# Patient Record
Sex: MALE | Race: WHITE | NOT HISPANIC OR LATINO | Employment: OTHER | URBAN - NONMETROPOLITAN AREA
[De-identification: names, ages, dates, MRNs, and addresses within clinical notes are randomized per-mention and may not be internally consistent; named-entity substitution may affect disease eponyms.]

---

## 2018-07-18 ENCOUNTER — TRANSCRIBE ORDERS (OUTPATIENT)
Dept: PODIATRY | Facility: CLINIC | Age: 68
End: 2018-07-18

## 2018-07-18 DIAGNOSIS — M79.671 RIGHT FOOT PAIN: Primary | ICD-10-CM

## 2018-08-07 ENCOUNTER — OFFICE VISIT (OUTPATIENT)
Dept: PODIATRY | Facility: CLINIC | Age: 68
End: 2018-08-07

## 2018-08-07 VITALS — OXYGEN SATURATION: 98 % | WEIGHT: 179 LBS | HEIGHT: 70 IN | HEART RATE: 69 BPM | BODY MASS INDEX: 25.62 KG/M2

## 2018-08-07 DIAGNOSIS — M79.671 RIGHT FOOT PAIN: Primary | ICD-10-CM

## 2018-08-07 DIAGNOSIS — S99.921A INJURY OF PLANTAR PLATE OF RIGHT FOOT, INITIAL ENCOUNTER: ICD-10-CM

## 2018-08-07 DIAGNOSIS — M20.21 HALLUX RIGIDUS OF RIGHT FOOT: ICD-10-CM

## 2018-08-07 DIAGNOSIS — B35.3 TINEA PEDIS OF BOTH FEET: ICD-10-CM

## 2018-08-07 PROCEDURE — 99204 OFFICE O/P NEW MOD 45 MIN: CPT | Performed by: PODIATRIST

## 2018-08-07 RX ORDER — HYDROCODONE BITARTRATE AND ACETAMINOPHEN 5; 325 MG/1; MG/1
TABLET ORAL
COMMUNITY
End: 2018-11-08

## 2018-08-07 RX ORDER — CLOTRIMAZOLE AND BETAMETHASONE DIPROPIONATE 10; .64 MG/G; MG/G
CREAM TOPICAL 2 TIMES DAILY
Qty: 45 G | Refills: 0 | Status: SHIPPED | OUTPATIENT
Start: 2018-08-07 | End: 2018-11-08

## 2018-08-07 RX ORDER — CHLORAL HYDRATE 500 MG
CAPSULE ORAL
COMMUNITY
End: 2018-11-08

## 2018-08-07 RX ORDER — LOSARTAN POTASSIUM AND HYDROCHLOROTHIAZIDE 12.5; 5 MG/1; MG/1
TABLET ORAL
COMMUNITY
End: 2018-11-08

## 2018-08-07 RX ORDER — ATORVASTATIN CALCIUM 40 MG/1
TABLET, FILM COATED ORAL
COMMUNITY
End: 2018-11-08

## 2018-08-07 RX ORDER — CELECOXIB 200 MG/1
CAPSULE ORAL
COMMUNITY
End: 2018-11-08

## 2018-08-07 RX ORDER — GUANFACINE 1 MG/1
TABLET ORAL
COMMUNITY
End: 2018-11-08

## 2018-08-07 RX ORDER — LANSOPRAZOLE 30 MG/1
CAPSULE, DELAYED RELEASE ORAL
COMMUNITY
End: 2018-11-08

## 2018-08-07 NOTE — PROGRESS NOTES
Carlos Zazueta  1950  68 y.o. male     Patient presents today with a complaint of right foot pain. X-rays obtained.    08/07/2018    Chief Complaint   Patient presents with   • Right Foot - Pain       History of Present Illness    Carlos Zazueta is a 68 y.o.male who presents to clinic with chief complaint of right foot pain.  Pain has been getting worse over the last 8 months.  Pain is primarily located to the right great toe joint and second toe.  He states the second toe is crossing over the big toe.  He rates his pain as an 8 out of 10.  Pain is aggravated with weightbearing and closed toed shoes.  Pain is relieved with rest.  He has done nothing to treat the pain.  He denies any injuries to the right foot.  He has no other pedal complaints at this time.      Past Medical History:   Diagnosis Date   • Hyperlipidemia    • Hypertension          History reviewed. No pertinent surgical history.      History reviewed. No pertinent family history.    Allergies   Allergen Reactions   • Clindamycin/Lincomycin Hives   • Codeine Nausea Only       Social History     Social History   • Marital status:      Spouse name: N/A   • Number of children: N/A   • Years of education: N/A     Occupational History   • Not on file.     Social History Main Topics   • Smoking status: Former Smoker   • Smokeless tobacco: Current User     Types: Snuff   • Alcohol use Yes   • Drug use: Unknown   • Sexual activity: Defer     Other Topics Concern   • Not on file     Social History Narrative   • No narrative on file         Current Outpatient Prescriptions   Medication Sig Dispense Refill   • atorvastatin (LIPITOR) 40 MG tablet atorvastatin 40 mg tablet     • celecoxib (CeleBREX) 200 MG capsule celecoxib 200 mg capsule     • guanFACINE (TENEX) 1 MG tablet guanfacine 1 mg tablet   Take 1 tablet twice a day by oral route at bedtime for 30 days.     • HYDROcodone-acetaminophen (NORCO) 5-325 MG per tablet hydrocodone 5 mg-acetaminophen 325  "mg tablet   Take 1 tablet 3 times a day by oral route as needed.     • lansoprazole (PREVACID) 30 MG capsule lansoprazole 30 mg capsule,delayed release   take 1 capsule by mouth once daily     • losartan-hydrochlorothiazide (HYZAAR) 50-12.5 MG per tablet losartan 50 mg-hydrochlorothiazide 12.5 mg tablet     • Omega-3 Fatty Acids (FISH OIL) 1000 MG capsule capsule Fish Oil 1,000 mg (120 mg-180 mg) capsule   Take by oral route.     • clotrimazole-betamethasone (LOTRISONE) 1-0.05 % cream Apply  topically to the appropriate area as directed 2 (Two) Times a Day. Apply to affected area twice daily 45 g 0     No current facility-administered medications for this visit.        Review of Systems   Constitutional: Negative.    HENT: Negative.    Respiratory: Negative.    Cardiovascular: Negative.    Gastrointestinal: Negative.    Genitourinary: Negative.    Musculoskeletal:        Right foot pain   Skin: Negative.    Neurological: Negative.    Psychiatric/Behavioral: Negative.          OBJECTIVE    Pulse 69   Ht 177.8 cm (70\")   Wt 81.2 kg (179 lb)   SpO2 98%   BMI 25.68 kg/m²       Physical Exam   Constitutional: He is oriented to person, place, and time. He appears well-developed and well-nourished.   HENT:   Head: Normocephalic and atraumatic.   Nose: Nose normal.   Eyes: Pupils are equal, round, and reactive to light. Conjunctivae and EOM are normal.   Pulmonary/Chest: Effort normal. No respiratory distress. He has no wheezes.   Neurological: He is alert and oriented to person, place, and time. He displays normal reflexes.   Skin: He is not diaphoretic.   Psychiatric: He has a normal mood and affect. His behavior is normal.   Vitals reviewed.      Gait: Normal     Assistive Device: None    Lower Extremity    Cardiovascular:    DP/PT pulses palpable bilateral  CFT brisk  to all digits  Skin temp is warm to warm from proximal tibia to distal digits bilateral  Pedal hair growth present.   No edema " noted    Musculoskeletal:  Muscle strength is 5/5 for all muscle groups tested   ROM of the 1st MTP is limited with pain. R>L  Crossover 2nd toe deformity R  ROM of the MTJ is full without pain or crepitus  bilateral  ROM of the STJ is full without pain or crepitus bilateral   ROM of the ankle joint is full without pain or crepitus  bilateral    Dermatological:   Skin is warm, dry and intact bilateral  Webspaces 1-4 bilateral are clean, dry and intact.   No subcutaneous nodules or masses noted    No open wounds noted   Dry scaling skin in a moccasin distribution with surrounding erythema noted to bilateral plantar feet    Neurological:   Protective sensation intact   Sensation intact to light touch      Radiographs: 3 views of the right foot obtained.  The first MPJ joint degeneration noted.  Second MPJ dislocation.  No acute osseous abnormalities noted        Procedures        ASSESSMENT AND PLAN    Carlos was seen today for pain.    Diagnoses and all orders for this visit:    Right foot pain  -     XR Foot 3+ View Right    Tinea pedis of both feet    Hallux rigidus of right foot    Injury of plantar plate of right foot, initial encounter    Other orders  -     clotrimazole-betamethasone (LOTRISONE) 1-0.05 % cream; Apply  topically to the appropriate area as directed 2 (Two) Times a Day. Apply to affected area twice daily      - Comprehensive foot and ankle exam performed.   - X-rays taken and reviewed  - Rx for Lotrisone for tinea pedis  - Discussion held patient regarding conservative and surgical treatment options for his right foot former days.  Dispensed Brickeys splint.  Patient will consider his options and report back in 2 weeks   - All questions were answered to the patients satisfaction.  - RTC 2 weeks     I spent 45 minutes states that this patient discussing his pedal complaints and treatment options including surgery.          This document has been electronically signed by Anthony Moore DPM on August 7,  2018 2:41 PM     8/7/2018  2:41 PM

## 2018-08-22 ENCOUNTER — OFFICE VISIT (OUTPATIENT)
Dept: PODIATRY | Facility: CLINIC | Age: 68
End: 2018-08-22

## 2018-08-22 VITALS — WEIGHT: 176.4 LBS | BODY MASS INDEX: 25.25 KG/M2 | HEIGHT: 70 IN

## 2018-08-22 DIAGNOSIS — S99.921A INJURY OF PLANTAR PLATE OF RIGHT FOOT, INITIAL ENCOUNTER: Primary | ICD-10-CM

## 2018-08-22 DIAGNOSIS — M79.671 RIGHT FOOT PAIN: ICD-10-CM

## 2018-08-22 DIAGNOSIS — M20.21 HALLUX RIGIDUS OF RIGHT FOOT: ICD-10-CM

## 2018-08-22 DIAGNOSIS — B35.3 TINEA PEDIS OF BOTH FEET: ICD-10-CM

## 2018-08-22 PROCEDURE — 20600 DRAIN/INJ JOINT/BURSA W/O US: CPT | Performed by: PODIATRIST

## 2018-08-22 PROCEDURE — 99213 OFFICE O/P EST LOW 20 MIN: CPT | Performed by: PODIATRIST

## 2018-08-22 RX ORDER — DEXAMETHASONE SODIUM PHOSPHATE 4 MG/ML
4 INJECTION, SOLUTION INTRA-ARTICULAR; INTRALESIONAL; INTRAMUSCULAR; INTRAVENOUS; SOFT TISSUE ONCE
Status: COMPLETED | OUTPATIENT
Start: 2018-08-22 | End: 2018-08-22

## 2018-08-22 RX ORDER — BUPIVACAINE HYDROCHLORIDE 5 MG/ML
1 INJECTION, SOLUTION EPIDURAL; INTRACAUDAL ONCE
Status: COMPLETED | OUTPATIENT
Start: 2018-08-22 | End: 2018-08-22

## 2018-08-22 RX ADMIN — BUPIVACAINE HYDROCHLORIDE 1 ML: 5 INJECTION, SOLUTION EPIDURAL; INTRACAUDAL at 16:58

## 2018-08-22 RX ADMIN — DEXAMETHASONE SODIUM PHOSPHATE 4 MG: 4 INJECTION, SOLUTION INTRA-ARTICULAR; INTRALESIONAL; INTRAMUSCULAR; INTRAVENOUS; SOFT TISSUE at 16:59

## 2018-08-22 NOTE — PROGRESS NOTES
Carlos Zazueta  1950  68 y.o. male       08/22/2018     Chief Complaint   Patient presents with   • Right Foot - Follow-up       History of Present Illness    Patient presents to clinic today for follow-up of his right foot pain.  Continues to complain of debilitating pain to his great toe joint and second toe.  He currently rates his pain as a 5 out of 10.  He describes a sharp and worse with weightbearing.  He is strongly considering surgical intervention.  However, he states that he will not be able to pursue surgical intervention until after Thanksgiving.  He relates today that the fungal cream prescribed for his athlete's foot has resolved issue.  He denies any new pedal complaints today.      Past Medical History:   Diagnosis Date   • Hyperlipidemia    • Hypertension          History reviewed. No pertinent surgical history.      History reviewed. No pertinent family history.    Allergies   Allergen Reactions   • Clindamycin/Lincomycin Hives   • Codeine Nausea Only       Social History     Social History   • Marital status:      Spouse name: N/A   • Number of children: N/A   • Years of education: N/A     Occupational History   • Not on file.     Social History Main Topics   • Smoking status: Former Smoker   • Smokeless tobacco: Current User     Types: Snuff   • Alcohol use Yes   • Drug use: Unknown   • Sexual activity: Defer     Other Topics Concern   • Not on file     Social History Narrative   • No narrative on file         Current Outpatient Prescriptions   Medication Sig Dispense Refill   • atorvastatin (LIPITOR) 40 MG tablet atorvastatin 40 mg tablet     • celecoxib (CeleBREX) 200 MG capsule celecoxib 200 mg capsule     • clotrimazole-betamethasone (LOTRISONE) 1-0.05 % cream Apply  topically to the appropriate area as directed 2 (Two) Times a Day. Apply to affected area twice daily 45 g 0   • guanFACINE (TENEX) 1 MG tablet guanfacine 1 mg tablet   Take 1 tablet twice a day by oral route at  "bedtime for 30 days.     • HYDROcodone-acetaminophen (NORCO) 5-325 MG per tablet hydrocodone 5 mg-acetaminophen 325 mg tablet   Take 1 tablet 3 times a day by oral route as needed.     • lansoprazole (PREVACID) 30 MG capsule lansoprazole 30 mg capsule,delayed release   take 1 capsule by mouth once daily     • losartan-hydrochlorothiazide (HYZAAR) 50-12.5 MG per tablet losartan 50 mg-hydrochlorothiazide 12.5 mg tablet     • Omega-3 Fatty Acids (FISH OIL) 1000 MG capsule capsule Fish Oil 1,000 mg (120 mg-180 mg) capsule   Take by oral route.       Current Facility-Administered Medications   Medication Dose Route Frequency Provider Last Rate Last Dose   • bupivacaine (PF) (MARCAINE) 0.5 % injection 1 mL  1 mL Injection Once Anthony Moore DPM       • dexamethasone (DECADRON) injection 4 mg  4 mg Intra-articular Once Anthony Moore DPM           Review of Systems   Constitutional: Negative.    HENT: Negative.    Respiratory: Negative.    Cardiovascular: Negative.    Gastrointestinal: Negative.    Musculoskeletal:        Right foot pain   Skin: Negative.    Neurological: Negative.    Psychiatric/Behavioral: Negative.          OBJECTIVE    Ht 177.8 cm (70\")   Wt 80 kg (176 lb 6.4 oz)   BMI 25.31 kg/m²       Physical Exam   Constitutional: He is oriented to person, place, and time. He appears well-developed and well-nourished. No distress.   HENT:   Head: Normocephalic and atraumatic.   Nose: Nose normal.   Pulmonary/Chest: Effort normal. No respiratory distress.   Neurological: He is alert and oriented to person, place, and time. He displays normal reflexes.   Psychiatric: He has a normal mood and affect. His behavior is normal.       Gait: Normal     Assistive Device: None    Lower Extremity    Cardiovascular:    DP/PT pulses palpable bilateral  CFT brisk  to all digits  Skin temp is warm to warm from proximal tibia to distal digits bilateral  Pedal hair growth present.   No edema noted    Musculoskeletal:  Muscle " strength is 5/5 for all muscle groups tested   ROM of the 1st MTP is limited with pain. R>L  Crossover 2nd toe deformity R, Pain with ROM of the 2nd MPJ  ROM of the MTJ is full without pain or crepitus  bilateral  ROM of the STJ is full without pain or crepitus bilateral   ROM of the ankle joint is full without pain or crepitus  bilateral    Dermatological:   Skin is warm, dry and intact bilateral  Webspaces 1-4 bilateral are clean, dry and intact.   No subcutaneous nodules or masses noted    No open wounds noted     Neurological:   Protective sensation intact   Sensation intact to light touch               Small joint arthrocentesis  Consent given by: patient  Site marked: site marked  Timeout: Immediately prior to procedure a time out was called to verify the correct patient, procedure, equipment, support staff and site/side marked as required   Supporting Documentation  Indications: pain   Procedure Details  Location: second toe - R second MTP  Needle size: 27 G  Approach: dorsal  Medication group details: 1-1 mixture of half percent Marcaine plain and Decadron 4 mg/mL.  Patient tolerance: patient tolerated the procedure well with no immediate complications              ASSESSMENT AND PLAN    Carlos was seen today for follow-up.    Diagnoses and all orders for this visit:    Injury of plantar plate of right foot, initial encounter  -     dexamethasone (DECADRON) injection 4 mg; Inject 1 mL into the appropriate joint as directed by provider 1 (One) Time.  -     bupivacaine (PF) (MARCAINE) 0.5 % injection 1 mL; Inject 1 mL as directed 1 (One) Time.    Hallux rigidus of right foot    Tinea pedis of both feet    Right foot pain    Other orders  -     Small Joint Arthrocentesis      - Tinea pedis has resolved.  - Continued pain to right first metatarsophalangeal joint and right second metatarsophalangeal joint.  Rediscussed conservative and surgical options.  Patient is interested in surgical intervention after  Thanksgiving.  He has elected for steroid injection today.  - Injection her right second metatarsal phalangeal joint  - Dispensed new bouton splint  - all his questions were answered  - RTC when ready to schedule surgery            This document has been electronically signed by Anthony Moore DPM on August 22, 2018 4:31 PM     8/22/2018  4:31 PM

## 2018-11-05 ENCOUNTER — OFFICE VISIT (OUTPATIENT)
Dept: PODIATRY | Facility: CLINIC | Age: 68
End: 2018-11-05

## 2018-11-05 VITALS — HEIGHT: 70 IN | WEIGHT: 183.2 LBS | HEART RATE: 80 BPM | OXYGEN SATURATION: 98 % | BODY MASS INDEX: 26.23 KG/M2

## 2018-11-05 DIAGNOSIS — S99.921D INJURY OF PLANTAR PLATE OF RIGHT FOOT, SUBSEQUENT ENCOUNTER: ICD-10-CM

## 2018-11-05 DIAGNOSIS — M79.672 LEFT FOOT PAIN: Primary | ICD-10-CM

## 2018-11-05 DIAGNOSIS — M20.21 HALLUX RIGIDUS OF RIGHT FOOT: ICD-10-CM

## 2018-11-05 PROBLEM — S99.921A INJURY OF PLANTAR PLATE OF RIGHT FOOT: Status: ACTIVE | Noted: 2018-11-05

## 2018-11-05 PROCEDURE — 99214 OFFICE O/P EST MOD 30 MIN: CPT | Performed by: PODIATRIST

## 2018-11-05 RX ORDER — RANITIDINE 150 MG/1
TABLET ORAL
COMMUNITY
End: 2018-11-08

## 2018-11-05 RX ORDER — CEFDINIR 300 MG/1
CAPSULE ORAL EVERY 12 HOURS
COMMUNITY
End: 2018-11-08

## 2018-11-05 RX ORDER — CIPROFLOXACIN HYDROCHLORIDE 3.5 MG/ML
SOLUTION/ DROPS TOPICAL
COMMUNITY
End: 2018-11-08

## 2018-11-05 RX ORDER — BUPIVACAINE HCL/0.9 % NACL/PF 0.1 %
2 PLASTIC BAG, INJECTION (ML) EPIDURAL ONCE
Status: CANCELLED | OUTPATIENT
Start: 2018-11-15 | End: 2018-11-05

## 2018-11-05 RX ORDER — TADALAFIL 20 MG/1
TABLET ORAL
COMMUNITY
End: 2018-11-08

## 2018-11-05 NOTE — PROGRESS NOTES
Carlos Zazueta  1950  68 y.o. male   Not diabetic     Patient presents today for a recheck of his right foot; he states his left foot is now starting to feel the same way.      11/05/2018     Chief Complaint   Patient presents with   • Right Foot - Follow-up, Pain       History of Present Illness    Patient presents to clinic today for follow-up.  He continues to complain of pain to his great toe joint and second toe on the right foot.  Continues to complain of debilitating pain to his great toe joint and second toe.  He currently rates his pain as a 6 out of 10.  He describes a sharp and worse with weightbearing.  He is ready to pursue surgical correction.     Past Medical History:   Diagnosis Date   • Bunion    • Hyperlipidemia    • Hypertension    • Right foot pain    • Tinea pedis          History reviewed. No pertinent surgical history.      History reviewed. No pertinent family history.    Allergies   Allergen Reactions   • Clindamycin/Lincomycin Hives   • Codeine Nausea Only       Social History     Social History   • Marital status:      Spouse name: N/A   • Number of children: N/A   • Years of education: N/A     Occupational History   • Not on file.     Social History Main Topics   • Smoking status: Former Smoker   • Smokeless tobacco: Current User     Types: Snuff   • Alcohol use Yes   • Drug use: Unknown   • Sexual activity: Defer     Other Topics Concern   • Not on file     Social History Narrative   • No narrative on file         Current Outpatient Prescriptions   Medication Sig Dispense Refill   • atorvastatin (LIPITOR) 40 MG tablet atorvastatin 40 mg tablet     • cefdinir (OMNICEF) 300 MG capsule Every 12 (Twelve) Hours.     • celecoxib (CeleBREX) 200 MG capsule celecoxib 200 mg capsule     • ciprofloxacin (CILOXAN) 0.3 % ophthalmic solution ciprofloxacin 0.3 % eye drops   INSTILL 3 DROP INTO AFFECTED EAR(S) EVERY 3 HOURS WHILE AWAKE FOR 2 DAYS THEN 2 DROPS Three times a day FOR 5 DAYS     •  "clotrimazole-betamethasone (LOTRISONE) 1-0.05 % cream Apply  topically to the appropriate area as directed 2 (Two) Times a Day. Apply to affected area twice daily 45 g 0   • guanFACINE (TENEX) 1 MG tablet guanfacine 1 mg tablet   Take 1 tablet twice a day by oral route at bedtime for 30 days.     • HYDROcodone-acetaminophen (NORCO) 5-325 MG per tablet hydrocodone 5 mg-acetaminophen 325 mg tablet   Take 1 tablet 3 times a day by oral route as needed.     • lansoprazole (PREVACID) 30 MG capsule lansoprazole 30 mg capsule,delayed release   take 1 capsule by mouth once daily     • losartan-hydrochlorothiazide (HYZAAR) 50-12.5 MG per tablet losartan 50 mg-hydrochlorothiazide 12.5 mg tablet     • Omega-3 Fatty Acids (FISH OIL) 1000 MG capsule capsule Fish Oil 1,000 mg (120 mg-180 mg) capsule   Take by oral route.     • raNITIdine (ZANTAC) 150 MG tablet ranitidine 150 mg tablet   Take 1 tablet twice a day by oral route for 30 days.     • tadalafil (CIALIS) 20 MG tablet Cialis 20 mg tablet   Take 1 tablet every day by oral route as needed.       No current facility-administered medications for this visit.        Review of Systems   Constitutional: Negative.    HENT: Negative.    Eyes: Negative.    Respiratory: Negative.    Cardiovascular: Negative.    Gastrointestinal: Negative.    Musculoskeletal:        Bilateral foot pain; R>L   Skin: Negative.    Neurological: Negative.    Psychiatric/Behavioral: Negative.          OBJECTIVE    Pulse 80   Ht 177.8 cm (70\")   Wt 83.1 kg (183 lb 3.2 oz)   SpO2 98%   BMI 26.29 kg/m²       Physical Exam   Constitutional: He is oriented to person, place, and time. He appears well-developed and well-nourished. No distress.   HENT:   Head: Normocephalic and atraumatic.   Nose: Nose normal.   Eyes: Pupils are equal, round, and reactive to light. Conjunctivae and EOM are normal.   Pulmonary/Chest: Effort normal. No respiratory distress. He has no wheezes.   Neurological: He is alert and " oriented to person, place, and time. He displays normal reflexes.   Skin: Skin is warm and dry. Capillary refill takes less than 2 seconds.   Psychiatric: He has a normal mood and affect. His behavior is normal.   Vitals reviewed.      Gait: Normal     Assistive Device: None    Lower Extremity    Cardiovascular:    DP/PT pulses palpable bilateral  CFT brisk  to all digits  Skin temp is warm to warm from proximal tibia to distal digits bilateral  Pedal hair growth present.   No edema noted    Musculoskeletal:  Muscle strength is 5/5 for all muscle groups tested   ROM of the 1st MTP is limited with pain. R>L  Crossover 2nd toe deformity R, Pain with ROM of the 2nd MPJ  ROM of the MTJ is full without pain or crepitus  bilateral  ROM of the STJ is full without pain or crepitus bilateral   ROM of the ankle joint is full without pain or crepitus  bilateral    Dermatological:   Skin is warm, dry and intact bilateral  Webspaces 1-4 bilateral are clean, dry and intact.   No subcutaneous nodules or masses noted    No open wounds noted     Neurological:   Protective sensation intact   Sensation intact to light touch               Procedures        ASSESSMENT AND PLAN    Carlos was seen today for follow-up and pain.    Diagnoses and all orders for this visit:    Left foot pain  -     XR Foot 3+ View Left    Hallux rigidus of right foot  -     Case Request; Standing  -     ceFAZolin (ANCEF) 2 g in sodium chloride 0.9 % 100 mL IVPB; Infuse 2 g into a venous catheter 1 (One) Time.  -     Case Request    Injury of plantar plate of right foot, subsequent encounter  -     Case Request; Standing  -     ceFAZolin (ANCEF) 2 g in sodium chloride 0.9 % 100 mL IVPB; Infuse 2 g into a venous catheter 1 (One) Time.  -     Case Request    Other orders  -     Follow Anesthesia Guidelines / Standing Orders; Standing  -     Verify NPO Status; Standing  -     Obtain informed consent (if not collected inpatient or PAT); Standing  -     Notify  Physician - Standard; Standing  -     Follow Anesthesia Guidelines / Standing Orders; Future        - Continued pain to right first metatarsophalangeal joint and right second metatarsophalangeal joint.  Rediscussed conservative and surgical options.  Patient has elected for surgical correction. Risks and benefits discussed. No guarantees given or implied. Plan is for 1st MPJ arthrodesis and plantar plate repair.  - all his questions were answered  - RTC after surgery            This document has been electronically signed by Anthony Moore DPM on November 6, 2018 6:26 PM     11/6/2018  6:26 PM

## 2018-11-08 ENCOUNTER — APPOINTMENT (OUTPATIENT)
Dept: PREADMISSION TESTING | Facility: HOSPITAL | Age: 68
End: 2018-11-08

## 2018-11-08 VITALS
DIASTOLIC BLOOD PRESSURE: 80 MMHG | HEIGHT: 70 IN | BODY MASS INDEX: 26.2 KG/M2 | HEART RATE: 75 BPM | OXYGEN SATURATION: 98 % | RESPIRATION RATE: 16 BRPM | SYSTOLIC BLOOD PRESSURE: 148 MMHG | WEIGHT: 183 LBS

## 2018-11-08 LAB
ANION GAP SERPL CALCULATED.3IONS-SCNC: 10 MMOL/L (ref 5–15)
BUN BLD-MCNC: 22 MG/DL (ref 7–21)
BUN/CREAT SERPL: 25.9 (ref 7–25)
CALCIUM SPEC-SCNC: 9.5 MG/DL (ref 8.4–10.2)
CHLORIDE SERPL-SCNC: 99 MMOL/L (ref 95–110)
CO2 SERPL-SCNC: 25 MMOL/L (ref 22–31)
CREAT BLD-MCNC: 0.85 MG/DL (ref 0.7–1.3)
GFR SERPL CREATININE-BSD FRML MDRD: 90 ML/MIN/1.73 (ref 49–113)
GLUCOSE BLD-MCNC: 99 MG/DL (ref 60–100)
POTASSIUM BLD-SCNC: 4.6 MMOL/L (ref 3.5–5.1)
SODIUM BLD-SCNC: 134 MMOL/L (ref 137–145)

## 2018-11-08 PROCEDURE — 93005 ELECTROCARDIOGRAM TRACING: CPT

## 2018-11-08 PROCEDURE — 93010 ELECTROCARDIOGRAM REPORT: CPT | Performed by: INTERNAL MEDICINE

## 2018-11-08 PROCEDURE — 80048 BASIC METABOLIC PNL TOTAL CA: CPT | Performed by: ANESTHESIOLOGY

## 2018-11-08 PROCEDURE — 36415 COLL VENOUS BLD VENIPUNCTURE: CPT

## 2018-11-08 RX ORDER — ATORVASTATIN CALCIUM 40 MG/1
40 TABLET, FILM COATED ORAL NIGHTLY
COMMUNITY
End: 2020-09-24

## 2018-11-08 RX ORDER — CHLORAL HYDRATE 500 MG
1000 CAPSULE ORAL NIGHTLY
COMMUNITY
End: 2022-05-20

## 2018-11-08 RX ORDER — CELECOXIB 200 MG/1
200 CAPSULE ORAL 2 TIMES DAILY
COMMUNITY
End: 2019-02-13 | Stop reason: HOSPADM

## 2018-11-08 RX ORDER — FLUTICASONE PROPIONATE 50 MCG
2 SPRAY, SUSPENSION (ML) NASAL AS NEEDED
COMMUNITY

## 2018-11-08 RX ORDER — LOSARTAN POTASSIUM AND HYDROCHLOROTHIAZIDE 25; 100 MG/1; MG/1
1 TABLET ORAL DAILY
COMMUNITY
End: 2019-02-13 | Stop reason: HOSPADM

## 2018-11-08 RX ORDER — CETIRIZINE HYDROCHLORIDE 10 MG/1
10 TABLET ORAL AS NEEDED
COMMUNITY

## 2018-11-08 RX ORDER — HYDROCODONE BITARTRATE AND ACETAMINOPHEN 7.5; 325 MG/1; MG/1
1 TABLET ORAL 2 TIMES DAILY PRN
COMMUNITY
End: 2018-11-15 | Stop reason: HOSPADM

## 2018-11-08 RX ORDER — SODIUM CHLORIDE, SODIUM GLUCONATE, SODIUM ACETATE, POTASSIUM CHLORIDE, AND MAGNESIUM CHLORIDE 526; 502; 368; 37; 30 MG/100ML; MG/100ML; MG/100ML; MG/100ML; MG/100ML
1000 INJECTION, SOLUTION INTRAVENOUS CONTINUOUS
Status: CANCELLED | OUTPATIENT
Start: 2018-11-15

## 2018-11-08 RX ORDER — TADALAFIL 20 MG/1
20 TABLET ORAL DAILY PRN
COMMUNITY
End: 2019-05-15

## 2018-11-08 RX ORDER — LANSOPRAZOLE 30 MG/1
30 CAPSULE, DELAYED RELEASE ORAL NIGHTLY
COMMUNITY
End: 2019-11-13 | Stop reason: ALTCHOICE

## 2018-11-08 RX ORDER — GUANFACINE 1 MG/1
1 TABLET ORAL NIGHTLY
COMMUNITY
End: 2021-03-23 | Stop reason: DRUGHIGH

## 2018-11-08 NOTE — PAT
Chlorhexidine scrub given with instruction sheet.  Instruction reviewed in PAT, understanding verbalized.

## 2018-11-08 NOTE — DISCHARGE INSTRUCTIONS
[facility]  Pre-op Information and Guidelines    You will be called after 2 p.m. the day before your surgery (Friday for Monday surgery) and notified of your time for arrival and approximate surgery time.  If you have not received a call by 4P.M., please contact Same Day Surgery at [phone] of if outside [facility] call [phone].    Please Follow these Important Safety Guidelines:    • The morning of your procedure, take only the medications listed below with   A sip of water:_____________________________________________       ______________________________________________    • DO NOT eat or drink anything after 12:00 midnight the night before surgery  Specific instructions concerning drinking clear liquids will be discussed during  the pre-surgery instruction call the day before your surgery.    • If you take a blood thinner (ex. Plavix, Coumadin, aspirin), ask your doctor when to stop it before surgery  STOP DATE: _________________    • Only 2 visitors are allowed in patient rooms at a time  Your visitors will be asked to wait in the lobby until the admission process is complete with the exception of a parent with a child and patients in need of special assistance.    • YOU CANNOT DRIVE YOURSELF HOME  You must be accompanied by someone who will be responsible for driving you home after surgery and for your care at home.    • DO NOT chew gum, use breath mints, hard candy, or smoke the day of surgery  • DO NOT drink alcohol for at least 24 hours before your surgery  • DO NOT wear any jewelry and remove all body piercing before coming to the hospital  • DO NOT wear make-up to the hospital  • If you are having surgery on an extremity (arm/leg/foot) remove nail polish/artificial nails on the surgical side  • Clothing, glasses, contacts, dentures, and hairpieces must be removed before surgery  • Bathe the night before or the morning of your surgery and do not use powders/lotions on  skin.

## 2018-11-14 ENCOUNTER — ANESTHESIA EVENT (OUTPATIENT)
Dept: PERIOP | Facility: HOSPITAL | Age: 68
End: 2018-11-14

## 2018-11-15 ENCOUNTER — ANESTHESIA (OUTPATIENT)
Dept: PERIOP | Facility: HOSPITAL | Age: 68
End: 2018-11-15

## 2018-11-15 ENCOUNTER — HOSPITAL ENCOUNTER (OUTPATIENT)
Facility: HOSPITAL | Age: 68
Setting detail: HOSPITAL OUTPATIENT SURGERY
Discharge: HOME | End: 2018-11-15
Attending: PODIATRIST | Admitting: PODIATRIST

## 2018-11-15 ENCOUNTER — APPOINTMENT (OUTPATIENT)
Dept: GENERAL RADIOLOGY | Facility: HOSPITAL | Age: 68
End: 2018-11-15

## 2018-11-15 VITALS
HEART RATE: 87 BPM | HEIGHT: 70 IN | WEIGHT: 181.88 LBS | RESPIRATION RATE: 18 BRPM | DIASTOLIC BLOOD PRESSURE: 65 MMHG | OXYGEN SATURATION: 97 % | SYSTOLIC BLOOD PRESSURE: 115 MMHG | BODY MASS INDEX: 26.04 KG/M2 | TEMPERATURE: 97 F

## 2018-11-15 DIAGNOSIS — M20.21 HALLUX RIGIDUS OF RIGHT FOOT: ICD-10-CM

## 2018-11-15 DIAGNOSIS — S99.921D INJURY OF PLANTAR PLATE OF RIGHT FOOT, SUBSEQUENT ENCOUNTER: ICD-10-CM

## 2018-11-15 PROCEDURE — C1713 ANCHOR/SCREW BN/BN,TIS/BN: HCPCS | Performed by: PODIATRIST

## 2018-11-15 PROCEDURE — 25010000003 CEFAZOLIN PER 500 MG: Performed by: NURSE ANESTHETIST, CERTIFIED REGISTERED

## 2018-11-15 PROCEDURE — 25010000002 PROPOFOL 10 MG/ML EMULSION: Performed by: NURSE ANESTHETIST, CERTIFIED REGISTERED

## 2018-11-15 PROCEDURE — L4361 PNEUMA/VAC WALK BOOT PRE OTS: HCPCS | Performed by: PODIATRIST

## 2018-11-15 PROCEDURE — 25010000002 FENTANYL CITRATE (PF) 100 MCG/2ML SOLUTION: Performed by: NURSE ANESTHETIST, CERTIFIED REGISTERED

## 2018-11-15 PROCEDURE — 25010000002 DEXAMETHASONE PER 1 MG: Performed by: NURSE ANESTHETIST, CERTIFIED REGISTERED

## 2018-11-15 PROCEDURE — 76000 FLUOROSCOPY <1 HR PHYS/QHP: CPT

## 2018-11-15 PROCEDURE — 28750 FUSION OF BIG TOE JOINT: CPT | Performed by: PODIATRIST

## 2018-11-15 PROCEDURE — 25010000002 ONDANSETRON PER 1 MG: Performed by: NURSE ANESTHETIST, CERTIFIED REGISTERED

## 2018-11-15 PROCEDURE — 25010000002 ROPIVACAINE PER 1 MG: Performed by: ANESTHESIOLOGY

## 2018-11-15 PROCEDURE — 25010000002 MIDAZOLAM PER 1 MG: Performed by: NURSE ANESTHETIST, CERTIFIED REGISTERED

## 2018-11-15 PROCEDURE — 28200 REPAIR OF FOOT TENDON: CPT | Performed by: PODIATRIST

## 2018-11-15 PROCEDURE — 28308 INCISION OF METATARSAL: CPT | Performed by: PODIATRIST

## 2018-11-15 DEVICE — CANNULATED SCREW
Type: IMPLANTABLE DEVICE | Site: FOOT | Status: FUNCTIONAL
Brand: ASNIS

## 2018-11-15 DEVICE — BONE SCREW, T7
Type: IMPLANTABLE DEVICE | Site: FOOT | Status: FUNCTIONAL
Brand: VARIAX

## 2018-11-15 DEVICE — LOCKING SCREW, T7
Type: IMPLANTABLE DEVICE | Site: FOOT | Status: FUNCTIONAL
Brand: VARIAX

## 2018-11-15 DEVICE — CURVED PLATE, RIGHT
Type: IMPLANTABLE DEVICE | Site: FOOT | Status: FUNCTIONAL
Brand: VARIAX

## 2018-11-15 RX ORDER — ONDANSETRON 2 MG/ML
INJECTION INTRAMUSCULAR; INTRAVENOUS AS NEEDED
Status: DISCONTINUED | OUTPATIENT
Start: 2018-11-15 | End: 2018-11-15 | Stop reason: SURG

## 2018-11-15 RX ORDER — PROPOFOL 10 MG/ML
VIAL (ML) INTRAVENOUS AS NEEDED
Status: DISCONTINUED | OUTPATIENT
Start: 2018-11-15 | End: 2018-11-15 | Stop reason: SURG

## 2018-11-15 RX ORDER — DEXAMETHASONE SODIUM PHOSPHATE 4 MG/ML
INJECTION, SOLUTION INTRA-ARTICULAR; INTRALESIONAL; INTRAMUSCULAR; INTRAVENOUS; SOFT TISSUE AS NEEDED
Status: DISCONTINUED | OUTPATIENT
Start: 2018-11-15 | End: 2018-11-15 | Stop reason: SURG

## 2018-11-15 RX ORDER — BUPIVACAINE HCL/0.9 % NACL/PF 0.1 %
2 PLASTIC BAG, INJECTION (ML) EPIDURAL ONCE
Status: DISCONTINUED | OUTPATIENT
Start: 2018-11-15 | End: 2018-11-15 | Stop reason: HOSPADM

## 2018-11-15 RX ORDER — ROPIVACAINE HYDROCHLORIDE 5 MG/ML
INJECTION, SOLUTION EPIDURAL; INFILTRATION; PERINEURAL
Status: COMPLETED | OUTPATIENT
Start: 2018-11-15 | End: 2018-11-15

## 2018-11-15 RX ORDER — LIDOCAINE HYDROCHLORIDE 20 MG/ML
INJECTION, SOLUTION INFILTRATION; PERINEURAL AS NEEDED
Status: DISCONTINUED | OUTPATIENT
Start: 2018-11-15 | End: 2018-11-15 | Stop reason: SURG

## 2018-11-15 RX ORDER — EPHEDRINE SULFATE 50 MG/ML
5 INJECTION, SOLUTION INTRAVENOUS ONCE AS NEEDED
Status: DISCONTINUED | OUTPATIENT
Start: 2018-11-15 | End: 2018-11-15 | Stop reason: HOSPADM

## 2018-11-15 RX ORDER — SODIUM CHLORIDE, SODIUM GLUCONATE, SODIUM ACETATE, POTASSIUM CHLORIDE, AND MAGNESIUM CHLORIDE 526; 502; 368; 37; 30 MG/100ML; MG/100ML; MG/100ML; MG/100ML; MG/100ML
INJECTION, SOLUTION INTRAVENOUS CONTINUOUS PRN
Status: DISCONTINUED | OUTPATIENT
Start: 2018-11-15 | End: 2018-11-15 | Stop reason: SURG

## 2018-11-15 RX ORDER — MIDAZOLAM HYDROCHLORIDE 1 MG/ML
INJECTION INTRAMUSCULAR; INTRAVENOUS AS NEEDED
Status: DISCONTINUED | OUTPATIENT
Start: 2018-11-15 | End: 2018-11-15 | Stop reason: SURG

## 2018-11-15 RX ORDER — FENTANYL CITRATE 50 UG/ML
INJECTION, SOLUTION INTRAMUSCULAR; INTRAVENOUS AS NEEDED
Status: DISCONTINUED | OUTPATIENT
Start: 2018-11-15 | End: 2018-11-15 | Stop reason: SURG

## 2018-11-15 RX ORDER — NALOXONE HCL 0.4 MG/ML
0.2 VIAL (ML) INJECTION AS NEEDED
Status: DISCONTINUED | OUTPATIENT
Start: 2018-11-15 | End: 2018-11-15 | Stop reason: HOSPADM

## 2018-11-15 RX ORDER — BUPIVACAINE HYDROCHLORIDE 5 MG/ML
INJECTION, SOLUTION EPIDURAL; INTRACAUDAL AS NEEDED
Status: DISCONTINUED | OUTPATIENT
Start: 2018-11-15 | End: 2018-11-15 | Stop reason: HOSPADM

## 2018-11-15 RX ORDER — EPHEDRINE SULFATE 50 MG/ML
INJECTION, SOLUTION INTRAVENOUS AS NEEDED
Status: DISCONTINUED | OUTPATIENT
Start: 2018-11-15 | End: 2018-11-15 | Stop reason: SURG

## 2018-11-15 RX ORDER — ONDANSETRON 2 MG/ML
4 INJECTION INTRAMUSCULAR; INTRAVENOUS ONCE AS NEEDED
Status: DISCONTINUED | OUTPATIENT
Start: 2018-11-15 | End: 2018-11-15 | Stop reason: HOSPADM

## 2018-11-15 RX ORDER — OXYCODONE AND ACETAMINOPHEN 7.5; 325 MG/1; MG/1
1 TABLET ORAL EVERY 4 HOURS PRN
Qty: 30 TABLET | Refills: 0 | Status: SHIPPED | OUTPATIENT
Start: 2018-11-15 | End: 2019-02-13 | Stop reason: HOSPADM

## 2018-11-15 RX ORDER — LABETALOL HYDROCHLORIDE 5 MG/ML
5 INJECTION, SOLUTION INTRAVENOUS
Status: DISCONTINUED | OUTPATIENT
Start: 2018-11-15 | End: 2018-11-15 | Stop reason: HOSPADM

## 2018-11-15 RX ORDER — DIPHENHYDRAMINE HYDROCHLORIDE 50 MG/ML
12.5 INJECTION INTRAMUSCULAR; INTRAVENOUS
Status: DISCONTINUED | OUTPATIENT
Start: 2018-11-15 | End: 2018-11-15 | Stop reason: HOSPADM

## 2018-11-15 RX ORDER — CEFAZOLIN SODIUM 1 G/3ML
INJECTION, POWDER, FOR SOLUTION INTRAMUSCULAR; INTRAVENOUS AS NEEDED
Status: DISCONTINUED | OUTPATIENT
Start: 2018-11-15 | End: 2018-11-15 | Stop reason: SURG

## 2018-11-15 RX ORDER — FLUMAZENIL 0.1 MG/ML
0.2 INJECTION INTRAVENOUS AS NEEDED
Status: DISCONTINUED | OUTPATIENT
Start: 2018-11-15 | End: 2018-11-15 | Stop reason: HOSPADM

## 2018-11-15 RX ORDER — SODIUM CHLORIDE, SODIUM GLUCONATE, SODIUM ACETATE, POTASSIUM CHLORIDE, AND MAGNESIUM CHLORIDE 526; 502; 368; 37; 30 MG/100ML; MG/100ML; MG/100ML; MG/100ML; MG/100ML
1000 INJECTION, SOLUTION INTRAVENOUS CONTINUOUS
Status: DISCONTINUED | OUTPATIENT
Start: 2018-11-15 | End: 2018-11-15 | Stop reason: HOSPADM

## 2018-11-15 RX ORDER — ACETAMINOPHEN 650 MG/1
650 SUPPOSITORY RECTAL ONCE AS NEEDED
Status: DISCONTINUED | OUTPATIENT
Start: 2018-11-15 | End: 2018-11-15 | Stop reason: HOSPADM

## 2018-11-15 RX ORDER — MEPERIDINE HYDROCHLORIDE 50 MG/ML
12.5 INJECTION INTRAMUSCULAR; INTRAVENOUS; SUBCUTANEOUS
Status: DISCONTINUED | OUTPATIENT
Start: 2018-11-15 | End: 2018-11-15 | Stop reason: HOSPADM

## 2018-11-15 RX ORDER — ACETAMINOPHEN 325 MG/1
650 TABLET ORAL ONCE AS NEEDED
Status: DISCONTINUED | OUTPATIENT
Start: 2018-11-15 | End: 2018-11-15 | Stop reason: HOSPADM

## 2018-11-15 RX ADMIN — PROPOFOL 50 MG: 10 INJECTION, EMULSION INTRAVENOUS at 08:55

## 2018-11-15 RX ADMIN — FENTANYL CITRATE 25 MCG: 50 INJECTION, SOLUTION INTRAMUSCULAR; INTRAVENOUS at 09:46

## 2018-11-15 RX ADMIN — EPHEDRINE SULFATE 10 MG: 50 INJECTION INTRAVENOUS at 09:08

## 2018-11-15 RX ADMIN — DEXAMETHASONE SODIUM PHOSPHATE 4 MG: 4 INJECTION, SOLUTION INTRAMUSCULAR; INTRAVENOUS at 09:01

## 2018-11-15 RX ADMIN — SODIUM CHLORIDE, SODIUM GLUCONATE, SODIUM ACETATE, POTASSIUM CHLORIDE, AND MAGNESIUM CHLORIDE: 526; 502; 368; 37; 30 INJECTION, SOLUTION INTRAVENOUS at 08:35

## 2018-11-15 RX ADMIN — CEFAZOLIN SODIUM 2 G: 1 INJECTION, POWDER, FOR SOLUTION INTRAMUSCULAR; INTRAVENOUS at 09:00

## 2018-11-15 RX ADMIN — SODIUM CHLORIDE, SODIUM GLUCONATE, SODIUM ACETATE, POTASSIUM CHLORIDE, AND MAGNESIUM CHLORIDE 1000 ML: 526; 502; 368; 37; 30 INJECTION, SOLUTION INTRAVENOUS at 06:42

## 2018-11-15 RX ADMIN — LIDOCAINE HYDROCHLORIDE 50 MG: 20 INJECTION, SOLUTION INFILTRATION; PERINEURAL at 08:50

## 2018-11-15 RX ADMIN — EPHEDRINE SULFATE 10 MG: 50 INJECTION INTRAVENOUS at 09:43

## 2018-11-15 RX ADMIN — ONDANSETRON 4 MG: 2 INJECTION INTRAMUSCULAR; INTRAVENOUS at 10:55

## 2018-11-15 RX ADMIN — ROPIVACAINE HYDROCHLORIDE 30 ML: 5 INJECTION, SOLUTION EPIDURAL; INFILTRATION; PERINEURAL at 07:51

## 2018-11-15 RX ADMIN — MIDAZOLAM HYDROCHLORIDE 1 MG: 2 INJECTION, SOLUTION INTRAMUSCULAR; INTRAVENOUS at 08:35

## 2018-11-15 RX ADMIN — PROPOFOL 200 MG: 10 INJECTION, EMULSION INTRAVENOUS at 08:50

## 2018-11-15 RX ADMIN — FENTANYL CITRATE 25 MCG: 50 INJECTION, SOLUTION INTRAMUSCULAR; INTRAVENOUS at 10:22

## 2018-11-15 RX ADMIN — EPHEDRINE SULFATE 10 MG: 50 INJECTION INTRAVENOUS at 09:10

## 2018-11-15 RX ADMIN — SODIUM CHLORIDE, SODIUM GLUCONATE, SODIUM ACETATE, POTASSIUM CHLORIDE, AND MAGNESIUM CHLORIDE: 526; 502; 368; 37; 30 INJECTION, SOLUTION INTRAVENOUS at 10:40

## 2018-11-15 NOTE — ANESTHESIA PROCEDURE NOTES
ANESTHESIA INTUBATION  Urgency: elective    Airway not difficult    General Information and Staff    Patient location during procedure: OR    Indications and Patient Condition  Indications for airway management: airway protection    Preoxygenated: yes  MILS not maintained throughout  Mask difficulty assessment: 0 - not attempted    Final Airway Details  Final airway type: supraglottic airway      Successful airway: I-gel  Size 4    Number of attempts at approach: 1

## 2018-11-15 NOTE — DISCHARGE INSTRUCTIONS
Discharge home once stable per mDA  Resume normal diet as tolerated  Keep dressing clean dry and intact  Partial weight bearing to right heel in cam boot  Ice and elevate  Percocet 7.5mg prn pain   Follow up Monday 11/19/18          This document has been electronically signed by Anthony Moore DPM on November 15, 2018 11:34 AM

## 2018-11-15 NOTE — ANESTHESIA PROCEDURE NOTES
Peripheral Block      Patient location during procedure: pre-op  Start time: 11/15/2018 7:40 AM  Stop time: 11/15/2018 7:47 AM  Reason for block: at surgeon's request and post-op pain management  Performed by  Anesthesiologist: David Herrera MD  Assisted by: Jayson Boone  Preanesthetic Checklist  Completed: patient identified, site marked, surgical consent, pre-op evaluation, timeout performed, IV checked, risks and benefits discussed and monitors and equipment checked  Prep:  Pt Position: left lateral decubitus  Sterile barriers:cap, gloves and mask  Prep: ChloraPrep  Procedure  Sedation:no  Performed under: local infiltration  Guidance:ultrasound guided  ULTRASOUND INTERPRETATION.  Using ultrasound guidance a 21 G gauge needle was placed in close proximity to the sciatic nerve, at which point, under ultrasound guidance anesthetic was injected in the area of the nerve and spread of the anesthesia was seen on ultrasound in close proximity thereto.  There were no abnormalities seen on ultrasound; a digital image was taken; and the patient tolerated the procedure with no complications. Images:still images obtained    Laterality:right  Block Type:popliteal  Injection Technique:single-shot  Needle Type:echogenic  Needle Gauge:21 G    Medications Used: ropivacaine (NAROPIN) 0.5 % injection, 30 mL  Medications  Comment:Pt ID'd  Ultrasound guided  Needle seen throughout  Local infiltration appropriate    Post Assessment  Injection Assessment: negative aspiration for heme, no paresthesia on injection and incremental injection  Patient Tolerance:comfortable throughout block  Complications:no

## 2018-11-15 NOTE — ANESTHESIA PREPROCEDURE EVALUATION
Anesthesia Evaluation     no history of anesthetic complications:  NPO Solid Status: > 8 hours  NPO Liquid Status: > 2 hours           Airway   Mallampati: II  TM distance: >3 FB  Neck ROM: full  No difficulty expected  Dental    (+) partials and poor dentition    Pulmonary - negative pulmonary ROS and normal exam    breath sounds clear to auscultation  (-) COPD, sleep apnea, not a smoker  Cardiovascular - normal exam  Exercise tolerance: good (4-7 METS)    ECG reviewed  Rhythm: regular  Rate: normal    (+) hypertension well controlled,   (-) valvular problems/murmurs, past MI, dysrhythmias, angina, cardiac stents, PVD, hyperlipidemia    ROS comment: Normal sinus rhythm  Incomplete right bundle branch block  Borderline ECG  No previous ECGs available  Confirmed by TEA    Neuro/Psych- negative ROS  (-) seizures, CVA, dizziness/light headedness, psychiatric history  GI/Hepatic/Renal/Endo    (+)  GERD well controlled,    (-) hepatitis, liver disease, no renal disease, diabetes, hypothyroidism    Musculoskeletal         ROS comment: Right foot  Abdominal    Substance History   (+) alcohol use (daily beer),   (-) drug use     OB/GYN          Other   (+) arthritis   history of cancer (skin)                    Anesthesia Plan    ASA 3     general and regional   (Popliteal block discussed and patient agrees to proceed)  intravenous induction   Anesthetic plan, all risks, benefits, and alternatives have been provided, discussed and informed consent has been obtained with: patient and spouse/significant other.

## 2018-11-15 NOTE — BRIEF OP NOTE
ARTHRODESIS METATARSALPHALANGEAL JOINT, TENDON REPAIR FOOT/TOE  Progress Note    Carlos Zazueta  11/15/2018    Pre-op Diagnosis:   Hallux rigidus of right foot [M20.21]  Injury of plantar plate of right foot, subsequent encounter [S99.920F]       Post-Op Diagnosis Codes:     * Hallux rigidus of right foot [M20.21]     * Injury of plantar plate of right foot, subsequent encounter [S99.929N]    Procedure/CPT® Codes:      Procedure(s):  FIRST METATARSALPHALANGEAL JOINT  ARTHRRODOSIS (popliteal block)  AND SECOND PLANTAR PLATE REPIAR AND ALL OTHER INDICATED PROCEDURES      (C-ARM)    Surgeon(s):  Anthony Moore DPM    Anesthesia: General with Block    Staff:   Circulator: Clau Reagan RN; Anca Marshall RN  Scrub Person: Ana Peters  Vendor Representative: Zoey King Robin  Assistant: Lisa Gonzales    Estimated Blood Loss: minimal    Urine Voided: * No values recorded between 11/15/2018  8:44 AM and 11/15/2018 11:20 AM *    Specimens:                None      Drains:      Findings: consistent with pre-op dx    Complications: none      Anthony Moore DPM     Date: 11/15/2018  Time: 11:30 AM

## 2018-11-16 NOTE — OP NOTE
Date of Procedure: 11/16/18     SURGEON: Anthony Moore DPM      ASSISTANT: MARIBEL Benites      PREOPERATIVE DIAGNOSIS: .  1. Hallux rigidus, right foot   2.  Plantar plate injury, right foot    POSTOPERATIVE DIAGNOSIS: Same as preop     PROCEDURES PERFORMED:   1. Right first metatarsophalangeal joint arthrodesis  2.  Osteotomy right second metatarsal with  screw fixation  3.  Repair of plantar plate, right second metatarsal phalangeal joint     ANESTHESIA: Gen. with a popliteal block     HEMOSTASIS: Pneumatic ankle tourniquet set at 250 mmHg.      ESTIMATED BLOOD LOSS: 10 mL.      MATERIALS: Eleno first MTP locking plate with locking screws, 4.0 partially threaded cannulated screw, fiber tape from Arthrex CPR, arthrex 2.0 screw     INJECTABLES:  10 cc of half percent Marcaine plain postoperatively     COMPLICATIONS: None.      CONDITION: Stable.      PATHOLOGY: None     INDICATIONS FOR PROCEDURE: This is a patient of mine who has a painful hallux rigidus and a plantar plate injury to his right second metatarsal phalangeal joint on his right foot.   he  agrees to undergo the surgical intervention at this time. Consent is signed and documented in the chart. History and physical exam were reviewed prior to patient being taken to the operating room and n.p.o. status was confirmed. No guarantees were given or implied regarding the outcome of this treatment.      DESCRIPTION OF PROCEDURE: After mild sedation was administered by the anesthesia team in the preoperative holding area the patient was transported to the operating room and placed in a supine position.  General anesthesia was then administered and the right foot was prepped and draped in usual sterile technique and a timeout was performed to confirm the correct patient, correct site, and correct procedure.      Attention was then directed to the right foot which was exsanguinated using an Esmarch bandage and the tourniquet was rapidly inflated to 250  mmHg.  A linear incision was made to the dorsal medial aspect of the first metatarsal phalangeal joint.  This incision was deepened through subcutaneous tissue taking care to avoid and retract all vital neurovascular structures.  The head of the first metatarsal and base of the proximal phalanx were exposed in the incision site.  Utilizing the Sanguine cup and cone reamers the articular cartilage was denuded off the head of the first metatarsal and the base of proximal phalanx.  The head of the first metatarsal and base of the proximal phalanx were fenestrated.  Next the joint was reduced and fixated utilizing a single 4.0 partially threaded cannulated screw and a dorsal locking plate with locking screws.  Intraoperative fluoroscopy was used throughout this entire process to ensure that the joint and toe were well reduced and that all hardware was well-placed.  Operative site was flushed with copious amounts of normal saline solution.  A layered closure was performed utilizing 3-0 Vicryl, 4-0 Vicryl and 4-0 nylon.    Next attention was directed to the second metatarsal phalangeal joint where a linear incision was made over the dorsal aspect of the second metatarsal phalangeal joint.  This incision was deepened to subcutaneous tissue taking care to identify and retract all vital neurovascular structures.  Next a linear capsulotomy was made and the head of the second metatarsal and base of the proximal phalanx was exposed.  Next utilizing a sagittal bone saw a Weil-type osteotomy was made in the second metatarsal head.  The plantar plate was exposed and was resected off the base of the proximal phalanx.  Next utilizing the Arthrex CPR system fiber tape was used to repair and reapproximate the plantar plate to the base of the proximal phalanx.  The weil osteotomy was fixated utilizing a single 2.0 pop off screw.  The second toe was noted to sit in a rectus position following repair of the plantar plate.  The incision  site was flushed with copious amounts of normal saline solution and a layered closure was performed utilizing 3-0 Vicryl, 4-0 Vicryl and 4-0 nylon.   A sterile dressing was applied.  The tourniquet was deflated and a rapid hyperemic spots was noted to the distal digits.  The patient tolerated procedure and anesthesia well and was transported from the operating room to the PACU while signs stable and neurovascular status intact to the operative extremity.     The plan is to discharge patient home once stable per anesthesia.   he can resume his normal diet.   he is to be weightbearing as tolerated in a cam boot only.   he is to keep his dressing clean, dry and intact.   he will follow up with me on Monday, November 19, 2018.

## 2018-11-19 ENCOUNTER — OFFICE VISIT (OUTPATIENT)
Dept: PODIATRY | Facility: CLINIC | Age: 68
End: 2018-11-19

## 2018-11-19 VITALS — BODY MASS INDEX: 25.91 KG/M2 | HEIGHT: 70 IN | WEIGHT: 181 LBS

## 2018-11-19 DIAGNOSIS — M20.21 HALLUX RIGIDUS OF RIGHT FOOT: Primary | ICD-10-CM

## 2018-11-19 DIAGNOSIS — S99.921D INJURY OF PLANTAR PLATE OF RIGHT FOOT, SUBSEQUENT ENCOUNTER: ICD-10-CM

## 2018-11-19 PROCEDURE — 99024 POSTOP FOLLOW-UP VISIT: CPT | Performed by: PODIATRIST

## 2018-11-19 RX ORDER — HYDROCODONE BITARTRATE AND ACETAMINOPHEN 10; 325 MG/1; MG/1
1 TABLET ORAL EVERY 6 HOURS PRN
Qty: 42 TABLET | Refills: 0 | Status: SHIPPED | OUTPATIENT
Start: 2018-11-19 | End: 2019-02-13 | Stop reason: HOSPADM
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
## 2018-11-26 ENCOUNTER — OFFICE VISIT (OUTPATIENT)
Dept: PODIATRY | Facility: CLINIC | Age: 68
End: 2018-11-26

## 2018-11-26 VITALS — BODY MASS INDEX: 25.91 KG/M2 | WEIGHT: 181 LBS | HEIGHT: 70 IN

## 2018-11-26 DIAGNOSIS — M20.21 HALLUX RIGIDUS OF RIGHT FOOT: Primary | ICD-10-CM

## 2018-11-26 DIAGNOSIS — S99.921D INJURY OF PLANTAR PLATE OF RIGHT FOOT, SUBSEQUENT ENCOUNTER: ICD-10-CM

## 2018-11-26 PROCEDURE — 99024 POSTOP FOLLOW-UP VISIT: CPT | Performed by: PODIATRIST
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
## 2018-12-10 ENCOUNTER — OFFICE VISIT (OUTPATIENT)
Dept: PODIATRY | Facility: CLINIC | Age: 68
End: 2018-12-10

## 2018-12-10 VITALS — HEIGHT: 70 IN | BODY MASS INDEX: 25.91 KG/M2 | WEIGHT: 181 LBS | HEART RATE: 87 BPM | OXYGEN SATURATION: 98 %

## 2018-12-10 DIAGNOSIS — M20.21 HALLUX RIGIDUS OF RIGHT FOOT: Primary | ICD-10-CM

## 2018-12-10 DIAGNOSIS — S99.921D INJURY OF PLANTAR PLATE OF RIGHT FOOT, SUBSEQUENT ENCOUNTER: ICD-10-CM

## 2018-12-10 PROCEDURE — 99024 POSTOP FOLLOW-UP VISIT: CPT | Performed by: PODIATRIST

## 2018-12-10 RX ORDER — SULFAMETHOXAZOLE AND TRIMETHOPRIM 800; 160 MG/1; MG/1
1 TABLET ORAL 2 TIMES DAILY
Qty: 20 TABLET | Refills: 0 | Status: SHIPPED | OUTPATIENT
Start: 2018-12-10 | End: 2018-12-12
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
## 2018-12-12 ENCOUNTER — TELEPHONE (OUTPATIENT)
Dept: PODIATRY | Facility: CLINIC | Age: 68
End: 2018-12-12

## 2018-12-12 RX ORDER — DOXYCYCLINE HYCLATE 100 MG/1
100 CAPSULE ORAL 2 TIMES DAILY
Qty: 20 CAPSULE | Refills: 0 | Status: SHIPPED | OUTPATIENT
Start: 2018-12-12 | End: 2019-02-13 | Stop reason: HOSPADM

## 2018-12-12 NOTE — TELEPHONE ENCOUNTER
RODRIGO      PATIENT'S WIFE CALLED TO SAY THAT PATIENT STARTED THE ANTIBIOTICS Monday AFTER LUNCH AND SINCE THEN HE HAS HAD LOOSE BOWELS AND TODAY HE WOKE UP WITH HIS FACE/JAW/HANDS WERE SWOLLEN AND RED.    IS THERE SOMETHING ELSE HE CAN TAKE.    PATIENT'S WIFE ALSO STATED THAT THEY ARE LEAVING FOR [place] IN A FEW HOURS.

## 2018-12-24 ENCOUNTER — OFFICE VISIT (OUTPATIENT)
Dept: PODIATRY | Facility: CLINIC | Age: 68
End: 2018-12-24

## 2018-12-24 VITALS — WEIGHT: 181 LBS | BODY MASS INDEX: 25.91 KG/M2 | OXYGEN SATURATION: 98 % | HEART RATE: 76 BPM | HEIGHT: 70 IN

## 2018-12-24 DIAGNOSIS — S99.921D INJURY OF PLANTAR PLATE OF RIGHT FOOT, SUBSEQUENT ENCOUNTER: ICD-10-CM

## 2018-12-24 DIAGNOSIS — M20.21 HALLUX RIGIDUS OF RIGHT FOOT: Primary | ICD-10-CM

## 2018-12-24 PROCEDURE — 99024 POSTOP FOLLOW-UP VISIT: CPT | Performed by: PODIATRIST
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
## 2019-01-09 ENCOUNTER — OFFICE VISIT (OUTPATIENT)
Dept: PODIATRY | Facility: CLINIC | Age: 69
End: 2019-01-09

## 2019-01-09 VITALS — HEIGHT: 70 IN | HEART RATE: 101 BPM | OXYGEN SATURATION: 99 % | BODY MASS INDEX: 25.91 KG/M2 | WEIGHT: 181 LBS

## 2019-01-09 DIAGNOSIS — M20.21 HALLUX RIGIDUS OF RIGHT FOOT: Primary | ICD-10-CM

## 2019-01-09 DIAGNOSIS — S99.921D INJURY OF PLANTAR PLATE OF RIGHT FOOT, SUBSEQUENT ENCOUNTER: ICD-10-CM

## 2019-01-09 PROCEDURE — 99024 POSTOP FOLLOW-UP VISIT: CPT | Performed by: PODIATRIST
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
## 2019-02-06 ENCOUNTER — LAB (OUTPATIENT)
Dept: LAB | Facility: HOSPITAL | Age: 69
End: 2019-02-06

## 2019-02-06 ENCOUNTER — APPOINTMENT (OUTPATIENT)
Dept: CT IMAGING | Facility: HOSPITAL | Age: 69
End: 2019-02-06

## 2019-02-06 ENCOUNTER — HOSPITAL ENCOUNTER (INPATIENT)
Facility: HOSPITAL | Age: 69
LOS: 7 days | Discharge: HOME | End: 2019-02-13
Attending: EMERGENCY MEDICINE | Admitting: INTERNAL MEDICINE

## 2019-02-06 DIAGNOSIS — I10 HTN (HYPERTENSION), BENIGN: ICD-10-CM

## 2019-02-06 DIAGNOSIS — R07.9 CHEST PAIN, UNSPECIFIED TYPE: ICD-10-CM

## 2019-02-06 DIAGNOSIS — R06.00 DYSPNEA, UNSPECIFIED TYPE: Primary | ICD-10-CM

## 2019-02-06 DIAGNOSIS — I26.99 OTHER ACUTE PULMONARY EMBOLISM WITHOUT ACUTE COR PULMONALE (HCC): Primary | ICD-10-CM

## 2019-02-06 DIAGNOSIS — R06.00 DYSPNEA, UNSPECIFIED TYPE: ICD-10-CM

## 2019-02-06 DIAGNOSIS — I11.9 BENIGN HYPERTENSIVE HEART DISEASE WITHOUT HEART FAILURE: ICD-10-CM

## 2019-02-06 LAB
ALBUMIN SERPL-MCNC: 3.7 G/DL (ref 3.4–4.8)
ALBUMIN/GLOB SERPL: 1.4 G/DL (ref 1.1–1.8)
ALP SERPL-CCNC: 53 U/L (ref 38–126)
ALT SERPL W P-5'-P-CCNC: 31 U/L (ref 21–72)
ANION GAP SERPL CALCULATED.3IONS-SCNC: 18 MMOL/L (ref 5–15)
AST SERPL-CCNC: 38 U/L (ref 17–59)
BASOPHILS # BLD AUTO: 0.01 10*3/MM3 (ref 0–0.2)
BASOPHILS NFR BLD AUTO: 0.2 % (ref 0–2)
BILIRUB SERPL-MCNC: 0.3 MG/DL (ref 0.2–1.3)
BUN BLD-MCNC: 22 MG/DL (ref 7–21)
BUN/CREAT SERPL: 15.5 (ref 7–25)
CALCIUM SPEC-SCNC: 7.7 MG/DL (ref 8.4–10.2)
CHLORIDE SERPL-SCNC: 91 MMOL/L (ref 95–110)
CO2 SERPL-SCNC: 23 MMOL/L (ref 22–31)
CREAT BLD-MCNC: 1.42 MG/DL (ref 0.7–1.3)
D-DIMER, QUANTITATIVE (MAD,POW, STR): >4000 NG/ML (FEU) (ref 0–470)
DEPRECATED RDW RBC AUTO: 44.5 FL (ref 35.1–43.9)
EOSINOPHIL # BLD AUTO: 0.03 10*3/MM3 (ref 0–0.7)
EOSINOPHIL NFR BLD AUTO: 0.5 % (ref 0–7)
ERYTHROCYTE [DISTWIDTH] IN BLOOD BY AUTOMATED COUNT: 13.2 % (ref 11.5–14.5)
GFR SERPL CREATININE-BSD FRML MDRD: 50 ML/MIN/1.73 (ref 49–113)
GLOBULIN UR ELPH-MCNC: 2.6 GM/DL (ref 2.3–3.5)
GLUCOSE BLD-MCNC: 119 MG/DL (ref 60–100)
HCT VFR BLD AUTO: 30.3 % (ref 39–49)
HGB BLD-MCNC: 11 G/DL (ref 13.7–17.3)
HOLD SPECIMEN: NORMAL
IMM GRANULOCYTES # BLD AUTO: 0.04 10*3/MM3 (ref 0–0.02)
IMM GRANULOCYTES NFR BLD AUTO: 0.6 % (ref 0–0.5)
LYMPHOCYTES # BLD AUTO: 2.31 10*3/MM3 (ref 0.6–4.2)
LYMPHOCYTES NFR BLD AUTO: 34.7 % (ref 10–50)
MCH RBC QN AUTO: 33.6 PG (ref 26.5–34)
MCHC RBC AUTO-ENTMCNC: 36.3 G/DL (ref 31.5–36.3)
MCV RBC AUTO: 92.7 FL (ref 80–98)
MONOCYTES # BLD AUTO: 1.05 10*3/MM3 (ref 0–0.9)
MONOCYTES NFR BLD AUTO: 15.8 % (ref 0–12)
NEUTROPHILS # BLD AUTO: 3.21 10*3/MM3 (ref 2–8.6)
NEUTROPHILS NFR BLD AUTO: 48.2 % (ref 37–80)
NT-PROBNP SERPL-MCNC: 252 PG/ML (ref 0–900)
PLATELET # BLD AUTO: 257 10*3/MM3 (ref 150–450)
PMV BLD AUTO: 8.9 FL (ref 8–12)
POTASSIUM BLD-SCNC: 2.6 MMOL/L (ref 3.5–5.1)
PROT SERPL-MCNC: 6.3 G/DL (ref 6.3–8.6)
RBC # BLD AUTO: 3.27 10*6/MM3 (ref 4.37–5.74)
SODIUM BLD-SCNC: 132 MMOL/L (ref 137–145)
TROPONIN I SERPL-MCNC: <0.012 NG/ML
WBC NRBC COR # BLD: 6.65 10*3/MM3 (ref 3.2–9.8)
WHOLE BLOOD HOLD SPECIMEN: NORMAL

## 2019-02-06 PROCEDURE — 99284 EMERGENCY DEPT VISIT MOD MDM: CPT

## 2019-02-06 PROCEDURE — 71275 CT ANGIOGRAPHY CHEST: CPT

## 2019-02-06 PROCEDURE — 36415 COLL VENOUS BLD VENIPUNCTURE: CPT

## 2019-02-06 PROCEDURE — 83880 ASSAY OF NATRIURETIC PEPTIDE: CPT | Performed by: EMERGENCY MEDICINE

## 2019-02-06 PROCEDURE — 85379 FIBRIN DEGRADATION QUANT: CPT

## 2019-02-06 PROCEDURE — 93005 ELECTROCARDIOGRAM TRACING: CPT | Performed by: EMERGENCY MEDICINE

## 2019-02-06 PROCEDURE — 85025 COMPLETE CBC W/AUTO DIFF WBC: CPT | Performed by: EMERGENCY MEDICINE

## 2019-02-06 PROCEDURE — 0 IOPAMIDOL PER 1 ML: Performed by: EMERGENCY MEDICINE

## 2019-02-06 PROCEDURE — 80053 COMPREHEN METABOLIC PANEL: CPT | Performed by: EMERGENCY MEDICINE

## 2019-02-06 PROCEDURE — 84484 ASSAY OF TROPONIN QUANT: CPT | Performed by: EMERGENCY MEDICINE

## 2019-02-06 PROCEDURE — 25010000002 ENOXAPARIN PER 10 MG: Performed by: EMERGENCY MEDICINE

## 2019-02-06 PROCEDURE — 93010 ELECTROCARDIOGRAM REPORT: CPT | Performed by: INTERNAL MEDICINE

## 2019-02-06 RX ORDER — POTASSIUM CHLORIDE 750 MG/1
40 CAPSULE, EXTENDED RELEASE ORAL ONCE
Status: COMPLETED | OUTPATIENT
Start: 2019-02-06 | End: 2019-02-06

## 2019-02-06 RX ORDER — SODIUM CHLORIDE 9 MG/ML
INJECTION, SOLUTION INTRAVENOUS
Status: COMPLETED
Start: 2019-02-06 | End: 2019-02-07

## 2019-02-06 RX ORDER — LOSARTAN POTASSIUM 50 MG/1
100 TABLET ORAL DAILY
COMMUNITY
End: 2019-06-19 | Stop reason: DRUGHIGH

## 2019-02-06 RX ORDER — SODIUM CHLORIDE 0.9 % (FLUSH) 0.9 %
10 SYRINGE (ML) INJECTION AS NEEDED
Status: DISCONTINUED | OUTPATIENT
Start: 2019-02-06 | End: 2019-02-13 | Stop reason: HOSPADM

## 2019-02-06 RX ORDER — HYDROCODONE BITARTRATE AND ACETAMINOPHEN 7.5; 325 MG/1; MG/1
1 TABLET ORAL EVERY 6 HOURS PRN
COMMUNITY
End: 2019-12-05 | Stop reason: HOSPADM

## 2019-02-06 RX ADMIN — SODIUM CHLORIDE 1000 ML: 900 INJECTION, SOLUTION INTRAVENOUS at 23:40

## 2019-02-06 RX ADMIN — IOPAMIDOL 75 ML: 755 INJECTION, SOLUTION INTRAVENOUS at 21:35

## 2019-02-06 RX ADMIN — POTASSIUM CHLORIDE 40 MEQ: 750 CAPSULE, EXTENDED RELEASE ORAL at 21:15

## 2019-02-06 RX ADMIN — SODIUM CHLORIDE 1000 ML: 9 INJECTION, SOLUTION INTRAVENOUS at 20:44

## 2019-02-06 RX ADMIN — ENOXAPARIN SODIUM 80 MG: 80 INJECTION SUBCUTANEOUS at 20:46

## 2019-02-07 ENCOUNTER — APPOINTMENT (OUTPATIENT)
Dept: ULTRASOUND IMAGING | Facility: HOSPITAL | Age: 69
End: 2019-02-07

## 2019-02-07 ENCOUNTER — APPOINTMENT (OUTPATIENT)
Dept: CARDIOLOGY | Facility: HOSPITAL | Age: 69
End: 2019-02-07

## 2019-02-07 PROBLEM — K21.9 GASTROESOPHAGEAL REFLUX DISEASE WITHOUT ESOPHAGITIS: Status: ACTIVE | Noted: 2019-02-07

## 2019-02-07 PROBLEM — I10 BENIGN ESSENTIAL HTN: Status: ACTIVE | Noted: 2019-02-07

## 2019-02-07 LAB
ALBUMIN SERPL-MCNC: 3.6 G/DL (ref 3.4–4.8)
ALBUMIN/GLOB SERPL: 1.6 G/DL (ref 1.1–1.8)
ALP SERPL-CCNC: 54 U/L (ref 38–126)
ALT SERPL W P-5'-P-CCNC: 30 U/L (ref 21–72)
ANION GAP SERPL CALCULATED.3IONS-SCNC: 7 MMOL/L (ref 5–15)
APTT PPP: 31.9 SECONDS (ref 20–40.3)
AST SERPL-CCNC: 35 U/L (ref 17–59)
BASOPHILS # BLD AUTO: 0.02 10*3/MM3 (ref 0–0.2)
BASOPHILS NFR BLD AUTO: 0.5 % (ref 0–2)
BH CV ECHO MEAS - BSA(HAYCOCK): 1.9 M^2
BH CV ECHO MEAS - BSA: 1.8 M^2
BH CV ECHO MEAS - BZI_BMI: 32.5 KILOGRAMS/M^2
BH CV ECHO MEAS - BZI_METRIC_HEIGHT: 155 CM
BH CV ECHO MEAS - BZI_METRIC_WEIGHT: 78 KG
BILIRUB SERPL-MCNC: 0.9 MG/DL (ref 0.2–1.3)
BUN BLD-MCNC: 20 MG/DL (ref 7–21)
BUN/CREAT SERPL: 18.2 (ref 7–25)
CALCIUM SPEC-SCNC: 7.9 MG/DL (ref 8.4–10.2)
CHLORIDE SERPL-SCNC: 101 MMOL/L (ref 95–110)
CO2 SERPL-SCNC: 28 MMOL/L (ref 22–31)
CREAT BLD-MCNC: 1.1 MG/DL (ref 0.7–1.3)
DEPRECATED RDW RBC AUTO: 47.1 FL (ref 35.1–43.9)
EOSINOPHIL # BLD AUTO: 0.05 10*3/MM3 (ref 0–0.7)
EOSINOPHIL NFR BLD AUTO: 1.2 % (ref 0–7)
ERYTHROCYTE [DISTWIDTH] IN BLOOD BY AUTOMATED COUNT: 13.7 % (ref 11.5–14.5)
GFR SERPL CREATININE-BSD FRML MDRD: 67 ML/MIN/1.73 (ref 49–113)
GLOBULIN UR ELPH-MCNC: 2.3 GM/DL (ref 2.3–3.5)
GLUCOSE BLD-MCNC: 96 MG/DL (ref 60–100)
HCT VFR BLD AUTO: 36.6 % (ref 39–49)
HGB BLD-MCNC: 13.1 G/DL (ref 13.7–17.3)
IMM GRANULOCYTES # BLD AUTO: 0.02 10*3/MM3 (ref 0–0.02)
IMM GRANULOCYTES NFR BLD AUTO: 0.5 % (ref 0–0.5)
INR PPP: 1.09 (ref 0.8–1.2)
LYMPHOCYTES # BLD AUTO: 1.39 10*3/MM3 (ref 0.6–4.2)
LYMPHOCYTES NFR BLD AUTO: 33.5 % (ref 10–50)
MAXIMAL PREDICTED HEART RATE: 153 BPM
MCH RBC QN AUTO: 33.9 PG (ref 26.5–34)
MCHC RBC AUTO-ENTMCNC: 35.8 G/DL (ref 31.5–36.3)
MCV RBC AUTO: 94.8 FL (ref 80–98)
MONOCYTES # BLD AUTO: 0.6 10*3/MM3 (ref 0–0.9)
MONOCYTES NFR BLD AUTO: 14.5 % (ref 0–12)
NEUTROPHILS # BLD AUTO: 2.07 10*3/MM3 (ref 2–8.6)
NEUTROPHILS NFR BLD AUTO: 49.8 % (ref 37–80)
NRBC BLD AUTO-RTO: 0 /100 WBC (ref 0–0)
PLATELET # BLD AUTO: 200 10*3/MM3 (ref 150–450)
PMV BLD AUTO: 9 FL (ref 8–12)
POTASSIUM BLD-SCNC: 3.3 MMOL/L (ref 3.5–5.1)
PROT SERPL-MCNC: 5.9 G/DL (ref 6.3–8.6)
PROTHROMBIN TIME: 13.9 SECONDS (ref 11.1–15.3)
RBC # BLD AUTO: 3.86 10*6/MM3 (ref 4.37–5.74)
SODIUM BLD-SCNC: 136 MMOL/L (ref 137–145)
STRESS TARGET HR: 130 BPM
WBC NRBC COR # BLD: 4.15 10*3/MM3 (ref 3.2–9.8)

## 2019-02-07 PROCEDURE — 83520 IMMUNOASSAY QUANT NOS NONAB: CPT | Performed by: INTERNAL MEDICINE

## 2019-02-07 PROCEDURE — 85220 BLOOC CLOT FACTOR V TEST: CPT | Performed by: INTERNAL MEDICINE

## 2019-02-07 PROCEDURE — 25810000003 SODIUM CHLORIDE 0.9 % WITH KCL 20 MEQ 20-0.9 MEQ/L-% SOLUTION: Performed by: STUDENT IN AN ORGANIZED HEALTH CARE EDUCATION/TRAINING PROGRAM

## 2019-02-07 PROCEDURE — 86147 CARDIOLIPIN ANTIBODY EA IG: CPT | Performed by: INTERNAL MEDICINE

## 2019-02-07 PROCEDURE — 80053 COMPREHEN METABOLIC PANEL: CPT | Performed by: STUDENT IN AN ORGANIZED HEALTH CARE EDUCATION/TRAINING PROGRAM

## 2019-02-07 PROCEDURE — 85610 PROTHROMBIN TIME: CPT | Performed by: INTERNAL MEDICINE

## 2019-02-07 PROCEDURE — 85300 ANTITHROMBIN III ACTIVITY: CPT | Performed by: INTERNAL MEDICINE

## 2019-02-07 PROCEDURE — 93306 TTE W/DOPPLER COMPLETE: CPT

## 2019-02-07 PROCEDURE — 85613 RUSSELL VIPER VENOM DILUTED: CPT | Performed by: INTERNAL MEDICINE

## 2019-02-07 PROCEDURE — 85303 CLOT INHIBIT PROT C ACTIVITY: CPT | Performed by: INTERNAL MEDICINE

## 2019-02-07 PROCEDURE — 85670 THROMBIN TIME PLASMA: CPT | Performed by: INTERNAL MEDICINE

## 2019-02-07 PROCEDURE — 85025 COMPLETE CBC W/AUTO DIFF WBC: CPT | Performed by: STUDENT IN AN ORGANIZED HEALTH CARE EDUCATION/TRAINING PROGRAM

## 2019-02-07 PROCEDURE — 85730 THROMBOPLASTIN TIME PARTIAL: CPT | Performed by: INTERNAL MEDICINE

## 2019-02-07 PROCEDURE — 85306 CLOT INHIBIT PROT S FREE: CPT | Performed by: INTERNAL MEDICINE

## 2019-02-07 PROCEDURE — 93970 EXTREMITY STUDY: CPT

## 2019-02-07 PROCEDURE — 85732 THROMBOPLASTIN TIME PARTIAL: CPT | Performed by: INTERNAL MEDICINE

## 2019-02-07 PROCEDURE — 99223 1ST HOSP IP/OBS HIGH 75: CPT | Performed by: INTERNAL MEDICINE

## 2019-02-07 PROCEDURE — 25010000002 ENOXAPARIN PER 10 MG: Performed by: STUDENT IN AN ORGANIZED HEALTH CARE EDUCATION/TRAINING PROGRAM

## 2019-02-07 PROCEDURE — 83090 ASSAY OF HOMOCYSTEINE: CPT | Performed by: INTERNAL MEDICINE

## 2019-02-07 PROCEDURE — 85705 THROMBOPLASTIN INHIBITION: CPT | Performed by: INTERNAL MEDICINE

## 2019-02-07 RX ORDER — CETIRIZINE HYDROCHLORIDE 10 MG/1
10 TABLET ORAL DAILY
Status: DISCONTINUED | OUTPATIENT
Start: 2019-02-07 | End: 2019-02-13 | Stop reason: HOSPADM

## 2019-02-07 RX ORDER — GUANFACINE 1 MG/1
1 TABLET ORAL NIGHTLY
Status: DISCONTINUED | OUTPATIENT
Start: 2019-02-07 | End: 2019-02-13 | Stop reason: HOSPADM

## 2019-02-07 RX ORDER — HYDROCODONE BITARTRATE AND ACETAMINOPHEN 7.5; 325 MG/1; MG/1
1 TABLET ORAL EVERY 6 HOURS PRN
Status: DISCONTINUED | OUTPATIENT
Start: 2019-02-07 | End: 2019-02-13 | Stop reason: HOSPADM

## 2019-02-07 RX ORDER — SODIUM CHLORIDE 0.9 % (FLUSH) 0.9 %
3 SYRINGE (ML) INJECTION EVERY 12 HOURS SCHEDULED
Status: DISCONTINUED | OUTPATIENT
Start: 2019-02-07 | End: 2019-02-13 | Stop reason: HOSPADM

## 2019-02-07 RX ORDER — PANTOPRAZOLE SODIUM 40 MG/1
40 TABLET, DELAYED RELEASE ORAL EVERY MORNING
Status: DISCONTINUED | OUTPATIENT
Start: 2019-02-07 | End: 2019-02-13 | Stop reason: HOSPADM

## 2019-02-07 RX ORDER — LOSARTAN POTASSIUM 50 MG/1
100 TABLET ORAL DAILY
Status: DISCONTINUED | OUTPATIENT
Start: 2019-02-07 | End: 2019-02-07

## 2019-02-07 RX ORDER — SODIUM CHLORIDE 0.9 % (FLUSH) 0.9 %
1-10 SYRINGE (ML) INJECTION AS NEEDED
Status: DISCONTINUED | OUTPATIENT
Start: 2019-02-07 | End: 2019-02-13 | Stop reason: HOSPADM

## 2019-02-07 RX ORDER — ATORVASTATIN CALCIUM 40 MG/1
40 TABLET, FILM COATED ORAL NIGHTLY
Status: DISCONTINUED | OUTPATIENT
Start: 2019-02-07 | End: 2019-02-07

## 2019-02-07 RX ORDER — FLUTICASONE PROPIONATE 50 MCG
2 SPRAY, SUSPENSION (ML) NASAL DAILY PRN
Status: DISCONTINUED | OUTPATIENT
Start: 2019-02-07 | End: 2019-02-13 | Stop reason: HOSPADM

## 2019-02-07 RX ORDER — WARFARIN SODIUM 5 MG/1
5 TABLET ORAL
Status: DISCONTINUED | OUTPATIENT
Start: 2019-02-07 | End: 2019-02-09

## 2019-02-07 RX ORDER — POTASSIUM CHLORIDE 750 MG/1
40 CAPSULE, EXTENDED RELEASE ORAL ONCE
Status: COMPLETED | OUTPATIENT
Start: 2019-02-07 | End: 2019-02-07

## 2019-02-07 RX ORDER — DOCUSATE SODIUM 100 MG/1
100 CAPSULE, LIQUID FILLED ORAL 2 TIMES DAILY PRN
Status: DISCONTINUED | OUTPATIENT
Start: 2019-02-07 | End: 2019-02-13 | Stop reason: HOSPADM

## 2019-02-07 RX ORDER — FAMOTIDINE 20 MG/1
20 TABLET, FILM COATED ORAL DAILY
Status: DISCONTINUED | OUTPATIENT
Start: 2019-02-07 | End: 2019-02-07

## 2019-02-07 RX ORDER — SODIUM CHLORIDE AND POTASSIUM CHLORIDE 150; 900 MG/100ML; MG/100ML
200 INJECTION, SOLUTION INTRAVENOUS CONTINUOUS
Status: DISCONTINUED | OUTPATIENT
Start: 2019-02-07 | End: 2019-02-07

## 2019-02-07 RX ORDER — ONDANSETRON 2 MG/ML
4 INJECTION INTRAMUSCULAR; INTRAVENOUS EVERY 6 HOURS PRN
Status: DISCONTINUED | OUTPATIENT
Start: 2019-02-07 | End: 2019-02-13 | Stop reason: HOSPADM

## 2019-02-07 RX ADMIN — ENOXAPARIN SODIUM 80 MG: 80 INJECTION SUBCUTANEOUS at 09:14

## 2019-02-07 RX ADMIN — POTASSIUM CHLORIDE AND SODIUM CHLORIDE 200 ML/HR: 900; 150 INJECTION, SOLUTION INTRAVENOUS at 10:35

## 2019-02-07 RX ADMIN — PANTOPRAZOLE SODIUM 40 MG: 40 TABLET, DELAYED RELEASE ORAL at 06:10

## 2019-02-07 RX ADMIN — SODIUM CHLORIDE, PRESERVATIVE FREE 3 ML: 5 INJECTION INTRAVENOUS at 09:14

## 2019-02-07 RX ADMIN — SODIUM CHLORIDE, PRESERVATIVE FREE 3 ML: 5 INJECTION INTRAVENOUS at 21:02

## 2019-02-07 RX ADMIN — GUANFACINE HYDROCHLORIDE 1 MG: 1 TABLET ORAL at 21:02

## 2019-02-07 RX ADMIN — POTASSIUM CHLORIDE AND SODIUM CHLORIDE 200 ML/HR: 900; 150 INJECTION, SOLUTION INTRAVENOUS at 04:55

## 2019-02-07 RX ADMIN — CETIRIZINE HYDROCHLORIDE 10 MG: 10 TABLET, FILM COATED ORAL at 09:14

## 2019-02-07 RX ADMIN — WARFARIN SODIUM 5 MG: 5 TABLET ORAL at 18:40

## 2019-02-07 RX ADMIN — POTASSIUM CHLORIDE 40 MEQ: 750 CAPSULE, EXTENDED RELEASE ORAL at 04:21

## 2019-02-07 RX ADMIN — ENOXAPARIN SODIUM 80 MG: 80 INJECTION SUBCUTANEOUS at 21:02
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
## 2019-02-07 NOTE — PROGRESS NOTES
MEDICINE DAILY PROGRESS NOTE  NAME: Carlos Zazueta  : 1950  MRN: [id number]     LOS: 1 day     PROVIDER OF SERVICE: Ovidio Catherine MD    Chief Complaint: Pulmonary embolus (CMS/HCC)    Subjective:   HPI: 68-year-old white male with medical history significant for hypertension, hyperlipidemia, and gastroesophageal reflux presented to the ER with worsening complaints of dyspnea with exertion.  Patient also with a recent right foot surgery that has decreased his normal mobility.  Patient states that he is otherwise very active and healthy with no history of blood clots in himself or his family.    Interval History:  History taken from: patient chart RN  . Patient resting comfortably. No acute events overnight.    Review of Systems:   Review of Systems   Constitutional: Positive for activity change and fatigue. Negative for appetite change and fever.   Respiratory: Negative for cough and shortness of breath.    Cardiovascular: Negative for chest pain and palpitations.   Gastrointestinal: Negative for abdominal pain and nausea.   Musculoskeletal: Positive for arthralgias. Negative for gait problem.   Skin: Negative for color change and rash.   Neurological: Negative for dizziness, weakness and headaches.   Hematological: Negative for adenopathy. Does not bruise/bleed easily.   Psychiatric/Behavioral: Negative for agitation, confusion and sleep disturbance.       Objective:     Vital Signs  Vitals:    19 0321 19 0322 19 0613 19 0807   BP: 94/60 (!) 85/52     BP Location: Left arm Left arm     Patient Position: Sitting Standing     Pulse:    62   Resp:       Temp:       TempSrc:       SpO2:       Weight:   76.9 kg (169 lb 9.6 oz)    Height:           Physical Exam  Physical Exam   Constitutional: He is oriented to person, place, and time. He appears well-developed and well-nourished. No distress.   HENT:   Head: Normocephalic and atraumatic.   Right Ear: External ear normal.   Left Ear:  External ear normal.   Nose: Nose normal.   Eyes: Conjunctivae and EOM are normal. Pupils are equal, round, and reactive to light.   Neck: Neck supple. No thyromegaly present.   Cardiovascular: Normal rate, regular rhythm and normal heart sounds.   Pulmonary/Chest: Effort normal and breath sounds normal. No respiratory distress. He has no wheezes. He has no rales. He exhibits no tenderness.   Abdominal: Soft. Bowel sounds are normal. He exhibits no distension and no mass. There is no tenderness. There is no rebound and no guarding.   Musculoskeletal: Normal range of motion. He exhibits tenderness. He exhibits no edema.   Neurological: He is alert and oriented to person, place, and time.   Skin: Skin is warm and dry. No rash noted. He is not diaphoretic. No erythema. No pallor.   Psychiatric: He has a normal mood and affect. His behavior is normal.   Nursing note and vitals reviewed.      Medication Review    Current Facility-Administered Medications:   •  cetirizine (zyrTEC) tablet 10 mg, 10 mg, Oral, Daily, Clemente Magdaleno Jr., MD  •  docusate sodium (COLACE) capsule 100 mg, 100 mg, Oral, BID PRN, Clemente Magdaleno Jr., MD  •  enoxaparin (LOVENOX) syringe 80 mg, 1 mg/kg, Subcutaneous, Q12H, Clemente Magdaleno Jr., MD  •  fluticasone (FLONASE) 50 MCG/ACT nasal spray 2 spray, 2 spray, Nasal, Daily PRN, Clemente Magdaleno Jr., MD  •  guanFACINE (TENEX) tablet 1 mg, 1 mg, Oral, Nightly, Clemente Magdaleno Jr., MD  •  HYDROcodone-acetaminophen (NORCO) 7.5-325 MG per tablet 1 tablet, 1 tablet, Oral, Q6H PRN, Clemente Magdaleno Jr., MD  •  ondansetron (ZOFRAN) injection 4 mg, 4 mg, Intravenous, Q6H PRN, Clemente Magdaleno Jr., MD  •  pantoprazole (PROTONIX) EC tablet 40 mg, 40 mg, Oral, QAM, Clemente Magdaleno Jr., MD, 40 mg at 02/07/19 0610  •  sodium chloride 0.9 % flush 1-10 mL, 1-10 mL, Intravenous, PRN, Clemente Magdaleno Jr., MD  •  [COMPLETED] Insert peripheral IV, , , Once **AND** sodium chloride 0.9 % flush 10 mL, 10 mL, Intravenous, PRN, Antonietta  Davion CANNON MD  •  sodium chloride 0.9 % flush 3 mL, 3 mL, Intravenous, Q12H, Clemente Magdaleno Jr., MD  •  sodium chloride 0.9 % with KCl 20 mEq/L infusion, 200 mL/hr, Intravenous, Continuous, Clemente Magdaleno Jr., MD, Last Rate: 200 mL/hr at 02/07/19 0455, 200 mL/hr at 02/07/19 0455     Diagnostic Data    Lab Results (last 24 hours)     Procedure Component Value Units Date/Time    Protime-INR [[id number]]  (Normal) Collected:  02/07/19 0222    Specimen:  Blood Updated:  02/07/19 0317     Protime 13.9 Seconds      INR 1.09    Narrative:       Therapeutic range for most indications is 2.0-3.0 INR,  or 2.5-3.5 for mechanical heart valves.    aPTT [[id number]]  (Normal) Collected:  02/07/19 0222    Specimen:  Blood Updated:  02/07/19 0317     PTT 31.9 seconds     Narrative:       The recommended Heparin therapeutic range is 68-97 seconds.    Extra Tubes [[id number]] Collected:  02/06/19 2040    Specimen:  Blood, Venous Line Updated:  02/06/19 2146    Narrative:       The following orders were created for panel order Extra Tubes.  Procedure                               Abnormality         Status                     ---------                               -----------         ------                     Light Blue Top[[id number]]                                   Final result               Gold Top - SST[[id number]]                                   Final result                 Please view results for these tests on the individual orders.    Light Blue Top [[id number]] Collected:  02/06/19 2040    Specimen:  Blood Updated:  02/06/19 2146     Extra Tube hold for add-on     Comment: Auto resulted       Gold Top - SST [[id number]] Collected:  02/06/19 2040    Specimen:  Blood Updated:  02/06/19 2146     Extra Tube Hold for add-ons.     Comment: Auto resulted.       CBC & Differential [[id number]] Collected:  02/06/19 2035    Specimen:  Blood Updated:  02/06/19 2111    Narrative:       The following orders were created for panel order CBC &  Differential.  Procedure                               Abnormality         Status                     ---------                               -----------         ------                     Scan Slide[[id number]]                                                                  CBC Auto Differential[[id number]]        Abnormal            Final result                 Please view results for these tests on the individual orders.    CBC Auto Differential [[id number]]  (Abnormal) Collected:  02/06/19 2035    Specimen:  Blood Updated:  02/06/19 2111     WBC 6.65 10*3/mm3      RBC 3.27 10*6/mm3      Hemoglobin 11.0 g/dL      Hematocrit 30.3 %      MCV 92.7 fL      MCH 33.6 pg      MCHC 36.3 g/dL      RDW 13.2 %      RDW-SD 44.5 fl      MPV 8.9 fL      Platelets 257 10*3/mm3      Neutrophil % 48.2 %      Lymphocyte % 34.7 %      Monocyte % 15.8 %      Eosinophil % 0.5 %      Basophil % 0.2 %      Immature Grans % 0.6 %      Neutrophils, Absolute 3.21 10*3/mm3      Lymphocytes, Absolute 2.31 10*3/mm3      Monocytes, Absolute 1.05 10*3/mm3      Eosinophils, Absolute 0.03 10*3/mm3      Basophils, Absolute 0.01 10*3/mm3      Immature Grans, Absolute 0.04 10*3/mm3     BNP [[id number]]  (Normal) Collected:  02/06/19 2035    Specimen:  Blood Updated:  02/06/19 2108     proBNP 252.0 pg/mL     Troponin [[id number]]  (Normal) Collected:  02/06/19 2035    Specimen:  Blood Updated:  02/06/19 2108     Troponin I <0.012 ng/mL     Comprehensive Metabolic Panel [[id number]]  (Abnormal) Collected:  02/06/19 2035    Specimen:  Blood Updated:  02/06/19 2107     Glucose 119 mg/dL      BUN 22 mg/dL      Creatinine 1.42 mg/dL      Sodium 132 mmol/L      Potassium 2.6 mmol/L      Chloride 91 mmol/L      CO2 23.0 mmol/L      Calcium 7.7 mg/dL      Total Protein 6.3 g/dL      Albumin 3.70 g/dL      ALT (SGPT) 31 U/L      AST (SGOT) 38 U/L      Alkaline Phosphatase 53 U/L      Total Bilirubin 0.3 mg/dL      eGFR Non African Amer 50 mL/min/1.73       Globulin 2.6 gm/dL      A/G Ratio 1.4 g/dL      BUN/Creatinine Ratio 15.5     Anion Gap 18.0 mmol/L           I reviewed the patient's new clinical results.    Assessment/Plan:     Active Hospital Problems    Diagnosis Date Noted   • **Pulmonary embolus (CMS/HCC) [I26.99] 02/06/2019   • Benign essential HTN [I10] 02/07/2019   • Gastroesophageal reflux disease without esophagitis [K21.9] 02/07/2019       #. Bilateral PE's and DVTs. ECHO pending. Lovenox. Plan for NOAC once ECHO reviewed and no lysis needed. Heme/onc consulted.   #. Hypokalemia. Replaced. Reviewed. Improvement.   #. Acute pain related to PE/DVT. Norco 7.5 q6h PRN.  #. GERD. Protonix.      Will monitor patient's hospital course and adjust treatment as hospital course dictates.    DVT prophylaxis: Lovenox  Code status is   Code Status and Medical Interventions:   Ordered at: 02/07/19 0359     Code Status:    CPR     Medical Interventions (Level of Support Prior to Arrest):    Full       Plan for disposition:Where: home and When:  1-2 days      Time:           This document has been electronically signed by Ovidio Catherine MD on February 7, 2019 8:32 AM

## 2019-02-07 NOTE — CONSULTS
Adult Nutrition  Assessment    Patient Name:  Carlos Zazueta  YOB: 1950  MRN: [id number]  Admit Date:  2/6/2019    Assessment Date:  2/7/2019    Comments:  Pt reports good appetite.  Reports 10-12 lb wt loss the past 2 weeks due to decreased appetite due  To difficulty breathing due to blood clots in his lungs.  Pt willing to receive milk at breakfast and voiced food preference.  Meds an labs reviewed.    Reason for Assessment     Row Name 02/07/19 1629          Reason for Assessment    Reason For Assessment  identified at risk by screening criteria     Identified At Risk by Screening Criteria  MST SCORE 2+;unintentional loss of 10 lbs or more in the past 2 mos             Labs/Tests/Procedures/Meds     Row Name 02/07/19 1630          Labs/Procedures/Meds    Lab Results Reviewed  reviewed        Medications    Pertinent Medications Reviewed  reviewed           Estimated/Assessed Needs     Row Name 02/07/19 1630          Calculation Measurements    Weight Used For Calculations  76.9 kg (169 lb 8.5 oz)        Estimated/Assessed Needs    Additional Documentation  Calorie Requirements (Group);Fluid Requirements (Group);[place]-St. Jeor Equation (Group);Protein Requirements (Group)        Calorie Requirements    Estimated Calorie Requirement (kcal/day)  2015     Estimated Calorie Need Method  [place]-St Jeor        KCAL/KG    14 Kcal/Kg (kcal)  1076.6     15 Kcal/Kg (kcal)  1153.5     18 Kcal/Kg (kcal)  1384.2     20 Kcal/Kg (kcal)  1538     25 Kcal/Kg (kcal)  1922.5     30 Kcal/Kg (kcal)  2307     35 Kcal/Kg (kcal)  2691.5     40 Kcal/Kg (kcal)  3076     45 Kcal/Kg (kcal)  3460.5     50 Kcal/Kg (kcal)  3845        [place]-St. Jeor Equation    RMR ([place]-St. Jeor Equation)  1550.25        Protein Requirements    Est Protein Requirement Amount (gms/kg)  1.2 gm protein     Estimated Protein Requirements (gms/day)  92.28        Fluid Requirements    Estimated Fluid Requirements (mL/day)  1922     RDA  Method (mL)  1922     Brando Method (over 20 kg)  3038         Nutrition Prescription Ordered     Row Name 02/07/19 1632          Nutrition Prescription PO    Current PO Diet  Regular     Common Modifiers  Cardiac         Evaluation of Received Nutrient/Fluid Intake     Row Name 02/07/19 1632 02/07/19 1630       Calculation Measurements    Weight Used For Calculations  --  76.9 kg (169 lb 8.5 oz)       PO Evaluation    Number of Days PO Intake Evaluated  Insufficient Data  --        Evaluation of Prescribed Nutrient/Fluid Intake     Row Name 02/07/19 1630          Calculation Measurements    Weight Used For Calculations  76.9 kg (169 lb 8.5 oz)             Electronically signed by:  Lorena Faria, MAI  02/07/19 4:33 PM

## 2019-02-07 NOTE — PLAN OF CARE
Problem: VTE, DVT and PE (Adult)  Goal: Signs and Symptoms of Listed Potential Problems Will be Absent, Minimized or Managed (VTE, DVT and PE)  Outcome: Ongoing (interventions implemented as appropriate)      Problem: Patient Care Overview  Goal: Plan of Care Review  Outcome: Ongoing (interventions implemented as appropriate)   02/07/19 0511   Coping/Psychosocial   Plan of Care Reviewed With patient   Plan of Care Review   Progress no change   OTHER   Outcome Summary New admit
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
## 2019-02-07 NOTE — PLAN OF CARE
Problem: Patient Care Overview  Goal: Plan of Care Review  Outcome: Ongoing (interventions implemented as appropriate)  Encourage adequate intake.   02/07/19 2015   Coping/Psychosocial   Plan of Care Reviewed With patient   Plan of Care Review   Progress no change   OTHER   Outcome Summary initial assessment

## 2019-02-08 ENCOUNTER — APPOINTMENT (OUTPATIENT)
Dept: CARDIOLOGY | Facility: HOSPITAL | Age: 69
End: 2019-02-08

## 2019-02-08 ENCOUNTER — APPOINTMENT (OUTPATIENT)
Dept: NUCLEAR MEDICINE | Facility: HOSPITAL | Age: 69
End: 2019-02-08

## 2019-02-08 LAB
ALBUMIN SERPL-MCNC: 3.3 G/DL (ref 3.4–4.8)
ALBUMIN/GLOB SERPL: 1.5 G/DL (ref 1.1–1.8)
ALP SERPL-CCNC: 50 U/L (ref 38–126)
ALT SERPL W P-5'-P-CCNC: 28 U/L (ref 21–72)
ANION GAP SERPL CALCULATED.3IONS-SCNC: 4 MMOL/L (ref 5–15)
AST SERPL-CCNC: 32 U/L (ref 17–59)
AT III PPP CHRO-ACNC: 105 % (ref 75–135)
BASOPHILS # BLD AUTO: 0.04 10*3/MM3 (ref 0–0.2)
BASOPHILS NFR BLD AUTO: 0.8 % (ref 0–2)
BILIRUB SERPL-MCNC: 0.6 MG/DL (ref 0.2–1.3)
BUN BLD-MCNC: 12 MG/DL (ref 7–21)
BUN/CREAT SERPL: 13.3 (ref 7–25)
CALCIUM SPEC-SCNC: 8.4 MG/DL (ref 8.4–10.2)
CARDIOLIPIN IGG SER IA-ACNC: <9 GPL U/ML (ref 0–14)
CARDIOLIPIN IGM SER IA-ACNC: <9 MPL U/ML (ref 0–12)
CHLORIDE SERPL-SCNC: 103 MMOL/L (ref 95–110)
CO2 SERPL-SCNC: 31 MMOL/L (ref 22–31)
CREAT BLD-MCNC: 0.9 MG/DL (ref 0.7–1.3)
DEPRECATED RDW RBC AUTO: 48 FL (ref 35.1–43.9)
EOSINOPHIL # BLD AUTO: 0.08 10*3/MM3 (ref 0–0.7)
EOSINOPHIL NFR BLD AUTO: 1.5 % (ref 0–7)
ERYTHROCYTE [DISTWIDTH] IN BLOOD BY AUTOMATED COUNT: 13.9 % (ref 11.5–14.5)
FERRITIN SERPL-MCNC: 263 NG/ML (ref 17.9–464)
FOLATE SERPL-MCNC: 7.02 NG/ML (ref 2.76–21)
GFR SERPL CREATININE-BSD FRML MDRD: 84 ML/MIN/1.73 (ref 49–113)
GLOBULIN UR ELPH-MCNC: 2.2 GM/DL (ref 2.3–3.5)
GLUCOSE BLD-MCNC: 98 MG/DL (ref 60–100)
HCT VFR BLD AUTO: 33.1 % (ref 39–49)
HCYS SERPL-SCNC: 16.7 UMOL/L (ref 0–15)
HGB BLD-MCNC: 11.8 G/DL (ref 13.7–17.3)
IMM GRANULOCYTES # BLD AUTO: 0.05 10*3/MM3 (ref 0–0.02)
IMM GRANULOCYTES NFR BLD AUTO: 0.9 % (ref 0–0.5)
INR PPP: 0.97 (ref 0.8–1.2)
IRON 24H UR-MRATE: 56 MCG/DL (ref 49–181)
IRON SATN MFR SERPL: 26 % (ref 20–55)
LYMPHOCYTES # BLD AUTO: 2.07 10*3/MM3 (ref 0.6–4.2)
LYMPHOCYTES NFR BLD AUTO: 39.1 % (ref 10–50)
MCH RBC QN AUTO: 34.1 PG (ref 26.5–34)
MCHC RBC AUTO-ENTMCNC: 35.6 G/DL (ref 31.5–36.3)
MCV RBC AUTO: 95.7 FL (ref 80–98)
MONOCYTES # BLD AUTO: 0.77 10*3/MM3 (ref 0–0.9)
MONOCYTES NFR BLD AUTO: 14.5 % (ref 0–12)
NEUTROPHILS # BLD AUTO: 2.29 10*3/MM3 (ref 2–8.6)
NEUTROPHILS NFR BLD AUTO: 43.2 % (ref 37–80)
PLATELET # BLD AUTO: 217 10*3/MM3 (ref 150–450)
PMV BLD AUTO: 8.8 FL (ref 8–12)
POTASSIUM BLD-SCNC: 4 MMOL/L (ref 3.5–5.1)
PROT SERPL-MCNC: 5.5 G/DL (ref 6.3–8.6)
PROTHROMBIN TIME: 12.7 SECONDS (ref 11.1–15.3)
RBC # BLD AUTO: 3.46 10*6/MM3 (ref 4.37–5.74)
SODIUM BLD-SCNC: 138 MMOL/L (ref 137–145)
TIBC SERPL-MCNC: 212 MCG/DL (ref 261–462)
VIT B12 BLD-MCNC: 275 PG/ML (ref 239–931)
WBC NRBC COR # BLD: 5.3 10*3/MM3 (ref 3.2–9.8)

## 2019-02-08 PROCEDURE — 81240 F2 GENE: CPT | Performed by: INTERNAL MEDICINE

## 2019-02-08 PROCEDURE — 25010000002 REGADENOSON 0.4 MG/5ML SOLUTION: Performed by: INTERNAL MEDICINE

## 2019-02-08 PROCEDURE — 78452 HT MUSCLE IMAGE SPECT MULT: CPT

## 2019-02-08 PROCEDURE — 80053 COMPREHEN METABOLIC PANEL: CPT | Performed by: STUDENT IN AN ORGANIZED HEALTH CARE EDUCATION/TRAINING PROGRAM

## 2019-02-08 PROCEDURE — 82746 ASSAY OF FOLIC ACID SERUM: CPT | Performed by: INTERNAL MEDICINE

## 2019-02-08 PROCEDURE — 0 TECHNETIUM SESTAMIBI: Performed by: INTERNAL MEDICINE

## 2019-02-08 PROCEDURE — A9500 TC99M SESTAMIBI: HCPCS | Performed by: INTERNAL MEDICINE

## 2019-02-08 PROCEDURE — 83550 IRON BINDING TEST: CPT | Performed by: INTERNAL MEDICINE

## 2019-02-08 PROCEDURE — 85025 COMPLETE CBC W/AUTO DIFF WBC: CPT | Performed by: STUDENT IN AN ORGANIZED HEALTH CARE EDUCATION/TRAINING PROGRAM

## 2019-02-08 PROCEDURE — 93017 CV STRESS TEST TRACING ONLY: CPT

## 2019-02-08 PROCEDURE — 99233 SBSQ HOSP IP/OBS HIGH 50: CPT | Performed by: INTERNAL MEDICINE

## 2019-02-08 PROCEDURE — 82728 ASSAY OF FERRITIN: CPT | Performed by: INTERNAL MEDICINE

## 2019-02-08 PROCEDURE — 25010000002 ENOXAPARIN PER 10 MG: Performed by: STUDENT IN AN ORGANIZED HEALTH CARE EDUCATION/TRAINING PROGRAM

## 2019-02-08 PROCEDURE — 83540 ASSAY OF IRON: CPT | Performed by: INTERNAL MEDICINE

## 2019-02-08 PROCEDURE — 82607 VITAMIN B-12: CPT | Performed by: INTERNAL MEDICINE

## 2019-02-08 PROCEDURE — 85610 PROTHROMBIN TIME: CPT | Performed by: FAMILY MEDICINE

## 2019-02-08 RX ORDER — FOLIC ACID 1 MG/1
1 TABLET ORAL DAILY
Status: DISCONTINUED | OUTPATIENT
Start: 2019-02-08 | End: 2019-02-13 | Stop reason: HOSPADM

## 2019-02-08 RX ORDER — LANOLIN ALCOHOL/MO/W.PET/CERES
1000 CREAM (GRAM) TOPICAL DAILY
Status: DISCONTINUED | OUTPATIENT
Start: 2019-02-08 | End: 2019-02-13 | Stop reason: HOSPADM

## 2019-02-08 RX ORDER — 0.9 % SODIUM CHLORIDE 0.9 %
10 VIAL (ML) INJECTION AS NEEDED
Status: DISCONTINUED | OUTPATIENT
Start: 2019-02-08 | End: 2019-02-13 | Stop reason: HOSPADM

## 2019-02-08 RX ADMIN — TECHNETIUM TC 99M SESTAMIBI 1 DOSE: 1 INJECTION INTRAVENOUS at 08:04

## 2019-02-08 RX ADMIN — FOLIC ACID 1 MG: 1 TABLET ORAL at 17:56

## 2019-02-08 RX ADMIN — GUANFACINE HYDROCHLORIDE 1 MG: 1 TABLET ORAL at 20:25

## 2019-02-08 RX ADMIN — ENOXAPARIN SODIUM 80 MG: 80 INJECTION SUBCUTANEOUS at 09:58

## 2019-02-08 RX ADMIN — TECHNETIUM TC 99M SESTAMIBI 1 DOSE: 1 INJECTION INTRAVENOUS at 09:29

## 2019-02-08 RX ADMIN — REGADENOSON 0.4 MG: 0.08 INJECTION, SOLUTION INTRAVENOUS at 09:28

## 2019-02-08 RX ADMIN — PANTOPRAZOLE SODIUM 40 MG: 40 TABLET, DELAYED RELEASE ORAL at 06:09

## 2019-02-08 RX ADMIN — SODIUM CHLORIDE, PRESERVATIVE FREE 3 ML: 5 INJECTION INTRAVENOUS at 09:59

## 2019-02-08 RX ADMIN — SODIUM CHLORIDE, PRESERVATIVE FREE 3 ML: 5 INJECTION INTRAVENOUS at 20:26

## 2019-02-08 RX ADMIN — ENOXAPARIN SODIUM 80 MG: 80 INJECTION SUBCUTANEOUS at 20:25

## 2019-02-08 RX ADMIN — WARFARIN SODIUM 5 MG: 5 TABLET ORAL at 17:56

## 2019-02-08 RX ADMIN — CETIRIZINE HYDROCHLORIDE 10 MG: 10 TABLET, FILM COATED ORAL at 09:58

## 2019-02-08 RX ADMIN — SODIUM CHLORIDE, PRESERVATIVE FREE 10 ML: 5 INJECTION INTRAVENOUS at 09:28

## 2019-02-08 RX ADMIN — HYDROCODONE BITARTRATE AND ACETAMINOPHEN 1 TABLET: 7.5; 325 TABLET ORAL at 13:45

## 2019-02-08 RX ADMIN — CYANOCOBALAMIN TAB 1000 MCG 1000 MCG: 1000 TAB at 17:56

## 2019-02-08 NOTE — CONSULTS
Cardiology Consultation Note.        Patient Name: Carlos Zazueta  Age/Sex: 68 y.o. male  : 1950  MRN: [id number]    Date of consultation: 2019  Consulting Physician: Jerson Norton MD  Primary care Physician: Marcy Florez APRN  Requesting Physician:  Ovidio Catherine MD     Reason for consultation: Shortness of breath atypical chest pain  Subjective:       Chief Complaint: Shortness of breath    History of Present Illness:  Carlos Zazueta is a 68 y.o. male     Body mass index is 24.34 kg/m².  Past medical history significant for arterial hypertension, hypertensive heart disease, right bundle branch configuration, gastroesophageal reflux disease, previous coronary angiogram done at [facility] 20 years ago which had not revealed off any evidence of any obstructive epicardial coronary artery disease, family history for coronary artery disease and previous history of lightheaded dizziness and presyncopal symptoms.    Patient was initially evaluated for preoperative cardiovascular risk assessment and subsequently had undergone a transthoracic echocardiogram which had revealed preserved left ventricular systolic function.    Patient had undergone right foot surgery in 2018.  Since then patient has had decreased mobility.  Patient does have previous history of heavy alcohol abuse.    Patient had undergone cataract surgery which was also uneventful.    Patient over the last 3-4 weeks has been having increasing episodes of shortness of breath.  Patient was subsequently evaluated by Marcy Florez.  Patient underwent x-ray including blood tests which was unremarkable.  Patient had unexplained symptoms of shortness of breath associated with lightheaded dizziness.  Patient blood pressure 100/60 with a heart rate of 63 bpm.  Patient resting electrocardiogram done revealed sinus rhythm at the rate of 63 bpm with right bundle branch configuration.    Due to the patient's symptom complex  patient had complain of having symptoms of chest pain associated with shortness of breath patient was recommended to start isosorbide and to stop tenex and to consider getting a d-dimer.    Patient was sent to the lab with a d-dimer of greater than 4000 and was subsequently recommended to present to the emergency room to undergo a CTA of the chest to rule out pulmonary embolism.    Patient underwent a CTA which revealed bilateral pulmonary embolism.  Patient was subsequently hospitalized.  Patient underwent a transthoracic echocardiogram which revealed right ventricular enlargement with mild to moderate tricuspid regurgitation with preserved left ventricular systolic function with an ejection fraction of 55-60%.  A shunt underwent ultrasound of the lower extremity which revealed evidence of bilateral deep vein thrombosis.    Patient had complain of having  symptoms of chest pain.  Patient on questioning denies any symptoms of palpitation.  Patient denies any symptoms of lightheaded dizziness.  Patient does complain of having symptoms of shortness of breath with minimal activity.  Patient is also complained of having symptoms of lightheaded dizziness.    Patient 10 point review of system except for what is stated in the history of present illness is negative.      Past Medical History:  1.  Chest pain shortness of breath.  2.  Previous coronary angiogram done at [facility] 20 years ago which was negative for any evidence of any obstructive epicardial coronary artery disease.  3.  Baseline resting echocardiogram with a right bundle branch configuration.  4.  Arterial hypertension.  5.  Hypertensive heart disease.  6.  Preserved left ventricular systolic function with an ejection fraction of 55-60%.  7.  Mild to moderate tricuspid regurgitation and mild mitral regurgitation.  8.  Hyperlipidemia.  9.  Arthritis.  10.  Gastroesophageal reflux disease.  11.  Skin carcinoma resection.  12.  Bilateral pulmonary  embolism noted on a CTA.  13.  Bilateral deep vein thrombosis noted on ultrasound            Past Surgical History:  1.  Tonsillectomy.  2.  Appendectomy.  3.  Colonoscopy.  4.  Exploratory laparotomy.  5.  Inguinal hernia repair.  6.  Esophagogastroduodenoscopy.  7.  Right foot surgery done in November 2018.  8.  Cataract surgery.  9.  MTP joint fusion.      Family History: Family history significant for father who had atherosclerotic coronary artery disease.      Social History: Quit smoking 14 years ago social alcohol intake currently used to drink heavy patient is a retired        Cardiac Risk Factors:   1.  Male.  2.  Arterial hypertension.  3.  Hyperlipidemia.  4.  Family history for coronary artery disease.  5.  Previous history of tobacco abuse    Allergies:  Allergies   Allergen Reactions   • Sulfa Antibiotics Rash   • Sulfamethoxazole-Trimethoprim Swelling   • Clindamycin/Lincomycin Hives   • Codeine Nausea Only       Medication:  Medications Prior to Admission   Medication Sig Dispense Refill Last Dose   • atorvastatin (LIPITOR) 40 MG tablet Take 40 mg by mouth Every Night.   Taking   • cetirizine (zyrTEC) 10 MG tablet Take 10 mg by mouth Daily.   Taking   • HYDROcodone-acetaminophen (NORCO) 7.5-325 MG per tablet Take 1 tablet by mouth Every 6 (Six) Hours As Needed for Moderate Pain .      • lansoprazole (PREVACID) 30 MG capsule Take 30 mg by mouth Every Night.   Taking   • losartan (COZAAR) 50 MG tablet Take 100 mg by mouth Daily.      • Omega-3 Fatty Acids (FISH OIL) 1000 MG capsule capsule Take 1,000 mg by mouth Daily.   Taking   • celecoxib (CeleBREX) 200 MG capsule Take 200 mg by mouth 2 (Two) Times a Day.   Taking   • doxycycline (VIBRAMYCIN) 100 MG capsule Take 1 capsule by mouth 2 (Two) Times a Day. 20 capsule 0 Taking   • fluticasone (FLONASE) 50 MCG/ACT nasal spray 2 sprays into the nostril(s) as directed by provider Daily As Needed for Rhinitis.   Taking   • guanFACINE (TENEX) 1 MG  tablet Take 1 mg by mouth Every Night. 1/2 tab at night   Taking   • HYDROcodone-acetaminophen (NORCO)  MG per tablet Take 1 tablet by mouth Every 6 (Six) Hours As Needed for Moderate Pain . 42 tablet 0 Taking   • losartan-hydrochlorothiazide (HYZAAR) 100-25 MG per tablet Take 1 tablet by mouth Daily.   Taking   • oxyCODONE-acetaminophen (PERCOCET) 7.5-325 MG per tablet Take 1 tablet by mouth Every 4 (Four) Hours As Needed for Moderate Pain. 30 tablet 0 Taking   • tadalafil (CIALIS) 20 MG tablet Take 20 mg by mouth Daily As Needed for erectile dysfunction.   Taking           Review of Systems:       Constitutional:  Denies recent weight loss, weight gain, fever or chills, no change in exercise tolerance.     HENT:  Denies any hearing loss, epistaxis, hoarseness, or difficulty speaking.     Eyes: Wears eyeglasses or contact lenses     Respiratory:  Denies dyspnea with exertion,no cough, wheezing, or hemoptysis.     Cardiovascular: Positive for chest pain and shortness of breath Negative for palpitations,  orthopnea, PND, peripheral edema, syncope, or claudication.     Gastrointestinal:  Denies change in bowel habits, dyspepsia, ulcer disease, hematochezia, or melena.  No nausea, no vomiting, no hematemesis, no diarrhea or constipation.    Endocrine: Negative for cold intolerance, heat intolerance, polydipsia, polyphagia or polyuria. Denies any history of weight change or unintended weight loss.    Genitourinary: Negative for hematuria.  No frequent urination or nocturia.      Musculoskeletal: Denies any history of arthritic symptoms or back problems .  No joint pain, joint stiffness, joint swelling, muscle pain, muscle weakness or neck pain.    Skin:  Denies any change in hair or nails, rashes, or skin lesions.     Allergic/Immunologic: Negative.  Negative for environmental allergies, food allergies or immunocompromised state.     Neurological:  Denies any history of recurrent headaches, strokes, TIA, or  seizure disorder.     Hematological: Denies excessive bleeding, easy bruising, fatigue, lymphadenopathy or petechiae or any bleeding disorders.     Psychiatric/Behavioral: Denies any history of depression, substance abuse, or change in cognitive function. Denies any psychomotor reaction or tangential thought.  No depression, homicidal ideations or suicidal ideations.          Objective:     Objective:  Temp:  [97.2 °F (36.2 °C)-98.6 °F (37 °C)] 98.6 °F (37 °C)  Heart Rate:  [62-83] 83  Resp:  [18] 18  BP: ()/(50-79) 121/79      Body mass index is 24.34 kg/m².           Physical Exam:   General Appearance:    Alert, oriented, cooperative, in no acute distress.   Head:    Normocephalic, atraumatic, without obvious abnormality.   Eyes:           RAS.  Lids and lashes normal, conjunctivae and sclerae normal, no icterus, no pallor.   Ears:    Ears appear intact with no abnormalities noted.   Throat:   Mucous membranes pink and moist.   Neck:   Supple, trachea midline, no carotid bruit, no organomegaly or JVD.   Lungs:     Clear to auscultation and percussion, respirations regular, even and unlabored. No wheezes, rales or rhonchi.    Heart:    Regular rhythm and normal rate, normal S1 and S2, no            murmur, no gallop, no rub, no click.   Abdomen:     Soft, non-tender, non-distended, no guarding, no rebound tenderness, normal bowel sounds in all four quadrants, no masses, liver and spleen nonpalpable.   Genitalia:    Deferred.   Extremities:   Moves all extremities well, no edema, no cyanosis, no              redness, no clubbing.   Pulses:   Pulses palpable and equal bilaterally.   Skin:   Moist and warm. No bleeding, bruising or rash.   Neurologic/Psychiatric:   Alert and oriented to person, place, and time.  Motor, power and tone in upper and lower extremities are grossly intact. No focal neurological deficits. Normal cognitive function. No psychomotor reaction or tangential thought. No depression,  homicidal ideations and suicidal ideations.       Medication Review:   Current Facility-Administered Medications   Medication Dose Route Frequency Provider Last Rate Last Dose   • cetirizine (zyrTEC) tablet 10 mg  10 mg Oral Daily Clemente Magdaleno Jr., MD   10 mg at 02/07/19 0914   • docusate sodium (COLACE) capsule 100 mg  100 mg Oral BID PRN Clemente Magdaleno Jr., MD       • enoxaparin (LOVENOX) syringe 80 mg  1 mg/kg Subcutaneous Q12H Clemente Magdaleno Jr., MD   80 mg at 02/07/19 0914   • fluticasone (FLONASE) 50 MCG/ACT nasal spray 2 spray  2 spray Nasal Daily PRN Clemente Magdaleno Jr., MD       • guanFACINE (TENEX) tablet 1 mg  1 mg Oral Nightly Clemente Magdaleno Jr., MD       • HYDROcodone-acetaminophen (NORCO) 7.5-325 MG per tablet 1 tablet  1 tablet Oral Q6H PRN Clemente Magdaleno Jr., MD       • ondansetron (ZOFRAN) injection 4 mg  4 mg Intravenous Q6H PRN Clemente Magdaleno Jr., MD       • pantoprazole (PROTONIX) EC tablet 40 mg  40 mg Oral QAM Clemente Magdaleno Jr., MD   40 mg at 02/07/19 0610   • Pharmacy to dose warfarin   Does not apply Continuous PRN Ovidio Catherine MD       • sodium chloride 0.9 % flush 1-10 mL  1-10 mL Intravenous PRN Clemente Magdaleno Jr., MD       • sodium chloride 0.9 % flush 10 mL  10 mL Intravenous PRN Davion Mane MD       • sodium chloride 0.9 % flush 3 mL  3 mL Intravenous Q12H Clemente Magdaleno Jr., MD   3 mL at 02/07/19 0914   • warfarin (COUMADIN) tablet 5 mg  5 mg Oral Daily Ovidio Catherine MD   5 mg at 02/07/19 1840       Lab Review:     Results from last 7 days   Lab Units 02/07/19  0944   SODIUM mmol/L 136*   POTASSIUM mmol/L 3.3*   CHLORIDE mmol/L 101   CO2 mmol/L 28.0   BUN mg/dL 20   CREATININE mg/dL 1.10   CALCIUM mg/dL 7.9*   BILIRUBIN mg/dL 0.9   ALK PHOS U/L 54   ALT (SGPT) U/L 30   AST (SGOT) U/L 35   GLUCOSE mg/dL 96     Results from last 7 days   Lab Units 02/06/19  2035   TROPONIN I ng/mL <0.012         Results from last 7 days   Lab Units 02/07/19  0944   WBC 10*3/mm3 4.15   HEMOGLOBIN  g/dL 13.1*   HEMATOCRIT % 36.6*   PLATELETS 10*3/mm3 200     Results from last 7 days   Lab Units 02/07/19  0222   INR  1.09   APTT seconds 31.9                       EKG:   ECG/EMG Results (last 24 hours)     Procedure Component Value Units Date/Time    ECG 12 Lead [[id number]] Collected:  02/06/19 2027     Updated:  02/06/19 2243    Narrative:       Test Reason : dyspnea  Blood Pressure : **/** mmHG  Vent. Rate : 063 BPM     Atrial Rate : 063 BPM     P-R Int : 162 ms          QRS Dur : 108 ms      QT Int : 462 ms       P-R-T Axes : 047 036 058 degrees     QTc Int : 472 ms    Normal sinus rhythm  Incomplete right bundle branch block  Borderline ECG    Confirmed by GAETANO OLIVAREZ, B. N. ([id number]) on 2/6/2019 9:59:23 PM    Referred By:             Confirmed By:FLAKITA SALTER MD    SCANNED EKG [[id number]] Resulted:  02/06/19      Updated:  02/07/19 1309    Adult Transthoracic Echo Complete W/ Cont if Necessary Per Protocol [[id number]] Collected:  02/07/19 0800     Updated:  02/07/19 1803     BSA 1.8 m^2      BH CV ECHO JENARO - BZI_BMI 32.5 kilograms/m^2      BH CV ECHO JENARO - BSA(HAYCOCK) 1.9 m^2      BH CV ECHO JENARO - BZI_METRIC_WEIGHT 78.0 kg       CV ECHO JENARO - BZI_METRIC_HEIGHT 155.0 cm      Target HR (85%) 130 bpm      Max. Pred. HR (100%) 153 bpm     Narrative:       · Right ventricular cavity is mildly dilated.  · Left ventricular wall thickness is consistent with borderline concentric   hypertrophy.  · Mild mitral valve regurgitation is present  · Mild to moderate tricuspid valve regurgitation is present.             ECHO:  Results for orders placed during the hospital encounter of 02/06/19   Adult Transthoracic Echo Complete W/ Cont if Necessary Per Protocol    Narrative · Right ventricular cavity is mildly dilated.  · Left ventricular wall thickness is consistent with borderline concentric   hypertrophy.  · Mild mitral valve regurgitation is present  · Mild to moderate tricuspid valve regurgitation is  present.           Imaging:  Imaging Results (last 24 hours)     Procedure Component Value Units Date/Time    US Venous Doppler Lower Extremity Bilateral (duplex) [[id number]] Collected:  02/07/19 0735     Updated:  02/07/19 0849    Narrative:       Procedure: Bilateral lower extremity venous ultrasound      CLINICAL INDICATION: PE check for DVT, I26.99 Other pulmonary  embolism without acute cor pulmonale      COMPARISON: None    FINDINGS:    Noncompressibility of the left popliteal vein with associated  indwelling partial deep venous thrombosis. Deep venous thrombosis  which appears completely occlusive is also seen involving left  calf vein arising off the popliteal vein. Remainder of left lower  extremity venous system appears widely patent with normal Doppler  blood flow.    Completely occlusive deep venous thrombosis involving right calf  vein extending off the popliteal vein. Otherwise right lower  extremity venous system appears widely patent and demonstrates  normal dopplerable blood flow.        Impression:       CONCLUSION:  1.  Left popliteal vein indwelling partial deep venous  thrombosis.  2.  Bilateral lower extremity calf vein completely occlusive deep  venous thrombosis.  3.  Remainder of bilateral lower extremity venous ultrasound  unremarkable.  4.  Findings discussed with patient's nurse via phone at 8:47 AM  on 2/7/2019.    Electronically signed by:  Janusz Urbina MD  2/7/2019 8:48 AM CST  Workstation: [id number]    CT Angiogram Chest With Contrast [[id number]] Collected:  02/06/19 2125     Updated:  02/06/19 2212    Narrative:         [facility]    Patient Name: DIANA KAYLEEN    ATTENDING: KRISTINE RAE     REFERRING: KRISTINE RAE    ORDERING: KRISTINE RAE    -----------------------    PROCEDURE: CT angiogram chest with contrast    DATE OF EXAM: 2/6/2019    HISTORY: Dyspnea, elevated d-dimer level    CT angiography examination of the chest was performed following  the bolus  administration of intravenous contrast. Axial and  coronal image sets were generated. This exam was performed  according to our departmental dose-optimization program, which  includes automated exposure control, adjustment of the mA and/or  kV according to the patient's size and/or use of iterative  reconstruction techniques with goal of optimizing doses that are  As Low As Reasonably Achievable (ALARA).  Additional multi-planer  oblique MIPS images were also performed. Examination followed the  pulmonary embolus protocol.     No previous studies are available for comparison.    There is satisfactory contrast opacification achieved throughout  the pulmonary arteries bilaterally. There are several filling  defects compatible with a large pulmonary emboli within the right  lower pulmonary branches. There is also a single small lumbar  emboli suggested in the left lower lobe.     The lungs are well expanded bilaterally and appear free of  infiltrates or pleural effusions. No pulmonary nodules or masses  are seen. The thoracic aorta enhances satisfactorily with no  aneurysmal dilatation or dissection seen.  The mediastinal and  hilar regions are clear with no evidence of central masses or  adenopathy. The central airways are widely patent.       Impression:       1. Bilateral pulmonary emboli. Moderate emboli are seen  throughout the right lower lobe pulmonary artery branches. A  single small embolus is seen in the left lower lobe.  2. Lungs are satisfactorily expanded and clear of infiltrates or  effusions.  3. Results of the examination was called to Dr. Mane in the  emergency department at 2210 hours.      Electronically signed by:  Thong Munguia MD  2/6/2019 10:11 PM  CST Workstation: [id number] personally viewed and interpreted the patient's EKG/Telemetry data.    Assessment:   1.  Chest pain shortness of breath.  2.  Bilateral pulmonary embolism.  3.  Bilateral deep vein thrombosis.  4.  Arterial  hypertension.  5.  Hypertensive heart disease.  6.  Baseline EKG with a right bundle branch block.          Plan:   1.  Chest pain and shortness of breath.  Patient's symptoms of chest pain and shortness of breath is probably secondary to the bilateral pulmonary embolism.  Patient on outpatient basis was recommended to undergo a stress tests evaluation.  Patient had undergone previous coronary angiogram 20 years ago at [facility] which had not revealed off any evidence of any obstructive epicardial coronary artery disease.  Patient at the present time would undergo workup for the bilateral pulmonary embolism with a hypercoagulable workup and evaluation by hematology Dr. Vega.  Patient would also be subjected to a Lexiscan Cardiolite stress test to rule out stress-induced ischemia.  Patient has been started on anticoagulation with Coumadin and would continue with the Lovenox.  2.  Abnormal resting (right bundle branch configuration is noted.  Patient has complained of having symptoms of lightheaded dizziness.  Patient has been Tenex daily which was decreased to 1 mg recently due to the patient's low blood pressure.  Patient resting bradycardia is probably secondary Tenex.   3.  Arterial hypertension.  Patient blood pressure remained stable.  4.  Hypertensive heart disease.  Patient echocardiogram revealed right ventricular enlargement with mild to moderate atrophy regurgitation which is probably secondary to the bilateral pulmonary embolism.  Patient hemodynamically has remained stable.      Thank you for the consultation      Time: time spent in face-to-face evaluation of greater than 55  minutes and interacting and formulating examining and discussing the plan with the patient with 50% of greater time spent in face-to-face interaction.    Jerson Norton MD  02/07/19  7:58 PM  Dictated utilizing Dragon dictation.

## 2019-02-08 NOTE — PROGRESS NOTES
"Anticoagulation by Pharmacy - Warfarin    Carlos Zazueta is a 68 y.o.male being initiated on warfarin for active thrombosis PE/DVT  Patient is also receiving enoxaparin    Home regimen: new start  INR Goal: 2-3    Today's INR:   Lab Results   Component Value Date    INR 0.97 02/08/2019       Objective:  [Ht: 177.8 cm (70\"); Wt: 76.7 kg (169 lb)]  Lab Results   Component Value Date    INR 0.97 02/08/2019    INR 1.09 02/07/2019    PROTIME 12.7 02/08/2019    PROTIME 13.9 02/07/2019     Lab Results   Component Value Date    HGB 11.8 (L) 02/08/2019    HGB 13.1 (L) 02/07/2019    HGB 11.0 (L) 02/06/2019    HCT 33.1 (L) 02/08/2019    HCT 36.6 (L) 02/07/2019    HCT 30.3 (L) 02/06/2019     02/08/2019     02/07/2019     02/06/2019       Recent Warfarin Administrations       The 5 most recent administrations since 02/01/2019 are shown below each listed medication.    Warfarin Sodium         Order Dose Date Given     warfarin (COUMADIN) tablet 5 mg 5 mg 02/07/2019                      Assessment  Interacting medications: lovenox treatment dose  INR is 0.97, due to new start warfarin, will initiate with warfarin 5 mg nightly and monitor.    Plan:  1.  Give warfarin 5 mg tablet PO @ 1800 tonight  2.  Draw a PT/INR in AM  3.  Pharmacy will continue to follow    Brian Trammell RPH  02/08/19 1:02 PM     "
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
## 2019-02-08 NOTE — PROGRESS NOTES
"DAILY PROGRESS NOTE  [facility]    Patient Identification:  Name: Carlos Zazueta  Age: 68 y.o.  Sex: male  :  1950  MRN: [id number]         Primary Care Physician: Marcy Florez APRN    Subjective:  Interval History:He feels better.    Objective:    Scheduled Meds:  cetirizine 10 mg Oral Daily   enoxaparin 1 mg/kg Subcutaneous Q12H   guanFACINE 1 mg Oral Nightly   pantoprazole 40 mg Oral QAM   sodium chloride 3 mL Intravenous Q12H   warfarin 5 mg Oral Daily     Continuous Infusions:  Pharmacy to dose warfarin        Vital signs in last 24 hours:  Temp:  [97.4 °F (36.3 °C)-98.6 °F (37 °C)] 97.9 °F (36.6 °C)  Heart Rate:  [61-83] 76  Resp:  [18] 18  BP: (121-134)/(77-80) 132/80    Intake/Output:  No intake or output data in the 24 hours ending 19 1514    Exam:  /80 (BP Location: Right arm, Patient Position: Lying)   Pulse 76   Temp 97.9 °F (36.6 °C) (Oral)   Resp 18   Ht 177.8 cm (70\")   Wt 76.7 kg (169 lb)   SpO2 98%   BMI 24.25 kg/m²     General Appearance:    Alert, cooperative, no distress   Head:    Normocephalic, without obvious abnormality, atraumatic   Eyes:       Throat:   Lips, tongue, gums normal   Neck:   Supple, symmetrical, trachea midline, no JVD   Lungs:     Clear to auscultation bilaterally, respirations unlabored   Chest Wall:    No tenderness or deformity    Heart:    Regular rate and rhythm, S1 and S2 normal, no murmur,no  Rub or gallop   Abdomen:     Soft, non-tender, bowel sounds active, no masses, no organomegaly    Extremities:   Extremities normal, atraumatic, no cyanosis or edema   Pulses:      Skin:   Skin is warm and dry,  no rashes or palpable lesions   Neurologic:   no focal deficits noted      [unfilled]  Data Review:  Results from last 7 days   Lab Units 19  0710 19  0944 19  2035   SODIUM mmol/L 138 136* 132*   POTASSIUM mmol/L 4.0 3.3* 2.6*   CHLORIDE mmol/L 103 101 91*   CO2 mmol/L 31.0 28.0 23.0   BUN mg/dL 12 " 20 22*   CREATININE mg/dL 0.90 1.10 1.42*   GLUCOSE mg/dL 98 96 119*   CALCIUM mg/dL 8.4 7.9* 7.7*     Results from last 7 days   Lab Units 02/08/19  0638 02/07/19  0944 02/06/19 2035   WBC 10*3/mm3 5.30 4.15 6.65   HEMOGLOBIN g/dL 11.8* 13.1* 11.0*   HEMATOCRIT % 33.1* 36.6* 30.3*   PLATELETS 10*3/mm3 217 200 257             No results found for: TROPONINT      Results from last 7 days   Lab Units 02/08/19  0710 02/07/19  0944 02/06/19 2035   ALK PHOS U/L 50 54 53   BILIRUBIN mg/dL 0.6 0.9 0.3   ALT (SGPT) U/L 28 30 31   AST (SGOT) U/L 32 35 38             No results found for: POCGLU  Results from last 7 days   Lab Units 02/08/19  0710 02/07/19  0222   INR  0.97 1.09       Patient Active Problem List   Diagnosis Code   • Hallux rigidus of right foot M20.21   • Injury of plantar plate of right foot S99.921A   • Pulmonary embolus (CMS/HCC) I26.99   • Benign essential HTN I10   • Gastroesophageal reflux disease without esophagitis K21.9       Assessment:  Active Hospital Problems    Diagnosis Date Noted   • **Pulmonary embolus (CMS/HCC) [I26.99] 02/06/2019   • Benign essential HTN [I10] 02/07/2019   • Gastroesophageal reflux disease without esophagitis [K21.9] 02/07/2019      Resolved Hospital Problems   No resolved problems to display.       Plan:  Continue with anticoagulation. Await results of stress test. Lovenox bridge to coumadin. Follow up labs    Darryl Nunn MD  2/8/2019  3:14 PM

## 2019-02-08 NOTE — PLAN OF CARE
Problem: VTE, DVT and PE (Adult)  Goal: Signs and Symptoms of Listed Potential Problems Will be Absent, Minimized or Managed (VTE, DVT and PE)  Outcome: Ongoing (interventions implemented as appropriate)      Problem: Patient Care Overview  Goal: Plan of Care Review  Outcome: Ongoing (interventions implemented as appropriate)   02/07/19 3181   Coping/Psychosocial   Plan of Care Reviewed With patient   OTHER   Outcome Summary U/S of james LE positive for DVTs, started on coumadin, will continue to monitor.

## 2019-02-08 NOTE — PLAN OF CARE
Problem: VTE, DVT and PE (Adult)  Goal: Signs and Symptoms of Listed Potential Problems Will be Absent, Minimized or Managed (VTE, DVT and PE)  Outcome: Ongoing (interventions implemented as appropriate)      Problem: Patient Care Overview  Goal: Plan of Care Review  Outcome: Ongoing (interventions implemented as appropriate)   02/08/19 2491   Coping/Psychosocial   Plan of Care Reviewed With patient   Plan of Care Review   Progress no change

## 2019-02-09 ENCOUNTER — APPOINTMENT (OUTPATIENT)
Dept: GENERAL RADIOLOGY | Facility: HOSPITAL | Age: 69
End: 2019-02-09

## 2019-02-09 LAB
ALBUMIN SERPL-MCNC: 3.7 G/DL (ref 3.4–4.8)
ALBUMIN/GLOB SERPL: 1.6 G/DL (ref 1.1–1.8)
ALP SERPL-CCNC: 52 U/L (ref 38–126)
ALT SERPL W P-5'-P-CCNC: 23 U/L (ref 21–72)
ANION GAP SERPL CALCULATED.3IONS-SCNC: 4 MMOL/L (ref 5–15)
AST SERPL-CCNC: 31 U/L (ref 17–59)
BASOPHILS # BLD AUTO: 0.03 10*3/MM3 (ref 0–0.2)
BASOPHILS NFR BLD AUTO: 0.5 % (ref 0–2)
BILIRUB SERPL-MCNC: 0.4 MG/DL (ref 0.2–1.3)
BUN BLD-MCNC: 10 MG/DL (ref 7–21)
BUN/CREAT SERPL: 12.3 (ref 7–25)
CALCIUM SPEC-SCNC: 8.8 MG/DL (ref 8.4–10.2)
CHLORIDE SERPL-SCNC: 102 MMOL/L (ref 95–110)
CO2 SERPL-SCNC: 31 MMOL/L (ref 22–31)
CREAT BLD-MCNC: 0.81 MG/DL (ref 0.7–1.3)
DEPRECATED RDW RBC AUTO: 48 FL (ref 35.1–43.9)
EOSINOPHIL # BLD AUTO: 0.11 10*3/MM3 (ref 0–0.7)
EOSINOPHIL NFR BLD AUTO: 1.8 % (ref 0–7)
ERYTHROCYTE [DISTWIDTH] IN BLOOD BY AUTOMATED COUNT: 13.7 % (ref 11.5–14.5)
GFR SERPL CREATININE-BSD FRML MDRD: 95 ML/MIN/1.73 (ref 49–113)
GLOBULIN UR ELPH-MCNC: 2.3 GM/DL (ref 2.3–3.5)
GLUCOSE BLD-MCNC: 105 MG/DL (ref 60–100)
HCT VFR BLD AUTO: 34.3 % (ref 39–49)
HGB BLD-MCNC: 12.2 G/DL (ref 13.7–17.3)
IMM GRANULOCYTES # BLD AUTO: 0.03 10*3/MM3 (ref 0–0.02)
IMM GRANULOCYTES NFR BLD AUTO: 0.5 % (ref 0–0.5)
INR PPP: 0.98 (ref 0.8–1.2)
LA NT DPL PPP: 46.5 SEC (ref 0–55)
LA NT DPL/LA NT HPL PPP-RTO: 1.07 RATIO (ref 0–1.4)
LA NT PLATELET PPP: 32.8 SEC (ref 0–51.9)
LUPUS ANTICOAGULANT REFLEX: NORMAL
LYMPHOCYTES # BLD AUTO: 2.17 10*3/MM3 (ref 0.6–4.2)
LYMPHOCYTES NFR BLD AUTO: 35.7 % (ref 10–50)
MCH RBC QN AUTO: 34.3 PG (ref 26.5–34)
MCHC RBC AUTO-ENTMCNC: 35.6 G/DL (ref 31.5–36.3)
MCV RBC AUTO: 96.3 FL (ref 80–98)
MONOCYTES # BLD AUTO: 1.04 10*3/MM3 (ref 0–0.9)
MONOCYTES NFR BLD AUTO: 17.1 % (ref 0–12)
NEUTROPHILS # BLD AUTO: 2.69 10*3/MM3 (ref 2–8.6)
NEUTROPHILS NFR BLD AUTO: 44.4 % (ref 37–80)
PLATELET # BLD AUTO: 236 10*3/MM3 (ref 150–450)
PMV BLD AUTO: 8.8 FL (ref 8–12)
POTASSIUM BLD-SCNC: 4.3 MMOL/L (ref 3.5–5.1)
PROT SERPL-MCNC: 6 G/DL (ref 6.3–8.6)
PROTEIN S-FUNCTIONAL: 101 % (ref 63–140)
PROTHROMBIN TIME: 12.8 SECONDS (ref 11.1–15.3)
RBC # BLD AUTO: 3.56 10*6/MM3 (ref 4.37–5.74)
SCREEN DRVVT: 45.8 SEC (ref 0–47)
SODIUM BLD-SCNC: 137 MMOL/L (ref 137–145)
THROMBIN TIME: 18 SEC (ref 0–23)
URATE SERPL-MCNC: 4.3 MG/DL (ref 2.5–8.5)
WBC NRBC COR # BLD: 6.07 10*3/MM3 (ref 3.2–9.8)

## 2019-02-09 PROCEDURE — 80053 COMPREHEN METABOLIC PANEL: CPT | Performed by: STUDENT IN AN ORGANIZED HEALTH CARE EDUCATION/TRAINING PROGRAM

## 2019-02-09 PROCEDURE — 85610 PROTHROMBIN TIME: CPT | Performed by: FAMILY MEDICINE

## 2019-02-09 PROCEDURE — 85025 COMPLETE CBC W/AUTO DIFF WBC: CPT | Performed by: STUDENT IN AN ORGANIZED HEALTH CARE EDUCATION/TRAINING PROGRAM

## 2019-02-09 PROCEDURE — 84550 ASSAY OF BLOOD/URIC ACID: CPT | Performed by: HOSPITALIST

## 2019-02-09 PROCEDURE — 25010000002 ENOXAPARIN PER 10 MG: Performed by: STUDENT IN AN ORGANIZED HEALTH CARE EDUCATION/TRAINING PROGRAM

## 2019-02-09 PROCEDURE — 99232 SBSQ HOSP IP/OBS MODERATE 35: CPT | Performed by: INTERNAL MEDICINE

## 2019-02-09 PROCEDURE — 73630 X-RAY EXAM OF FOOT: CPT

## 2019-02-09 RX ORDER — WARFARIN SODIUM 7.5 MG/1
7.5 TABLET ORAL
Status: DISCONTINUED | OUTPATIENT
Start: 2019-02-09 | End: 2019-02-11

## 2019-02-09 RX ORDER — LOSARTAN POTASSIUM 50 MG/1
50 TABLET ORAL
Status: DISCONTINUED | OUTPATIENT
Start: 2019-02-09 | End: 2019-02-13 | Stop reason: HOSPADM

## 2019-02-09 RX ADMIN — PANTOPRAZOLE SODIUM 40 MG: 40 TABLET, DELAYED RELEASE ORAL at 06:40

## 2019-02-09 RX ADMIN — SODIUM CHLORIDE, PRESERVATIVE FREE 3 ML: 5 INJECTION INTRAVENOUS at 23:30

## 2019-02-09 RX ADMIN — ENOXAPARIN SODIUM 80 MG: 80 INJECTION SUBCUTANEOUS at 08:53

## 2019-02-09 RX ADMIN — SODIUM CHLORIDE, PRESERVATIVE FREE 3 ML: 5 INJECTION INTRAVENOUS at 08:54

## 2019-02-09 RX ADMIN — WARFARIN SODIUM 7.5 MG: 7.5 TABLET ORAL at 17:04

## 2019-02-09 RX ADMIN — CYANOCOBALAMIN TAB 1000 MCG 1000 MCG: 1000 TAB at 08:54

## 2019-02-09 RX ADMIN — ENOXAPARIN SODIUM 80 MG: 80 INJECTION SUBCUTANEOUS at 21:41

## 2019-02-09 RX ADMIN — FOLIC ACID 1 MG: 1 TABLET ORAL at 08:53

## 2019-02-09 RX ADMIN — CETIRIZINE HYDROCHLORIDE 10 MG: 10 TABLET, FILM COATED ORAL at 08:53

## 2019-02-09 RX ADMIN — LOSARTAN POTASSIUM 50 MG: 50 TABLET, FILM COATED ORAL at 12:13

## 2019-02-09 RX ADMIN — GUANFACINE HYDROCHLORIDE 1 MG: 1 TABLET ORAL at 21:41

## 2019-02-09 RX ADMIN — HYDROCODONE BITARTRATE AND ACETAMINOPHEN 1 TABLET: 7.5; 325 TABLET ORAL at 08:53

## 2019-02-09 NOTE — PROGRESS NOTES
"DAILY PROGRESS NOTE  [facility]    Patient Identification:  Name: Carlos Zazueta  Age: 68 y.o.  Sex: male  :  1950  MRN: [id number]         Primary Care Physician: Marcy Florez APRN    Subjective:  Interval History:He feels better.  He complains of left foot pain.    Objective:    Scheduled Meds:    cetirizine 10 mg Oral Daily   enoxaparin 1 mg/kg Subcutaneous Q12H   folic acid 1 mg Oral Daily   guanFACINE 1 mg Oral Nightly   pantoprazole 40 mg Oral QAM   sodium chloride 3 mL Intravenous Q12H   vitamin B-12 1,000 mcg Oral Daily   warfarin 5 mg Oral Daily     Continuous Infusions:    Pharmacy to dose warfarin        Vital signs in last 24 hours:  Temp:  [97.9 °F (36.6 °C)-99.2 °F (37.3 °C)] 98.7 °F (37.1 °C)  Heart Rate:  [64-81] 72  Resp:  [18] 18  BP: (121-160)/(73-93) 160/90    Intake/Output:    Intake/Output Summary (Last 24 hours) at 2019 1135  Last data filed at 2019 0640  Gross per 24 hour   Intake 1440 ml   Output --   Net 1440 ml       Exam:  /90 (BP Location: Left arm)   Pulse 72   Temp 98.7 °F (37.1 °C) (Temporal)   Resp 18   Ht 177.8 cm (70\")   Wt 77.1 kg (170 lb)   SpO2 98%   BMI 24.39 kg/m²     General Appearance:    Alert, cooperative, no distress   Head:    Normocephalic, without obvious abnormality, atraumatic   Eyes:       Throat:   Lips, tongue, gums normal   Neck:   Supple, symmetrical, trachea midline, no JVD   Lungs:     Clear to auscultation bilaterally, respirations unlabored   Chest Wall:    No tenderness or deformity    Heart:    Regular rate and rhythm, S1 and S2 normal, no murmur,no  Rub or gallop   Abdomen:     Soft, non-tender, bowel sounds active, no masses, no organomegaly    Extremities:   Extremities normal, atraumatic, no cyanosis or edema   Pulses:      Skin:   Skin is warm and dry,  no rashes or palpable lesions   Neurologic:   no focal deficits noted      [unfilled]  Data Review:  Results from last 7 days   Lab Units " 02/09/19  0800 02/08/19  0710 02/07/19  0944   SODIUM mmol/L 137 138 136*   POTASSIUM mmol/L 4.3 4.0 3.3*   CHLORIDE mmol/L 102 103 101   CO2 mmol/L 31.0 31.0 28.0   BUN mg/dL 10 12 20   CREATININE mg/dL 0.81 0.90 1.10   GLUCOSE mg/dL 105* 98 96   CALCIUM mg/dL 8.8 8.4 7.9*     Results from last 7 days   Lab Units 02/09/19  0800 02/08/19  0638 02/07/19  0944   WBC 10*3/mm3 6.07 5.30 4.15   HEMOGLOBIN g/dL 12.2* 11.8* 13.1*   HEMATOCRIT % 34.3* 33.1* 36.6*   PLATELETS 10*3/mm3 236 217 200             No results found for: TROPONINT      Results from last 7 days   Lab Units 02/09/19  0800 02/08/19  0710 02/07/19  0944   ALK PHOS U/L 52 50 54   BILIRUBIN mg/dL 0.4 0.6 0.9   ALT (SGPT) U/L 23 28 30   AST (SGOT) U/L 31 32 35             No results found for: POCGLU  Results from last 7 days   Lab Units 02/09/19  0800 02/08/19  0710 02/07/19  0222   INR  0.98 0.97 1.09       Patient Active Problem List   Diagnosis Code   • Hallux rigidus of right foot M20.21   • Injury of plantar plate of right foot S99.921A   • Pulmonary embolus (CMS/HCC) I26.99   • Benign essential HTN I10   • Gastroesophageal reflux disease without esophagitis K21.9       Assessment:  Active Hospital Problems    Diagnosis Date Noted   • **Pulmonary embolus (CMS/HCC) [I26.99] 02/06/2019   • Benign essential HTN [I10] 02/07/2019   • Gastroesophageal reflux disease without esophagitis [K21.9] 02/07/2019      Resolved Hospital Problems   No resolved problems to display.       Plan:  Continue with anticoagulation. Restart losartan . Lovenox bridge to coumadin. Follow up labs. XR left foot and check uric acid level.    Darryl Nunn MD  2/9/2019  11:35 AM

## 2019-02-09 NOTE — PROGRESS NOTES
Cardiology Progress Note     LOS: 2 days   Patient Care Team:  Marcy Florez APRN as PCP - General (Family Medicine)  Anthony Moore DPM as Consulting Physician (Podiatry)    Subjective:     Chart reviewed. Patient seen and examined. Patient denies any chest pain, shortness of breath, or palpitation.  Patient underwent a nuclear Cardiolite stress tests which revealed evidence of perfusion defect in the inferior wall distribution.  Patient has not had any further recurrent symptoms of chest pain.    Objective:  Temp:  [97.4 °F (36.3 °C)-98.4 °F (36.9 °C)] 98.4 °F (36.9 °C)  Heart Rate:  [61-82] 71  Resp:  [18] 18  BP: (123-154)/(77-90) 154/90    Intake/Output Summary (Last 24 hours) at 2/8/2019 1842  Last data filed at 2/8/2019 1800  Gross per 24 hour   Intake 480 ml   Output --   Net 480 ml       Physical Exam:   General Appearance:    Alert, oriented, cooperative, in no acute distress.   Head:    Normocephalic, atraumatic, without obvious abnormality   Eyes:              RAS. Lids and lashes normal, conjunctivae and sclerae normal, no icterus, no pallor.   Ears:    Ears appear intact with no abnormalities noted.   Throat:   Mucous membranes pink and moist.   Neck:   Supple, trachea midline, no carotid bruit, no organomegaly or JVD.   Lungs:     Clear to auscultation and percussion, respirations. regular, even and unlabored. No wheezes, rales, or rhonchi.    Heart:    Regular rhythm and normal rate, normal S1 and S2, no      murmur, no gallop, no rub, no click.   Abdomen:     Soft, non-tender, non-distended, no guarding, no rebound tenderness. Normal bowel sounds in all four quadrants, no masses, liver and spleen nonpalpable.    Genitalia:    Deferred.   Extremities:   Moves all extremities well, no edema, no cyanosis, no       redness, no clubbing.   Pulses:   Pulses palpable and equal bilaterally.   Skin:   Moist and warm. No bleeding, bruising or rash.   Neurologic/Psychiatric:   Alert and oriented to  person, place, and time.  Motor, power and tone in upper and lower extremities are grossly intact.  No focal neurological deficits. Normal cognitive function. No psychomotor reaction or tangential thought. No depression, homicidal ideations and suicidal ideations.          Results Review:    Results from last 7 days   Lab Units 02/08/19  0710   SODIUM mmol/L 138   POTASSIUM mmol/L 4.0   CHLORIDE mmol/L 103   CO2 mmol/L 31.0   BUN mg/dL 12   CREATININE mg/dL 0.90   CALCIUM mg/dL 8.4   BILIRUBIN mg/dL 0.6   ALK PHOS U/L 50   ALT (SGPT) U/L 28   AST (SGOT) U/L 32   GLUCOSE mg/dL 98     Results from last 7 days   Lab Units 02/06/19  2035   TROPONIN I ng/mL <0.012         Results from last 7 days   Lab Units 02/08/19  0638   WBC 10*3/mm3 5.30   HEMOGLOBIN g/dL 11.8*   HEMATOCRIT % 33.1*   PLATELETS 10*3/mm3 217     Results from last 7 days   Lab Units 02/08/19  0710 02/07/19  0222   INR  0.97 1.09   APTT seconds  --  31.9                       ECHO:  Results for orders placed during the hospital encounter of 02/06/19   Adult Transthoracic Echo Complete W/ Cont if Necessary Per Protocol    Narrative · Right ventricular cavity is mildly dilated.  · Left ventricular wall thickness is consistent with borderline concentric   hypertrophy.  · Mild mitral valve regurgitation is present  · Mild to moderate tricuspid valve regurgitation is present.          SCANNED EKG   Final Result      ECG 12 Lead   ED Interpretation   Davion Mane MD     2/6/2019 10:43 PM   ECG 12 Lead      Date/Time: 2/6/2019 8:27 PM   Performed by: Davion Mane MD   Authorized by: Davion Mane MD    Interpreted by physician   Clinical impression: non-specific ECG   Comments: Normal sinus rhythm rate of 63.  Incomplete right bundle branch    block pattern.  No ST elevation.         Final Result   Test Reason : dyspnea   Blood Pressure : **/** mmHG   Vent. Rate : 063 BPM     Atrial Rate : 063 BPM      P-R Int : 162 ms          QRS Dur : 108 ms       QT Int  : 462 ms       P-R-T Axes : 047 036 058 degrees      QTc Int : 472 ms      Normal sinus rhythm   Incomplete right bundle branch block   Borderline ECG      Confirmed by GAETANO OLIVAREZ, B. N. ([id number]) on 2/6/2019 9:59:23 PM      Referred By:             Confirmed By:FLAKITA SALTER MD           Medication Review:   Current Facility-Administered Medications   Medication Dose Route Frequency Provider Last Rate Last Dose   • cetirizine (zyrTEC) tablet 10 mg  10 mg Oral Daily Clemente Magdaleno Jr., MD   10 mg at 02/08/19 0958   • docusate sodium (COLACE) capsule 100 mg  100 mg Oral BID PRN Clemente Magdaleno Jr., MD       • enoxaparin (LOVENOX) syringe 80 mg  1 mg/kg Subcutaneous Q12H Clemente Magdaleno Jr., MD   80 mg at 02/08/19 0958   • fluticasone (FLONASE) 50 MCG/ACT nasal spray 2 spray  2 spray Nasal Daily PRN Clemente Magdaleno Jr., MD       • folic acid (FOLVITE) tablet 1 mg  1 mg Oral Daily Dickson Vega MD   1 mg at 02/08/19 1756   • guanFACINE (TENEX) tablet 1 mg  1 mg Oral Nightly Clemente Magdaleno Jr., MD   1 mg at 02/07/19 2102   • HYDROcodone-acetaminophen (NORCO) 7.5-325 MG per tablet 1 tablet  1 tablet Oral Q6H PRN Clemente Magdaleno Jr., MD   1 tablet at 02/08/19 1345   • ondansetron (ZOFRAN) injection 4 mg  4 mg Intravenous Q6H PRN Clemente Magdaleno Jr., MD       • pantoprazole (PROTONIX) EC tablet 40 mg  40 mg Oral QAM Clemente Magdaleno Jr., MD   40 mg at 02/08/19 0609   • Pharmacy to dose warfarin   Does not apply Continuous PRN Ovidio Catherine MD       • sodium chloride 0.9 % flush 1-10 mL  1-10 mL Intravenous PRN Clemente Magdaleno Jr., MD       • sodium chloride 0.9 % flush 10 mL  10 mL Intravenous PRN Davion Mane MD       • sodium chloride 0.9 % flush 10 mL  10 mL Intravenous PRN Jerson Norton MD   10 mL at 02/08/19 0928   • sodium chloride 0.9 % flush 3 mL  3 mL Intravenous Q12H Clemente Magdaleno Jr., MD   3 mL at 02/08/19 0959   • vitamin B-12 (CYANOCOBALAMIN) tablet 1,000 mcg  1,000 mcg Oral Daily Dickson Vega MD   1,000  mcg at 02/08/19 1756   • warfarin (COUMADIN) tablet 5 mg  5 mg Oral Daily Ovidio Catherine MD   5 mg at 02/08/19 1756       Assessment and Plan:      Pulmonary embolus (CMS/HCC)    Benign essential HTN    Gastroesophageal reflux disease without esophagitis      1.  Chest pain.  Patient had undergone previous coronary angiogram 20 years ago at [facility] which had not revealed off any evidence of any coronary artery disease.  Patient nuclear stress test was positive with perfusion imaging indicates a medium-sized, mild-to-moderately severe area of ischemia located in the inferior wall.  Have discussed the finding of the nuclear stress test with the patient.  Due to the patient evidence of bilateral deep vein thrombosis and bilateral pulmonary embolism patient at the present time would not be subjected to a invasive coronary angiogram.  Patient would be treated medically.  2.  Pulmonary embolism with deep vein thrombosis.  Patient is on anticoagulation with Coumadin.  Patient has been followed by Dr. Vega for hypercoagulable workup.  3.  Arterial hypertension.  Patient blood pressure has remained stable.    The above plan of management were discussed with the patient          Jerson Norton MD  02/08/19  6:42 PM      Time: Time spent on face-to-face interaction 20 minutes      Dictated utilizing Dragon dictation.

## 2019-02-09 NOTE — PROGRESS NOTES
"Anticoagulation by Pharmacy - Warfarin    Carlos Zazueta is a 68 y.o.male  [Ht: 177.8 cm (70\"); Wt: 77.1 kg (170 lb)] on Warfarin 5 mg PO  for indication of DVT/PE.    Goal INR: 2-3    Today's INR:   Lab Results   Component Value Date    INR 0.98 02/09/2019         Lab Results   Component Value Date    INR 0.98 02/09/2019    INR 0.97 02/08/2019    INR 1.09 02/07/2019    PROTIME 12.8 02/09/2019    PROTIME 12.7 02/08/2019    PROTIME 13.9 02/07/2019     Lab Results   Component Value Date    HGB 12.2 (L) 02/09/2019    HGB 11.8 (L) 02/08/2019    HGB 13.1 (L) 02/07/2019     Lab Results   Component Value Date    HCT 34.3 (L) 02/09/2019    HCT 33.1 (L) 02/08/2019    HCT 36.6 (L) 02/07/2019     Lab Results   Component Value Date     02/09/2019     02/08/2019     02/07/2019       Recent Warfarin Administrations       The 5 most recent administrations since 02/02/2019 are shown below each listed medication.    Warfarin Sodium         Order Dose Date Given     warfarin (COUMADIN) tablet 5 mg 5 mg 02/08/2019      5 mg 02/07/2019                      Assessment/Plan:  Reviewed above labs. INR is 0.98.  INR is sub therapeutic. Pt did receive dose of warfarin last night. No changes in medication list. Will increase current dose of warfarin to  7.5 mg. Pt is also on Enoxaparin. Rx will continue to follow and adjust dose accordingly. .    Alexa Holcomb, [facility]  02/09/19 12:49 PM     "
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
## 2019-02-09 NOTE — PLAN OF CARE
Problem: VTE, DVT and PE (Adult)  Goal: Signs and Symptoms of Listed Potential Problems Will be Absent, Minimized or Managed (VTE, DVT and PE)  Outcome: Ongoing (interventions implemented as appropriate)      Problem: Patient Care Overview  Goal: Plan of Care Review  Outcome: Ongoing (interventions implemented as appropriate)   02/09/19 0657   Coping/Psychosocial   Plan of Care Reviewed With patient   Plan of Care Review   Progress improving

## 2019-02-09 NOTE — PLAN OF CARE
Problem: VTE, DVT and PE (Adult)  Goal: Signs and Symptoms of Listed Potential Problems Will be Absent, Minimized or Managed (VTE, DVT and PE)  Outcome: Ongoing (interventions implemented as appropriate)      Problem: Patient Care Overview  Goal: Plan of Care Review  Outcome: Ongoing (interventions implemented as appropriate)   02/09/19 4884   Coping/Psychosocial   Plan of Care Reviewed With patient   Plan of Care Review   Progress improving   OTHER   Outcome Summary monitoring INR; coumadin increased

## 2019-02-10 PROBLEM — M19.90 ARTHRITIS: Status: ACTIVE | Noted: 2019-02-10

## 2019-02-10 PROBLEM — I82.409 DVT (DEEP VENOUS THROMBOSIS): Status: ACTIVE | Noted: 2019-02-10

## 2019-02-10 LAB
ALBUMIN SERPL-MCNC: 3.6 G/DL (ref 3.4–4.8)
ALBUMIN/GLOB SERPL: 1.6 G/DL (ref 1.1–1.8)
ALP SERPL-CCNC: 50 U/L (ref 38–126)
ALT SERPL W P-5'-P-CCNC: 26 U/L (ref 21–72)
ANION GAP SERPL CALCULATED.3IONS-SCNC: 6 MMOL/L (ref 5–15)
AST SERPL-CCNC: 32 U/L (ref 17–59)
BASOPHILS # BLD AUTO: 0.01 10*3/MM3 (ref 0–0.2)
BASOPHILS NFR BLD AUTO: 0.2 % (ref 0–2)
BILIRUB SERPL-MCNC: 0.3 MG/DL (ref 0.2–1.3)
BUN BLD-MCNC: 8 MG/DL (ref 7–21)
BUN/CREAT SERPL: 12.1 (ref 7–25)
CALCIUM SPEC-SCNC: 8.7 MG/DL (ref 8.4–10.2)
CHLORIDE SERPL-SCNC: 103 MMOL/L (ref 95–110)
CO2 SERPL-SCNC: 28 MMOL/L (ref 22–31)
CREAT BLD-MCNC: 0.66 MG/DL (ref 0.7–1.3)
DEPRECATED RDW RBC AUTO: 48.1 FL (ref 35.1–43.9)
EOSINOPHIL # BLD AUTO: 0.12 10*3/MM3 (ref 0–0.7)
EOSINOPHIL NFR BLD AUTO: 2 % (ref 0–7)
ERYTHROCYTE [DISTWIDTH] IN BLOOD BY AUTOMATED COUNT: 13.8 % (ref 11.5–14.5)
FACT V ACT/NOR PPP: 104 % (ref 70–150)
GFR SERPL CREATININE-BSD FRML MDRD: 120 ML/MIN/1.73 (ref 49–113)
GLOBULIN UR ELPH-MCNC: 2.3 GM/DL (ref 2.3–3.5)
GLUCOSE BLD-MCNC: 103 MG/DL (ref 60–100)
HCT VFR BLD AUTO: 32.7 % (ref 39–49)
HGB BLD-MCNC: 11.7 G/DL (ref 13.7–17.3)
IMM GRANULOCYTES # BLD AUTO: 0.06 10*3/MM3 (ref 0–0.02)
IMM GRANULOCYTES NFR BLD AUTO: 1 % (ref 0–0.5)
INR PPP: 1.06 (ref 0.8–1.2)
LYMPHOCYTES # BLD AUTO: 2.29 10*3/MM3 (ref 0.6–4.2)
LYMPHOCYTES NFR BLD AUTO: 38.2 % (ref 10–50)
MCH RBC QN AUTO: 34.3 PG (ref 26.5–34)
MCHC RBC AUTO-ENTMCNC: 35.8 G/DL (ref 31.5–36.3)
MCV RBC AUTO: 95.9 FL (ref 80–98)
MONOCYTES # BLD AUTO: 0.81 10*3/MM3 (ref 0–0.9)
MONOCYTES NFR BLD AUTO: 13.5 % (ref 0–12)
NEUTROPHILS # BLD AUTO: 2.7 10*3/MM3 (ref 2–8.6)
NEUTROPHILS NFR BLD AUTO: 45.1 % (ref 37–80)
PLATELET # BLD AUTO: 225 10*3/MM3 (ref 150–450)
PMV BLD AUTO: 8.8 FL (ref 8–12)
POTASSIUM BLD-SCNC: 4.1 MMOL/L (ref 3.5–5.1)
PROT SERPL-MCNC: 5.9 G/DL (ref 6.3–8.6)
PROTHROMBIN TIME: 13.6 SECONDS (ref 11.1–15.3)
RBC # BLD AUTO: 3.41 10*6/MM3 (ref 4.37–5.74)
SODIUM BLD-SCNC: 137 MMOL/L (ref 137–145)
WBC NRBC COR # BLD: 5.99 10*3/MM3 (ref 3.2–9.8)

## 2019-02-10 PROCEDURE — 85025 COMPLETE CBC W/AUTO DIFF WBC: CPT | Performed by: STUDENT IN AN ORGANIZED HEALTH CARE EDUCATION/TRAINING PROGRAM

## 2019-02-10 PROCEDURE — 80053 COMPREHEN METABOLIC PANEL: CPT | Performed by: STUDENT IN AN ORGANIZED HEALTH CARE EDUCATION/TRAINING PROGRAM

## 2019-02-10 PROCEDURE — 99232 SBSQ HOSP IP/OBS MODERATE 35: CPT | Performed by: INTERNAL MEDICINE

## 2019-02-10 PROCEDURE — 85610 PROTHROMBIN TIME: CPT | Performed by: FAMILY MEDICINE

## 2019-02-10 PROCEDURE — 25010000002 ENOXAPARIN PER 10 MG: Performed by: STUDENT IN AN ORGANIZED HEALTH CARE EDUCATION/TRAINING PROGRAM

## 2019-02-10 RX ADMIN — SODIUM CHLORIDE, PRESERVATIVE FREE 3 ML: 5 INJECTION INTRAVENOUS at 21:19

## 2019-02-10 RX ADMIN — ENOXAPARIN SODIUM 80 MG: 80 INJECTION SUBCUTANEOUS at 21:18

## 2019-02-10 RX ADMIN — ENOXAPARIN SODIUM 80 MG: 80 INJECTION SUBCUTANEOUS at 09:55

## 2019-02-10 RX ADMIN — HYDROCODONE BITARTRATE AND ACETAMINOPHEN 1 TABLET: 7.5; 325 TABLET ORAL at 16:44

## 2019-02-10 RX ADMIN — LOSARTAN POTASSIUM 50 MG: 50 TABLET, FILM COATED ORAL at 09:55

## 2019-02-10 RX ADMIN — SODIUM CHLORIDE, PRESERVATIVE FREE 3 ML: 5 INJECTION INTRAVENOUS at 09:55

## 2019-02-10 RX ADMIN — CETIRIZINE HYDROCHLORIDE 10 MG: 10 TABLET, FILM COATED ORAL at 09:54

## 2019-02-10 RX ADMIN — WARFARIN SODIUM 7.5 MG: 7.5 TABLET ORAL at 16:44

## 2019-02-10 RX ADMIN — FOLIC ACID 1 MG: 1 TABLET ORAL at 09:54

## 2019-02-10 RX ADMIN — CYANOCOBALAMIN TAB 1000 MCG 1000 MCG: 1000 TAB at 09:55

## 2019-02-10 RX ADMIN — PANTOPRAZOLE SODIUM 40 MG: 40 TABLET, DELAYED RELEASE ORAL at 06:27

## 2019-02-10 RX ADMIN — GUANFACINE HYDROCHLORIDE 1 MG: 1 TABLET ORAL at 21:18

## 2019-02-10 NOTE — NURSING NOTE
"Pt wife came to desk and states that she believes pt has a can of dip (tobacco) in his room and that she has voiced this concern to two other nurses  Pts wife states that if pt is confronted about the dip in his room that he \"will go off and security will have to be called\"  Spoke with pts wife and informed her that nurse has not seen a can of dip nor any trash to indicate the presence of dipping but that nurse would keep a eye on the situation and would approach the situation if nurse witnessed any of the above actions   "

## 2019-02-10 NOTE — PLAN OF CARE
Problem: VTE, DVT and PE (Adult)  Goal: Signs and Symptoms of Listed Potential Problems Will be Absent, Minimized or Managed (VTE, DVT and PE)  Outcome: Ongoing (interventions implemented as appropriate)      Problem: Patient Care Overview  Goal: Plan of Care Review  Outcome: Ongoing (interventions implemented as appropriate)   02/10/19 0350   Coping/Psychosocial   Plan of Care Reviewed With patient   Plan of Care Review   Progress improving   OTHER   Outcome Summary waiting on therapeutic INR

## 2019-02-10 NOTE — PROGRESS NOTES
"DAILY PROGRESS NOTE  [facility]    Patient Identification:  Name: Carlos Zazueta  Age: 68 y.o.  Sex: male  :  1950  MRN: [id number]         Primary Care Physician: Maryc Florez APRN    Subjective:  Interval History:He feels better.  He complains of left foot pain, but is better.    Objective:    Scheduled Meds:    cetirizine 10 mg Oral Daily   enoxaparin 1 mg/kg Subcutaneous Q12H   folic acid 1 mg Oral Daily   guanFACINE 1 mg Oral Nightly   losartan 50 mg Oral Q24H   pantoprazole 40 mg Oral QAM   sodium chloride 3 mL Intravenous Q12H   vitamin B-12 1,000 mcg Oral Daily   warfarin 7.5 mg Oral Daily     Continuous Infusions:    Pharmacy to dose warfarin        Vital signs in last 24 hours:  Temp:  [97.2 °F (36.2 °C)-99.2 °F (37.3 °C)] 98.2 °F (36.8 °C)  Heart Rate:  [65-80] 74  Resp:  [18] 18  BP: (127-144)/(60-89) 127/85    Intake/Output:    Intake/Output Summary (Last 24 hours) at 2/10/2019 1345  Last data filed at 2/10/2019 0956  Gross per 24 hour   Intake 1020 ml   Output --   Net 1020 ml       Exam:  /85 (BP Location: Right arm, Patient Position: Sitting)   Pulse 74   Temp 98.2 °F (36.8 °C) (Temporal)   Resp 18   Ht 177.8 cm (70\")   Wt 76.7 kg (169 lb)   SpO2 97%   BMI 24.25 kg/m²     General Appearance:    Alert, cooperative, no distress   Head:    Normocephalic, without obvious abnormality, atraumatic   Eyes:       Throat:   Lips, tongue, gums normal   Neck:   Supple, symmetrical, trachea midline, no JVD   Lungs:     Clear to auscultation bilaterally, respirations unlabored   Chest Wall:    No tenderness or deformity    Heart:    Regular rate and rhythm, S1 and S2 normal, no murmur,no  Rub or gallop   Abdomen:     Soft, non-tender, bowel sounds active, no masses, no organomegaly    Extremities:   Extremities normal, atraumatic, no cyanosis or edema   Pulses:      Skin:   Skin is warm and dry,  no rashes or palpable lesions   Neurologic:   no focal deficits noted "      [unfilled]  Data Review:  Results from last 7 days   Lab Units 02/10/19  0738 02/09/19  0800 02/08/19  0710   SODIUM mmol/L 137 137 138   POTASSIUM mmol/L 4.1 4.3 4.0   CHLORIDE mmol/L 103 102 103   CO2 mmol/L 28.0 31.0 31.0   BUN mg/dL 8 10 12   CREATININE mg/dL 0.66* 0.81 0.90   GLUCOSE mg/dL 103* 105* 98   CALCIUM mg/dL 8.7 8.8 8.4     Results from last 7 days   Lab Units 02/10/19  0738 02/09/19  0800 02/08/19  0638   WBC 10*3/mm3 5.99 6.07 5.30   HEMOGLOBIN g/dL 11.7* 12.2* 11.8*   HEMATOCRIT % 32.7* 34.3* 33.1*   PLATELETS 10*3/mm3 225 236 217             No results found for: TROPONINT      Results from last 7 days   Lab Units 02/10/19  0738 02/09/19  0800 02/08/19  0710   ALK PHOS U/L 50 52 50   BILIRUBIN mg/dL 0.3 0.4 0.6   ALT (SGPT) U/L 26 23 28   AST (SGOT) U/L 32 31 32             No results found for: POCGLU  Results from last 7 days   Lab Units 02/10/19  0738 02/09/19  0800 02/08/19  0710   INR  1.06 0.98 0.97       Patient Active Problem List   Diagnosis Code   • Hallux rigidus of right foot M20.21   • Injury of plantar plate of right foot S99.921A   • Pulmonary embolus (CMS/HCC) I26.99   • Benign essential HTN I10   • Gastroesophageal reflux disease without esophagitis K21.9   • Arthritis M19.90   • DVT (deep venous thrombosis) (CMS/HCC) I82.409       Assessment:  Active Hospital Problems    Diagnosis Date Noted   • **Pulmonary embolus (CMS/HCC) [I26.99] 02/06/2019   • Arthritis [M19.90] 02/10/2019   • DVT (deep venous thrombosis) (CMS/HCC) [I82.409] 02/10/2019   • Benign essential HTN [I10] 02/07/2019   • Gastroesophageal reflux disease without esophagitis [K21.9] 02/07/2019      Resolved Hospital Problems   No resolved problems to display.       Plan:  Continue with anticoagulation. Restart losartan . Lovenox bridge to coumadin.     Darryl Nunn MD  2/10/2019  1:45 PM
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
## 2019-02-10 NOTE — PLAN OF CARE
Problem: VTE, DVT and PE (Adult)  Goal: Signs and Symptoms of Listed Potential Problems Will be Absent, Minimized or Managed (VTE, DVT and PE)  Outcome: Ongoing (interventions implemented as appropriate)      Problem: Patient Care Overview  Goal: Plan of Care Review  Outcome: Ongoing (interventions implemented as appropriate)   02/10/19 1401   Coping/Psychosocial   Plan of Care Reviewed With patient;spouse   Coping/Psychosocial   Patient Agreement with Plan of Care agrees     Goal: Individualization and Mutuality  Outcome: Ongoing (interventions implemented as appropriate)    Goal: Discharge Needs Assessment  Outcome: Ongoing (interventions implemented as appropriate)    Goal: Interprofessional Rounds/Family Conf  Outcome: Ongoing (interventions implemented as appropriate)

## 2019-02-10 NOTE — PROGRESS NOTES
"Anticoagulation by Pharmacy - Warfarin    Carlos Zazueta is a 68 y.o.male  [Ht: 177.8 cm (70\"); Wt: 76.7 kg (169 lb)] on Warfarin 7.5 mg PO  for indication of DVT/PE.    Goal INR: 2-3    Today's INR:   Lab Results   Component Value Date    INR 1.06 02/10/2019         Lab Results   Component Value Date    INR 1.06 02/10/2019    INR 0.98 02/09/2019    INR 0.97 02/08/2019    PROTIME 13.6 02/10/2019    PROTIME 12.8 02/09/2019    PROTIME 12.7 02/08/2019     Lab Results   Component Value Date    HGB 11.7 (L) 02/10/2019    HGB 12.2 (L) 02/09/2019    HGB 11.8 (L) 02/08/2019     Lab Results   Component Value Date    HCT 32.7 (L) 02/10/2019    HCT 34.3 (L) 02/09/2019    HCT 33.1 (L) 02/08/2019     Lab Results   Component Value Date     02/10/2019     02/09/2019     02/08/2019       Recent Warfarin Administrations       The 5 most recent administrations since 02/03/2019 are shown below each listed medication.    Warfarin Sodium         Order Dose Date Given     warfarin (COUMADIN) tablet 7.5 mg 7.5 mg 02/09/2019     warfarin (COUMADIN) tablet 5 mg 5 mg 02/08/2019      5 mg 02/07/2019                      Assessment/Plan:  Reviewed above labs. INR is 1.06.  INR is slowly increasing towards goal. No changes in medication list. Concurrent medications include enoxaparin. Pt did receive dose of warfarin last night.  Will continue current dose of  7.5 mg. Rx will continue to follow and adjust dose accordingly.      Alexa Holcomb [facility]  02/10/19 10:35 AM     "

## 2019-02-11 LAB
ALBUMIN SERPL-MCNC: 3.3 G/DL (ref 3.4–4.8)
ALBUMIN/GLOB SERPL: 1.4 G/DL (ref 1.1–1.8)
ALP SERPL-CCNC: 45 U/L (ref 38–126)
ALT SERPL W P-5'-P-CCNC: 20 U/L (ref 21–72)
ANION GAP SERPL CALCULATED.3IONS-SCNC: 3 MMOL/L (ref 5–15)
AST SERPL-CCNC: 29 U/L (ref 17–59)
BASOPHILS # BLD AUTO: 0.04 10*3/MM3 (ref 0–0.2)
BASOPHILS NFR BLD AUTO: 0.7 % (ref 0–2)
BILIRUB SERPL-MCNC: 0.2 MG/DL (ref 0.2–1.3)
BUN BLD-MCNC: 8 MG/DL (ref 7–21)
BUN/CREAT SERPL: 10.1 (ref 7–25)
CALCIUM SPEC-SCNC: 8.6 MG/DL (ref 8.4–10.2)
CHLORIDE SERPL-SCNC: 105 MMOL/L (ref 95–110)
CO2 SERPL-SCNC: 30 MMOL/L (ref 22–31)
CREAT BLD-MCNC: 0.79 MG/DL (ref 0.7–1.3)
DEPRECATED RDW RBC AUTO: 50.2 FL (ref 35.1–43.9)
EOSINOPHIL # BLD AUTO: 0.12 10*3/MM3 (ref 0–0.7)
EOSINOPHIL NFR BLD AUTO: 2.2 % (ref 0–7)
ERYTHROCYTE [DISTWIDTH] IN BLOOD BY AUTOMATED COUNT: 14 % (ref 11.5–14.5)
GFR SERPL CREATININE-BSD FRML MDRD: 98 ML/MIN/1.73 (ref 49–113)
GLOBULIN UR ELPH-MCNC: 2.4 GM/DL (ref 2.3–3.5)
GLUCOSE BLD-MCNC: 99 MG/DL (ref 60–100)
HCT VFR BLD AUTO: 33 % (ref 39–49)
HGB BLD-MCNC: 11.3 G/DL (ref 13.7–17.3)
IMM GRANULOCYTES # BLD AUTO: 0.05 10*3/MM3 (ref 0–0.02)
IMM GRANULOCYTES NFR BLD AUTO: 0.9 % (ref 0–0.5)
INR PPP: 1.56 (ref 0.8–1.2)
LYMPHOCYTES # BLD AUTO: 2.3 10*3/MM3 (ref 0.6–4.2)
LYMPHOCYTES NFR BLD AUTO: 42.1 % (ref 10–50)
MCH RBC QN AUTO: 33.6 PG (ref 26.5–34)
MCHC RBC AUTO-ENTMCNC: 34.2 G/DL (ref 31.5–36.3)
MCV RBC AUTO: 98.2 FL (ref 80–98)
MONOCYTES # BLD AUTO: 0.77 10*3/MM3 (ref 0–0.9)
MONOCYTES NFR BLD AUTO: 14.1 % (ref 0–12)
NEUTROPHILS # BLD AUTO: 2.18 10*3/MM3 (ref 2–8.6)
NEUTROPHILS NFR BLD AUTO: 40 % (ref 37–80)
NRBC BLD AUTO-RTO: 0 /100 WBC (ref 0–0)
PLATELET # BLD AUTO: 200 10*3/MM3 (ref 150–450)
PMV BLD AUTO: 8.6 FL (ref 8–12)
POTASSIUM BLD-SCNC: 4.2 MMOL/L (ref 3.5–5.1)
PROT SERPL-MCNC: 5.7 G/DL (ref 6.3–8.6)
PROTHROMBIN TIME: 18.2 SECONDS (ref 11.1–15.3)
RBC # BLD AUTO: 3.36 10*6/MM3 (ref 4.37–5.74)
SODIUM BLD-SCNC: 138 MMOL/L (ref 137–145)
WBC NRBC COR # BLD: 5.46 10*3/MM3 (ref 3.2–9.8)

## 2019-02-11 PROCEDURE — 25010000002 ENOXAPARIN PER 10 MG: Performed by: STUDENT IN AN ORGANIZED HEALTH CARE EDUCATION/TRAINING PROGRAM

## 2019-02-11 PROCEDURE — 80053 COMPREHEN METABOLIC PANEL: CPT | Performed by: STUDENT IN AN ORGANIZED HEALTH CARE EDUCATION/TRAINING PROGRAM

## 2019-02-11 PROCEDURE — 85025 COMPLETE CBC W/AUTO DIFF WBC: CPT | Performed by: STUDENT IN AN ORGANIZED HEALTH CARE EDUCATION/TRAINING PROGRAM

## 2019-02-11 PROCEDURE — 85610 PROTHROMBIN TIME: CPT | Performed by: FAMILY MEDICINE

## 2019-02-11 PROCEDURE — 99232 SBSQ HOSP IP/OBS MODERATE 35: CPT | Performed by: INTERNAL MEDICINE

## 2019-02-11 RX ORDER — WARFARIN SODIUM 3 MG/1
6 TABLET ORAL
Status: DISCONTINUED | OUTPATIENT
Start: 2019-02-11 | End: 2019-02-12

## 2019-02-11 RX ADMIN — FOLIC ACID 1 MG: 1 TABLET ORAL at 08:10

## 2019-02-11 RX ADMIN — PANTOPRAZOLE SODIUM 40 MG: 40 TABLET, DELAYED RELEASE ORAL at 05:51

## 2019-02-11 RX ADMIN — ENOXAPARIN SODIUM 80 MG: 80 INJECTION SUBCUTANEOUS at 08:09

## 2019-02-11 RX ADMIN — SODIUM CHLORIDE, PRESERVATIVE FREE 3 ML: 5 INJECTION INTRAVENOUS at 20:01

## 2019-02-11 RX ADMIN — GUANFACINE HYDROCHLORIDE 1 MG: 1 TABLET ORAL at 21:23

## 2019-02-11 RX ADMIN — LOSARTAN POTASSIUM 50 MG: 50 TABLET, FILM COATED ORAL at 08:10

## 2019-02-11 RX ADMIN — SODIUM CHLORIDE, PRESERVATIVE FREE 3 ML: 5 INJECTION INTRAVENOUS at 08:13

## 2019-02-11 RX ADMIN — CETIRIZINE HYDROCHLORIDE 10 MG: 10 TABLET, FILM COATED ORAL at 08:10

## 2019-02-11 RX ADMIN — CYANOCOBALAMIN TAB 1000 MCG 1000 MCG: 1000 TAB at 08:10

## 2019-02-11 RX ADMIN — WARFARIN SODIUM 6 MG: 3 TABLET ORAL at 17:30

## 2019-02-11 RX ADMIN — ENOXAPARIN SODIUM 80 MG: 80 INJECTION SUBCUTANEOUS at 20:00

## 2019-02-11 NOTE — PLAN OF CARE
Problem: VTE, DVT and PE (Adult)  Goal: Signs and Symptoms of Listed Potential Problems Will be Absent, Minimized or Managed (VTE, DVT and PE)  Outcome: Ongoing (interventions implemented as appropriate)      Problem: Patient Care Overview  Goal: Plan of Care Review  Outcome: Ongoing (interventions implemented as appropriate)   02/11/19 3322   Coping/Psychosocial   Plan of Care Reviewed With patient   Plan of Care Review   Progress no change   OTHER   Outcome Summary Patient ambulating in room

## 2019-02-11 NOTE — PROGRESS NOTES
"Anticoagulation by Pharmacy - Warfarin    Carlos Zazueta is a 68 y.o.male being continued on warfarin for PE and DVT  Patient is also receiving enoxaparin  Home regimen: New start  INR Goal: 2-3  Today's INR:   Lab Results   Component Value Date    INR 1.56 (H) 02/11/2019       Objective:  [Ht: 177.8 cm (70\"); Wt: 76.4 kg (168 lb 6.4 oz)]  Lab Results   Component Value Date    INR 1.56 (H) 02/11/2019    INR 1.06 02/10/2019    INR 0.98 02/09/2019    PROTIME 18.2 (H) 02/11/2019    PROTIME 13.6 02/10/2019    PROTIME 12.8 02/09/2019     Lab Results   Component Value Date    HGB 11.3 (L) 02/11/2019    HGB 11.7 (L) 02/10/2019    HGB 12.2 (L) 02/09/2019    HCT 33.0 (L) 02/11/2019    HCT 32.7 (L) 02/10/2019    HCT 34.3 (L) 02/09/2019     02/11/2019     02/10/2019     02/09/2019       Recent Warfarin Administrations       The 5 most recent administrations since 02/04/2019 are shown below each listed medication.    Warfarin Sodium         Order Dose Date Given     warfarin (COUMADIN) tablet 7.5 mg 7.5 mg 02/10/2019      7.5 mg 02/09/2019     warfarin (COUMADIN) tablet 5 mg 5 mg 02/08/2019      5 mg 02/07/2019                      Assessment  Interacting medications: No significant interactions noted  INR is subtherapeutic, but took a decent jump from 1.06 - 1.56  H&H noted  Will slightly scale back dose to 6 mg    Plan:  1.  Give warfarin 6 mg tablet PO @ 1800 tonight  2.  Draw a PT/INR in AM  3.  Pharmacy will continue to follow    Tru Jacobson RPH  02/11/19 4:42 PM     "

## 2019-02-11 NOTE — PROGRESS NOTES
"DAILY PROGRESS NOTE  [facility]    Patient Identification:  Name: Carlos Zazueta  Age: 68 y.o.  Sex: male  :  1950  MRN: [id number]         Primary Care Physician: Marcy Florez APRN    Subjective:  Interval History:He feels better.  He complains of left foot pain, but is better.    Objective:    Scheduled Meds:    cetirizine 10 mg Oral Daily   enoxaparin 1 mg/kg Subcutaneous Q12H   folic acid 1 mg Oral Daily   guanFACINE 1 mg Oral Nightly   losartan 50 mg Oral Q24H   pantoprazole 40 mg Oral QAM   sodium chloride 3 mL Intravenous Q12H   vitamin B-12 1,000 mcg Oral Daily   warfarin 7.5 mg Oral Daily     Continuous Infusions:    Pharmacy to dose warfarin        Vital signs in last 24 hours:  Temp:  [98.2 °F (36.8 °C)-98.5 °F (36.9 °C)] 98.2 °F (36.8 °C)  Heart Rate:  [66-90] 68  Resp:  [18] 18  BP: (124-150)/(71-89) 139/71    Intake/Output:    Intake/Output Summary (Last 24 hours) at 2019 1107  Last data filed at 2019 0550  Gross per 24 hour   Intake 1260 ml   Output --   Net 1260 ml       Exam:  /71 (BP Location: Left arm, Patient Position: Sitting)   Pulse 68   Temp 98.2 °F (36.8 °C) (Temporal)   Resp 18   Ht 177.8 cm (70\")   Wt 76.4 kg (168 lb 6.4 oz)   SpO2 98%   BMI 24.16 kg/m²     General Appearance:    Alert, cooperative, no distress   Head:    Normocephalic, without obvious abnormality, atraumatic   Eyes:       Throat:   Lips, tongue, gums normal   Neck:   Supple, symmetrical, trachea midline, no JVD   Lungs:     Clear to auscultation bilaterally, respirations unlabored   Chest Wall:    No tenderness or deformity    Heart:    Regular rate and rhythm, S1 and S2 normal, no murmur,no  Rub or gallop   Abdomen:     Soft, non-tender, bowel sounds active, no masses, no organomegaly    Extremities:   Extremities normal, atraumatic, no cyanosis or edema   Pulses:      Skin:   Skin is warm and dry,  no rashes or palpable lesions   Neurologic:   no focal deficits " noted      [unfilled]  Data Review:  Results from last 7 days   Lab Units 02/11/19  0528 02/10/19  0738 02/09/19  0800   SODIUM mmol/L 138 137 137   POTASSIUM mmol/L 4.2 4.1 4.3   CHLORIDE mmol/L 105 103 102   CO2 mmol/L 30.0 28.0 31.0   BUN mg/dL 8 8 10   CREATININE mg/dL 0.79 0.66* 0.81   GLUCOSE mg/dL 99 103* 105*   CALCIUM mg/dL 8.6 8.7 8.8     Results from last 7 days   Lab Units 02/11/19  0528 02/10/19  0738 02/09/19  0800   WBC 10*3/mm3 5.46 5.99 6.07   HEMOGLOBIN g/dL 11.3* 11.7* 12.2*   HEMATOCRIT % 33.0* 32.7* 34.3*   PLATELETS 10*3/mm3 200 225 236             No results found for: TROPONINT      Results from last 7 days   Lab Units 02/11/19  0528 02/10/19  0738 02/09/19  0800   ALK PHOS U/L 45 50 52   BILIRUBIN mg/dL 0.2 0.3 0.4   ALT (SGPT) U/L 20* 26 23   AST (SGOT) U/L 29 32 31             No results found for: POCGLU  Results from last 7 days   Lab Units 02/11/19  0528 02/10/19  0738 02/09/19  0800   INR  1.56* 1.06 0.98       Patient Active Problem List   Diagnosis Code   • Hallux rigidus of right foot M20.21   • Injury of plantar plate of right foot S99.921A   • Pulmonary embolus (CMS/HCC) I26.99   • Benign essential HTN I10   • Gastroesophageal reflux disease without esophagitis K21.9   • Arthritis M19.90   • DVT (deep venous thrombosis) (CMS/HCC) I82.409       Assessment:  Active Hospital Problems    Diagnosis Date Noted   • **Pulmonary embolus (CMS/HCC) [I26.99] 02/06/2019   • Arthritis [M19.90] 02/10/2019   • DVT (deep venous thrombosis) (CMS/HCC) [I82.409] 02/10/2019   • Benign essential HTN [I10] 02/07/2019   • Gastroesophageal reflux disease without esophagitis [K21.9] 02/07/2019      Resolved Hospital Problems   No resolved problems to display.       Plan:  Continue with anticoagulation. Continue losartan . Lovenox bridge to coumadin. Home when INR theraputic    Darryl Nunn MD  2/11/2019  11:07 AM
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
## 2019-02-12 DIAGNOSIS — R09.89 BRUIT: Primary | ICD-10-CM

## 2019-02-12 LAB
ALBUMIN SERPL-MCNC: 3.4 G/DL (ref 3.4–4.8)
ALBUMIN/GLOB SERPL: 1.3 G/DL (ref 1.1–1.8)
ALP SERPL-CCNC: 45 U/L (ref 38–126)
ALT SERPL W P-5'-P-CCNC: 25 U/L (ref 21–72)
ANION GAP SERPL CALCULATED.3IONS-SCNC: 7 MMOL/L (ref 5–15)
AST SERPL-CCNC: 35 U/L (ref 17–59)
BASOPHILS # BLD AUTO: 0.06 10*3/MM3 (ref 0–0.2)
BASOPHILS NFR BLD AUTO: 1.2 % (ref 0–1.5)
BILIRUB SERPL-MCNC: 0.3 MG/DL (ref 0.2–1.3)
BUN BLD-MCNC: 11 MG/DL (ref 7–21)
BUN/CREAT SERPL: 13.6 (ref 7–25)
CALCIUM SPEC-SCNC: 8.6 MG/DL (ref 8.4–10.2)
CHLORIDE SERPL-SCNC: 104 MMOL/L (ref 95–110)
CO2 SERPL-SCNC: 27 MMOL/L (ref 22–31)
CREAT BLD-MCNC: 0.81 MG/DL (ref 0.7–1.3)
DEPRECATED RDW RBC AUTO: 49.3 FL (ref 37–54)
EOSINOPHIL # BLD AUTO: 0.12 10*3/MM3 (ref 0–0.4)
EOSINOPHIL NFR BLD AUTO: 2.3 % (ref 0.3–6.2)
ERYTHROCYTE [DISTWIDTH] IN BLOOD BY AUTOMATED COUNT: 13.6 % (ref 12.3–15.4)
GFR SERPL CREATININE-BSD FRML MDRD: 95 ML/MIN/1.73 (ref 49–113)
GLOBULIN UR ELPH-MCNC: 2.7 GM/DL (ref 2.3–3.5)
GLUCOSE BLD-MCNC: 94 MG/DL (ref 60–100)
HCT VFR BLD AUTO: 33.1 % (ref 37.5–51)
HGB BLD-MCNC: 11.3 G/DL (ref 13–17.7)
IMM GRANULOCYTES # BLD AUTO: 0.05 10*3/MM3 (ref 0–0.05)
IMM GRANULOCYTES NFR BLD AUTO: 1 % (ref 0–0.5)
INR PPP: 1.47 (ref 0.8–1.2)
LYMPHOCYTES # BLD AUTO: 2.01 10*3/MM3 (ref 0.7–3.1)
LYMPHOCYTES NFR BLD AUTO: 38.7 % (ref 19.6–45.3)
MCH RBC QN AUTO: 33.7 PG (ref 26.6–33)
MCHC RBC AUTO-ENTMCNC: 34.1 G/DL (ref 31.5–35.7)
MCV RBC AUTO: 98.8 FL (ref 79–97)
MONOCYTES # BLD AUTO: 0.72 10*3/MM3 (ref 0.1–0.9)
MONOCYTES NFR BLD AUTO: 13.9 % (ref 5–12)
NEUTROPHILS # BLD AUTO: 2.23 10*3/MM3 (ref 1.4–7)
NEUTROPHILS NFR BLD AUTO: 42.9 % (ref 42.7–76)
NRBC BLD AUTO-RTO: 0 /100 WBC (ref 0–0)
PLATELET # BLD AUTO: 237 10*3/MM3 (ref 140–450)
PMV BLD AUTO: 9 FL (ref 6–12)
POTASSIUM BLD-SCNC: 4.1 MMOL/L (ref 3.5–5.1)
PROT SERPL-MCNC: 6.1 G/DL (ref 6.3–8.6)
PROTHROMBIN TIME: 17.4 SECONDS (ref 11.1–15.3)
PRT C ACTIVITY (CHROMOGENIC): 149 %
RBC # BLD AUTO: 3.35 10*6/MM3 (ref 4.14–5.8)
SODIUM BLD-SCNC: 138 MMOL/L (ref 137–145)
WBC NRBC COR # BLD: 5.19 10*3/MM3 (ref 3.4–10.8)

## 2019-02-12 PROCEDURE — 25010000002 ENOXAPARIN PER 10 MG: Performed by: STUDENT IN AN ORGANIZED HEALTH CARE EDUCATION/TRAINING PROGRAM

## 2019-02-12 PROCEDURE — 81241 F5 GENE: CPT | Performed by: INTERNAL MEDICINE

## 2019-02-12 PROCEDURE — 80053 COMPREHEN METABOLIC PANEL: CPT | Performed by: STUDENT IN AN ORGANIZED HEALTH CARE EDUCATION/TRAINING PROGRAM

## 2019-02-12 PROCEDURE — 99232 SBSQ HOSP IP/OBS MODERATE 35: CPT | Performed by: INTERNAL MEDICINE

## 2019-02-12 PROCEDURE — 85025 COMPLETE CBC W/AUTO DIFF WBC: CPT | Performed by: STUDENT IN AN ORGANIZED HEALTH CARE EDUCATION/TRAINING PROGRAM

## 2019-02-12 PROCEDURE — 85610 PROTHROMBIN TIME: CPT | Performed by: FAMILY MEDICINE

## 2019-02-12 RX ORDER — WARFARIN SODIUM 7.5 MG/1
7.5 TABLET ORAL
Status: DISCONTINUED | OUTPATIENT
Start: 2019-02-12 | End: 2019-02-13 | Stop reason: HOSPADM

## 2019-02-12 RX ADMIN — GUANFACINE HYDROCHLORIDE 1 MG: 1 TABLET ORAL at 20:38

## 2019-02-12 RX ADMIN — WARFARIN SODIUM 7.5 MG: 7.5 TABLET ORAL at 17:45

## 2019-02-12 RX ADMIN — FOLIC ACID 1 MG: 1 TABLET ORAL at 09:17

## 2019-02-12 RX ADMIN — ENOXAPARIN SODIUM 80 MG: 80 INJECTION SUBCUTANEOUS at 20:38

## 2019-02-12 RX ADMIN — SODIUM CHLORIDE, PRESERVATIVE FREE 3 ML: 5 INJECTION INTRAVENOUS at 20:38

## 2019-02-12 RX ADMIN — CYANOCOBALAMIN TAB 1000 MCG 1000 MCG: 1000 TAB at 09:17

## 2019-02-12 RX ADMIN — PANTOPRAZOLE SODIUM 40 MG: 40 TABLET, DELAYED RELEASE ORAL at 05:28

## 2019-02-12 RX ADMIN — SODIUM CHLORIDE, PRESERVATIVE FREE 3 ML: 5 INJECTION INTRAVENOUS at 09:18

## 2019-02-12 RX ADMIN — LOSARTAN POTASSIUM 50 MG: 50 TABLET, FILM COATED ORAL at 09:18

## 2019-02-12 RX ADMIN — CETIRIZINE HYDROCHLORIDE 10 MG: 10 TABLET, FILM COATED ORAL at 09:17

## 2019-02-12 RX ADMIN — ENOXAPARIN SODIUM 80 MG: 80 INJECTION SUBCUTANEOUS at 09:18

## 2019-02-12 NOTE — PLAN OF CARE
Problem: Patient Care Overview  Goal: Plan of Care Review  Outcome: Ongoing (interventions implemented as appropriate)  Encourage adequate intake.   02/12/19 1631   Coping/Psychosocial   Plan of Care Reviewed With patient   Plan of Care Review   Progress improving   OTHER   Outcome Summary Intake 100% - 2x, 75% - 3x.

## 2019-02-12 NOTE — PROGRESS NOTES
Cardiology Progress Note     LOS: 5 days   Patient Care Team:  Marcy Florez APRN as PCP - General (Family Medicine)  Anthony Moore DPM as Consulting Physician (Podiatry)    Subjective:     Chart reviewed. Patient seen and examined. Patient denies any chest pain, shortness of breath, or palpitation.  Patient underwent a nuclear Cardiolite stress tests which revealed evidence of perfusion defect in the inferior wall distribution.  Patient has not had any further recurrent symptoms of chest pain.  Patient has not complained of having any symptoms of chest pain.  Patient is awaiting the PT INR to become therapeutic.    Objective:  Temp:  [98.2 °F (36.8 °C)-98.6 °F (37 °C)] 98.2 °F (36.8 °C)  Heart Rate:  [66-80] 80  Resp:  [18] 18  BP: (124-169)/(71-89) 169/89    Intake/Output Summary (Last 24 hours) at 2/11/2019 2053  Last data filed at 2/11/2019 0550  Gross per 24 hour   Intake 960 ml   Output --   Net 960 ml       Physical Exam:   General Appearance:    Alert, oriented, cooperative, in no acute distress.   Head:    Normocephalic, atraumatic, without obvious abnormality   Eyes:              RAS. Lids and lashes normal, conjunctivae and sclerae normal, no icterus, no pallor.   Ears:    Ears appear intact with no abnormalities noted.   Throat:   Mucous membranes pink and moist.   Neck:   Supple, trachea midline, no carotid bruit, no organomegaly or JVD.   Lungs:     Clear to auscultation and percussion, respirations. regular, even and unlabored. No wheezes, rales, or rhonchi.    Heart:    Regular rhythm and normal rate, normal S1 and S2, no      murmur, no gallop, no rub, no click.   Abdomen:     Soft, non-tender, non-distended, no guarding, no rebound tenderness. Normal bowel sounds in all four quadrants, no masses, liver and spleen nonpalpable.    Genitalia:    Deferred.   Extremities:   Moves all extremities well, no edema, no cyanosis, no       redness, no clubbing.   Pulses:   Pulses palpable and equal  bilaterally.   Skin:   Moist and warm. No bleeding, bruising or rash.   Neurologic/Psychiatric:   Alert and oriented to person, place, and time.  Motor, power and tone in upper and lower extremities are grossly intact.  No focal neurological deficits. Normal cognitive function. No psychomotor reaction or tangential thought. No depression, homicidal ideations and suicidal ideations.          Results Review:    Results from last 7 days   Lab Units 02/11/19  0528   SODIUM mmol/L 138   POTASSIUM mmol/L 4.2   CHLORIDE mmol/L 105   CO2 mmol/L 30.0   BUN mg/dL 8   CREATININE mg/dL 0.79   CALCIUM mg/dL 8.6   BILIRUBIN mg/dL 0.2   ALK PHOS U/L 45   ALT (SGPT) U/L 20*   AST (SGOT) U/L 29   GLUCOSE mg/dL 99     Results from last 7 days   Lab Units 02/06/19  2035   TROPONIN I ng/mL <0.012         Results from last 7 days   Lab Units 02/11/19  0528   WBC 10*3/mm3 5.46   HEMOGLOBIN g/dL 11.3*   HEMATOCRIT % 33.0*   PLATELETS 10*3/mm3 200     Results from last 7 days   Lab Units 02/11/19  0528 02/10/19  0738 02/09/19  0800  02/07/19  0222   INR  1.56* 1.06 0.98   < > 1.09   APTT seconds  --   --   --   --  31.9    < > = values in this interval not displayed.                       ECHO:  Results for orders placed during the hospital encounter of 02/06/19   Adult Transthoracic Echo Complete W/ Cont if Necessary Per Protocol    Narrative · Right ventricular cavity is mildly dilated.  · Left ventricular wall thickness is consistent with borderline concentric   hypertrophy.  · Mild mitral valve regurgitation is present  · Mild to moderate tricuspid valve regurgitation is present.          SCANNED EKG   Final Result      ECG 12 Lead   ED Interpretation   Davion Mane MD     2/6/2019 10:43 PM   ECG 12 Lead      Date/Time: 2/6/2019 8:27 PM   Performed by: Davion Mane MD   Authorized by: Davion Mane MD    Interpreted by physician   Clinical impression: non-specific ECG   Comments: Normal sinus rhythm rate of 63.  Incomplete right  bundle branch    block pattern.  No ST elevation.         Final Result   Test Reason : dyspnea   Blood Pressure : **/** mmHG   Vent. Rate : 063 BPM     Atrial Rate : 063 BPM      P-R Int : 162 ms          QRS Dur : 108 ms       QT Int : 462 ms       P-R-T Axes : 047 036 058 degrees      QTc Int : 472 ms      Normal sinus rhythm   Incomplete right bundle branch block   Borderline ECG      Confirmed by GAETANO OLIVAREZ, B. N. ([id number]) on 2/6/2019 9:59:23 PM      Referred By:             Confirmed By:FLAKITA SALTER MD           Medication Review:   Current Facility-Administered Medications   Medication Dose Route Frequency Provider Last Rate Last Dose   • cetirizine (zyrTEC) tablet 10 mg  10 mg Oral Daily Clemente Magdaleno Jr., MD   10 mg at 02/11/19 0810   • docusate sodium (COLACE) capsule 100 mg  100 mg Oral BID PRN Clemente Magdaleno Jr., MD       • enoxaparin (LOVENOX) syringe 80 mg  1 mg/kg Subcutaneous Q12H Clemente Magdaleno Jr., MD   80 mg at 02/11/19 2000   • fluticasone (FLONASE) 50 MCG/ACT nasal spray 2 spray  2 spray Nasal Daily PRN Clemente Magdaleno Jr., MD       • folic acid (FOLVITE) tablet 1 mg  1 mg Oral Daily Dickson Vega MD   1 mg at 02/11/19 0810   • guanFACINE (TENEX) tablet 1 mg  1 mg Oral Nightly Clemente Magdaleno Jr., MD   1 mg at 02/10/19 2118   • HYDROcodone-acetaminophen (NORCO) 7.5-325 MG per tablet 1 tablet  1 tablet Oral Q6H PRN Clemente Magdaleno Jr., MD   1 tablet at 02/10/19 1644   • losartan (COZAAR) tablet 50 mg  50 mg Oral Q24H Darryl Nunn MD   50 mg at 02/11/19 0810   • ondansetron (ZOFRAN) injection 4 mg  4 mg Intravenous Q6H PRN Clemente Magdaleno Jr., MD       • pantoprazole (PROTONIX) EC tablet 40 mg  40 mg Oral QAM Clemente Magdaleno Jr., MD   40 mg at 02/11/19 0551   • Pharmacy to dose warfarin   Does not apply Continuous PRN Ovidio Catherine MD       • sodium chloride 0.9 % flush 1-10 mL  1-10 mL Intravenous PRN Clemente Magdaleno Jr., MD       • sodium chloride 0.9 % flush 10 mL  10 mL Intravenous PRN Antonietta  Davion CANNON MD       • sodium chloride 0.9 % flush 10 mL  10 mL Intravenous PRN Jerson Norton MD   10 mL at 02/08/19 0928   • sodium chloride 0.9 % flush 3 mL  3 mL Intravenous Q12H Clemente Magdaleno Jr., MD   3 mL at 02/11/19 2001   • vitamin B-12 (CYANOCOBALAMIN) tablet 1,000 mcg  1,000 mcg Oral Daily Dickson Vega MD   1,000 mcg at 02/11/19 0810   • warfarin (COUMADIN) tablet 6 mg  6 mg Oral Daily Ovidio Catherine MD   6 mg at 02/11/19 1730       Assessment and Plan:      Pulmonary embolus (CMS/HCC)    Benign essential HTN    Gastroesophageal reflux disease without esophagitis    Arthritis    DVT (deep venous thrombosis) (CMS/HCC)      1.  Chest pain.  Patient had undergone previous coronary angiogram 20 years ago at [facility] which had not revealed off any evidence of any coronary artery disease.  Patient nuclear stress test was positive with perfusion imaging indicates a medium-sized, mild-to-moderately severe area of ischemia located in the inferior wall.  Have discussed the finding of the nuclear stress test with the patient.  Due to the patient evidence of bilateral deep vein thrombosis and bilateral pulmonary embolism patient at the present time would not be subjected to a invasive coronary angiogram.  Patient would be treated medically.  2.  Pulmonary embolism with deep vein thrombosis.  Patient is on anticoagulation with Coumadin.  Patient has been followed by Dr. Vega for hypercoagulable workup.  Patient PT/INR went therapeutic would be able to be discharged.  3.  Arterial hypertension.  Patient blood pressure has remained stable.    The above plan of management were discussed with the patient          Jerson Norton MD  02/11/19  8:53 PM      Time: Time spent on face-to-face interaction 20 minutes      Dictated utilizing Dragon dictation.

## 2019-02-12 NOTE — PLAN OF CARE
Problem: Patient Care Overview  Goal: Plan of Care Review   02/12/19 0512   Coping/Psychosocial   Plan of Care Reviewed With patient   Plan of Care Review   Progress no change   OTHER   Outcome Summary patient remained stable throughout the night with no complications noted   Coping/Psychosocial   Patient Agreement with Plan of Care agrees     Goal: Discharge Needs Assessment  Outcome: Ongoing (interventions implemented as appropriate)    Goal: Interprofessional Rounds/Family Conf  Outcome: Ongoing (interventions implemented as appropriate)

## 2019-02-12 NOTE — CONSULTS
Adult Nutrition  Assessment    Patient Name:  Jose Zazueta  YOB: 1950  MRN: [id number]  Admit Date:  2/6/2019    Assessment Date:  2/12/2019    Comments:  Pt reports good appetite.  Likes the milk he receives.  Intake 100% - 2x, 75% - 3x.  Hgb, Hct are low.  Folic Acid prescribed.  Will maintain pt on prescribed diet.    Reason for Assessment     Row Name 02/12/19 1623          Reason for Assessment    Reason For Assessment  follow-up protocol             Labs/Tests/Procedures/Meds     Row Name 02/12/19 1623          Labs/Procedures/Meds    Lab Results Reviewed  reviewed, pertinent        Medications    Pertinent Medications Reviewed  reviewed, pertinent             Nutrition Prescription Ordered     Row Name 02/12/19 1624          Nutrition Prescription PO    Current PO Diet  Regular     Supplement  Milk     Supplement Frequency  Daily     Common Modifiers  Cardiac         Evaluation of Received Nutrient/Fluid Intake     Row Name 02/12/19 1624          PO Evaluation    Number of Meals  5     % PO Intake  100% - 2x, 75% - 3x               Electronically signed by:  Lorena Faria RD  02/12/19 4:26 PM

## 2019-02-12 NOTE — PROGRESS NOTES
"Anticoagulation by Pharmacy - Warfarin    Carlos Zazueta is a 68 y.o.male being continued on warfarin for PE and DVT  Patient is also receiving enoxaparin  Home regimen: New start  INR Goal: 2-3  Today's INR:   Lab Results   Component Value Date    INR 1.47 (H) 02/12/2019       Objective:  [Ht: 177.8 cm (70\"); Wt: 75.3 kg (166 lb)]  Lab Results   Component Value Date    INR 1.47 (H) 02/12/2019    INR 1.56 (H) 02/11/2019    INR 1.06 02/10/2019    PROTIME 17.4 (H) 02/12/2019    PROTIME 18.2 (H) 02/11/2019    PROTIME 13.6 02/10/2019     Lab Results   Component Value Date    HGB 11.3 (L) 02/12/2019    HGB 11.3 (L) 02/11/2019    HGB 11.7 (L) 02/10/2019    HCT 33.1 (L) 02/12/2019    HCT 33.0 (L) 02/11/2019    HCT 32.7 (L) 02/10/2019     02/12/2019     02/11/2019     02/10/2019       Recent Warfarin Administrations       The 5 most recent administrations since 02/05/2019 are shown below each listed medication.    Warfarin Sodium         Order Dose Date Given     warfarin (COUMADIN) tablet 6 mg 6 mg 02/11/2019     warfarin (COUMADIN) tablet 7.5 mg 7.5 mg 02/10/2019      7.5 mg 02/09/2019     warfarin (COUMADIN) tablet 5 mg 5 mg 02/08/2019      5 mg 02/07/2019                      Assessment  Interacting medications: No significant interactions noted  INR is subtherapeutic and trended down after dose decrease due to large increase in INR yesterday  Will change dose back to 7.5 mg today  H&H stable    Plan:  1.  Give warfarin 7.5 mg tablet PO @ 1800 tonight  2.  Draw a PT/INR in AM  3.  Pharmacy will continue to follow    Tru Jacobson RPH  02/12/19 8:56 AM     "

## 2019-02-12 NOTE — PLAN OF CARE
Problem: VTE, DVT and PE (Adult)  Goal: Signs and Symptoms of Listed Potential Problems Will be Absent, Minimized or Managed (VTE, DVT and PE)  Outcome: Ongoing (interventions implemented as appropriate)   02/12/19 7441   Goal/Outcome Evaluation   Problems Assessed (VTE, DVT, PE) all   Problems Present (VTE, DVT, PE) deep vein thrombosis;pulmonary embolism       Problem: Patient Care Overview  Goal: Plan of Care Review  Outcome: Ongoing (interventions implemented as appropriate)    Goal: Individualization and Mutuality  Outcome: Ongoing (interventions implemented as appropriate)    Goal: Discharge Needs Assessment  Outcome: Ongoing (interventions implemented as appropriate)    Goal: Interprofessional Rounds/Family Conf  Outcome: Ongoing (interventions implemented as appropriate)
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
## 2019-02-12 NOTE — PROGRESS NOTES
"DAILY PROGRESS NOTE  [facility]    Patient Identification:  Name: Jose Zazueta  Age: 68 y.o.  Sex: male  :  1950  MRN: [id number]         Primary Care Physician: Marcy Florez APRN    Subjective:  Interval History:He feels better.  No new complaints.    Objective:    Scheduled Meds:    cetirizine 10 mg Oral Daily   enoxaparin 1 mg/kg Subcutaneous Q12H   folic acid 1 mg Oral Daily   guanFACINE 1 mg Oral Nightly   losartan 50 mg Oral Q24H   pantoprazole 40 mg Oral QAM   sodium chloride 3 mL Intravenous Q12H   vitamin B-12 1,000 mcg Oral Daily   warfarin 7.5 mg Oral Daily     Continuous Infusions:    Pharmacy to dose warfarin        Vital signs in last 24 hours:  Temp:  [97.2 °F (36.2 °C)-98.6 °F (37 °C)] 97.2 °F (36.2 °C)  Heart Rate:  [58-80] 77  Resp:  [18] 18  BP: (105-169)/(68-89) 141/82    Intake/Output:    Intake/Output Summary (Last 24 hours) at 2019 1011  Last data filed at 2019 0924  Gross per 24 hour   Intake 500 ml   Output --   Net 500 ml       Exam:  /82 (BP Location: Left arm, Patient Position: Sitting)   Pulse 77   Temp 97.2 °F (36.2 °C) (Temporal)   Resp 18   Ht 177.8 cm (70\")   Wt 75.3 kg (166 lb)   SpO2 92%   BMI 23.82 kg/m²     General Appearance:    Alert, cooperative, no distress   Head:    Normocephalic, without obvious abnormality, atraumatic   Eyes:       Throat:   Lips, tongue, gums normal   Neck:   Supple, symmetrical, trachea midline, no JVD   Lungs:     Clear to auscultation bilaterally, respirations unlabored   Chest Wall:    No tenderness or deformity    Heart:    Regular rate and rhythm, S1 and S2 normal, no murmur,no  Rub or gallop   Abdomen:     Soft, non-tender, bowel sounds active, no masses, no organomegaly    Extremities:   Extremities normal, atraumatic, no cyanosis or edema   Pulses:      Skin:   Skin is warm and dry,  no rashes or palpable lesions   Neurologic:   no focal deficits noted      [unfilled]  Data " Review:  Results from last 7 days   Lab Units 02/12/19  0520 02/11/19  0528 02/10/19  0738   SODIUM mmol/L 138 138 137   POTASSIUM mmol/L 4.1 4.2 4.1   CHLORIDE mmol/L 104 105 103   CO2 mmol/L 27.0 30.0 28.0   BUN mg/dL 11 8 8   CREATININE mg/dL 0.81 0.79 0.66*   GLUCOSE mg/dL 94 99 103*   CALCIUM mg/dL 8.6 8.6 8.7     Results from last 7 days   Lab Units 02/12/19  0520 02/11/19  0528 02/10/19  0738   WBC 10*3/mm3 5.19 5.46 5.99   HEMOGLOBIN g/dL 11.3* 11.3* 11.7*   HEMATOCRIT % 33.1* 33.0* 32.7*   PLATELETS 10*3/mm3 237 200 225             No results found for: TROPONINT      Results from last 7 days   Lab Units 02/12/19  0520 02/11/19  0528 02/10/19  0738   ALK PHOS U/L 45 45 50   BILIRUBIN mg/dL 0.3 0.2 0.3   ALT (SGPT) U/L 25 20* 26   AST (SGOT) U/L 35 29 32             No results found for: POCGLU  Results from last 7 days   Lab Units 02/12/19  0520 02/11/19  0528 02/10/19  0738   INR  1.47* 1.56* 1.06       Patient Active Problem List   Diagnosis Code   • Hallux rigidus of right foot M20.21   • Injury of plantar plate of right foot S99.921A   • Pulmonary embolus (CMS/HCC) I26.99   • Benign essential HTN I10   • Gastroesophageal reflux disease without esophagitis K21.9   • Arthritis M19.90   • DVT (deep venous thrombosis) (CMS/[facility]) I82.409       Assessment:  Active Hospital Problems    Diagnosis Date Noted   • **Pulmonary embolus (CMS/HCC) [I26.99] 02/06/2019   • Arthritis [M19.90] 02/10/2019   • DVT (deep venous thrombosis) (CMS/HCC) [I82.409] 02/10/2019   • Benign essential HTN [I10] 02/07/2019   • Gastroesophageal reflux disease without esophagitis [K21.9] 02/07/2019      Resolved Hospital Problems   No resolved problems to display.       Plan:  Continue with anticoagulation. Continue losartan . Lovenox bridge to coumadin. Home when INR theraputic    Darryl Nunn MD  2/12/2019  10:11 AM

## 2019-02-13 VITALS
WEIGHT: 168.13 LBS | HEIGHT: 70 IN | DIASTOLIC BLOOD PRESSURE: 67 MMHG | RESPIRATION RATE: 16 BRPM | HEART RATE: 70 BPM | BODY MASS INDEX: 24.07 KG/M2 | OXYGEN SATURATION: 98 % | SYSTOLIC BLOOD PRESSURE: 139 MMHG | TEMPERATURE: 98.4 F

## 2019-02-13 PROBLEM — I26.99 OTHER ACUTE PULMONARY EMBOLISM WITHOUT ACUTE COR PULMONALE: Status: ACTIVE | Noted: 2019-02-13

## 2019-02-13 LAB
ALBUMIN SERPL-MCNC: 3.4 G/DL (ref 3.4–4.8)
ALBUMIN/GLOB SERPL: 1.3 G/DL (ref 1.1–1.8)
ALP SERPL-CCNC: 43 U/L (ref 38–126)
ALT SERPL W P-5'-P-CCNC: 22 U/L (ref 21–72)
ANION GAP SERPL CALCULATED.3IONS-SCNC: 7 MMOL/L (ref 5–15)
AST SERPL-CCNC: 31 U/L (ref 17–59)
BASOPHILS # BLD AUTO: 0.06 10*3/MM3 (ref 0–0.2)
BASOPHILS NFR BLD AUTO: 1.1 % (ref 0–1.5)
BILIRUB SERPL-MCNC: 0.1 MG/DL (ref 0.2–1.3)
BUN BLD-MCNC: 12 MG/DL (ref 7–21)
BUN/CREAT SERPL: 14.8 (ref 7–25)
CALCIUM SPEC-SCNC: 8.5 MG/DL (ref 8.4–10.2)
CHLORIDE SERPL-SCNC: 107 MMOL/L (ref 95–110)
CO2 SERPL-SCNC: 26 MMOL/L (ref 22–31)
CREAT BLD-MCNC: 0.81 MG/DL (ref 0.7–1.3)
DEPRECATED RDW RBC AUTO: 49.2 FL (ref 37–54)
EOSINOPHIL # BLD AUTO: 0.1 10*3/MM3 (ref 0–0.4)
EOSINOPHIL NFR BLD AUTO: 1.8 % (ref 0.3–6.2)
ERYTHROCYTE [DISTWIDTH] IN BLOOD BY AUTOMATED COUNT: 13.4 % (ref 12.3–15.4)
F2 GENE MUT ANL BLD/T: NORMAL
GFR SERPL CREATININE-BSD FRML MDRD: 95 ML/MIN/1.73 (ref 49–113)
GLOBULIN UR ELPH-MCNC: 2.7 GM/DL (ref 2.3–3.5)
GLUCOSE BLD-MCNC: 99 MG/DL (ref 60–100)
HCT VFR BLD AUTO: 33.2 % (ref 37.5–51)
HGB BLD-MCNC: 11 G/DL (ref 13–17.7)
IMM GRANULOCYTES # BLD AUTO: 0.04 10*3/MM3 (ref 0–0.05)
IMM GRANULOCYTES NFR BLD AUTO: 0.7 % (ref 0–0.5)
INR PPP: 1.45 (ref 0.8–1.2)
LYMPHOCYTES # BLD AUTO: 2 10*3/MM3 (ref 0.7–3.1)
LYMPHOCYTES NFR BLD AUTO: 35.9 % (ref 19.6–45.3)
MCH RBC QN AUTO: 33.1 PG (ref 26.6–33)
MCHC RBC AUTO-ENTMCNC: 33.1 G/DL (ref 31.5–35.7)
MCV RBC AUTO: 100 FL (ref 79–97)
MONOCYTES # BLD AUTO: 0.8 10*3/MM3 (ref 0.1–0.9)
MONOCYTES NFR BLD AUTO: 14.4 % (ref 5–12)
NEUTROPHILS # BLD AUTO: 2.57 10*3/MM3 (ref 1.4–7)
NEUTROPHILS NFR BLD AUTO: 46.1 % (ref 42.7–76)
NRBC BLD AUTO-RTO: 0 /100 WBC (ref 0–0)
PLATELET # BLD AUTO: 230 10*3/MM3 (ref 140–450)
PMV BLD AUTO: 9 FL (ref 6–12)
POTASSIUM BLD-SCNC: 4.5 MMOL/L (ref 3.5–5.1)
PROT SERPL-MCNC: 6.1 G/DL (ref 6.3–8.6)
PROTHROMBIN TIME: 17.2 SECONDS (ref 11.1–15.3)
RBC # BLD AUTO: 3.32 10*6/MM3 (ref 4.14–5.8)
SODIUM BLD-SCNC: 140 MMOL/L (ref 137–145)
WBC NRBC COR # BLD: 5.57 10*3/MM3 (ref 3.4–10.8)

## 2019-02-13 PROCEDURE — 85025 COMPLETE CBC W/AUTO DIFF WBC: CPT | Performed by: STUDENT IN AN ORGANIZED HEALTH CARE EDUCATION/TRAINING PROGRAM

## 2019-02-13 PROCEDURE — 99232 SBSQ HOSP IP/OBS MODERATE 35: CPT | Performed by: INTERNAL MEDICINE

## 2019-02-13 PROCEDURE — 25010000002 ENOXAPARIN PER 10 MG: Performed by: STUDENT IN AN ORGANIZED HEALTH CARE EDUCATION/TRAINING PROGRAM

## 2019-02-13 PROCEDURE — 80053 COMPREHEN METABOLIC PANEL: CPT | Performed by: STUDENT IN AN ORGANIZED HEALTH CARE EDUCATION/TRAINING PROGRAM

## 2019-02-13 PROCEDURE — 85610 PROTHROMBIN TIME: CPT | Performed by: FAMILY MEDICINE

## 2019-02-13 RX ORDER — FOLIC ACID 1 MG/1
1 TABLET ORAL DAILY
Qty: 30 TABLET | Refills: 0 | Status: SHIPPED | OUTPATIENT
Start: 2019-02-14 | End: 2019-02-27

## 2019-02-13 RX ORDER — WARFARIN SODIUM 7.5 MG/1
7.5 TABLET ORAL NIGHTLY
Qty: 7 TABLET | Refills: 0 | Status: SHIPPED | OUTPATIENT
Start: 2019-02-13 | End: 2019-02-13

## 2019-02-13 RX ORDER — FOLIC ACID 1 MG/1
1 TABLET ORAL DAILY
Qty: 30 TABLET | Refills: 0 | Status: SHIPPED | OUTPATIENT
Start: 2019-02-14 | End: 2019-02-13

## 2019-02-13 RX ORDER — WARFARIN SODIUM 7.5 MG/1
7.5 TABLET ORAL NIGHTLY
Qty: 7 TABLET | Refills: 0 | Status: SHIPPED | OUTPATIENT
Start: 2019-02-13 | End: 2019-02-18 | Stop reason: SDUPTHER

## 2019-02-13 RX ADMIN — CYANOCOBALAMIN TAB 1000 MCG 1000 MCG: 1000 TAB at 09:08

## 2019-02-13 RX ADMIN — ENOXAPARIN SODIUM 80 MG: 80 INJECTION SUBCUTANEOUS at 09:07

## 2019-02-13 RX ADMIN — SODIUM CHLORIDE, PRESERVATIVE FREE 3 ML: 5 INJECTION INTRAVENOUS at 09:07

## 2019-02-13 RX ADMIN — CETIRIZINE HYDROCHLORIDE 10 MG: 10 TABLET, FILM COATED ORAL at 09:08

## 2019-02-13 RX ADMIN — LOSARTAN POTASSIUM 50 MG: 50 TABLET, FILM COATED ORAL at 09:08

## 2019-02-13 RX ADMIN — FOLIC ACID 1 MG: 1 TABLET ORAL at 09:08

## 2019-02-13 RX ADMIN — PANTOPRAZOLE SODIUM 40 MG: 40 TABLET, DELAYED RELEASE ORAL at 05:47

## 2019-02-13 NOTE — PLAN OF CARE
Problem: VTE, DVT and PE (Adult)  Goal: Signs and Symptoms of Listed Potential Problems Will be Absent, Minimized or Managed (VTE, DVT and PE)  Outcome: Ongoing (interventions implemented as appropriate)      Problem: Patient Care Overview  Goal: Plan of Care Review  Outcome: Outcome(s) achieved Date Met: 02/13/19    Goal: Individualization and Mutuality  Outcome: Outcome(s) achieved Date Met: 02/13/19    Goal: Discharge Needs Assessment  Outcome: Outcome(s) achieved Date Met: 02/13/19    Goal: Interprofessional Rounds/Family Conf  Outcome: Outcome(s) achieved Date Met: 02/13/19      Problem: Patient Care Overview  Goal: Plan of Care Review  Outcome: Ongoing (interventions implemented as appropriate)    Goal: Individualization and Mutuality  Outcome: Ongoing (interventions implemented as appropriate)    Goal: Discharge Needs Assessment  Outcome: Ongoing (interventions implemented as appropriate)    Goal: Interprofessional Rounds/Family Conf  Outcome: Outcome(s) achieved Date Met: 02/13/19

## 2019-02-13 NOTE — PROGRESS NOTES
"Anticoagulation by Pharmacy - Warfarin    Carlos Zazueta is a 68 y.o.male being continued on warfarin for PE and DVT  Patient is also receiving enoxaparin    Home regimen: New start  INR Goal: 2-3      Today's INR:   Lab Results   Component Value Date    INR 1.45 (H) 02/13/2019       Objective:  [Ht: 177.8 cm (70\"); Wt: 76.3 kg (168 lb 2 oz)]  Lab Results   Component Value Date    INR 1.45 (H) 02/13/2019    INR 1.47 (H) 02/12/2019    INR 1.56 (H) 02/11/2019    PROTIME 17.2 (H) 02/13/2019    PROTIME 17.4 (H) 02/12/2019    PROTIME 18.2 (H) 02/11/2019     Lab Results   Component Value Date    HGB 11.0 (L) 02/13/2019    HGB 11.3 (L) 02/12/2019    HGB 11.3 (L) 02/11/2019    HCT 33.2 (L) 02/13/2019    HCT 33.1 (L) 02/12/2019    HCT 33.0 (L) 02/11/2019     02/13/2019     02/12/2019     02/11/2019       Recent Warfarin Administrations       The 5 most recent administrations since 02/06/2019 are shown below each listed medication.    Warfarin Sodium         Order Dose Date Given     warfarin (COUMADIN) tablet 7.5 mg 7.5 mg 02/12/2019     warfarin (COUMADIN) tablet 6 mg 6 mg 02/11/2019     warfarin (COUMADIN) tablet 7.5 mg 7.5 mg 02/10/2019      7.5 mg 02/09/2019     warfarin (COUMADIN) tablet 5 mg 5 mg 02/08/2019                      Assessment  Interacting medications: enoxaparin bridge  INR is 1.45, subtherapeutic, trending around 1.5, no significant interactions.  Steady state on 7.5 mg will be approx 2/14 - 2/15, will leave 7.5 mg for now.  Patient may need another increase in daily dose or an alternating regimen at home.    Plan:  1.  Give warfarin 7.5 mg tablet PO @ 1800 tonight  2.  Draw a PT/INR in AM  3.  Pharmacy will continue to follow    Brian Trammell [facility]  02/13/19 4:33 PM     "

## 2019-02-13 NOTE — DISCHARGE SUMMARY
"    [facility] Medicine Services  DISCHARGE SUMMARY       Date of Admission: 2/6/2019  Date of Discharge:  2/13/2019  Primary Care Physician: Marcy Florez APRN    Presenting Problem/History of Present Illness:  Other acute pulmonary embolism without acute cor pulmonale (CMS/[facility]) [I26.99]       Final Discharge Diagnoses:  Active Hospital Problems    Diagnosis   • **Pulmonary embolus (CMS/HCC)   • Arthritis   • DVT (deep venous thrombosis) (CMS/[facility])   • Benign essential HTN   • Gastroesophageal reflux disease without esophagitis       Consults:   Consults     Date and Time Order Name Status Description    2/7/2019 1808 Inpatient Cardiology Consult      2/7/2019 1338 Hematology & Oncology Inpatient Consult Completed           Procedures Performed: CTA chest  angiogram , US venous doppler                 Pertinent Test Results: bilateral pulmonary embolus and b/l lower extremities DVT     Chief Complaint on Day of Discharge: none     Hospital Course:  The patient is a 68 y.o. male who presented to [facility] with worsening SOB and left leg pain with history of recent immobility after foot surgery. Labs at the ED showed elevated d-dimer. Att he ED CTA chest showed bilateral pulmonary emboli. he was started on Lovenox bridging with coumadin as recommended by hematology. Patient will be discharge home and recommended to follow up with hematology. Cardiology was consulted and recommended stress test, that resulted positive, but currently patient is not candidate for cardia cath due to PE/DVT on anticoagulation, then cardiology recommended medical management.             Condition on Discharge:  stable    Physical Exam on Discharge:      /84 (BP Location: Right arm, Patient Position: Sitting)   Pulse 68   Temp 97.5 °F (36.4 °C) (Oral)   Resp 18   Ht 177.8 cm (70\")   Wt 76.3 kg (168 lb 2 oz)   SpO2 100%   BMI 24.12 kg/m²   Physical Exam  GENERAL " APPEARANCE: Well developed, well nourished, alert and cooperative, , not acute distress.  HEENT: normocephalic. PERRL, EOMI, vision is grossly intact.: External auditory canals and tympanic membranes clear, hearing grossly intact. No nasal discharge. Oral cavity and pharynx normal. No inflammation, swelling, exudate, or lesions. Teeth and gingiva in good general condition.  CARDIAC: Normal S1 and S2. RRR, not M/R/G.   LUNGS: Clear to auscultation and percussion without rales, rhonchi, wheezing or diminished breath sounds.  ABDOMEN: Positive bowel sounds. Soft, nondistended, nontender. No guarding or rebound. No masses.  EXTREMITIES: No significant deformity or joint abnormality. No edema. Peripheral pulses intact. No varicosities.  NEUROLOGICAL: CN II-XII intact. Strength and sensation symmetric and intact throughout. Reflexes 2+ throughout. Cerebellar testing normal.  SKIN: Skin normal color, texture and turgor with no lesions or eruptions.  PSYCHIATRIC: oriented x 3 .  good judgement and reason, without hallucinations, abnormal affect or abnormal behaviors during the examination.  not suicidal.    Discharge Disposition:  Home or Self Care    Discharge Medications:     Discharge Medications      New Medications      Instructions Start Date   cyanocobalamin 1000 MCG tablet  Commonly known as:  VITAMIN B-12   1,000 mcg, Oral, Daily      enoxaparin 80 MG/0.8ML solution syringe  Commonly known as:  LOVENOX   1 mg/kg, Subcutaneous, Every 12 Hours      folic acid 1 MG tablet  Commonly known as:  FOLVITE   1 mg, Oral, Daily      warfarin 7.5 MG tablet  Commonly known as:  COUMADIN   7.5 mg, Oral, Nightly         Changes to Medications      Instructions Start Date   HYDROcodone-acetaminophen 7.5-325 MG per tablet  Commonly known as:  NORCO  What changed:  Another medication with the same name was removed. Continue taking this medication, and follow the directions you see here.   1 tablet, Oral, Every 6 Hours PRN          Continue These Medications      Instructions Start Date   atorvastatin 40 MG tablet  Commonly known as:  LIPITOR   40 mg, Oral, Nightly      cetirizine 10 MG tablet  Commonly known as:  zyrTEC   10 mg, Oral, Daily      CIALIS 20 MG tablet  Generic drug:  tadalafil   20 mg, Oral, Daily PRN      fish oil 1000 MG capsule capsule   1,000 mg, Oral, Daily      fluticasone 50 MCG/ACT nasal spray  Commonly known as:  FLONASE   2 sprays, Nasal, Daily PRN      guanFACINE 1 MG tablet  Commonly known as:  TENEX   1 mg, Oral, Nightly, 1/2 tab at night      lansoprazole 30 MG capsule  Commonly known as:  PREVACID   30 mg, Oral, Nightly      losartan 50 MG tablet  Commonly known as:  COZAAR   100 mg, Oral, Daily         Stop These Medications    celecoxib 200 MG capsule  Commonly known as:  CeleBREX     doxycycline 100 MG capsule  Commonly known as:  VIBRAMYCIN     losartan-hydrochlorothiazide 100-25 MG per tablet  Commonly known as:  HYZAAR     oxyCODONE-acetaminophen 7.5-325 MG per tablet  Commonly known as:  PERCOCET            Discharge Diet: cardiac diet     Activity at Discharge: as tolerated     Discharge Care Plan/Instructions: follow up at coumadin clinic and hematologist     Follow-up Appointments:   Future Appointments   Date Time Provider Department Center   2/27/2019  2:45 PM Dickson Vega MD [facility]       Test Results Pending at Discharge:    Order Current Status    Antiphosphotidyl Antibodies Panel II In process    Factor 5 Leiden In process    Factor II, DNA Analysis In process          Paris Leal MD    Time: 34 minutes on preparation of discharge, discussed final diagnosis, hospital stay, and treatment on discharge, reviewing EHR ,  And preparing prescriptions
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
## 2019-02-13 NOTE — PROGRESS NOTES
Cardiology Progress Note     LOS: 6 days   Patient Care Team:  Marcy Florez APRN as PCP - General (Family Medicine)  Anthony Moore DPM as Consulting Physician (Podiatry)    Subjective:     Chart reviewed. Patient seen and examined. Patient denies any chest pain, shortness of breath, or palpitation.  Patient has not complained of having any symptoms of chest pain with  evidence of perfusion defect in the inferior wall distribution on the nuclear stress tests.  Patient at the present time would not be subjected to any invasive evaluation for the positive nuclear stress tests.  Objective:  Temp:  [97.2 °F (36.2 °C)-98.7 °F (37.1 °C)] 98.3 °F (36.8 °C)  Heart Rate:  [58-80] 78  Resp:  [18] 18  BP: (105-169)/(65-89) 119/65    Intake/Output Summary (Last 24 hours) at 2/12/2019 1950  Last data filed at 2/12/2019 1357  Gross per 24 hour   Intake 750 ml   Output --   Net 750 ml       Physical Exam:   General Appearance:    Alert, oriented, cooperative, in no acute distress.   Head:    Normocephalic, atraumatic, without obvious abnormality   Eyes:              RAS. Lids and lashes normal, conjunctivae and sclerae normal, no icterus, no pallor.   Ears:    Ears appear intact with no abnormalities noted.   Throat:   Mucous membranes pink and moist.   Neck:   Supple, trachea midline, no carotid bruit, no organomegaly or JVD.   Lungs:     Clear to auscultation and percussion, respirations. regular, even and unlabored. No wheezes, rales, or rhonchi.    Heart:    Regular rhythm and normal rate, normal S1 and S2, no      murmur, no gallop, no rub, no click.   Abdomen:     Soft, non-tender, non-distended, no guarding, no rebound tenderness. Normal bowel sounds in all four quadrants, no masses, liver and spleen nonpalpable.    Genitalia:    Deferred.   Extremities:   Moves all extremities well, no edema, no cyanosis, no       redness, no clubbing.   Pulses:   Pulses palpable and equal bilaterally.   Skin:   Moist and warm.  No bleeding, bruising or rash.   Neurologic/Psychiatric:   Alert and oriented to person, place, and time.  Motor, power and tone in upper and lower extremities are grossly intact.  No focal neurological deficits. Normal cognitive function. No psychomotor reaction or tangential thought. No depression, homicidal ideations and suicidal ideations.          Results Review:    Results from last 7 days   Lab Units 02/12/19  0520   SODIUM mmol/L 138   POTASSIUM mmol/L 4.1   CHLORIDE mmol/L 104   CO2 mmol/L 27.0   BUN mg/dL 11   CREATININE mg/dL 0.81   CALCIUM mg/dL 8.6   BILIRUBIN mg/dL 0.3   ALK PHOS U/L 45   ALT (SGPT) U/L 25   AST (SGOT) U/L 35   GLUCOSE mg/dL 94     Results from last 7 days   Lab Units 02/06/19  2035   TROPONIN I ng/mL <0.012         Results from last 7 days   Lab Units 02/12/19  0520   WBC 10*3/mm3 5.19   HEMOGLOBIN g/dL 11.3*   HEMATOCRIT % 33.1*   PLATELETS 10*3/mm3 237     Results from last 7 days   Lab Units 02/12/19  0520 02/11/19  0528 02/10/19  0738  02/07/19  0222   INR  1.47* 1.56* 1.06   < > 1.09   APTT seconds  --   --   --   --  31.9    < > = values in this interval not displayed.                       ECHO:  Results for orders placed during the hospital encounter of 02/06/19   Adult Transthoracic Echo Complete W/ Cont if Necessary Per Protocol    Narrative · Right ventricular cavity is mildly dilated.  · Left ventricular wall thickness is consistent with borderline concentric   hypertrophy.  · Mild mitral valve regurgitation is present  · Mild to moderate tricuspid valve regurgitation is present.          SCANNED EKG   Final Result      ECG 12 Lead   ED Interpretation   Davion Mane MD     2/6/2019 10:43 PM   ECG 12 Lead      Date/Time: 2/6/2019 8:27 PM   Performed by: Davion Mane MD   Authorized by: Davion Mane MD    Interpreted by physician   Clinical impression: non-specific ECG   Comments: Normal sinus rhythm rate of 63.  Incomplete right bundle branch    block pattern.  No ST  elevation.         Final Result   Test Reason : dyspnea   Blood Pressure : **/** mmHG   Vent. Rate : 063 BPM     Atrial Rate : 063 BPM      P-R Int : 162 ms          QRS Dur : 108 ms       QT Int : 462 ms       P-R-T Axes : 047 036 058 degrees      QTc Int : 472 ms      Normal sinus rhythm   Incomplete right bundle branch block   Borderline ECG      Confirmed by GAETANO OLIVAREZ, B. N. ([id number]) on 2/6/2019 9:59:23 PM      Referred By:             Confirmed By:FLAKITA SALTER MD           Medication Review:   Current Facility-Administered Medications   Medication Dose Route Frequency Provider Last Rate Last Dose   • cetirizine (zyrTEC) tablet 10 mg  10 mg Oral Daily Clemente Magdaleno Jr., MD   10 mg at 02/12/19 0917   • docusate sodium (COLACE) capsule 100 mg  100 mg Oral BID PRN Clemente Magdaleno Jr., MD       • enoxaparin (LOVENOX) syringe 80 mg  1 mg/kg Subcutaneous Q12H Clemente Magdaleno Jr., MD   80 mg at 02/12/19 0918   • fluticasone (FLONASE) 50 MCG/ACT nasal spray 2 spray  2 spray Nasal Daily PRN Clemente Magdaleno Jr., MD       • folic acid (FOLVITE) tablet 1 mg  1 mg Oral Daily Dickson Vega MD   1 mg at 02/12/19 0917   • guanFACINE (TENEX) tablet 1 mg  1 mg Oral Nightly Clemente Magdaleno Jr., MD   1 mg at 02/11/19 2123   • HYDROcodone-acetaminophen (NORCO) 7.5-325 MG per tablet 1 tablet  1 tablet Oral Q6H PRN Clemente Magdaleno Jr., MD   1 tablet at 02/10/19 1644   • losartan (COZAAR) tablet 50 mg  50 mg Oral Q24H Darryl Nunn MD   50 mg at 02/12/19 0918   • ondansetron (ZOFRAN) injection 4 mg  4 mg Intravenous Q6H PRN Clemente Magdaleno Jr., MD       • pantoprazole (PROTONIX) EC tablet 40 mg  40 mg Oral QAM Clemente Magdaleno Jr., MD   40 mg at 02/12/19 0528   • Pharmacy to dose warfarin   Does not apply Continuous PRN Ovidio Catherine MD       • sodium chloride 0.9 % flush 1-10 mL  1-10 mL Intravenous PRN Clemente Magdaleno Jr., MD       • sodium chloride 0.9 % flush 10 mL  10 mL Intravenous PRN Davion Mane MD       • sodium chloride 0.9  % flush 10 mL  10 mL Intravenous PRN Jersno Norton MD   10 mL at 02/08/19 0928   • sodium chloride 0.9 % flush 3 mL  3 mL Intravenous Q12H Clemente Magdaleno Jr., MD   3 mL at 02/12/19 0918   • vitamin B-12 (CYANOCOBALAMIN) tablet 1,000 mcg  1,000 mcg Oral Daily Dickson Vega MD   1,000 mcg at 02/12/19 0917   • warfarin (COUMADIN) tablet 7.5 mg  7.5 mg Oral Daily Ovidio Catherine MD   7.5 mg at 02/12/19 3249       Assessment and Plan:      Pulmonary embolus (CMS/HCC)    Benign essential HTN    Gastroesophageal reflux disease without esophagitis    Arthritis    DVT (deep venous thrombosis) (CMS/HCC)      1.  Chest pain.  Patient had undergone previous coronary angiogram 20 years ago at [facility] which had not revealed off any evidence of any coronary artery disease.  Patient nuclear stress test was positive with perfusion imaging indicates a medium-sized, mild-to-moderately severe area of ischemia located in the inferior wall.  Have discussed the finding of the nuclear stress test with the patient.  Due to the patient evidence of bilateral deep vein thrombosis and bilateral pulmonary embolism patient at the present time would not be subjected to a invasive coronary angiogram.  Patient would be treated medically.  2.  Pulmonary embolism with deep vein thrombosis.  Patient is on anticoagulation with Coumadin.  Patient has been followed by Dr. Vega for hypercoagulable workup.  Patient PT/INR went therapeutic would be able to be discharged.  3.  Arterial hypertension.  Patient blood pressure has remained stable.  4.  Shortness of breath.  Patient's symptoms of shortness of breath is improved.    The above plan of management were discussed with the patient          Jerson Norton MD  02/12/19  7:50 PM      Time: Time spent on face-to-face interaction 20 minutes      Dictated utilizing Dragon dictation.

## 2019-02-14 ENCOUNTER — READMISSION MANAGEMENT (OUTPATIENT)
Dept: CALL CENTER | Facility: HOSPITAL | Age: 69
End: 2019-02-14

## 2019-02-14 ENCOUNTER — ANTICOAGULATION VISIT (OUTPATIENT)
Dept: CARDIAC SURGERY | Facility: CLINIC | Age: 69
End: 2019-02-14

## 2019-02-14 VITALS — DIASTOLIC BLOOD PRESSURE: 78 MMHG | HEART RATE: 78 BPM | OXYGEN SATURATION: 99 % | SYSTOLIC BLOOD PRESSURE: 138 MMHG

## 2019-02-14 DIAGNOSIS — I26.99 OTHER ACUTE PULMONARY EMBOLISM WITHOUT ACUTE COR PULMONALE (HCC): ICD-10-CM

## 2019-02-14 DIAGNOSIS — I82.4Y9 DEEP VEIN THROMBOSIS (DVT) OF PROXIMAL LOWER EXTREMITY, UNSPECIFIED CHRONICITY, UNSPECIFIED LATERALITY (HCC): ICD-10-CM

## 2019-02-14 DIAGNOSIS — Z79.01 LONG TERM (CURRENT) USE OF ANTICOAGULANTS: ICD-10-CM

## 2019-02-14 LAB
ANTI-PHOSPHATIDIC ACID: NORMAL
ANTI-PHOSPHATIDIC,IGA: 4.6 U/ML
ANTI-PHOSPHATIDIC,IGG: 2.8 U/ML
ANTI-PHOSPHATIDIC,IGM: 2.3 U/ML
ANTI-PHOSPHATIDYL GLYCEROL, IGA: 3.9 U/ML
ANTI-PHOSPHATIDYL GLYCEROL, IGG: 4.8 U/ML
ANTI-PHOSPHATIDYL GLYCEROL, IGM: 4.4 U/ML
ANTI-PHOSPHATIDYL GLYCEROL: NORMAL
ANTI-PHOSPHATIDYL INOSITOL: NORMAL
ANTI-PHOSPHATIDYL,IGA: 2 U/ML
ANTI-PHOSPHATIDYL,IGG: 7 U/ML
ANTI-PHOSPHATIDYL,IGM: 1.5 U/ML
ANTI-PHOSPHATIDYLETHANOLAMINE, IGA: 0.2 U/ML
ANTI-PHOSPHATIDYLETHANOLAMINE, IGG: 0.5 U/ML
ANTI-PHOSPHATIDYLETHANOLAMINE, IGM: 1.4 U/ML
ANTI-PHOSPHATIDYLETHANOLAMINE: NORMAL
INR PPP: 1.8 (ref 0.9–1.1)

## 2019-02-14 PROCEDURE — 85610 PROTHROMBIN TIME: CPT | Performed by: NURSE PRACTITIONER

## 2019-02-14 PROCEDURE — 99211 OFF/OP EST MAY X REQ PHY/QHP: CPT | Performed by: NURSE PRACTITIONER

## 2019-02-14 NOTE — OUTREACH NOTE
Prep Survey      Responses   Facility patient discharged from?  [facility]   Is patient eligible?  Yes   Discharge diagnosis  Pulmonary embolus,  DVT,  HTN,  GERD   Does the patient have one of the following disease processes/diagnoses(primary or secondary)?  Other   Does the patient have Home health ordered?  No   Is there a DME ordered?  No   Comments regarding appointments  see AVS   Medication alerts for this patient  lovenox   Prep survey completed?  Yes          Ying Simon RN

## 2019-02-14 NOTE — PAYOR COMM NOTE
"Jose Zazueta (68 y.o. Male)     Date of Birth Social Security Number Address Home Phone MRN    1950  [facility] [id number] [phone] [id number]    Orthodox Marital Status          Adventism        Admission Date Admission Type Admitting Provider Attending Provider Department, Room/Bed    2/6/19 Emergency Lyle Manjarrez MD  [address], 333/1    Discharge Date Discharge Disposition Discharge Destination        2/13/2019 Home or Self Care              Attending Provider:  (none)   Allergies:  Sulfa Antibiotics, Sulfamethoxazole-trimethoprim, Clindamycin/lincomycin, Codeine    Isolation:  None   Infection:  None   Code Status:  Prior    Ht:  177.8 cm (70\")   Wt:  76.3 kg (168 lb 2 oz)    Admission Cmt:  None   Principal Problem:  Pulmonary embolus (CMS/HCC) [I26.99]                 Active Insurance as of 2/6/2019     Primary Coverage     Payor Plan Insurance Group Employer/Plan Group    HUMANA MEDICARE REPLACEMENT HUMANA MEDICARE REPL [id number]     Payor Plan Address Payor Plan Phone Number Payor Plan Fax Number Effective Dates    [address] [address] [phone]  5/1/2018 - None Entered    [facility] [id number]       Subscriber Name Subscriber Birth Date Member ID       JOSE ZAZUETA 1950 [id number]                 Emergency Contacts      (Rel.) Home Phone Work Phone Mobile Phone    Tasha Zazueta (Spouse) [phone] -- [phone]               Discharge Summary      Paris Leal MD at 2/13/2019  1:03 PM              [facility]  DISCHARGE SUMMARY       Date of Admission: 2/6/2019  Date of Discharge:  2/13/2019  Primary Care Physician: Marcy Florez APRN    Presenting Problem/History of Present Illness:  Other acute pulmonary embolism without acute cor pulmonale (CMS/HCC) [I26.99]       Final Discharge Diagnoses:  Active Hospital " "Problems    Diagnosis   • **Pulmonary embolus (CMS/HCC)   • Arthritis   • DVT (deep venous thrombosis) (CMS/HCC)   • Benign essential HTN   • Gastroesophageal reflux disease without esophagitis       Consults:   Consults     Date and Time Order Name Status Description    2/7/2019 1808 Inpatient Cardiology Consult      2/7/2019 1338 Hematology & Oncology Inpatient Consult Completed           Procedures Performed: CTA chest  angiogram , US venous doppler                 Pertinent Test Results: bilateral pulmonary embolus and b/l lower extremities DVT     Chief Complaint on Day of Discharge: none     Hospital Course:  The patient is a 68 y.o. male who presented to [facility] with worsening SOB and left leg pain with history of recent immobility after foot surgery. Labs at the ED showed elevated d-dimer. Att he ED CTA chest showed bilateral pulmonary emboli.  he was started on Lovenox bridging with coumadin as recommended by hematology. Patient will be discharge home and recommended to follow up with hematology. Cardiology was consulted and recommended stress test, that resulted positive, but currently patient is not candidate for cardia cath due to PE/DVT on anticoagulation, then cardiology recommended medical management.             Condition on Discharge:  stable    Physical Exam on Discharge:      /84 (BP Location: Right arm, Patient Position: Sitting)   Pulse 68   Temp 97.5 °F (36.4 °C) (Oral)   Resp 18   Ht 177.8 cm (70\")   Wt 76.3 kg (168 lb 2 oz)   SpO2 100%   BMI 24.12 kg/m²    Physical Exam  GENERAL APPEARANCE: Well developed, well nourished, alert and cooperative, , not acute distress.  HEENT: normocephalic. PERRL, EOMI, vision is grossly intact.: External auditory canals and tympanic membranes clear, hearing grossly intact. No nasal discharge. Oral cavity and pharynx normal. No inflammation, swelling, exudate, or lesions. Teeth and gingiva in good general condition.  CARDIAC: " Normal S1 and S2. RRR, not M/R/G.   LUNGS: Clear to auscultation and percussion without rales, rhonchi, wheezing or diminished breath sounds.  ABDOMEN: Positive bowel sounds. Soft, nondistended, nontender. No guarding or rebound. No masses.  EXTREMITIES: No significant deformity or joint abnormality. No edema. Peripheral pulses intact. No varicosities.  NEUROLOGICAL: CN II-XII intact. Strength and sensation symmetric and intact throughout. Reflexes 2+ throughout. Cerebellar testing normal.  SKIN: Skin normal color, texture and turgor with no lesions or eruptions.  PSYCHIATRIC: oriented x 3 .  good judgement and reason, without hallucinations, abnormal affect or abnormal behaviors during the examination.  not suicidal.    Discharge Disposition:  Home or Self Care    Discharge Medications:     Discharge Medications      New Medications      Instructions Start Date   cyanocobalamin 1000 MCG tablet  Commonly known as:  VITAMIN B-12   1,000 mcg, Oral, Daily      enoxaparin 80 MG/0.8ML solution syringe  Commonly known as:  LOVENOX   1 mg/kg, Subcutaneous, Every 12 Hours      folic acid 1 MG tablet  Commonly known as:  FOLVITE   1 mg, Oral, Daily      warfarin 7.5 MG tablet  Commonly known as:  COUMADIN   7.5 mg, Oral, Nightly         Changes to Medications      Instructions Start Date   HYDROcodone-acetaminophen 7.5-325 MG per tablet  Commonly known as:  NORCO  What changed:  Another medication with the same name was removed. Continue taking this medication, and follow the directions you see here.   1 tablet, Oral, Every 6 Hours PRN         Continue These Medications      Instructions Start Date   atorvastatin 40 MG tablet  Commonly known as:  LIPITOR   40 mg, Oral, Nightly      cetirizine 10 MG tablet  Commonly known as:  zyrTEC   10 mg, Oral, Daily      CIALIS 20 MG tablet  Generic drug:  tadalafil   20 mg, Oral, Daily PRN      fish oil 1000 MG capsule capsule   1,000 mg, Oral, Daily      fluticasone 50 MCG/ACT nasal  spray  Commonly known as:  FLONASE   2 sprays, Nasal, Daily PRN      guanFACINE 1 MG tablet  Commonly known as:  TENEX   1 mg, Oral, Nightly, 1/2 tab at night      lansoprazole 30 MG capsule  Commonly known as:  PREVACID   30 mg, Oral, Nightly      losartan 50 MG tablet  Commonly known as:  COZAAR   100 mg, Oral, Daily         Stop These Medications    celecoxib 200 MG capsule  Commonly known as:  CeleBREX     doxycycline 100 MG capsule  Commonly known as:  VIBRAMYCIN     losartan-hydrochlorothiazide 100-25 MG per tablet  Commonly known as:  HYZAAR     oxyCODONE-acetaminophen 7.5-325 MG per tablet  Commonly known as:  PERCOCET            Discharge Diet: cardiac diet     Activity at Discharge: as tolerated     Discharge Care Plan/Instructions: follow up at coumadin clinic and hematologist     Follow-up Appointments:   Future Appointments   Date Time Provider Department Center   2/27/2019  2:45 PM Dickson Vega MD [facility]       Test Results Pending at Discharge:    Order Current Status    Antiphosphotidyl Antibodies Panel II In process    Factor 5 Leiden In process    Factor II, DNA Analysis In process          Paris Leal MD    Time: 34 minutes on preparation of discharge, discussed final diagnosis, hospital stay, and treatment on discharge, reviewing EHR ,  And preparing prescriptions                   Electronically signed by Paris Leal MD at 2/13/2019  2:33 PM

## 2019-02-14 NOTE — PROGRESS NOTES
PT here today for enrollment in Coumadin Clinic. PT was released from hospital yesterday post bilateral DVT/PE post foot surgery.PT was given Coumadin informational packet.  Educated pt on reasoning for Coumadin therapy. Explained mechanism of action with Coumadin and vitamin k. Risk and benefits were explained including rationale for INR range. Stressed compliance and consistency with diet, medications, and appts. PT was educated on interaction of other medications and instructed to call [facility] with any med changes immediately including those that are short term. Instructed pt to take Coumadin between supper time and bedtime. Pt was educated on expectations of visits including intervals and frequencies. Educated pt on dietary restrictions and Coumadin friendly diet. PT was given information sheets regarding exceptable green vegs. PT was instructed to contact [facility] for any planned medical procedures and to make sure all providers are aware of Coumadin therapy. PT was educated on need for Lovenox if Coumadin therapy is interrupted at least during first 6 months. PT was educated on s/s of bleeding and to report bleeding to ER immediatly. PT was advised to wear medical alert bracelet. Emphasized safety while on blood thinner including power tool safety and to always obtain CT scan for head/blunt trauma. PT was instructed that Coumadin Clinic would manage pt's INR and refill Coumadin. PT instructed to continue Lovenox BID, tonight at 8pm and tomorrow at 8am. PT was given appt for tomorrow to see if pt can come off Lovenox before weekend. PT denies any bleeding issues or further s/s of blood clot. PT and wife verbalize understanding. 30 minutes of direct pt education.         This document has been electronically signed by Fara Combs, DOMENIC @ on February 14, 2019 1:35 PM

## 2019-02-15 ENCOUNTER — READMISSION MANAGEMENT (OUTPATIENT)
Dept: CALL CENTER | Facility: HOSPITAL | Age: 69
End: 2019-02-15

## 2019-02-15 ENCOUNTER — ANTICOAGULATION VISIT (OUTPATIENT)
Dept: CARDIAC SURGERY | Facility: CLINIC | Age: 69
End: 2019-02-15

## 2019-02-15 VITALS — SYSTOLIC BLOOD PRESSURE: 108 MMHG | DIASTOLIC BLOOD PRESSURE: 72 MMHG | HEART RATE: 88 BPM

## 2019-02-15 DIAGNOSIS — I26.99 OTHER ACUTE PULMONARY EMBOLISM WITHOUT ACUTE COR PULMONALE (HCC): ICD-10-CM

## 2019-02-15 DIAGNOSIS — I82.4Y9 DEEP VEIN THROMBOSIS (DVT) OF PROXIMAL LOWER EXTREMITY, UNSPECIFIED CHRONICITY, UNSPECIFIED LATERALITY (HCC): ICD-10-CM

## 2019-02-15 DIAGNOSIS — Z79.01 LONG TERM (CURRENT) USE OF ANTICOAGULANTS: ICD-10-CM

## 2019-02-15 LAB — INR PPP: 1.6 (ref 0.9–1.1)

## 2019-02-15 PROCEDURE — 85610 PROTHROMBIN TIME: CPT | Performed by: NURSE PRACTITIONER

## 2019-02-15 PROCEDURE — 99211 OFF/OP EST MAY X REQ PHY/QHP: CPT | Performed by: NURSE PRACTITIONER

## 2019-02-15 NOTE — PROGRESS NOTES
Today's INR is 1.6. Denies any new medications or bleeding issues. Denies any further s/s of blood clot. PT instructed on dosing and to take Lovenox tonight and BID Saturday. No Lovenox on Sunday. PT will be seen on Monday. PT instructed to hold green over weekend. Patient instructed regarding medication; results given and questions answered. Nutritional counseling given.  Dietary factors affecting therapy addressed.  Patient instructed to monitor for excessive bruising or bleeding. PT verbalizes understanding.       This document has been electronically signed by HERIBERTO Santos @  On February 15, 2019 10:29 AM

## 2019-02-15 NOTE — OUTREACH NOTE
Medical Week 1 Survey      Responses   Facility patient discharged from?  [facility]   Does the patient have one of the following disease processes/diagnoses(primary or secondary)?  Other   Is there a successful TCM telephone encounter documented?  No   Week 1 attempt successful?  Yes   Call start time  1145   Revoke  Decline to participate   Call end time  1145          Janene Mejia RN

## 2019-02-18 ENCOUNTER — ANTICOAGULATION VISIT (OUTPATIENT)
Dept: CARDIAC SURGERY | Facility: CLINIC | Age: 69
End: 2019-02-18

## 2019-02-18 VITALS — OXYGEN SATURATION: 98 % | HEART RATE: 96 BPM | DIASTOLIC BLOOD PRESSURE: 84 MMHG | SYSTOLIC BLOOD PRESSURE: 128 MMHG

## 2019-02-18 DIAGNOSIS — Z79.01 LONG TERM (CURRENT) USE OF ANTICOAGULANTS: ICD-10-CM

## 2019-02-18 DIAGNOSIS — I82.4Y9 DEEP VEIN THROMBOSIS (DVT) OF PROXIMAL LOWER EXTREMITY, UNSPECIFIED CHRONICITY, UNSPECIFIED LATERALITY (HCC): ICD-10-CM

## 2019-02-18 DIAGNOSIS — I26.99 OTHER ACUTE PULMONARY EMBOLISM WITHOUT ACUTE COR PULMONALE (HCC): ICD-10-CM

## 2019-02-18 LAB — INR PPP: 2.4 (ref 0.9–1.1)

## 2019-02-18 PROCEDURE — 85610 PROTHROMBIN TIME: CPT | Performed by: NURSE PRACTITIONER

## 2019-02-18 RX ORDER — WARFARIN SODIUM 7.5 MG/1
7.5 TABLET ORAL NIGHTLY
Qty: 35 TABLET | Refills: 5 | Status: SHIPPED | OUTPATIENT
Start: 2019-02-18 | End: 2019-02-18 | Stop reason: SDUPTHER

## 2019-02-18 RX ORDER — WARFARIN SODIUM 7.5 MG/1
7.5 TABLET ORAL NIGHTLY
Qty: 35 TABLET | Refills: 5 | Status: SHIPPED | OUTPATIENT
Start: 2019-02-18 | End: 2019-02-25

## 2019-02-18 NOTE — PROGRESS NOTES
Today's INR is 2.4. PT denies any new medications or bleeding issues. PT took Coumadin and Lovenox as directed. Denies any s/s of blood clot. PT instructed to continue Coumadin only and to have serving of green today or tomorrow. PT will be seen on Thursday. Patient instructed regarding medication; results given and questions answered. Nutritional counseling given.  Dietary factors affecting therapy addressed.  Patient instructed to monitor for excessive bruising or bleeding. PT verbalizes understanding.       This document has been electronically signed by HERIBERTO Santos  On February 18, 2019 9:25 AM

## 2019-02-19 LAB — F5 GENE MUT ANL BLD/T: NORMAL

## 2019-02-21 ENCOUNTER — ANTICOAGULATION VISIT (OUTPATIENT)
Dept: CARDIAC SURGERY | Facility: CLINIC | Age: 69
End: 2019-02-21

## 2019-02-21 ENCOUNTER — HOSPITAL ENCOUNTER (OUTPATIENT)
Dept: ULTRASOUND IMAGING | Facility: HOSPITAL | Age: 69
Discharge: HOME | End: 2019-02-21
Admitting: INTERNAL MEDICINE

## 2019-02-21 VITALS — HEART RATE: 72 BPM

## 2019-02-21 DIAGNOSIS — R09.89 BRUIT: ICD-10-CM

## 2019-02-21 DIAGNOSIS — Z79.01 LONG TERM (CURRENT) USE OF ANTICOAGULANTS: ICD-10-CM

## 2019-02-21 DIAGNOSIS — I26.99 OTHER ACUTE PULMONARY EMBOLISM WITHOUT ACUTE COR PULMONALE (HCC): ICD-10-CM

## 2019-02-21 DIAGNOSIS — I82.4Y9 DEEP VEIN THROMBOSIS (DVT) OF PROXIMAL LOWER EXTREMITY, UNSPECIFIED CHRONICITY, UNSPECIFIED LATERALITY (HCC): ICD-10-CM

## 2019-02-21 LAB — INR PPP: 3.1 (ref 0.9–1.1)

## 2019-02-21 PROCEDURE — 85610 PROTHROMBIN TIME: CPT | Performed by: NURSE PRACTITIONER

## 2019-02-21 PROCEDURE — 93880 EXTRACRANIAL BILAT STUDY: CPT

## 2019-02-21 RX ORDER — ISOSORBIDE MONONITRATE 30 MG/1
30 TABLET, EXTENDED RELEASE ORAL DAILY
COMMUNITY

## 2019-02-21 NOTE — PROGRESS NOTES
Today's INR is 3.1. Denies any new medications or bleeding issues. PT did consume green x1 since last visit. Adjusted pt's dose and instructed to consume green today and Monday. PT will be seen on Tuesday when returning to see Dr Vega. Patient instructed regarding medication; results given and questions answered. Nutritional counseling given.  Dietary factors affecting therapy addressed.  Patient instructed to monitor for excessive bruising or bleeding. PT verbalizes understanding.         This document has been electronically signed by DOMENIC Kim @ on February 21, 2019 2:45 PM

## 2019-02-27 ENCOUNTER — ANTICOAGULATION VISIT (OUTPATIENT)
Dept: CARDIAC SURGERY | Facility: CLINIC | Age: 69
End: 2019-02-27

## 2019-02-27 ENCOUNTER — OFFICE VISIT (OUTPATIENT)
Dept: ONCOLOGY | Facility: CLINIC | Age: 69
End: 2019-02-27

## 2019-02-27 VITALS
TEMPERATURE: 97.4 F | HEIGHT: 70 IN | DIASTOLIC BLOOD PRESSURE: 92 MMHG | SYSTOLIC BLOOD PRESSURE: 140 MMHG | WEIGHT: 178.8 LBS | OXYGEN SATURATION: 98 % | HEART RATE: 74 BPM | BODY MASS INDEX: 25.6 KG/M2 | RESPIRATION RATE: 18 BRPM

## 2019-02-27 VITALS — OXYGEN SATURATION: 98 % | HEART RATE: 69 BPM | SYSTOLIC BLOOD PRESSURE: 151 MMHG | DIASTOLIC BLOOD PRESSURE: 88 MMHG

## 2019-02-27 DIAGNOSIS — Z79.01 LONG TERM (CURRENT) USE OF ANTICOAGULANTS: ICD-10-CM

## 2019-02-27 DIAGNOSIS — I70.90 ATHEROSCLEROSIS: ICD-10-CM

## 2019-02-27 DIAGNOSIS — I26.99 OTHER ACUTE PULMONARY EMBOLISM WITHOUT ACUTE COR PULMONALE (HCC): ICD-10-CM

## 2019-02-27 DIAGNOSIS — I82.4Y9 DEEP VEIN THROMBOSIS (DVT) OF PROXIMAL LOWER EXTREMITY, UNSPECIFIED CHRONICITY, UNSPECIFIED LATERALITY (HCC): ICD-10-CM

## 2019-02-27 DIAGNOSIS — R79.89 ELEVATED HOMOCYSTEINE: ICD-10-CM

## 2019-02-27 DIAGNOSIS — I26.99 OTHER ACUTE PULMONARY EMBOLISM WITHOUT ACUTE COR PULMONALE (HCC): Primary | ICD-10-CM

## 2019-02-27 DIAGNOSIS — D64.9 ANEMIA, UNSPECIFIED TYPE: ICD-10-CM

## 2019-02-27 LAB — INR PPP: 4.2 (ref 0.9–1.1)

## 2019-02-27 PROCEDURE — 99214 OFFICE O/P EST MOD 30 MIN: CPT | Performed by: INTERNAL MEDICINE

## 2019-02-27 PROCEDURE — 85610 PROTHROMBIN TIME: CPT | Performed by: NURSE PRACTITIONER

## 2019-02-27 PROCEDURE — G8420 CALC BMI NORM PARAMETERS: HCPCS | Performed by: INTERNAL MEDICINE

## 2019-02-27 PROCEDURE — 99211 OFF/OP EST MAY X REQ PHY/QHP: CPT | Performed by: NURSE PRACTITIONER

## 2019-02-27 PROCEDURE — 1157F ADVNC CARE PLAN IN RCRD: CPT | Performed by: INTERNAL MEDICINE

## 2019-02-27 PROCEDURE — G0463 HOSPITAL OUTPT CLINIC VISIT: HCPCS | Performed by: INTERNAL MEDICINE

## 2019-02-27 RX ORDER — FOLIC ACID 1 MG/1
1 TABLET ORAL DAILY
Qty: 90 TABLET | Refills: 1 | Status: SHIPPED | OUTPATIENT
Start: 2019-02-27 | End: 2019-08-22 | Stop reason: ALTCHOICE
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
## 2019-02-27 NOTE — PROGRESS NOTES
Today's INR is 4.2. Pt denies bleeding problems.  Pt states he ate a serving of vegetable soup this past weekend and has been taking Tylenol daily for headache. Dose adjusted and pt instructed to have a cup serving of turnip greens today; pt verbalized. Patient instructed regarding medication; results given and questions answered. Nutritional counseling given.  Dietary factors affecting therapy addressed.  Patient instructed to monitor for excessive bruising or bleeding. Will recheck in 2 days.         This document has been electronically signed by DOMENIC Kim @ on February 27, 2019 2:27 PM

## 2019-03-01 ENCOUNTER — DOCUMENTATION (OUTPATIENT)
Dept: NUTRITION | Facility: HOSPITAL | Age: 69
End: 2019-03-01

## 2019-03-01 ENCOUNTER — ANTICOAGULATION VISIT (OUTPATIENT)
Dept: CARDIAC SURGERY | Facility: CLINIC | Age: 69
End: 2019-03-01

## 2019-03-01 VITALS — HEART RATE: 55 BPM | SYSTOLIC BLOOD PRESSURE: 148 MMHG | DIASTOLIC BLOOD PRESSURE: 94 MMHG | OXYGEN SATURATION: 95 %

## 2019-03-01 DIAGNOSIS — Z79.01 LONG TERM (CURRENT) USE OF ANTICOAGULANTS: ICD-10-CM

## 2019-03-01 DIAGNOSIS — I26.99 OTHER ACUTE PULMONARY EMBOLISM WITHOUT ACUTE COR PULMONALE (HCC): ICD-10-CM

## 2019-03-01 DIAGNOSIS — I82.4Y9 DEEP VEIN THROMBOSIS (DVT) OF PROXIMAL LOWER EXTREMITY, UNSPECIFIED CHRONICITY, UNSPECIFIED LATERALITY (HCC): ICD-10-CM

## 2019-03-01 LAB — INR PPP: 3.4 (ref 0.9–1.1)

## 2019-03-01 PROCEDURE — 99211 OFF/OP EST MAY X REQ PHY/QHP: CPT | Performed by: NURSE PRACTITIONER

## 2019-03-01 PROCEDURE — 85610 PROTHROMBIN TIME: CPT | Performed by: NURSE PRACTITIONER

## 2019-03-01 NOTE — PROGRESS NOTES
Today's INR is 3.4.  Pt denies med changes or bleeding problems. Dose slightly adjusted and pt instructed to have a serving of green veggies today and Tuesday; pt verbalized. Patient instructed regarding medication; results given and questions answered. Nutritional counseling given.  Dietary factors affecting therapy addressed.  Patient instructed to monitor for excessive bruising or bleeding. Will recheck next week.         This document has been electronically signed by DOMENIC Kim @ on March 1, 2019 8:41 AM

## 2019-03-01 NOTE — PROGRESS NOTES
Adult Outpatient Nutrition  Assessment    Patient Name:  Jose Zazueta  YOB: 1950  MRN: [id number]    Nutrition Screen Date:  3/1/2019    Comments: Chart review revealed 68WM coming to [facility] due to anemia. On coumadin due to recent DVT. Ht 70 in. Wt 178.9 lb.--up/down (overall stable over past 4 months). BMI 25.7. Pt is being followed by coumadin clinic which does include education on vitamin K. [facility] RDN is available if needed.                        Electronically signed by:  Ofelia Bonilla RD  03/01/19 1:29 PM

## 2019-03-07 ENCOUNTER — ANTICOAGULATION VISIT (OUTPATIENT)
Dept: CARDIAC SURGERY | Facility: CLINIC | Age: 69
End: 2019-03-07

## 2019-03-07 VITALS — HEART RATE: 77 BPM | OXYGEN SATURATION: 98 % | SYSTOLIC BLOOD PRESSURE: 150 MMHG | DIASTOLIC BLOOD PRESSURE: 87 MMHG

## 2019-03-07 DIAGNOSIS — I26.99 OTHER ACUTE PULMONARY EMBOLISM WITHOUT ACUTE COR PULMONALE (HCC): ICD-10-CM

## 2019-03-07 DIAGNOSIS — I82.4Y9 DEEP VEIN THROMBOSIS (DVT) OF PROXIMAL LOWER EXTREMITY, UNSPECIFIED CHRONICITY, UNSPECIFIED LATERALITY (HCC): ICD-10-CM

## 2019-03-07 DIAGNOSIS — Z79.01 LONG TERM (CURRENT) USE OF ANTICOAGULANTS: ICD-10-CM

## 2019-03-07 LAB — INR PPP: 3.3 (ref 0.9–1.1)

## 2019-03-07 PROCEDURE — 85610 PROTHROMBIN TIME: CPT | Performed by: NURSE PRACTITIONER

## 2019-03-07 NOTE — PROGRESS NOTES
Today's INR is 3.3.  Pt denies med changes or bleeding problems. INR is decreasing; in collaboration with the pt it was decided with recent clotting to leave dose and increase vitamin K intake. Patient instructed regarding medication; results given and questions answered. Nutritional counseling given.  Dietary factors affecting therapy addressed.  Patient instructed to monitor for excessive bruising or bleeding. Will recheck next week.         This document has been electronically signed by DOMENCI Kim @ on March 7, 2019 8:37 AM

## 2019-03-10 LAB
BH CV STRESS BP STAGE 1: NORMAL
BH CV STRESS COMMENTS STAGE 1: NORMAL
BH CV STRESS DOSE REGADENOSON STAGE 1: 0.4
BH CV STRESS DURATION MIN STAGE 1: 0
BH CV STRESS DURATION SEC STAGE 1: 10
BH CV STRESS HR STAGE 1: 60
BH CV STRESS PROTOCOL 1: NORMAL
BH CV STRESS RECOVERY BP: NORMAL MMHG
BH CV STRESS RECOVERY HR: 72 BPM
BH CV STRESS STAGE 1: 1
LV EF NUC BP: 69 %
MAXIMAL PREDICTED HEART RATE: 153 BPM
PERCENT MAX PREDICTED HR: 57.52 %
STRESS BASELINE BP: NORMAL MMHG
STRESS BASELINE HR: 62 BPM
STRESS PERCENT HR: 68 %
STRESS POST ESTIMATED WORKLOAD: 1 METS
STRESS POST PEAK BP: NORMAL MMHG
STRESS POST PEAK HR: 88 BPM
STRESS TARGET HR: 130 BPM

## 2019-03-14 ENCOUNTER — ANTICOAGULATION VISIT (OUTPATIENT)
Dept: CARDIAC SURGERY | Facility: CLINIC | Age: 69
End: 2019-03-14

## 2019-03-14 VITALS — DIASTOLIC BLOOD PRESSURE: 95 MMHG | SYSTOLIC BLOOD PRESSURE: 143 MMHG | OXYGEN SATURATION: 97 % | HEART RATE: 93 BPM

## 2019-03-14 DIAGNOSIS — Z79.01 LONG TERM (CURRENT) USE OF ANTICOAGULANTS: ICD-10-CM

## 2019-03-14 DIAGNOSIS — I82.4Y9 DEEP VEIN THROMBOSIS (DVT) OF PROXIMAL LOWER EXTREMITY, UNSPECIFIED CHRONICITY, UNSPECIFIED LATERALITY (HCC): ICD-10-CM

## 2019-03-14 DIAGNOSIS — I26.99 OTHER ACUTE PULMONARY EMBOLISM WITHOUT ACUTE COR PULMONALE (HCC): ICD-10-CM

## 2019-03-14 LAB — INR PPP: 2.7 (ref 0.9–1.1)

## 2019-03-14 PROCEDURE — 85610 PROTHROMBIN TIME: CPT | Performed by: NURSE PRACTITIONER

## 2019-03-14 NOTE — PROGRESS NOTES
Today's INR is 2.7.  Pt denies med changes or bleeding problems. Pt was instructed to continue current dose and eat green veggies 3 times per week;pt verbalized. Patient instructed regarding medication; results given and questions answered. Nutritional counseling given.  Dietary factors affecting therapy addressed.  Patient instructed to monitor for excessive bruising or bleeding. Will recheck next week.        This document has been electronically signed by Fara Combs, DOMENIC @ on March 14, 2019 8:36 AM

## 2019-03-21 ENCOUNTER — ANTICOAGULATION VISIT (OUTPATIENT)
Dept: CARDIAC SURGERY | Facility: CLINIC | Age: 69
End: 2019-03-21

## 2019-03-21 VITALS — HEART RATE: 71 BPM | SYSTOLIC BLOOD PRESSURE: 135 MMHG | OXYGEN SATURATION: 99 % | DIASTOLIC BLOOD PRESSURE: 86 MMHG

## 2019-03-21 DIAGNOSIS — I26.99 OTHER ACUTE PULMONARY EMBOLISM WITHOUT ACUTE COR PULMONALE (HCC): ICD-10-CM

## 2019-03-21 DIAGNOSIS — I82.4Y9 DEEP VEIN THROMBOSIS (DVT) OF PROXIMAL LOWER EXTREMITY, UNSPECIFIED CHRONICITY, UNSPECIFIED LATERALITY (HCC): ICD-10-CM

## 2019-03-21 DIAGNOSIS — Z79.01 LONG TERM (CURRENT) USE OF ANTICOAGULANTS: ICD-10-CM

## 2019-03-21 LAB — INR PPP: 2.4 (ref 0.9–1.1)

## 2019-03-21 PROCEDURE — 85610 PROTHROMBIN TIME: CPT | Performed by: NURSE PRACTITIONER

## 2019-03-21 RX ORDER — WARFARIN SODIUM 7.5 MG/1
TABLET ORAL
Qty: 90 TABLET | Refills: 1 | Status: SHIPPED | OUTPATIENT
Start: 2019-03-21 | End: 2019-10-25 | Stop reason: SDUPTHER

## 2019-03-21 NOTE — PROGRESS NOTES
Today's INR is 2.4.  Patient states no med changes or bleeding problems or unexplained bruising. Patient instructed to continue current dosing schedule. Verbalizes understanding. Will recheck in 2 weeks. Patient instructed regarding medication; results given and questions answered. Nutritional counseling given.  Dietary factors affecting therapy addressed.  Patient instructed to monitor for excessive bruising or bleeding.         This document has been electronically signed by DOMENIC Kim @ on March 21, 2019 8:33 AM

## 2019-04-04 ENCOUNTER — ANTICOAGULATION VISIT (OUTPATIENT)
Dept: CARDIAC SURGERY | Facility: CLINIC | Age: 69
End: 2019-04-04

## 2019-04-04 VITALS — DIASTOLIC BLOOD PRESSURE: 72 MMHG | SYSTOLIC BLOOD PRESSURE: 128 MMHG | HEART RATE: 89 BPM

## 2019-04-04 DIAGNOSIS — I82.4Y9 DEEP VEIN THROMBOSIS (DVT) OF PROXIMAL LOWER EXTREMITY, UNSPECIFIED CHRONICITY, UNSPECIFIED LATERALITY (HCC): ICD-10-CM

## 2019-04-04 DIAGNOSIS — I26.99 OTHER ACUTE PULMONARY EMBOLISM WITHOUT ACUTE COR PULMONALE (HCC): ICD-10-CM

## 2019-04-04 DIAGNOSIS — Z79.01 LONG TERM (CURRENT) USE OF ANTICOAGULANTS: ICD-10-CM

## 2019-04-04 LAB — INR PPP: 2.4 (ref 0.9–1.1)

## 2019-04-04 PROCEDURE — 85610 PROTHROMBIN TIME: CPT | Performed by: NURSE PRACTITIONER

## 2019-04-04 NOTE — PROGRESS NOTES
Today's INR is 2.4. Denies any new medications or bleeding issues. Denies any s/s of blood clot. PT instructed to continue same dose and Coumadin friendly diet. PT will be seen in 3 weeks. Patient instructed regarding medication; results given and questions answered. Nutritional counseling given.  Dietary factors affecting therapy addressed.  Patient instructed to monitor for excessive bruising or bleeding. PT verbalizes understanding.       This document has been electronically signed by SIMONE Santos-BC @  On April 4, 2019 8:09 AM

## 2019-04-25 ENCOUNTER — ANTICOAGULATION VISIT (OUTPATIENT)
Dept: CARDIAC SURGERY | Facility: CLINIC | Age: 69
End: 2019-04-25

## 2019-04-25 VITALS — SYSTOLIC BLOOD PRESSURE: 128 MMHG | HEART RATE: 66 BPM | DIASTOLIC BLOOD PRESSURE: 78 MMHG | OXYGEN SATURATION: 99 %

## 2019-04-25 DIAGNOSIS — Z79.01 LONG TERM (CURRENT) USE OF ANTICOAGULANTS: ICD-10-CM

## 2019-04-25 DIAGNOSIS — I82.4Y9 DEEP VEIN THROMBOSIS (DVT) OF PROXIMAL LOWER EXTREMITY, UNSPECIFIED CHRONICITY, UNSPECIFIED LATERALITY (HCC): ICD-10-CM

## 2019-04-25 DIAGNOSIS — I26.99 OTHER ACUTE PULMONARY EMBOLISM WITHOUT ACUTE COR PULMONALE (HCC): ICD-10-CM

## 2019-04-25 LAB — INR PPP: 1.3 (ref 0.9–1.1)

## 2019-04-25 PROCEDURE — 85610 PROTHROMBIN TIME: CPT | Performed by: NURSE PRACTITIONER

## 2019-04-25 PROCEDURE — 99211 OFF/OP EST MAY X REQ PHY/QHP: CPT | Performed by: NURSE PRACTITIONER

## 2019-04-25 NOTE — PROGRESS NOTES
Today's INR is 1.3.  Pt denies med changes, missed doses, or excessive vitamin K intake. Pt states he has not eaten any green veggies since 4/16. Unable to determine cause for subtherapeutic INR. Dose adjusted and pt instructed to hold green veggies until further notice; pt verbalized. Patient instructed regarding medication; results given and questions answered. Nutritional counseling given.  Dietary factors affecting therapy addressed.  Patient instructed to monitor for excessive bruising or bleeding. Will recheck in 5 days.         This document has been electronically signed by Fara Combs, DOMENIC @ on April 25, 2019 8:15 AM

## 2019-04-30 ENCOUNTER — ANTICOAGULATION VISIT (OUTPATIENT)
Dept: CARDIAC SURGERY | Facility: CLINIC | Age: 69
End: 2019-04-30

## 2019-04-30 VITALS — HEART RATE: 64 BPM | DIASTOLIC BLOOD PRESSURE: 72 MMHG | SYSTOLIC BLOOD PRESSURE: 118 MMHG

## 2019-04-30 DIAGNOSIS — I26.99 OTHER ACUTE PULMONARY EMBOLISM WITHOUT ACUTE COR PULMONALE (HCC): ICD-10-CM

## 2019-04-30 DIAGNOSIS — I82.4Y9 DEEP VEIN THROMBOSIS (DVT) OF PROXIMAL LOWER EXTREMITY, UNSPECIFIED CHRONICITY, UNSPECIFIED LATERALITY (HCC): ICD-10-CM

## 2019-04-30 DIAGNOSIS — Z79.01 LONG TERM (CURRENT) USE OF ANTICOAGULANTS: ICD-10-CM

## 2019-04-30 LAB — INR PPP: 2.5 (ref 0.9–1.1)

## 2019-04-30 PROCEDURE — 99211 OFF/OP EST MAY X REQ PHY/QHP: CPT | Performed by: NURSE PRACTITIONER

## 2019-04-30 PROCEDURE — 85610 PROTHROMBIN TIME: CPT | Performed by: NURSE PRACTITIONER

## 2019-04-30 RX ORDER — MELOXICAM 7.5 MG/1
7.5 TABLET ORAL DAILY
COMMUNITY
End: 2019-05-15

## 2019-04-30 NOTE — PROGRESS NOTES
Today's INR is 2.5.   Pt here today for recheck of subtherapeutic INR level of unknown cause.  Pt states he was placed on Meloxicam on April 25th which shows potential for major interaction.  Pt will resume previous coumadin dose and will recheck INR this Friday due to new med.  Pt verbalizes.  Patient instructed regarding medication; results given and questions answered. Nutritional counseling given.  Dietary factors affecting therapy addressed.  Patient instructed to monitor for excessive bruising or bleeding.        This document has been electronically signed by DOMENIC Kim @ on April 30, 2019 8:08 AM

## 2019-05-03 ENCOUNTER — ANTICOAGULATION VISIT (OUTPATIENT)
Dept: CARDIAC SURGERY | Facility: CLINIC | Age: 69
End: 2019-05-03

## 2019-05-03 VITALS — HEART RATE: 70 BPM | SYSTOLIC BLOOD PRESSURE: 123 MMHG | DIASTOLIC BLOOD PRESSURE: 78 MMHG | OXYGEN SATURATION: 99 %

## 2019-05-03 DIAGNOSIS — I82.4Y9 DEEP VEIN THROMBOSIS (DVT) OF PROXIMAL LOWER EXTREMITY, UNSPECIFIED CHRONICITY, UNSPECIFIED LATERALITY (HCC): ICD-10-CM

## 2019-05-03 DIAGNOSIS — I26.99 OTHER ACUTE PULMONARY EMBOLISM WITHOUT ACUTE COR PULMONALE (HCC): ICD-10-CM

## 2019-05-03 DIAGNOSIS — Z79.01 LONG TERM (CURRENT) USE OF ANTICOAGULANTS: ICD-10-CM

## 2019-05-03 LAB — INR PPP: 3.4 (ref 0.9–1.1)

## 2019-05-03 PROCEDURE — 85610 PROTHROMBIN TIME: CPT | Performed by: NURSE PRACTITIONER

## 2019-05-03 PROCEDURE — 99211 OFF/OP EST MAY X REQ PHY/QHP: CPT | Performed by: NURSE PRACTITIONER

## 2019-05-03 RX ORDER — MELOXICAM 15 MG/1
15 TABLET ORAL DAILY
COMMUNITY
End: 2019-10-25

## 2019-05-03 NOTE — PROGRESS NOTES
Today's INR is 3.4. Pt continues Meloxicam. Pt states he has not eaten any green veggies since last visit. Dose adjusted an pt instructed to have a serving of green veggies today and tomorrow. Patient instructed regarding medication; results given and questions answered. Nutritional counseling given.  Dietary factors affecting therapy addressed.  Patient instructed to monitor for excessive bruising or bleeding. Will recheck in 4 days.         This document has been electronically signed by DOMENIC Kim @ on May 3, 2019 8:21 AM

## 2019-05-07 ENCOUNTER — ANTICOAGULATION VISIT (OUTPATIENT)
Dept: CARDIAC SURGERY | Facility: CLINIC | Age: 69
End: 2019-05-07

## 2019-05-07 VITALS — DIASTOLIC BLOOD PRESSURE: 84 MMHG | SYSTOLIC BLOOD PRESSURE: 143 MMHG | OXYGEN SATURATION: 99 % | HEART RATE: 68 BPM

## 2019-05-07 DIAGNOSIS — I26.99 OTHER ACUTE PULMONARY EMBOLISM WITHOUT ACUTE COR PULMONALE (HCC): ICD-10-CM

## 2019-05-07 DIAGNOSIS — Z79.01 LONG TERM (CURRENT) USE OF ANTICOAGULANTS: ICD-10-CM

## 2019-05-07 DIAGNOSIS — I82.4Y9 DEEP VEIN THROMBOSIS (DVT) OF PROXIMAL LOWER EXTREMITY, UNSPECIFIED CHRONICITY, UNSPECIFIED LATERALITY (HCC): ICD-10-CM

## 2019-05-07 LAB — INR PPP: 2.8 (ref 0.9–1.1)

## 2019-05-07 PROCEDURE — 85610 PROTHROMBIN TIME: CPT | Performed by: NURSE PRACTITIONER

## 2019-05-15 ENCOUNTER — OFFICE VISIT (OUTPATIENT)
Dept: ONCOLOGY | Facility: CLINIC | Age: 69
End: 2019-05-15

## 2019-05-15 ENCOUNTER — ANTICOAGULATION VISIT (OUTPATIENT)
Dept: CARDIAC SURGERY | Facility: CLINIC | Age: 69
End: 2019-05-15

## 2019-05-15 ENCOUNTER — LAB (OUTPATIENT)
Dept: ONCOLOGY | Facility: HOSPITAL | Age: 69
End: 2019-05-15

## 2019-05-15 VITALS
WEIGHT: 185.4 LBS | HEART RATE: 63 BPM | TEMPERATURE: 98.5 F | SYSTOLIC BLOOD PRESSURE: 143 MMHG | DIASTOLIC BLOOD PRESSURE: 84 MMHG | OXYGEN SATURATION: 98 % | BODY MASS INDEX: 26.54 KG/M2 | HEIGHT: 70 IN | RESPIRATION RATE: 18 BRPM

## 2019-05-15 VITALS — DIASTOLIC BLOOD PRESSURE: 70 MMHG | SYSTOLIC BLOOD PRESSURE: 122 MMHG | HEART RATE: 64 BPM

## 2019-05-15 DIAGNOSIS — I26.99 OTHER ACUTE PULMONARY EMBOLISM WITHOUT ACUTE COR PULMONALE (HCC): ICD-10-CM

## 2019-05-15 DIAGNOSIS — D64.9 ANEMIA, UNSPECIFIED TYPE: ICD-10-CM

## 2019-05-15 DIAGNOSIS — R79.89 ELEVATED HOMOCYSTEINE: ICD-10-CM

## 2019-05-15 DIAGNOSIS — I82.4Y9 DEEP VEIN THROMBOSIS (DVT) OF PROXIMAL LOWER EXTREMITY, UNSPECIFIED CHRONICITY, UNSPECIFIED LATERALITY (HCC): Primary | ICD-10-CM

## 2019-05-15 DIAGNOSIS — I82.4Y9 DEEP VEIN THROMBOSIS (DVT) OF PROXIMAL LOWER EXTREMITY, UNSPECIFIED CHRONICITY, UNSPECIFIED LATERALITY (HCC): ICD-10-CM

## 2019-05-15 DIAGNOSIS — Z79.01 LONG TERM (CURRENT) USE OF ANTICOAGULANTS: ICD-10-CM

## 2019-05-15 DIAGNOSIS — I70.90 ATHEROSCLEROSIS: ICD-10-CM

## 2019-05-15 LAB
ALBUMIN SERPL-MCNC: 4.1 G/DL (ref 3.5–5.2)
ALBUMIN/GLOB SERPL: 1.6 G/DL
ALP SERPL-CCNC: 52 U/L (ref 39–117)
ALT SERPL W P-5'-P-CCNC: 13 U/L (ref 1–41)
ANION GAP SERPL CALCULATED.3IONS-SCNC: 12 MMOL/L
AST SERPL-CCNC: 16 U/L (ref 1–40)
BASOPHILS # BLD AUTO: 0.04 10*3/MM3 (ref 0–0.2)
BASOPHILS NFR BLD AUTO: 0.6 % (ref 0–1.5)
BILIRUB SERPL-MCNC: 0.3 MG/DL (ref 0.2–1.2)
BUN BLD-MCNC: 18 MG/DL (ref 8–23)
BUN/CREAT SERPL: 18.2 (ref 7–25)
CALCIUM SPEC-SCNC: 9.2 MG/DL (ref 8.6–10.5)
CHLORIDE SERPL-SCNC: 107 MMOL/L (ref 98–107)
CO2 SERPL-SCNC: 25 MMOL/L (ref 22–29)
CREAT BLD-MCNC: 0.99 MG/DL (ref 0.76–1.27)
DEPRECATED RDW RBC AUTO: 43.9 FL (ref 37–54)
EOSINOPHIL # BLD AUTO: 0.06 10*3/MM3 (ref 0–0.4)
EOSINOPHIL NFR BLD AUTO: 0.9 % (ref 0.3–6.2)
ERYTHROCYTE [DISTWIDTH] IN BLOOD BY AUTOMATED COUNT: 12.8 % (ref 12.3–15.4)
FERRITIN SERPL-MCNC: 57.53 NG/ML (ref 30–400)
FOLATE SERPL-MCNC: >20 NG/ML (ref 4.78–24.2)
GFR SERPL CREATININE-BSD FRML MDRD: 75 ML/MIN/1.73
GLOBULIN UR ELPH-MCNC: 2.5 GM/DL
GLUCOSE BLD-MCNC: 93 MG/DL (ref 65–99)
HCT VFR BLD AUTO: 38.8 % (ref 37.5–51)
HCYS SERPL-MCNC: 8.3 UMOL/L (ref 0–15)
HGB BLD-MCNC: 13 G/DL (ref 13–17.7)
IMM GRANULOCYTES # BLD AUTO: 0.02 10*3/MM3 (ref 0–0.05)
IMM GRANULOCYTES NFR BLD AUTO: 0.3 % (ref 0–0.5)
INR PPP: 2.3 (ref 0.9–1.1)
IRON 24H UR-MRATE: 73 MCG/DL (ref 59–158)
IRON SATN MFR SERPL: 20 % (ref 20–50)
LYMPHOCYTES # BLD AUTO: 1.76 10*3/MM3 (ref 0.7–3.1)
LYMPHOCYTES NFR BLD AUTO: 27.5 % (ref 19.6–45.3)
MCH RBC QN AUTO: 31.2 PG (ref 26.6–33)
MCHC RBC AUTO-ENTMCNC: 33.5 G/DL (ref 31.5–35.7)
MCV RBC AUTO: 93 FL (ref 79–97)
MONOCYTES # BLD AUTO: 0.64 10*3/MM3 (ref 0.1–0.9)
MONOCYTES NFR BLD AUTO: 10 % (ref 5–12)
NEUTROPHILS # BLD AUTO: 3.87 10*3/MM3 (ref 1.7–7)
NEUTROPHILS NFR BLD AUTO: 60.7 % (ref 42.7–76)
NRBC BLD AUTO-RTO: 0 /100 WBC (ref 0–0.2)
PLATELET # BLD AUTO: 225 10*3/MM3 (ref 140–450)
PMV BLD AUTO: 9.6 FL (ref 6–12)
POTASSIUM BLD-SCNC: 4.4 MMOL/L (ref 3.5–5.2)
PROT SERPL-MCNC: 6.6 G/DL (ref 6–8.5)
RBC # BLD AUTO: 4.17 10*6/MM3 (ref 4.14–5.8)
SODIUM BLD-SCNC: 144 MMOL/L (ref 136–145)
TIBC SERPL-MCNC: 361 MCG/DL (ref 298–536)
TRANSFERRIN SERPL-MCNC: 242 MG/DL (ref 200–360)
VIT B12 BLD-MCNC: 605 PG/ML (ref 211–946)
WBC NRBC COR # BLD: 6.39 10*3/MM3 (ref 3.4–10.8)

## 2019-05-15 PROCEDURE — 80053 COMPREHEN METABOLIC PANEL: CPT | Performed by: INTERNAL MEDICINE

## 2019-05-15 PROCEDURE — G9903 PT SCRN TBCO ID AS NON USER: HCPCS | Performed by: INTERNAL MEDICINE

## 2019-05-15 PROCEDURE — 86146 BETA-2 GLYCOPROTEIN ANTIBODY: CPT | Performed by: INTERNAL MEDICINE

## 2019-05-15 PROCEDURE — 85610 PROTHROMBIN TIME: CPT | Performed by: NURSE PRACTITIONER

## 2019-05-15 PROCEDURE — G0463 HOSPITAL OUTPT CLINIC VISIT: HCPCS | Performed by: INTERNAL MEDICINE

## 2019-05-15 PROCEDURE — 1157F ADVNC CARE PLAN IN RCRD: CPT | Performed by: INTERNAL MEDICINE

## 2019-05-15 PROCEDURE — 82728 ASSAY OF FERRITIN: CPT | Performed by: INTERNAL MEDICINE

## 2019-05-15 PROCEDURE — 85025 COMPLETE CBC W/AUTO DIFF WBC: CPT | Performed by: INTERNAL MEDICINE

## 2019-05-15 PROCEDURE — G8417 CALC BMI ABV UP PARAM F/U: HCPCS | Performed by: INTERNAL MEDICINE

## 2019-05-15 PROCEDURE — 82746 ASSAY OF FOLIC ACID SERUM: CPT | Performed by: INTERNAL MEDICINE

## 2019-05-15 PROCEDURE — 86147 CARDIOLIPIN ANTIBODY EA IG: CPT | Performed by: INTERNAL MEDICINE

## 2019-05-15 PROCEDURE — 84466 ASSAY OF TRANSFERRIN: CPT | Performed by: INTERNAL MEDICINE

## 2019-05-15 PROCEDURE — 99214 OFFICE O/P EST MOD 30 MIN: CPT | Performed by: INTERNAL MEDICINE

## 2019-05-15 PROCEDURE — 83090 ASSAY OF HOMOCYSTEINE: CPT | Performed by: INTERNAL MEDICINE

## 2019-05-15 PROCEDURE — 82607 VITAMIN B-12: CPT | Performed by: INTERNAL MEDICINE

## 2019-05-15 PROCEDURE — 83540 ASSAY OF IRON: CPT | Performed by: INTERNAL MEDICINE
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
## 2019-05-15 NOTE — PROGRESS NOTES
Today's INR is 2.3.   Patient states no med changes or bleeding problems or unexplained bruising. Patient instructed to continue current dosing schedule. Verbalizes understanding. Will recheck in two weeks.  Patient instructed regarding medication; results given and questions answered. Nutritional counseling given.  Dietary factors affecting therapy addressed.  Patient instructed to monitor for excessive bruising or bleeding.        This document has been electronically signed by DOMENIC Kim @ on May 15, 2019 1:40 PM

## 2019-05-16 ENCOUNTER — TELEPHONE (OUTPATIENT)
Dept: ONCOLOGY | Facility: CLINIC | Age: 69
End: 2019-05-16

## 2019-05-16 ENCOUNTER — TELEPHONE (OUTPATIENT)
Dept: ONCOLOGY | Facility: HOSPITAL | Age: 69
End: 2019-05-16

## 2019-05-16 LAB — CARDIOLIPIN IGA SER IA-ACNC: <9 APL U/ML (ref 0–11)

## 2019-05-16 NOTE — TELEPHONE ENCOUNTER
----- Message from Dickson Vega MD sent at 5/16/2019  7:48 AM CDT -----  Please let patient know, he needs to continue taking B12 and folic acid supplement until next clinic visit.  Thank you

## 2019-05-16 NOTE — TELEPHONE ENCOUNTER
Returned call to patient, he called to clarify the time frame of his doppler and angiogram, the scheduling department called him and he wasn't sure if Brennan wanted him to get these test done now or wait until closer to his appointment in August.  The patient preference would be to go ahead to get the doppler and angiogram because he would like to discontinue the blood thinner as soon as possible.  I reviewed the chart and orders and informed patient that the doppler and the angiogram are ordered for August, usually a week before the follow-up.  I informed patient that I would let Brennan know that he is wanting to get of blood thinner as soon as possible and would like to have the doppler and angiogram sooner--he was told the next available appointment is May 30th.  Tish Hill RN  May 16, 2019  1:57 PM

## 2019-05-16 NOTE — TELEPHONE ENCOUNTER
Called patient, verbal understanding of need for anticoagulation therapy for 6 months.  We will have patient's doppler and angiogram scheduled for the week before his follow-up in August.  Tish Hill RN  May 16, 2019  3:13 PM

## 2019-05-16 NOTE — TELEPHONE ENCOUNTER
He needs at least 6 months of anticoagulation that will be in August 2019.  That is why we are doing Doppler in August 2019.  Thank you

## 2019-05-18 LAB
B2 GLYCOPROT1 IGA SER-ACNC: <9 GPI IGA UNITS (ref 0–25)
B2 GLYCOPROT1 IGG SER-ACNC: <9 GPI IGG UNITS (ref 0–20)
B2 GLYCOPROT1 IGM SER-ACNC: <9 GPI IGM UNITS (ref 0–32)

## 2019-05-29 ENCOUNTER — ANTICOAGULATION VISIT (OUTPATIENT)
Dept: CARDIAC SURGERY | Facility: CLINIC | Age: 69
End: 2019-05-29

## 2019-05-29 VITALS — OXYGEN SATURATION: 97 % | HEART RATE: 65 BPM | DIASTOLIC BLOOD PRESSURE: 87 MMHG | SYSTOLIC BLOOD PRESSURE: 143 MMHG

## 2019-05-29 DIAGNOSIS — I82.4Y9 DEEP VEIN THROMBOSIS (DVT) OF PROXIMAL LOWER EXTREMITY, UNSPECIFIED CHRONICITY, UNSPECIFIED LATERALITY (HCC): ICD-10-CM

## 2019-05-29 DIAGNOSIS — Z79.01 LONG TERM (CURRENT) USE OF ANTICOAGULANTS: ICD-10-CM

## 2019-05-29 DIAGNOSIS — I26.99 OTHER ACUTE PULMONARY EMBOLISM WITHOUT ACUTE COR PULMONALE (HCC): ICD-10-CM

## 2019-05-29 LAB — INR PPP: 2.8 (ref 0.9–1.1)

## 2019-05-29 PROCEDURE — 85610 PROTHROMBIN TIME: CPT | Performed by: NURSE PRACTITIONER

## 2019-05-29 NOTE — PROGRESS NOTES
Today's INR is 2.8.  Patient states no med changes or bleeding problems or unexplained bruising. Patient instructed to continue current dosing schedule. Verbalizes understanding. Will recheck in 3 weeks. Patient instructed regarding medication; results given and questions answered. Nutritional counseling given.  Dietary factors affecting therapy addressed.  Patient instructed to monitor for excessive bruising or bleeding.         This document has been electronically signed by DOMENIC Kim @ on May 29, 2019 8:19 AM

## 2019-05-30 ENCOUNTER — APPOINTMENT (OUTPATIENT)
Dept: CT IMAGING | Facility: HOSPITAL | Age: 69
End: 2019-05-30

## 2019-05-30 ENCOUNTER — APPOINTMENT (OUTPATIENT)
Dept: ULTRASOUND IMAGING | Facility: HOSPITAL | Age: 69
End: 2019-05-30

## 2019-06-19 ENCOUNTER — ANTICOAGULATION VISIT (OUTPATIENT)
Dept: CARDIAC SURGERY | Facility: CLINIC | Age: 69
End: 2019-06-19

## 2019-06-19 VITALS — SYSTOLIC BLOOD PRESSURE: 130 MMHG | DIASTOLIC BLOOD PRESSURE: 74 MMHG | HEART RATE: 98 BPM

## 2019-06-19 DIAGNOSIS — I26.99 OTHER ACUTE PULMONARY EMBOLISM WITHOUT ACUTE COR PULMONALE (HCC): ICD-10-CM

## 2019-06-19 DIAGNOSIS — I82.4Y9 DEEP VEIN THROMBOSIS (DVT) OF PROXIMAL LOWER EXTREMITY, UNSPECIFIED CHRONICITY, UNSPECIFIED LATERALITY (HCC): ICD-10-CM

## 2019-06-19 DIAGNOSIS — Z79.01 LONG TERM (CURRENT) USE OF ANTICOAGULANTS: ICD-10-CM

## 2019-06-19 LAB — INR PPP: 4.4 (ref 0.9–1.1)

## 2019-06-19 PROCEDURE — 85610 PROTHROMBIN TIME: CPT | Performed by: NURSE PRACTITIONER

## 2019-06-19 PROCEDURE — 99211 OFF/OP EST MAY X REQ PHY/QHP: CPT | Performed by: NURSE PRACTITIONER

## 2019-06-19 RX ORDER — LOSARTAN POTASSIUM 100 MG/1
100 TABLET ORAL NIGHTLY
COMMUNITY
End: 2020-03-30 | Stop reason: ALTCHOICE

## 2019-06-19 NOTE — PROGRESS NOTES
Today's INR is 4.4.   Pt states the only change in medication is an increase in Losartan.  Pt denies bleeding issues.  Pt states he h as not been consuming green as of late.  Adjusted coumadin dose and instructed pt to increase Vit K intake today with a large turnip green serving.  Notify provider if you experience excessive bleeding from the nose, cuts, gums, rectum, urinary tract,. Reddish or brown urine or stool. Vomiting of blood or hemorrhoidal bleeding. If major injury occurs present to the Emergency Department. Recheck INR this Friday.  Pt verbalizes.  Patient instructed regarding medication; results given and questions answered. Nutritional counseling given.  Dietary factors affecting therapy addressed.  Patient instructed to monitor for excessive bruising or bleeding.        This document has been electronically signed by Fara Combs, DOMENIC @ on June 19, 2019 8:21 AM

## 2019-06-21 ENCOUNTER — ANTICOAGULATION VISIT (OUTPATIENT)
Dept: CARDIAC SURGERY | Facility: CLINIC | Age: 69
End: 2019-06-21

## 2019-06-21 VITALS — HEART RATE: 59 BPM | OXYGEN SATURATION: 99 % | DIASTOLIC BLOOD PRESSURE: 72 MMHG | SYSTOLIC BLOOD PRESSURE: 128 MMHG

## 2019-06-21 DIAGNOSIS — I82.4Y9 DEEP VEIN THROMBOSIS (DVT) OF PROXIMAL LOWER EXTREMITY, UNSPECIFIED CHRONICITY, UNSPECIFIED LATERALITY (HCC): ICD-10-CM

## 2019-06-21 DIAGNOSIS — Z79.01 LONG TERM (CURRENT) USE OF ANTICOAGULANTS: ICD-10-CM

## 2019-06-21 DIAGNOSIS — I26.99 OTHER ACUTE PULMONARY EMBOLISM WITHOUT ACUTE COR PULMONALE (HCC): ICD-10-CM

## 2019-06-21 LAB — INR PPP: 1.5 (ref 0.9–1.1)

## 2019-06-21 PROCEDURE — 85610 PROTHROMBIN TIME: CPT | Performed by: NURSE PRACTITIONER

## 2019-06-21 PROCEDURE — 99211 OFF/OP EST MAY X REQ PHY/QHP: CPT | Performed by: NURSE PRACTITIONER

## 2019-06-21 NOTE — PROGRESS NOTES
Today's INR is 1.5. PT ate liver, turnip greens, and salad. Denies any med changes or bleeding issues. Denies any s/s of blood clot. Adjusted pt's dose and instructed to hold green until Monday and Thursday. Patient instructed regarding medication; results given and questions answered. Nutritional counseling given.  Dietary factors affecting therapy addressed.  Patient instructed to monitor for excessive bruising or bleeding. PT will be seen in 1 week.         This document has been electronically signed by Fara Combs, DOMENIC @ on June 21, 2019 8:43 AM

## 2019-06-24 ENCOUNTER — OFFICE VISIT (OUTPATIENT)
Dept: PODIATRY | Facility: CLINIC | Age: 69
End: 2019-06-24

## 2019-06-24 VITALS — WEIGHT: 185 LBS | HEART RATE: 65 BPM | HEIGHT: 70 IN | BODY MASS INDEX: 26.48 KG/M2 | OXYGEN SATURATION: 99 %

## 2019-06-24 DIAGNOSIS — M79.675 PAIN OF LEFT GREAT TOE: Primary | ICD-10-CM

## 2019-06-24 DIAGNOSIS — M20.22 HALLUX RIGIDUS, LEFT FOOT: ICD-10-CM

## 2019-06-24 PROCEDURE — 20600 DRAIN/INJ JOINT/BURSA W/O US: CPT | Performed by: PODIATRIST

## 2019-06-24 RX ORDER — BUPIVACAINE HYDROCHLORIDE 5 MG/ML
1 INJECTION, SOLUTION EPIDURAL; INTRACAUDAL ONCE
Status: COMPLETED | OUTPATIENT
Start: 2019-06-24 | End: 2019-06-24

## 2019-06-24 RX ORDER — DEXAMETHASONE SODIUM PHOSPHATE 4 MG/ML
2 INJECTION, SOLUTION INTRA-ARTICULAR; INTRALESIONAL; INTRAMUSCULAR; INTRAVENOUS; SOFT TISSUE ONCE
Status: COMPLETED | OUTPATIENT
Start: 2019-06-24 | End: 2019-06-24

## 2019-06-24 RX ADMIN — BUPIVACAINE HYDROCHLORIDE 1 ML: 5 INJECTION, SOLUTION EPIDURAL; INTRACAUDAL at 09:14

## 2019-06-24 RX ADMIN — DEXAMETHASONE SODIUM PHOSPHATE 2 MG: 4 INJECTION, SOLUTION INTRA-ARTICULAR; INTRALESIONAL; INTRAMUSCULAR; INTRAVENOUS; SOFT TISSUE at 09:22
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
## 2019-06-28 ENCOUNTER — ANTICOAGULATION VISIT (OUTPATIENT)
Dept: CARDIAC SURGERY | Facility: CLINIC | Age: 69
End: 2019-06-28

## 2019-06-28 VITALS — OXYGEN SATURATION: 95 % | DIASTOLIC BLOOD PRESSURE: 78 MMHG | HEART RATE: 68 BPM | SYSTOLIC BLOOD PRESSURE: 142 MMHG

## 2019-06-28 DIAGNOSIS — I26.99 OTHER ACUTE PULMONARY EMBOLISM WITHOUT ACUTE COR PULMONALE (HCC): ICD-10-CM

## 2019-06-28 DIAGNOSIS — Z79.01 LONG TERM (CURRENT) USE OF ANTICOAGULANTS: ICD-10-CM

## 2019-06-28 DIAGNOSIS — I82.4Y9 DEEP VEIN THROMBOSIS (DVT) OF PROXIMAL LOWER EXTREMITY, UNSPECIFIED CHRONICITY, UNSPECIFIED LATERALITY (HCC): ICD-10-CM

## 2019-06-28 LAB — INR PPP: 2.9 (ref 0.9–1.1)

## 2019-06-28 PROCEDURE — 85610 PROTHROMBIN TIME: CPT | Performed by: NURSE PRACTITIONER

## 2019-06-28 NOTE — PROGRESS NOTES
Today's INR is 2.9. Denies any new medications or bleeding issues. Denies any s/s of blood clot. PT instructed to continue same dose and but increase vit k slightly. Recheck INR in 2 weeks. Patient instructed regarding medication; results given and questions answered. Nutritional counseling given.  Dietary factors affecting therapy addressed.  Patient instructed to monitor for excessive bruising or bleeding.        This document has been electronically signed by DOMENIC Kim @ on June 28, 2019 8:14 AM

## 2019-07-12 ENCOUNTER — ANTICOAGULATION VISIT (OUTPATIENT)
Dept: CARDIAC SURGERY | Facility: CLINIC | Age: 69
End: 2019-07-12

## 2019-07-12 VITALS — OXYGEN SATURATION: 99 % | DIASTOLIC BLOOD PRESSURE: 80 MMHG | HEART RATE: 68 BPM | SYSTOLIC BLOOD PRESSURE: 142 MMHG

## 2019-07-12 DIAGNOSIS — Z79.01 LONG TERM (CURRENT) USE OF ANTICOAGULANTS: ICD-10-CM

## 2019-07-12 DIAGNOSIS — I82.4Y9 DEEP VEIN THROMBOSIS (DVT) OF PROXIMAL LOWER EXTREMITY, UNSPECIFIED CHRONICITY, UNSPECIFIED LATERALITY (HCC): ICD-10-CM

## 2019-07-12 DIAGNOSIS — I26.99 OTHER ACUTE PULMONARY EMBOLISM WITHOUT ACUTE COR PULMONALE (HCC): ICD-10-CM

## 2019-07-12 LAB — INR PPP: 2.2 (ref 0.9–1.1)

## 2019-07-12 PROCEDURE — 85610 PROTHROMBIN TIME: CPT | Performed by: NURSE PRACTITIONER

## 2019-07-12 NOTE — PROGRESS NOTES
Today's INR is 2.2. Denies any new medications or bleeding issues. Denies any s/s of blood clot. PT instructed to continue same dose and Coumadin friendly diet. PT will be seen in 3 weeks. Patient instructed regarding medication; results given and questions answered. Nutritional counseling given.  Dietary factors affecting therapy addressed.  Patient instructed to monitor for excessive bruising or bleeding. PT verbalizes understanding.       This document has been electronically signed by SIMONE Santos-BC @  On July 12, 2019 8:16 AM

## 2019-08-02 ENCOUNTER — ANTICOAGULATION VISIT (OUTPATIENT)
Dept: CARDIAC SURGERY | Facility: CLINIC | Age: 69
End: 2019-08-02

## 2019-08-02 VITALS — DIASTOLIC BLOOD PRESSURE: 78 MMHG | SYSTOLIC BLOOD PRESSURE: 138 MMHG | HEART RATE: 72 BPM

## 2019-08-02 DIAGNOSIS — I26.99 OTHER ACUTE PULMONARY EMBOLISM WITHOUT ACUTE COR PULMONALE (HCC): ICD-10-CM

## 2019-08-02 DIAGNOSIS — Z79.01 LONG TERM (CURRENT) USE OF ANTICOAGULANTS: ICD-10-CM

## 2019-08-02 DIAGNOSIS — I82.4Y9 DEEP VEIN THROMBOSIS (DVT) OF PROXIMAL LOWER EXTREMITY, UNSPECIFIED CHRONICITY, UNSPECIFIED LATERALITY (HCC): ICD-10-CM

## 2019-08-02 LAB — INR PPP: 2.2 (ref 0.9–1.1)

## 2019-08-02 PROCEDURE — 85610 PROTHROMBIN TIME: CPT | Performed by: NURSE PRACTITIONER

## 2019-08-02 NOTE — PROGRESS NOTES
Today's INR is 2.2. Denies any new medications or bleeding issues. PT denies any s/s of blood clot. PT will be seeing Fara in 2 weeks to determine if he can come off Coumadin. PT's INR will be the drawn at that time. Patient instructed regarding medication; results given and questions answered. Nutritional counseling given.  Dietary factors affecting therapy addressed.  Patient instructed to monitor for excessive bruising or bleeding. PT verbalizes understanding.       This document has been electronically signed by SIMONE Santos-BC @  On August 2, 2019 8:25 AM

## 2019-08-12 ENCOUNTER — HOSPITAL ENCOUNTER (OUTPATIENT)
Dept: CT IMAGING | Facility: HOSPITAL | Age: 69
Discharge: HOME | End: 2019-08-12
Admitting: INTERNAL MEDICINE

## 2019-08-12 ENCOUNTER — HOSPITAL ENCOUNTER (OUTPATIENT)
Dept: ULTRASOUND IMAGING | Facility: HOSPITAL | Age: 69
Discharge: HOME | End: 2019-08-12

## 2019-08-12 ENCOUNTER — LAB (OUTPATIENT)
Dept: ONCOLOGY | Facility: HOSPITAL | Age: 69
End: 2019-08-12

## 2019-08-12 DIAGNOSIS — I26.99 OTHER ACUTE PULMONARY EMBOLISM WITHOUT ACUTE COR PULMONALE (HCC): ICD-10-CM

## 2019-08-12 DIAGNOSIS — Z79.01 LONG TERM (CURRENT) USE OF ANTICOAGULANTS: ICD-10-CM

## 2019-08-12 DIAGNOSIS — D64.9 ANEMIA, UNSPECIFIED TYPE: ICD-10-CM

## 2019-08-12 DIAGNOSIS — R79.89 ELEVATED HOMOCYSTEINE: ICD-10-CM

## 2019-08-12 DIAGNOSIS — I82.4Y9 DEEP VEIN THROMBOSIS (DVT) OF PROXIMAL LOWER EXTREMITY, UNSPECIFIED CHRONICITY, UNSPECIFIED LATERALITY (HCC): ICD-10-CM

## 2019-08-12 LAB
ALBUMIN SERPL-MCNC: 4.4 G/DL (ref 3.5–5.2)
ALBUMIN/GLOB SERPL: 1.8 G/DL
ALP SERPL-CCNC: 48 U/L (ref 39–117)
ALT SERPL W P-5'-P-CCNC: 15 U/L (ref 1–41)
ANION GAP SERPL CALCULATED.3IONS-SCNC: 11 MMOL/L (ref 5–15)
AST SERPL-CCNC: 20 U/L (ref 1–40)
BASOPHILS # BLD AUTO: 0.05 10*3/MM3 (ref 0–0.2)
BASOPHILS NFR BLD AUTO: 0.8 % (ref 0–1.5)
BILIRUB SERPL-MCNC: 0.3 MG/DL (ref 0.2–1.2)
BUN BLD-MCNC: 16 MG/DL (ref 8–23)
BUN/CREAT SERPL: 18.2 (ref 7–25)
CALCIUM SPEC-SCNC: 9.3 MG/DL (ref 8.6–10.5)
CHLORIDE SERPL-SCNC: 106 MMOL/L (ref 98–107)
CO2 SERPL-SCNC: 24 MMOL/L (ref 22–29)
CREAT BLD-MCNC: 0.88 MG/DL (ref 0.76–1.27)
DEPRECATED RDW RBC AUTO: 44.7 FL (ref 37–54)
EOSINOPHIL # BLD AUTO: 0.09 10*3/MM3 (ref 0–0.4)
EOSINOPHIL NFR BLD AUTO: 1.5 % (ref 0.3–6.2)
ERYTHROCYTE [DISTWIDTH] IN BLOOD BY AUTOMATED COUNT: 13.3 % (ref 12.3–15.4)
FERRITIN SERPL-MCNC: 77.72 NG/ML (ref 30–400)
FOLATE SERPL-MCNC: >20 NG/ML (ref 4.78–24.2)
GFR SERPL CREATININE-BSD FRML MDRD: 86 ML/MIN/1.73
GLOBULIN UR ELPH-MCNC: 2.4 GM/DL
GLUCOSE BLD-MCNC: 110 MG/DL (ref 65–99)
HCT VFR BLD AUTO: 39.5 % (ref 37.5–51)
HGB BLD-MCNC: 13.1 G/DL (ref 13–17.7)
IMM GRANULOCYTES # BLD AUTO: 0.02 10*3/MM3 (ref 0–0.05)
IMM GRANULOCYTES NFR BLD AUTO: 0.3 % (ref 0–0.5)
IRON 24H UR-MRATE: 105 MCG/DL (ref 59–158)
IRON SATN MFR SERPL: 26 % (ref 20–50)
LYMPHOCYTES # BLD AUTO: 1.56 10*3/MM3 (ref 0.7–3.1)
LYMPHOCYTES NFR BLD AUTO: 26.3 % (ref 19.6–45.3)
MCH RBC QN AUTO: 30 PG (ref 26.6–33)
MCHC RBC AUTO-ENTMCNC: 33.2 G/DL (ref 31.5–35.7)
MCV RBC AUTO: 90.6 FL (ref 79–97)
MONOCYTES # BLD AUTO: 0.68 10*3/MM3 (ref 0.1–0.9)
MONOCYTES NFR BLD AUTO: 11.5 % (ref 5–12)
NEUTROPHILS # BLD AUTO: 3.53 10*3/MM3 (ref 1.7–7)
NEUTROPHILS NFR BLD AUTO: 59.6 % (ref 42.7–76)
NRBC BLD AUTO-RTO: 0 /100 WBC (ref 0–0.2)
PLATELET # BLD AUTO: 211 10*3/MM3 (ref 140–450)
PMV BLD AUTO: 9.5 FL (ref 6–12)
POTASSIUM BLD-SCNC: 4.3 MMOL/L (ref 3.5–5.2)
PROT SERPL-MCNC: 6.8 G/DL (ref 6–8.5)
RBC # BLD AUTO: 4.36 10*6/MM3 (ref 4.14–5.8)
SODIUM BLD-SCNC: 141 MMOL/L (ref 136–145)
TIBC SERPL-MCNC: 398 MCG/DL (ref 298–536)
TRANSFERRIN SERPL-MCNC: 267 MG/DL (ref 200–360)
VIT B12 BLD-MCNC: 646 PG/ML (ref 211–946)
WBC NRBC COR # BLD: 5.93 10*3/MM3 (ref 3.4–10.8)

## 2019-08-12 PROCEDURE — 82728 ASSAY OF FERRITIN: CPT | Performed by: INTERNAL MEDICINE

## 2019-08-12 PROCEDURE — 80053 COMPREHEN METABOLIC PANEL: CPT | Performed by: INTERNAL MEDICINE

## 2019-08-12 PROCEDURE — 93970 EXTREMITY STUDY: CPT

## 2019-08-12 PROCEDURE — 83540 ASSAY OF IRON: CPT | Performed by: INTERNAL MEDICINE

## 2019-08-12 PROCEDURE — 36415 COLL VENOUS BLD VENIPUNCTURE: CPT | Performed by: NURSE PRACTITIONER

## 2019-08-12 PROCEDURE — 84466 ASSAY OF TRANSFERRIN: CPT | Performed by: INTERNAL MEDICINE

## 2019-08-12 PROCEDURE — 71275 CT ANGIOGRAPHY CHEST: CPT

## 2019-08-12 PROCEDURE — 82607 VITAMIN B-12: CPT | Performed by: INTERNAL MEDICINE

## 2019-08-12 PROCEDURE — 82746 ASSAY OF FOLIC ACID SERUM: CPT | Performed by: INTERNAL MEDICINE

## 2019-08-12 PROCEDURE — 0 IOPAMIDOL PER 1 ML: Performed by: INTERNAL MEDICINE

## 2019-08-12 PROCEDURE — 85025 COMPLETE CBC W/AUTO DIFF WBC: CPT | Performed by: INTERNAL MEDICINE

## 2019-08-12 RX ADMIN — IOPAMIDOL 71 ML: 755 INJECTION, SOLUTION INTRAVENOUS at 10:54

## 2019-08-14 ENCOUNTER — OFFICE VISIT (OUTPATIENT)
Dept: ONCOLOGY | Facility: CLINIC | Age: 69
End: 2019-08-14

## 2019-08-14 ENCOUNTER — ANTICOAGULATION VISIT (OUTPATIENT)
Dept: CARDIAC SURGERY | Facility: CLINIC | Age: 69
End: 2019-08-14

## 2019-08-14 VITALS — HEART RATE: 63 BPM | OXYGEN SATURATION: 98 % | SYSTOLIC BLOOD PRESSURE: 114 MMHG | DIASTOLIC BLOOD PRESSURE: 69 MMHG

## 2019-08-14 VITALS
RESPIRATION RATE: 18 BRPM | WEIGHT: 181.8 LBS | HEART RATE: 62 BPM | DIASTOLIC BLOOD PRESSURE: 81 MMHG | OXYGEN SATURATION: 98 % | HEIGHT: 70 IN | TEMPERATURE: 98 F | BODY MASS INDEX: 26.03 KG/M2 | SYSTOLIC BLOOD PRESSURE: 118 MMHG

## 2019-08-14 DIAGNOSIS — D64.9 ANEMIA, UNSPECIFIED TYPE: ICD-10-CM

## 2019-08-14 DIAGNOSIS — I26.99 OTHER ACUTE PULMONARY EMBOLISM WITHOUT ACUTE COR PULMONALE (HCC): ICD-10-CM

## 2019-08-14 DIAGNOSIS — I82.4Y9 DEEP VEIN THROMBOSIS (DVT) OF PROXIMAL LOWER EXTREMITY, UNSPECIFIED CHRONICITY, UNSPECIFIED LATERALITY (HCC): ICD-10-CM

## 2019-08-14 DIAGNOSIS — R79.89 ELEVATED HOMOCYSTEINE: ICD-10-CM

## 2019-08-14 DIAGNOSIS — Z79.01 LONG TERM (CURRENT) USE OF ANTICOAGULANTS: ICD-10-CM

## 2019-08-14 DIAGNOSIS — I82.4Y9 DEEP VEIN THROMBOSIS (DVT) OF PROXIMAL LOWER EXTREMITY, UNSPECIFIED CHRONICITY, UNSPECIFIED LATERALITY (HCC): Primary | ICD-10-CM

## 2019-08-14 LAB — INR PPP: 2.5 (ref 0.9–1.1)

## 2019-08-14 PROCEDURE — 85610 PROTHROMBIN TIME: CPT | Performed by: NURSE PRACTITIONER

## 2019-08-14 PROCEDURE — G8731 PAIN NEG NO PLAN: HCPCS | Performed by: INTERNAL MEDICINE

## 2019-08-14 PROCEDURE — 1157F ADVNC CARE PLAN IN RCRD: CPT | Performed by: INTERNAL MEDICINE

## 2019-08-14 PROCEDURE — G9903 PT SCRN TBCO ID AS NON USER: HCPCS | Performed by: INTERNAL MEDICINE

## 2019-08-14 PROCEDURE — G0463 HOSPITAL OUTPT CLINIC VISIT: HCPCS | Performed by: INTERNAL MEDICINE

## 2019-08-14 PROCEDURE — 99214 OFFICE O/P EST MOD 30 MIN: CPT | Performed by: INTERNAL MEDICINE
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
## 2019-08-14 NOTE — PROGRESS NOTES
Today's INR is 2.5.  Patient states no med changes or bleeding problems or unexplained bruising. Patient instructed to continue current dosing schedule. Verbalizes understanding. Will recheck 1 month.  Patient instructed regarding medication; results given and questions answered. Nutritional counseling given.  Dietary factors affecting therapy addressed.  Patient instructed to monitor for excessive bruising or bleeding.         This document has been electronically signed by DOMENIC Kim @ on August 14, 2019 9:53 AM

## 2019-08-19 ENCOUNTER — DOCUMENTATION (OUTPATIENT)
Dept: CARDIAC SURGERY | Facility: CLINIC | Age: 69
End: 2019-08-19

## 2019-08-19 NOTE — PROGRESS NOTES
Pt wife called and pt is having EGD on Monday August 26 and will need to hold coumadin 5 nights prior. Pt will begin holding coumadin Wednesday night and appt made for Thursday for Lovenox bridging. Lovenox 80mg sq bid #20 with NR called in to [facility].

## 2019-08-20 ENCOUNTER — DOCUMENTATION (OUTPATIENT)
Dept: CARDIAC SURGERY | Facility: CLINIC | Age: 69
End: 2019-08-20

## 2019-08-20 NOTE — PROGRESS NOTES
Lovenox 80mg sq bid #20 that was called in yesterday was cancelled and Lovenox 120mg sq daily #10 with 1 refill was called in to [facility]. Pt had 80mg syringes at home but had refrigerated them and was not going to be able to use them, therefore once daily dosing was preferred.

## 2019-08-22 ENCOUNTER — ANTICOAGULATION VISIT (OUTPATIENT)
Dept: CARDIAC SURGERY | Facility: CLINIC | Age: 69
End: 2019-08-22

## 2019-08-22 VITALS — DIASTOLIC BLOOD PRESSURE: 64 MMHG | HEART RATE: 62 BPM | OXYGEN SATURATION: 99 % | SYSTOLIC BLOOD PRESSURE: 112 MMHG

## 2019-08-22 DIAGNOSIS — I26.99 OTHER ACUTE PULMONARY EMBOLISM WITHOUT ACUTE COR PULMONALE (HCC): ICD-10-CM

## 2019-08-22 DIAGNOSIS — Z79.01 LONG TERM (CURRENT) USE OF ANTICOAGULANTS: ICD-10-CM

## 2019-08-22 DIAGNOSIS — I82.4Y9 DEEP VEIN THROMBOSIS (DVT) OF PROXIMAL LOWER EXTREMITY, UNSPECIFIED CHRONICITY, UNSPECIFIED LATERALITY (HCC): ICD-10-CM

## 2019-08-22 LAB — INR PPP: 2.2 (ref 0.9–1.1)

## 2019-08-22 PROCEDURE — 99211 OFF/OP EST MAY X REQ PHY/QHP: CPT | Performed by: NURSE PRACTITIONER

## 2019-08-22 PROCEDURE — 85610 PROTHROMBIN TIME: CPT | Performed by: NURSE PRACTITIONER

## 2019-08-22 NOTE — PROGRESS NOTES
Today's INR is 2.2. Pt is no longer taking Folic Acid and is now taking Potassium; med list updated. Pt denies bleeding problems. Pt here today for Lovenox bridging for EGD on Monday August 26. Pt states he held coumadin last night as instructed. Pt was instructed on coumadin dosing as noted on above calendar; pt verbalized. Pt has used Lovenox before in the past. Pt was provided with Lovenox teaching kit. Key points of administration, safety, and disposal were reviewed; pt verbalized. Pt was instructed to begin Lovenox tomorrow at 0500 and take daily but to hold Monday morning dose; pt verbalized. Pt was instructed to resume Lovenox Monday night at 1900 and take every 24 hours; pt verbalized. Patient instructed regarding medication; results given and questions answered. Nutritional counseling given.  Dietary factors affecting therapy addressed.  Patient instructed to monitor for excessive bruising or bleeding. Will recheck 4 days post procedure.       This document has been electronically signed by SIMONE Santos-BC @  On August 22, 2019 8:53 AM

## 2019-08-30 ENCOUNTER — ANTICOAGULATION VISIT (OUTPATIENT)
Dept: CARDIAC SURGERY | Facility: CLINIC | Age: 69
End: 2019-08-30

## 2019-08-30 VITALS — DIASTOLIC BLOOD PRESSURE: 64 MMHG | SYSTOLIC BLOOD PRESSURE: 110 MMHG | OXYGEN SATURATION: 97 % | HEART RATE: 67 BPM

## 2019-08-30 DIAGNOSIS — I82.4Y9 DEEP VEIN THROMBOSIS (DVT) OF PROXIMAL LOWER EXTREMITY, UNSPECIFIED CHRONICITY, UNSPECIFIED LATERALITY (HCC): ICD-10-CM

## 2019-08-30 DIAGNOSIS — Z79.01 LONG TERM (CURRENT) USE OF ANTICOAGULANTS: ICD-10-CM

## 2019-08-30 DIAGNOSIS — I26.99 OTHER ACUTE PULMONARY EMBOLISM WITHOUT ACUTE COR PULMONALE (HCC): ICD-10-CM

## 2019-08-30 LAB — INR PPP: 1.4 (ref 0.9–1.1)

## 2019-08-30 PROCEDURE — 85610 PROTHROMBIN TIME: CPT | Performed by: NURSE PRACTITIONER

## 2019-08-30 PROCEDURE — 99211 OFF/OP EST MAY X REQ PHY/QHP: CPT | Performed by: NURSE PRACTITIONER

## 2019-08-30 NOTE — PROGRESS NOTES
Today's INR is 1.4.   Pt restarted coumadin Monday evening and Lovenox Tuesday.  Pt denies med changes or bleeding issues. Instructed pt on coumadin dosing schedule and to continue daily Lovenox.  Instructed pt to limit green intake for now.  Pt verbalizes.  Patient instructed regarding medication; results given and questions answered. Nutritional counseling given.  Dietary factors affecting therapy addressed.  Patient instructed to monitor for excessive bruising or bleeding.        This document has been electronically signed by DOMENIC Kim @ on August 30, 2019 8:55 AM

## 2019-09-03 ENCOUNTER — ANTICOAGULATION VISIT (OUTPATIENT)
Dept: CARDIAC SURGERY | Facility: CLINIC | Age: 69
End: 2019-09-03

## 2019-09-03 VITALS — SYSTOLIC BLOOD PRESSURE: 138 MMHG | HEART RATE: 70 BPM | DIASTOLIC BLOOD PRESSURE: 74 MMHG

## 2019-09-03 DIAGNOSIS — I26.99 OTHER ACUTE PULMONARY EMBOLISM WITHOUT ACUTE COR PULMONALE (HCC): ICD-10-CM

## 2019-09-03 DIAGNOSIS — I82.4Y9 DEEP VEIN THROMBOSIS (DVT) OF PROXIMAL LOWER EXTREMITY, UNSPECIFIED CHRONICITY, UNSPECIFIED LATERALITY (HCC): ICD-10-CM

## 2019-09-03 DIAGNOSIS — Z79.01 LONG TERM (CURRENT) USE OF ANTICOAGULANTS: ICD-10-CM

## 2019-09-03 LAB — INR PPP: 1.6 (ref 0.9–1.1)

## 2019-09-03 PROCEDURE — 99211 OFF/OP EST MAY X REQ PHY/QHP: CPT | Performed by: NURSE PRACTITIONER

## 2019-09-03 PROCEDURE — 85610 PROTHROMBIN TIME: CPT | Performed by: NURSE PRACTITIONER

## 2019-09-03 NOTE — PROGRESS NOTES
Today's INR is 1.6.   Pt denies missed coumadin doses or consuming too much green.  Pt has continued the Lovenox injections as instructed.  Adjusted coumadin dose and instructed pt to continue daily Lovenox injections.  Instructed pt to limit all green for now.  Recheck INR this Friday.  Pt verbalizes.  Patient instructed regarding medication; results given and questions answered. Nutritional counseling given.  Dietary factors affecting therapy addressed.  Patient instructed to monitor for excessive bruising or bleeding.      This document has been electronically signed by SIMONE Santos-BC @  On September 3, 2019 1:29 PM

## 2019-09-06 ENCOUNTER — ANTICOAGULATION VISIT (OUTPATIENT)
Dept: CARDIAC SURGERY | Facility: CLINIC | Age: 69
End: 2019-09-06

## 2019-09-06 VITALS — DIASTOLIC BLOOD PRESSURE: 74 MMHG | HEART RATE: 68 BPM | SYSTOLIC BLOOD PRESSURE: 134 MMHG | OXYGEN SATURATION: 98 %

## 2019-09-06 DIAGNOSIS — I26.99 OTHER ACUTE PULMONARY EMBOLISM WITHOUT ACUTE COR PULMONALE (HCC): ICD-10-CM

## 2019-09-06 DIAGNOSIS — I82.4Y9 DEEP VEIN THROMBOSIS (DVT) OF PROXIMAL LOWER EXTREMITY, UNSPECIFIED CHRONICITY, UNSPECIFIED LATERALITY (HCC): ICD-10-CM

## 2019-09-06 DIAGNOSIS — Z79.01 LONG TERM (CURRENT) USE OF ANTICOAGULANTS: ICD-10-CM

## 2019-09-06 LAB — INR PPP: 2.1 (ref 0.9–1.1)

## 2019-09-06 PROCEDURE — 85610 PROTHROMBIN TIME: CPT | Performed by: NURSE PRACTITIONER

## 2019-09-06 PROCEDURE — 99211 OFF/OP EST MAY X REQ PHY/QHP: CPT | Performed by: NURSE PRACTITIONER

## 2019-09-06 NOTE — PROGRESS NOTES
Today's INR is 2.1. Denies any new medications or bleeding issues. PT denies any s/s of blood clot. PT instructed to d/c Lovenox and resume prior Coumadin dosing. PT will be seen in 1 week. Patient instructed regarding medication; results given and questions answered. Nutritional counseling given.  Dietary factors affecting therapy addressed.  Patient instructed to monitor for excessive bruising or bleeding. PT verbalizes understanding.       This document has been electronically signed by SIMONE Santos-BC @  On September 6, 2019 8:40 AM

## 2019-09-13 ENCOUNTER — ANTICOAGULATION VISIT (OUTPATIENT)
Dept: CARDIAC SURGERY | Facility: CLINIC | Age: 69
End: 2019-09-13

## 2019-09-13 VITALS — DIASTOLIC BLOOD PRESSURE: 80 MMHG | SYSTOLIC BLOOD PRESSURE: 120 MMHG | OXYGEN SATURATION: 94 % | HEART RATE: 66 BPM

## 2019-09-13 DIAGNOSIS — Z79.01 LONG TERM (CURRENT) USE OF ANTICOAGULANTS: ICD-10-CM

## 2019-09-13 DIAGNOSIS — I82.4Y9 DEEP VEIN THROMBOSIS (DVT) OF PROXIMAL LOWER EXTREMITY, UNSPECIFIED CHRONICITY, UNSPECIFIED LATERALITY (HCC): ICD-10-CM

## 2019-09-13 DIAGNOSIS — I26.99 OTHER ACUTE PULMONARY EMBOLISM WITHOUT ACUTE COR PULMONALE (HCC): ICD-10-CM

## 2019-09-13 LAB — INR PPP: 1.7 (ref 0.9–1.1)

## 2019-09-13 PROCEDURE — 85610 PROTHROMBIN TIME: CPT | Performed by: NURSE PRACTITIONER

## 2019-09-13 PROCEDURE — 99211 OFF/OP EST MAY X REQ PHY/QHP: CPT | Performed by: NURSE PRACTITIONER

## 2019-09-13 NOTE — PROGRESS NOTES
Today's INR is 1.7. Denies any new medications or bleeding issues. Denies any missed doses or excessive k. Adjusted pt's dose and instructed to hold green vegs for 2 days. Recheck INR in 10 days. Patient instructed regarding medication; results given and questions answered. Nutritional counseling given.  Dietary factors affecting therapy addressed.  Patient instructed to monitor for excessive bruising or bleeding. PT verbalizes understanding.         This document has been electronically signed by Fara Combs, DOMENIC @ on September 13, 2019 8:02 AM

## 2019-09-23 ENCOUNTER — ANTICOAGULATION VISIT (OUTPATIENT)
Dept: CARDIAC SURGERY | Facility: CLINIC | Age: 69
End: 2019-09-23

## 2019-09-23 VITALS — DIASTOLIC BLOOD PRESSURE: 79 MMHG | OXYGEN SATURATION: 99 % | HEART RATE: 64 BPM | SYSTOLIC BLOOD PRESSURE: 120 MMHG

## 2019-09-23 DIAGNOSIS — Z79.01 LONG TERM (CURRENT) USE OF ANTICOAGULANTS: ICD-10-CM

## 2019-09-23 DIAGNOSIS — I26.99 OTHER ACUTE PULMONARY EMBOLISM WITHOUT ACUTE COR PULMONALE (HCC): ICD-10-CM

## 2019-09-23 DIAGNOSIS — I82.4Y9 DEEP VEIN THROMBOSIS (DVT) OF PROXIMAL LOWER EXTREMITY, UNSPECIFIED CHRONICITY, UNSPECIFIED LATERALITY (HCC): ICD-10-CM

## 2019-09-23 LAB — INR PPP: 2.5 (ref 0.9–1.1)

## 2019-09-23 PROCEDURE — 85610 PROTHROMBIN TIME: CPT | Performed by: NURSE PRACTITIONER

## 2019-09-23 NOTE — PROGRESS NOTES
Today's INR is 2.5. Denies any new medications or bleeding issues. Denies any s/s of blood clot. PT instructed to continue same dose and Coumadin friendly diet. Recheck INR in 2 weeks. Patient instructed regarding medication; results given and questions answered. Nutritional counseling given.  Dietary factors affecting therapy addressed.  Patient instructed to monitor for excessive bruising or bleeding. PT verbalizes understanding.         This document has been electronically signed by Fara Combs, DOMENIC @ on September 23, 2019 8:14 AM

## 2019-10-02 ENCOUNTER — DOCUMENTATION (OUTPATIENT)
Dept: CARDIAC SURGERY | Facility: CLINIC | Age: 69
End: 2019-10-02

## 2019-10-02 NOTE — PROGRESS NOTES
Pt wife called and stated he will be starting Omeprazole 40mg daily and Carafate 1 gm ac and hs today. Pt is also stopping Meloxicam. Pt wife was advised one could increase INR and the other can decrease the INR, therefore he can start them both today and keep his appt for Monday; she verbalized an understanding. Pt is also scheduled for another EGD on December 4 at 0630.

## 2019-10-07 ENCOUNTER — ANTICOAGULATION VISIT (OUTPATIENT)
Dept: CARDIAC SURGERY | Facility: CLINIC | Age: 69
End: 2019-10-07

## 2019-10-07 VITALS — DIASTOLIC BLOOD PRESSURE: 76 MMHG | OXYGEN SATURATION: 97 % | HEART RATE: 66 BPM | SYSTOLIC BLOOD PRESSURE: 125 MMHG

## 2019-10-07 DIAGNOSIS — I82.4Y9 DEEP VEIN THROMBOSIS (DVT) OF PROXIMAL LOWER EXTREMITY, UNSPECIFIED CHRONICITY, UNSPECIFIED LATERALITY (HCC): ICD-10-CM

## 2019-10-07 DIAGNOSIS — Z79.01 LONG TERM (CURRENT) USE OF ANTICOAGULANTS: ICD-10-CM

## 2019-10-07 DIAGNOSIS — I26.99 OTHER ACUTE PULMONARY EMBOLISM WITHOUT ACUTE COR PULMONALE (HCC): ICD-10-CM

## 2019-10-07 LAB — INR PPP: 2.4 (ref 0.9–1.1)

## 2019-10-07 PROCEDURE — 85610 PROTHROMBIN TIME: CPT | Performed by: NURSE PRACTITIONER

## 2019-10-07 NOTE — PROGRESS NOTES
Today's INR is 2.4. Pt has been taking Carafate and Omeprazole for 5 days and is no longer taking Meloxicam. Dose not adjusted at this time but will recheck next week on current dose. Patient instructed regarding medication; results given and questions answered. Nutritional counseling given.  Dietary factors affecting therapy addressed.  Patient instructed to monitor for excessive bruising or bleeding.       This document has been electronically signed by Everett Ramirez AGACNP-BC Agatha  On October 7, 2019 8:29 AM

## 2019-10-14 ENCOUNTER — ANTICOAGULATION VISIT (OUTPATIENT)
Dept: CARDIAC SURGERY | Facility: CLINIC | Age: 69
End: 2019-10-14

## 2019-10-14 VITALS — OXYGEN SATURATION: 98 % | DIASTOLIC BLOOD PRESSURE: 76 MMHG | SYSTOLIC BLOOD PRESSURE: 129 MMHG | HEART RATE: 83 BPM

## 2019-10-14 DIAGNOSIS — I82.4Y9 DEEP VEIN THROMBOSIS (DVT) OF PROXIMAL LOWER EXTREMITY, UNSPECIFIED CHRONICITY, UNSPECIFIED LATERALITY (HCC): ICD-10-CM

## 2019-10-14 DIAGNOSIS — I26.99 OTHER ACUTE PULMONARY EMBOLISM WITHOUT ACUTE COR PULMONALE (HCC): ICD-10-CM

## 2019-10-14 DIAGNOSIS — Z79.01 LONG TERM (CURRENT) USE OF ANTICOAGULANTS: ICD-10-CM

## 2019-10-14 LAB — INR PPP: 2.1 (ref 0.9–1.1)

## 2019-10-14 PROCEDURE — 99211 OFF/OP EST MAY X REQ PHY/QHP: CPT | Performed by: NURSE PRACTITIONER

## 2019-10-14 PROCEDURE — 85610 PROTHROMBIN TIME: CPT | Performed by: NURSE PRACTITIONER

## 2019-10-14 NOTE — PROGRESS NOTES
Today's INR is 2.1.  Pt continues Omeprazole and Carafate. Dose adjusted and pt instructed to hold green veggies 2 days; pt verbalized. Patient instructed regarding medication; results given and questions answered. Nutritional counseling given.  Dietary factors affecting therapy addressed.  Patient instructed to monitor for excessive bruising or bleeding. Will recheck next week.         This document has been electronically signed by NIKO KimPBELO @ on October 14, 2019 8:15 AM

## 2019-10-22 ENCOUNTER — ANTICOAGULATION VISIT (OUTPATIENT)
Dept: CARDIAC SURGERY | Facility: CLINIC | Age: 69
End: 2019-10-22

## 2019-10-22 VITALS — HEART RATE: 63 BPM | OXYGEN SATURATION: 100 % | DIASTOLIC BLOOD PRESSURE: 79 MMHG | SYSTOLIC BLOOD PRESSURE: 135 MMHG

## 2019-10-22 DIAGNOSIS — I26.99 OTHER ACUTE PULMONARY EMBOLISM WITHOUT ACUTE COR PULMONALE (HCC): ICD-10-CM

## 2019-10-22 DIAGNOSIS — I82.4Y9 DEEP VEIN THROMBOSIS (DVT) OF PROXIMAL LOWER EXTREMITY, UNSPECIFIED CHRONICITY, UNSPECIFIED LATERALITY (HCC): ICD-10-CM

## 2019-10-22 DIAGNOSIS — Z79.01 LONG TERM (CURRENT) USE OF ANTICOAGULANTS: ICD-10-CM

## 2019-10-22 LAB — INR PPP: 2.6 (ref 0.9–1.1)

## 2019-10-22 PROCEDURE — 85610 PROTHROMBIN TIME: CPT | Performed by: NURSE PRACTITIONER

## 2019-10-22 NOTE — PROGRESS NOTES
Today's INR is 2.6.  Patient states no med changes or bleeding problems or unexplained bruising. Patient instructed to continue current dosing schedule. Verbalizes understanding. Will recheck 1 week. Patient instructed regarding medication; results given and questions answered. Nutritional counseling given.  Dietary factors affecting therapy addressed.  Patient instructed to monitor for excessive bruising or bleeding.       This document has been electronically signed by MAUREEN SantosCNP-BC Agatha  On October 22, 2019 8:17 AM

## 2019-10-25 ENCOUNTER — OFFICE VISIT (OUTPATIENT)
Dept: GASTROENTEROLOGY | Facility: CLINIC | Age: 69
End: 2019-10-25

## 2019-10-25 VITALS
BODY MASS INDEX: 26.71 KG/M2 | HEART RATE: 71 BPM | SYSTOLIC BLOOD PRESSURE: 118 MMHG | WEIGHT: 186.6 LBS | DIASTOLIC BLOOD PRESSURE: 69 MMHG | HEIGHT: 70 IN

## 2019-10-25 DIAGNOSIS — K20.90 ESOPHAGITIS: Primary | ICD-10-CM

## 2019-10-25 PROCEDURE — 99213 OFFICE O/P EST LOW 20 MIN: CPT | Performed by: INTERNAL MEDICINE

## 2019-10-25 RX ORDER — DEXTROSE AND SODIUM CHLORIDE 5; .45 G/100ML; G/100ML
30 INJECTION, SOLUTION INTRAVENOUS CONTINUOUS PRN
Status: CANCELLED | OUTPATIENT
Start: 2019-12-03

## 2019-10-25 RX ORDER — WARFARIN SODIUM 7.5 MG/1
TABLET ORAL
Qty: 90 TABLET | Refills: 1 | Status: SHIPPED | OUTPATIENT
Start: 2019-10-25 | End: 2020-07-13
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
## 2019-10-25 NOTE — PATIENT INSTRUCTIONS
Ferro Esophagus    Ferro esophagus occurs when the tissue that lines the esophagus changes or becomes damaged. The esophagus is the tube that carries food from the throat to the stomach. With Ferro esophagus, the cells that line the esophagus are replaced by cells that are similar to the lining of the intestines (intestinal metaplasia).  Ferro esophagus itself may not cause any symptoms. However, many people who have Ferro esophagus also have gastroesophageal reflux disease (GERD), which may cause symptoms such as heartburn. Treatment may include medicines, procedures to destroy the abnormal cells, or surgery. Over time, a few people with this condition may develop cancer of the esophagus.  What are the causes?  The exact cause of this condition is not known. In some cases, the condition develops from damage to the lining of the esophagus caused by GERD. GERD occurs when stomach acids flow up from the stomach into the esophagus. Frequent symptoms of GERD may cause intestinal metaplasia or cause cell changes (dysplasia).  What increases the risk?  The following factors may make you more likely to develop this condition:  · Having GERD.  · Being any of the following:  ? Male.  ? White ().  ? Obese.  ? Older than 50.  · Having a hiatal hernia.  · Smoking.  What are the signs or symptoms?  People with Ferro esophagus often have no symptoms. However, many people with this condition also have GERD. Symptoms of GERD may include:  · Heartburn.  · Difficulty swallowing.  · Dry cough.  How is this diagnosed?  Ferro esophagus may be diagnosed with an exam called an upper gastrointestinal endoscopy. During this exam, a thin, flexible tube (endoscope) is passed down your esophagus. The endoscope has a light and camera on the end of it. Your health care provider uses the endoscope to view the inside of your esophagus. During the exam, several tissue samples will be removed (biopsy) from your esophagus so  they can be checked for intestinal metaplasia or dysplasia.  How is this treated?  Treatment for this condition may include:  · Medicines (proton pump inhibitors, or PPIs) to decrease or stop GERD.  · Periodic endoscopic exams to make sure that cancer is not developing.  · A procedure or surgery for dysplasia. This may include:  ? Endoscopic removal or destruction of abnormal cells.  ? Removal of part of the esophagus (esophagectomy).  Follow these instructions at home:  Eating and drinking  · Eat more fruits and vegetables.  · Avoid fatty foods.  · Eat small, frequent meals instead of large meals.  · Avoid foods that cause heartburn. These foods include:  ? Coffee and alcoholic drinks.  ? Tomatoes and foods made with tomatoes.  ? Greasy or spicy foods.  ? Chocolate and peppermint.  General instructions    · Take over-the-counter and prescription medicines only as told by your health care provider.  · Do not use any tobacco products, such as cigarettes, chewing tobacco, and e-cigarettes. If you need help quitting, ask your health care provider.  · If your health care provider is treating you for GERD, make sure you follow all instructions and take medicines as directed.  · Keep all follow-up visits as told by your health care provider. This is important.  Contact a health care provider if:  · You have heartburn or GERD symptoms.  · You have difficulty swallowing.  Get help right away if:  · You have chest pain.  · You are unable to swallow.  · You vomit blood or material that looks like coffee grounds.  · Your stool (feces) is bright red or dark.  This information is not intended to replace advice given to you by your health care provider. Make sure you discuss any questions you have with your health care provider.  Document Released: 03/09/2005 Document Revised: 05/25/2017 Document Reviewed: 09/29/2016  [employer] Interactive Patient Education © 2019 [employer]

## 2019-10-30 ENCOUNTER — OFFICE VISIT (OUTPATIENT)
Dept: PODIATRY | Facility: CLINIC | Age: 69
End: 2019-10-30

## 2019-10-30 ENCOUNTER — ANTICOAGULATION VISIT (OUTPATIENT)
Dept: CARDIAC SURGERY | Facility: CLINIC | Age: 69
End: 2019-10-30

## 2019-10-30 VITALS — HEIGHT: 70 IN | HEART RATE: 73 BPM | WEIGHT: 186 LBS | BODY MASS INDEX: 26.63 KG/M2 | OXYGEN SATURATION: 98 %

## 2019-10-30 VITALS
SYSTOLIC BLOOD PRESSURE: 137 MMHG | WEIGHT: 182 LBS | HEART RATE: 69 BPM | OXYGEN SATURATION: 99 % | DIASTOLIC BLOOD PRESSURE: 89 MMHG | BODY MASS INDEX: 26.11 KG/M2

## 2019-10-30 DIAGNOSIS — M77.42 METATARSALGIA, LEFT FOOT: ICD-10-CM

## 2019-10-30 DIAGNOSIS — Z79.01 LONG TERM (CURRENT) USE OF ANTICOAGULANTS: ICD-10-CM

## 2019-10-30 DIAGNOSIS — M20.22 HALLUX RIGIDUS, LEFT FOOT: Primary | ICD-10-CM

## 2019-10-30 DIAGNOSIS — I26.99 OTHER ACUTE PULMONARY EMBOLISM WITHOUT ACUTE COR PULMONALE (HCC): ICD-10-CM

## 2019-10-30 DIAGNOSIS — I82.4Y9 DEEP VEIN THROMBOSIS (DVT) OF PROXIMAL LOWER EXTREMITY, UNSPECIFIED CHRONICITY, UNSPECIFIED LATERALITY (HCC): ICD-10-CM

## 2019-10-30 LAB — INR PPP: 2.8 (ref 0.9–1.1)

## 2019-10-30 PROCEDURE — 85610 PROTHROMBIN TIME: CPT | Performed by: NURSE PRACTITIONER

## 2019-10-30 PROCEDURE — 99214 OFFICE O/P EST MOD 30 MIN: CPT | Performed by: PODIATRIST

## 2019-10-30 RX ORDER — BUPIVACAINE HCL/0.9 % NACL/PF 0.1 %
2 PLASTIC BAG, INJECTION (ML) EPIDURAL ONCE
Status: CANCELLED | OUTPATIENT
Start: 2019-12-05 | End: 2019-10-30
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
## 2019-10-30 NOTE — PROGRESS NOTES
Today's INR is 2.8.  Pt denies med changes or bleeding problems. Pt is having an EGD on Tuesday December 3 and foot surgery on December 5. Pt will need to be bridged with Lovenox due to residual thrombus on venous ultrasound in May 2019. Appt made for Lovenox teaching on November 27 as pt will begin holding coumadin on Saturday November 30 (office closed November 28 and 29). Pt was instructed to continue current dose and appt made for 2 weeks. Patient instructed regarding medication; results given and questions answered. Nutritional counseling given.  Dietary factors affecting therapy addressed.  Patient instructed to monitor for excessive bruising or bleeding. Lovenox 120mg sq daily #10 with 1 refill called in to [facility].         This document has been electronically signed by DOMENIC Kim @ on October 30, 2019 10:16 AM

## 2019-11-06 ENCOUNTER — TELEPHONE (OUTPATIENT)
Dept: GASTROENTEROLOGY | Facility: CLINIC | Age: 69
End: 2019-11-06

## 2019-11-06 NOTE — TELEPHONE ENCOUNTER
----- Message from April Vásquez LPN sent at 11/1/2019  5:29 PM CDT -----  Contact: [phone]  11/01/2019, 1413 - Patient's wife, Donna Zazueta, submitted voice message requesting a return telephone call regarding a question concerning a prescription medication prescribed per Dr. Lali Fraser M.D.  Mrs. Zazueta did not specify which prescription medication she is referring to and this staff member did not note a prescription mediation prescribed per Dr. Lali Fraser M.D.      Patient clinical appointment with Dr. Lali Fraser M.D. was Friday, October 25, 2019.

## 2019-11-07 ENCOUNTER — TELEPHONE (OUTPATIENT)
Dept: GASTROENTEROLOGY | Facility: CLINIC | Age: 69
End: 2019-11-07

## 2019-11-07 NOTE — TELEPHONE ENCOUNTER
I tried to call this pt back again today and got an unavailable tone. I am attempting to call to discuss a question regarding this pt's medication.

## 2019-11-13 ENCOUNTER — LAB (OUTPATIENT)
Dept: ONCOLOGY | Facility: HOSPITAL | Age: 69
End: 2019-11-13

## 2019-11-13 ENCOUNTER — ANTICOAGULATION VISIT (OUTPATIENT)
Dept: CARDIAC SURGERY | Facility: CLINIC | Age: 69
End: 2019-11-13

## 2019-11-13 ENCOUNTER — OFFICE VISIT (OUTPATIENT)
Dept: ONCOLOGY | Facility: CLINIC | Age: 69
End: 2019-11-13

## 2019-11-13 VITALS — SYSTOLIC BLOOD PRESSURE: 141 MMHG | HEART RATE: 63 BPM | DIASTOLIC BLOOD PRESSURE: 78 MMHG | OXYGEN SATURATION: 98 %

## 2019-11-13 VITALS
TEMPERATURE: 98 F | HEART RATE: 62 BPM | WEIGHT: 189.6 LBS | DIASTOLIC BLOOD PRESSURE: 92 MMHG | BODY MASS INDEX: 27.14 KG/M2 | SYSTOLIC BLOOD PRESSURE: 153 MMHG | HEIGHT: 70 IN | RESPIRATION RATE: 18 BRPM

## 2019-11-13 DIAGNOSIS — I82.403 DEEP VEIN THROMBOSIS (DVT) OF BOTH LOWER EXTREMITIES, UNSPECIFIED CHRONICITY, UNSPECIFIED VEIN (HCC): ICD-10-CM

## 2019-11-13 DIAGNOSIS — D64.9 ANEMIA, UNSPECIFIED TYPE: ICD-10-CM

## 2019-11-13 DIAGNOSIS — I82.403 DEEP VEIN THROMBOSIS (DVT) OF BOTH LOWER EXTREMITIES, UNSPECIFIED CHRONICITY, UNSPECIFIED VEIN (HCC): Primary | ICD-10-CM

## 2019-11-13 DIAGNOSIS — I82.622 ACUTE EMBOLISM AND THROMBOSIS OF DEEP VEINS OF LEFT UPPER EXTREMITY (HCC): ICD-10-CM

## 2019-11-13 DIAGNOSIS — I26.99 OTHER ACUTE PULMONARY EMBOLISM WITHOUT ACUTE COR PULMONALE (HCC): ICD-10-CM

## 2019-11-13 DIAGNOSIS — Z79.01 LONG TERM (CURRENT) USE OF ANTICOAGULANTS: ICD-10-CM

## 2019-11-13 DIAGNOSIS — I82.4Y9 DEEP VEIN THROMBOSIS (DVT) OF PROXIMAL LOWER EXTREMITY, UNSPECIFIED CHRONICITY, UNSPECIFIED LATERALITY (HCC): ICD-10-CM

## 2019-11-13 LAB
ALBUMIN SERPL-MCNC: 4 G/DL (ref 3.5–5.2)
ALBUMIN/GLOB SERPL: 1.5 G/DL
ALP SERPL-CCNC: 48 U/L (ref 39–117)
ALT SERPL W P-5'-P-CCNC: 15 U/L (ref 1–41)
ANION GAP SERPL CALCULATED.3IONS-SCNC: 11 MMOL/L (ref 5–15)
AST SERPL-CCNC: 17 U/L (ref 1–40)
BASOPHILS # BLD AUTO: 0.04 10*3/MM3 (ref 0–0.2)
BASOPHILS NFR BLD AUTO: 0.6 % (ref 0–1.5)
BILIRUB SERPL-MCNC: 0.3 MG/DL (ref 0.2–1.2)
BUN BLD-MCNC: 22 MG/DL (ref 8–23)
BUN/CREAT SERPL: 23.7 (ref 7–25)
CALCIUM SPEC-SCNC: 9.2 MG/DL (ref 8.6–10.5)
CHLORIDE SERPL-SCNC: 102 MMOL/L (ref 98–107)
CO2 SERPL-SCNC: 26 MMOL/L (ref 22–29)
CREAT BLD-MCNC: 0.93 MG/DL (ref 0.76–1.27)
DEPRECATED RDW RBC AUTO: 44 FL (ref 37–54)
EOSINOPHIL # BLD AUTO: 0.15 10*3/MM3 (ref 0–0.4)
EOSINOPHIL NFR BLD AUTO: 2.4 % (ref 0.3–6.2)
ERYTHROCYTE [DISTWIDTH] IN BLOOD BY AUTOMATED COUNT: 12.9 % (ref 12.3–15.4)
FERRITIN SERPL-MCNC: 72.9 NG/ML (ref 30–400)
FOLATE SERPL-MCNC: 11.6 NG/ML (ref 4.78–24.2)
GFR SERPL CREATININE-BSD FRML MDRD: 81 ML/MIN/1.73
GLOBULIN UR ELPH-MCNC: 2.7 GM/DL
GLUCOSE BLD-MCNC: 110 MG/DL (ref 65–99)
HCT VFR BLD AUTO: 40.7 % (ref 37.5–51)
HGB BLD-MCNC: 13.3 G/DL (ref 13–17.7)
IMM GRANULOCYTES # BLD AUTO: 0.03 10*3/MM3 (ref 0–0.05)
IMM GRANULOCYTES NFR BLD AUTO: 0.5 % (ref 0–0.5)
INR PPP: 2.8 (ref 0.9–1.1)
IRON 24H UR-MRATE: 88 MCG/DL (ref 59–158)
IRON SATN MFR SERPL: 25 % (ref 20–50)
LYMPHOCYTES # BLD AUTO: 2.26 10*3/MM3 (ref 0.7–3.1)
LYMPHOCYTES NFR BLD AUTO: 35.6 % (ref 19.6–45.3)
MCH RBC QN AUTO: 30.6 PG (ref 26.6–33)
MCHC RBC AUTO-ENTMCNC: 32.7 G/DL (ref 31.5–35.7)
MCV RBC AUTO: 93.8 FL (ref 79–97)
MONOCYTES # BLD AUTO: 0.73 10*3/MM3 (ref 0.1–0.9)
MONOCYTES NFR BLD AUTO: 11.5 % (ref 5–12)
NEUTROPHILS # BLD AUTO: 3.13 10*3/MM3 (ref 1.7–7)
NEUTROPHILS NFR BLD AUTO: 49.4 % (ref 42.7–76)
NRBC BLD AUTO-RTO: 0 /100 WBC (ref 0–0.2)
PLATELET # BLD AUTO: 229 10*3/MM3 (ref 140–450)
PMV BLD AUTO: 9.4 FL (ref 6–12)
POTASSIUM BLD-SCNC: 4.6 MMOL/L (ref 3.5–5.2)
PROT SERPL-MCNC: 6.7 G/DL (ref 6–8.5)
RBC # BLD AUTO: 4.34 10*6/MM3 (ref 4.14–5.8)
SODIUM BLD-SCNC: 139 MMOL/L (ref 136–145)
TIBC SERPL-MCNC: 359 MCG/DL (ref 298–536)
TRANSFERRIN SERPL-MCNC: 241 MG/DL (ref 200–360)
VIT B12 BLD-MCNC: >2000 PG/ML (ref 211–946)
WBC NRBC COR # BLD: 6.34 10*3/MM3 (ref 3.4–10.8)

## 2019-11-13 PROCEDURE — 82607 VITAMIN B-12: CPT | Performed by: INTERNAL MEDICINE

## 2019-11-13 PROCEDURE — G0463 HOSPITAL OUTPT CLINIC VISIT: HCPCS | Performed by: NURSE PRACTITIONER

## 2019-11-13 PROCEDURE — 85025 COMPLETE CBC W/AUTO DIFF WBC: CPT | Performed by: INTERNAL MEDICINE

## 2019-11-13 PROCEDURE — 85610 PROTHROMBIN TIME: CPT | Performed by: NURSE PRACTITIONER

## 2019-11-13 PROCEDURE — 83540 ASSAY OF IRON: CPT | Performed by: INTERNAL MEDICINE

## 2019-11-13 PROCEDURE — 80053 COMPREHEN METABOLIC PANEL: CPT | Performed by: INTERNAL MEDICINE

## 2019-11-13 PROCEDURE — 99213 OFFICE O/P EST LOW 20 MIN: CPT | Performed by: NURSE PRACTITIONER

## 2019-11-13 PROCEDURE — 82746 ASSAY OF FOLIC ACID SERUM: CPT | Performed by: INTERNAL MEDICINE

## 2019-11-13 PROCEDURE — 84466 ASSAY OF TRANSFERRIN: CPT | Performed by: INTERNAL MEDICINE

## 2019-11-13 PROCEDURE — 82728 ASSAY OF FERRITIN: CPT | Performed by: INTERNAL MEDICINE

## 2019-11-13 RX ORDER — MELOXICAM 15 MG/1
15 TABLET ORAL DAILY
COMMUNITY
End: 2020-10-12

## 2019-11-13 RX ORDER — SUCRALFATE 1 G/1
1 TABLET ORAL 4 TIMES DAILY
COMMUNITY
End: 2019-12-09 | Stop reason: ALTCHOICE

## 2019-11-13 RX ORDER — OMEPRAZOLE 40 MG/1
40 CAPSULE, DELAYED RELEASE ORAL DAILY
COMMUNITY
End: 2019-12-09 | Stop reason: ALTCHOICE

## 2019-11-13 NOTE — PROGRESS NOTES
Today's INR is 2.8.  Pt is on day 6 of 10 of Amoxicillin. Pt med list updated as he is no longer taking Prevacid which was stopped when he started the Omeprazole and Carafate. Patient instructed regarding medication; results given and questions answered. Nutritional counseling given.  Dietary factors affecting therapy addressed.  Patient instructed to monitor for excessive bruising or bleeding. Will recheck in 2 weeks for Lovenox bridging. Patient instructed regarding medication; results given and questions answered. Nutritional counseling given.  Dietary factors affecting therapy addressed.  Patient instructed to monitor for excessive bruising or bleeding. Findings reported by Namita Kinsey RN.         This document has been electronically signed by Fara Combs, [email or web address] @ on November 13, 2019 9:22 AM
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
## 2019-11-27 ENCOUNTER — ANTICOAGULATION VISIT (OUTPATIENT)
Dept: CARDIAC SURGERY | Facility: CLINIC | Age: 69
End: 2019-11-27

## 2019-11-27 VITALS — OXYGEN SATURATION: 99 % | SYSTOLIC BLOOD PRESSURE: 108 MMHG | DIASTOLIC BLOOD PRESSURE: 70 MMHG | HEART RATE: 77 BPM

## 2019-11-27 DIAGNOSIS — I82.403 DEEP VEIN THROMBOSIS (DVT) OF BOTH LOWER EXTREMITIES, UNSPECIFIED CHRONICITY, UNSPECIFIED VEIN (HCC): ICD-10-CM

## 2019-11-27 DIAGNOSIS — Z79.01 LONG TERM (CURRENT) USE OF ANTICOAGULANTS: ICD-10-CM

## 2019-11-27 DIAGNOSIS — I26.99 OTHER ACUTE PULMONARY EMBOLISM WITHOUT ACUTE COR PULMONALE (HCC): ICD-10-CM

## 2019-11-27 LAB — INR PPP: 3.1 (ref 0.9–1.1)

## 2019-11-27 PROCEDURE — 85610 PROTHROMBIN TIME: CPT | Performed by: NURSE PRACTITIONER

## 2019-11-27 PROCEDURE — 99211 OFF/OP EST MAY X REQ PHY/QHP: CPT | Performed by: NURSE PRACTITIONER

## 2019-11-27 NOTE — PROGRESS NOTES
Today's INR is 3.1.   Pt here today for bridging with Lovenox for EGD on Tuesday the 3rd and left foot surgery on Dec the 5th.  Pt will begin holding coumadin this Saturday.  Pt will begin daily Lovenox injections on Sunday; pt has 120mg injections on hand.  Pt has done Lovenox multiple times in the past; teaching reinforced today.  Pt will have a noon injection Sunday and Monday, an evening injection on Tuesday post-EGD if OK with MD, no injection on Wednesday and will have evening injections following foot surgery on Thursday.  Instructed pt to resume usual coumadin dose Thursday evening following surgery if OK with MD.  Pt will take coumadin and Lovenox through the weekend and recheck INR on Monday the 9th.  Pt and wife verbalize instructions.  Patient instructed regarding medication; results given and questions answered. Nutritional counseling given.  Dietary factors affecting therapy addressed.  Patient instructed to monitor for excessive bruising or bleeding.  Findings reported by [place] Kim RN.         This document has been electronically signed by Fara Combs, DOMENIC @ on November 27, 2019 8:27 AM

## 2019-12-02 ENCOUNTER — APPOINTMENT (OUTPATIENT)
Dept: PREADMISSION TESTING | Facility: HOSPITAL | Age: 69
End: 2019-12-02

## 2019-12-02 VITALS
WEIGHT: 187.83 LBS | BODY MASS INDEX: 26.89 KG/M2 | RESPIRATION RATE: 18 BRPM | SYSTOLIC BLOOD PRESSURE: 161 MMHG | HEIGHT: 70 IN | OXYGEN SATURATION: 98 % | HEART RATE: 63 BPM | DIASTOLIC BLOOD PRESSURE: 97 MMHG

## 2019-12-02 PROCEDURE — 93010 ELECTROCARDIOGRAM REPORT: CPT | Performed by: INTERNAL MEDICINE

## 2019-12-02 PROCEDURE — 93005 ELECTROCARDIOGRAM TRACING: CPT

## 2019-12-02 RX ORDER — SODIUM CHLORIDE, SODIUM GLUCONATE, SODIUM ACETATE, POTASSIUM CHLORIDE, AND MAGNESIUM CHLORIDE 526; 502; 368; 37; 30 MG/100ML; MG/100ML; MG/100ML; MG/100ML; MG/100ML
1000 INJECTION, SOLUTION INTRAVENOUS CONTINUOUS
Status: CANCELLED | OUTPATIENT
Start: 2019-12-05

## 2019-12-02 NOTE — DISCHARGE INSTRUCTIONS
[facility]  Pre-op Information and Guidelines    You will be called after 2 p.m. the day before your surgery (Friday for Monday surgery) and notified of your time for arrival and approximate surgery time.  If you have not received a call by 4P.M., please contact Same Day Surgery at [phone] of if outside [facility] call [phone].    Please Follow these Important Safety Guidelines:    • The morning of your procedure, take only the medications listed below with   A sip of water:_____________________________________________       ___FLONASE, ISOSORBIDE, PRILOSEC__________    • DO NOT eat or drink anything after 12:00 midnight the night before surgery  Specific instructions concerning drinking clear liquids will be discussed during  the pre-surgery instruction call the day before your surgery.    • If you take a blood thinner (ex. Plavix, Coumadin, aspirin), ask your doctor when to stop it before surgery  STOP DATE: _________________    • Only 2 visitors are allowed in patient rooms at a time  Your visitors will be asked to wait in the lobby until the admission process is complete with the exception of a parent with a child and patients in need of special assistance.    • YOU CANNOT DRIVE YOURSELF HOME  You must be accompanied by someone who will be responsible for driving you home after surgery and for your care at home.    • DO NOT chew gum, use breath mints, hard candy, or smoke the day of surgery  • DO NOT drink alcohol for at least 24 hours before your surgery  • DO NOT wear any jewelry and remove all body piercing before coming to the hospital  • DO NOT wear make-up to the hospital  • If you are having surgery on an extremity (arm/leg/foot) remove nail polish/artificial nails on the surgical side  • Clothing, glasses, contacts, dentures, and hairpieces must be removed before surgery  • Bathe the night before or the morning of your surgery and do not use powders/lotions on  skin.

## 2019-12-03 ENCOUNTER — HOSPITAL ENCOUNTER (OUTPATIENT)
Facility: HOSPITAL | Age: 69
Setting detail: HOSPITAL OUTPATIENT SURGERY
Discharge: HOME | End: 2019-12-03
Attending: INTERNAL MEDICINE | Admitting: INTERNAL MEDICINE

## 2019-12-03 ENCOUNTER — ANESTHESIA EVENT (OUTPATIENT)
Dept: GASTROENTEROLOGY | Facility: HOSPITAL | Age: 69
End: 2019-12-03

## 2019-12-03 ENCOUNTER — ANESTHESIA (OUTPATIENT)
Dept: GASTROENTEROLOGY | Facility: HOSPITAL | Age: 69
End: 2019-12-03

## 2019-12-03 VITALS
BODY MASS INDEX: 26.99 KG/M2 | RESPIRATION RATE: 18 BRPM | HEART RATE: 68 BPM | OXYGEN SATURATION: 98 % | HEIGHT: 70 IN | SYSTOLIC BLOOD PRESSURE: 110 MMHG | WEIGHT: 188.49 LBS | TEMPERATURE: 97.9 F | DIASTOLIC BLOOD PRESSURE: 75 MMHG

## 2019-12-03 DIAGNOSIS — K20.90 ESOPHAGITIS: ICD-10-CM

## 2019-12-03 PROCEDURE — 25010000002 PROPOFOL 10 MG/ML EMULSION: Performed by: NURSE ANESTHETIST, CERTIFIED REGISTERED

## 2019-12-03 PROCEDURE — 88305 TISSUE EXAM BY PATHOLOGIST: CPT | Performed by: INTERNAL MEDICINE

## 2019-12-03 PROCEDURE — 43239 EGD BIOPSY SINGLE/MULTIPLE: CPT | Performed by: INTERNAL MEDICINE

## 2019-12-03 PROCEDURE — 88305 TISSUE EXAM BY PATHOLOGIST: CPT | Performed by: PATHOLOGY

## 2019-12-03 RX ORDER — DEXTROSE AND SODIUM CHLORIDE 5; .45 G/100ML; G/100ML
30 INJECTION, SOLUTION INTRAVENOUS CONTINUOUS PRN
Status: DISCONTINUED | OUTPATIENT
Start: 2019-12-03 | End: 2019-12-03 | Stop reason: HOSPADM

## 2019-12-03 RX ORDER — PROPOFOL 10 MG/ML
VIAL (ML) INTRAVENOUS AS NEEDED
Status: DISCONTINUED | OUTPATIENT
Start: 2019-12-03 | End: 2019-12-03 | Stop reason: SURG

## 2019-12-03 RX ORDER — LIDOCAINE HYDROCHLORIDE 20 MG/ML
INJECTION, SOLUTION EPIDURAL; INFILTRATION; INTRACAUDAL; PERINEURAL AS NEEDED
Status: DISCONTINUED | OUTPATIENT
Start: 2019-12-03 | End: 2019-12-03 | Stop reason: SURG

## 2019-12-03 RX ADMIN — PROPOFOL 10 MG: 10 INJECTION, EMULSION INTRAVENOUS at 13:13

## 2019-12-03 RX ADMIN — PROPOFOL 20 MG: 10 INJECTION, EMULSION INTRAVENOUS at 13:10

## 2019-12-03 RX ADMIN — PROPOFOL 20 MG: 10 INJECTION, EMULSION INTRAVENOUS at 13:12

## 2019-12-03 RX ADMIN — PROPOFOL 80 MG: 10 INJECTION, EMULSION INTRAVENOUS at 13:08

## 2019-12-03 RX ADMIN — LIDOCAINE HYDROCHLORIDE 60 MG: 20 INJECTION, SOLUTION EPIDURAL; INFILTRATION; INTRACAUDAL; PERINEURAL at 13:08

## 2019-12-03 RX ADMIN — DEXTROSE AND SODIUM CHLORIDE 30 ML/HR: 5; 450 INJECTION, SOLUTION INTRAVENOUS at 12:19

## 2019-12-03 NOTE — ANESTHESIA PREPROCEDURE EVALUATION
Anesthesia Evaluation     Patient summary reviewed and Nursing notes reviewed   history of anesthetic complications: PONV  NPO Solid Status: > 8 hours  NPO Liquid Status: > 8 hours           Airway   Mallampati: III  TM distance: >3 FB  Neck ROM: full  No difficulty expected  Dental    (+) partials    Pulmonary - normal exam   (+) pulmonary embolism,   Cardiovascular - normal exam    (+) hypertension, DVT, hyperlipidemia,       Neuro/Psych  GI/Hepatic/Renal/Endo    (+)  GERD, PUD,      Musculoskeletal     Abdominal    Substance History      OB/GYN          Other   arthritis,    history of cancer                    Anesthesia Plan    ASA 3     MAC     intravenous induction     Anesthetic plan, all risks, benefits, and alternatives have been provided, discussed and informed consent has been obtained with: patient.    Plan discussed with CRNA.
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
## 2019-12-03 NOTE — ANESTHESIA POSTPROCEDURE EVALUATION
Patient: Jose Zazueta    Procedure Summary     Date:  12/03/19 Room / Location:  [facility] ENDOSCOPY 1 / [facility] ENDOSCOPY    Anesthesia Start:  1258 Anesthesia Stop:  1314    Procedure:  ESOPHAGOGASTRODUODENOSCOPY (N/A ) Diagnosis:       Esophagitis      (Esophagitis [K20.9])    Surgeon:  Lali Fraser MD Provider:      Anesthesia Type:  MAC ASA Status:  3          Anesthesia Type: MAC  Last vitals  BP   139/91 (12/03/19 1204)   Temp   98.2 °F (36.8 °C) (12/03/19 1204)   Pulse   70 (12/03/19 1204)   Resp   16 (12/03/19 1204)     SpO2   98 % (12/03/19 1204)     Post Anesthesia Care and Evaluation    Patient location during evaluation: bedside  Patient participation: waiting for patient participation  Level of consciousness: responsive to physical stimuli  Pain score: 0  Pain management: adequate  Airway patency: patent  Anesthetic complications: No anesthetic complications  PONV Status: none  Cardiovascular status: acceptable  Respiratory status: acceptable, spontaneous ventilation, nasal cannula and unassisted  Hydration status: acceptable

## 2019-12-04 ENCOUNTER — ANESTHESIA EVENT (OUTPATIENT)
Dept: PERIOP | Facility: HOSPITAL | Age: 69
End: 2019-12-04

## 2019-12-05 ENCOUNTER — HOSPITAL ENCOUNTER (OUTPATIENT)
Facility: HOSPITAL | Age: 69
Setting detail: HOSPITAL OUTPATIENT SURGERY
Discharge: HOME | End: 2019-12-05
Attending: PODIATRIST | Admitting: PODIATRIST

## 2019-12-05 ENCOUNTER — APPOINTMENT (OUTPATIENT)
Dept: GENERAL RADIOLOGY | Facility: HOSPITAL | Age: 69
End: 2019-12-05

## 2019-12-05 ENCOUNTER — ANESTHESIA (OUTPATIENT)
Dept: PERIOP | Facility: HOSPITAL | Age: 69
End: 2019-12-05

## 2019-12-05 VITALS
OXYGEN SATURATION: 99 % | BODY MASS INDEX: 25.59 KG/M2 | RESPIRATION RATE: 18 BRPM | HEIGHT: 71 IN | HEART RATE: 68 BPM | SYSTOLIC BLOOD PRESSURE: 121 MMHG | WEIGHT: 182.76 LBS | TEMPERATURE: 98.8 F | DIASTOLIC BLOOD PRESSURE: 72 MMHG

## 2019-12-05 DIAGNOSIS — M20.22 HALLUX RIGIDUS, LEFT FOOT: ICD-10-CM

## 2019-12-05 LAB
INR PPP: 0.98 (ref 0.8–1.2)
PROTHROMBIN TIME: 12.8 SECONDS (ref 11.1–15.3)

## 2019-12-05 PROCEDURE — 25010000002 ONDANSETRON PER 1 MG: Performed by: NURSE ANESTHETIST, CERTIFIED REGISTERED

## 2019-12-05 PROCEDURE — C1713 ANCHOR/SCREW BN/BN,TIS/BN: HCPCS | Performed by: PODIATRIST

## 2019-12-05 PROCEDURE — 76000 FLUOROSCOPY <1 HR PHYS/QHP: CPT

## 2019-12-05 PROCEDURE — 25010000002 MIDAZOLAM PER 1 MG: Performed by: NURSE ANESTHETIST, CERTIFIED REGISTERED

## 2019-12-05 PROCEDURE — 28308 INCISION OF METATARSAL: CPT | Performed by: PODIATRIST

## 2019-12-05 PROCEDURE — 85610 PROTHROMBIN TIME: CPT | Performed by: ANESTHESIOLOGY

## 2019-12-05 PROCEDURE — C1769 GUIDE WIRE: HCPCS | Performed by: PODIATRIST

## 2019-12-05 PROCEDURE — 25010000002 FENTANYL CITRATE (PF) 100 MCG/2ML SOLUTION: Performed by: NURSE ANESTHETIST, CERTIFIED REGISTERED

## 2019-12-05 PROCEDURE — 25010000002 PROPOFOL 10 MG/ML EMULSION: Performed by: NURSE ANESTHETIST, CERTIFIED REGISTERED

## 2019-12-05 PROCEDURE — 25010000002 ROPIVACAINE PER 1 MG: Performed by: ANESTHESIOLOGY

## 2019-12-05 PROCEDURE — 25010000002 DEXAMETHASONE PER 1 MG: Performed by: NURSE ANESTHETIST, CERTIFIED REGISTERED

## 2019-12-05 PROCEDURE — 28750 FUSION OF BIG TOE JOINT: CPT | Performed by: PODIATRIST

## 2019-12-05 DEVICE — LOCKING SCREW, FULLY THREADED,T8
Type: IMPLANTABLE DEVICE | Status: FUNCTIONAL
Brand: VARIAX

## 2019-12-05 DEVICE — CANNULATED SCREW
Type: IMPLANTABLE DEVICE | Status: FUNCTIONAL
Brand: ASNIS

## 2019-12-05 DEVICE — DBX PUTTY, 1CC
Type: IMPLANTABLE DEVICE | Site: FOOT | Status: FUNCTIONAL
Brand: DBX®

## 2019-12-05 DEVICE — CURVED PLATE
Type: IMPLANTABLE DEVICE | Status: FUNCTIONAL
Brand: VARIAX

## 2019-12-05 DEVICE — BONE SCREW, FULLY THREADED, T8
Type: IMPLANTABLE DEVICE | Status: FUNCTIONAL
Brand: VARIAX

## 2019-12-05 RX ORDER — PROMETHAZINE HYDROCHLORIDE 25 MG/1
25 SUPPOSITORY RECTAL ONCE AS NEEDED
Status: DISCONTINUED | OUTPATIENT
Start: 2019-12-05 | End: 2019-12-05 | Stop reason: HOSPADM

## 2019-12-05 RX ORDER — SODIUM CHLORIDE, SODIUM GLUCONATE, SODIUM ACETATE, POTASSIUM CHLORIDE, AND MAGNESIUM CHLORIDE 526; 502; 368; 37; 30 MG/100ML; MG/100ML; MG/100ML; MG/100ML; MG/100ML
1000 INJECTION, SOLUTION INTRAVENOUS CONTINUOUS
Status: DISCONTINUED | OUTPATIENT
Start: 2019-12-05 | End: 2019-12-05 | Stop reason: HOSPADM

## 2019-12-05 RX ORDER — ACETAMINOPHEN 650 MG/1
650 SUPPOSITORY RECTAL ONCE AS NEEDED
Status: DISCONTINUED | OUTPATIENT
Start: 2019-12-05 | End: 2019-12-05 | Stop reason: HOSPADM

## 2019-12-05 RX ORDER — PROMETHAZINE HYDROCHLORIDE 25 MG/ML
12.5 INJECTION, SOLUTION INTRAMUSCULAR; INTRAVENOUS ONCE AS NEEDED
Status: DISCONTINUED | OUTPATIENT
Start: 2019-12-05 | End: 2019-12-05 | Stop reason: HOSPADM

## 2019-12-05 RX ORDER — ROPIVACAINE HYDROCHLORIDE 5 MG/ML
INJECTION, SOLUTION EPIDURAL; INFILTRATION; PERINEURAL
Status: COMPLETED | OUTPATIENT
Start: 2019-12-05 | End: 2019-12-05

## 2019-12-05 RX ORDER — FENTANYL CITRATE 50 UG/ML
INJECTION, SOLUTION INTRAMUSCULAR; INTRAVENOUS AS NEEDED
Status: DISCONTINUED | OUTPATIENT
Start: 2019-12-05 | End: 2019-12-05 | Stop reason: SURG

## 2019-12-05 RX ORDER — MIDAZOLAM HYDROCHLORIDE 1 MG/ML
INJECTION INTRAMUSCULAR; INTRAVENOUS AS NEEDED
Status: DISCONTINUED | OUTPATIENT
Start: 2019-12-05 | End: 2019-12-05 | Stop reason: SURG

## 2019-12-05 RX ORDER — DEXAMETHASONE SODIUM PHOSPHATE 4 MG/ML
INJECTION, SOLUTION INTRA-ARTICULAR; INTRALESIONAL; INTRAMUSCULAR; INTRAVENOUS; SOFT TISSUE AS NEEDED
Status: DISCONTINUED | OUTPATIENT
Start: 2019-12-05 | End: 2019-12-05 | Stop reason: SURG

## 2019-12-05 RX ORDER — ONDANSETRON 2 MG/ML
4 INJECTION INTRAMUSCULAR; INTRAVENOUS ONCE AS NEEDED
Status: DISCONTINUED | OUTPATIENT
Start: 2019-12-05 | End: 2019-12-05 | Stop reason: HOSPADM

## 2019-12-05 RX ORDER — FLUMAZENIL 0.1 MG/ML
0.2 INJECTION INTRAVENOUS AS NEEDED
Status: DISCONTINUED | OUTPATIENT
Start: 2019-12-05 | End: 2019-12-05 | Stop reason: HOSPADM

## 2019-12-05 RX ORDER — DIPHENHYDRAMINE HYDROCHLORIDE 50 MG/ML
12.5 INJECTION INTRAMUSCULAR; INTRAVENOUS
Status: DISCONTINUED | OUTPATIENT
Start: 2019-12-05 | End: 2019-12-05 | Stop reason: HOSPADM

## 2019-12-05 RX ORDER — LIDOCAINE HYDROCHLORIDE 20 MG/ML
INJECTION, SOLUTION INFILTRATION; PERINEURAL AS NEEDED
Status: DISCONTINUED | OUTPATIENT
Start: 2019-12-05 | End: 2019-12-05 | Stop reason: SURG

## 2019-12-05 RX ORDER — HYDROCODONE BITARTRATE AND ACETAMINOPHEN 10; 325 MG/1; MG/1
1 TABLET ORAL EVERY 4 HOURS PRN
Qty: 30 TABLET | Refills: 0 | Status: SHIPPED | OUTPATIENT
Start: 2019-12-05 | End: 2019-12-10 | Stop reason: SDUPTHER

## 2019-12-05 RX ORDER — BUPIVACAINE HCL/0.9 % NACL/PF 0.1 %
2 PLASTIC BAG, INJECTION (ML) EPIDURAL ONCE
Status: COMPLETED | OUTPATIENT
Start: 2019-12-05 | End: 2019-12-05

## 2019-12-05 RX ORDER — ACETAMINOPHEN 325 MG/1
650 TABLET ORAL ONCE AS NEEDED
Status: DISCONTINUED | OUTPATIENT
Start: 2019-12-05 | End: 2019-12-05 | Stop reason: HOSPADM

## 2019-12-05 RX ORDER — ONDANSETRON 2 MG/ML
INJECTION INTRAMUSCULAR; INTRAVENOUS AS NEEDED
Status: DISCONTINUED | OUTPATIENT
Start: 2019-12-05 | End: 2019-12-05 | Stop reason: SURG

## 2019-12-05 RX ORDER — NALOXONE HCL 0.4 MG/ML
0.4 VIAL (ML) INJECTION AS NEEDED
Status: DISCONTINUED | OUTPATIENT
Start: 2019-12-05 | End: 2019-12-05 | Stop reason: HOSPADM

## 2019-12-05 RX ORDER — PROPOFOL 10 MG/ML
VIAL (ML) INTRAVENOUS AS NEEDED
Status: DISCONTINUED | OUTPATIENT
Start: 2019-12-05 | End: 2019-12-05

## 2019-12-05 RX ORDER — EPHEDRINE SULFATE 50 MG/ML
5 INJECTION, SOLUTION INTRAVENOUS ONCE AS NEEDED
Status: DISCONTINUED | OUTPATIENT
Start: 2019-12-05 | End: 2019-12-05 | Stop reason: HOSPADM

## 2019-12-05 RX ORDER — LABETALOL HYDROCHLORIDE 5 MG/ML
5 INJECTION, SOLUTION INTRAVENOUS
Status: DISCONTINUED | OUTPATIENT
Start: 2019-12-05 | End: 2019-12-05 | Stop reason: HOSPADM

## 2019-12-05 RX ORDER — PROPOFOL 10 MG/ML
VIAL (ML) INTRAVENOUS AS NEEDED
Status: DISCONTINUED | OUTPATIENT
Start: 2019-12-05 | End: 2019-12-05 | Stop reason: SURG

## 2019-12-05 RX ORDER — PROMETHAZINE HYDROCHLORIDE 25 MG/1
25 TABLET ORAL ONCE AS NEEDED
Status: DISCONTINUED | OUTPATIENT
Start: 2019-12-05 | End: 2019-12-05 | Stop reason: HOSPADM

## 2019-12-05 RX ORDER — BUPIVACAINE HYDROCHLORIDE 5 MG/ML
INJECTION, SOLUTION EPIDURAL; INTRACAUDAL AS NEEDED
Status: DISCONTINUED | OUTPATIENT
Start: 2019-12-05 | End: 2019-12-05 | Stop reason: HOSPADM

## 2019-12-05 RX ADMIN — PROPOFOL 130 MG: 10 INJECTION, EMULSION INTRAVENOUS at 12:18

## 2019-12-05 RX ADMIN — SODIUM CHLORIDE, SODIUM GLUCONATE, SODIUM ACETATE, POTASSIUM CHLORIDE, AND MAGNESIUM CHLORIDE: 526; 502; 368; 37; 30 INJECTION, SOLUTION INTRAVENOUS at 12:51

## 2019-12-05 RX ADMIN — SODIUM CHLORIDE, SODIUM GLUCONATE, SODIUM ACETATE, POTASSIUM CHLORIDE, AND MAGNESIUM CHLORIDE 1000 ML: 526; 502; 368; 37; 30 INJECTION, SOLUTION INTRAVENOUS at 06:18

## 2019-12-05 RX ADMIN — EPHEDRINE SULFATE 10 MG: 50 INJECTION INTRAMUSCULAR; INTRAVENOUS; SUBCUTANEOUS at 12:41

## 2019-12-05 RX ADMIN — EPHEDRINE SULFATE 5 MG: 50 INJECTION INTRAMUSCULAR; INTRAVENOUS; SUBCUTANEOUS at 13:32

## 2019-12-05 RX ADMIN — FENTANYL CITRATE 25 MCG: 50 INJECTION, SOLUTION INTRAMUSCULAR; INTRAVENOUS at 14:30

## 2019-12-05 RX ADMIN — FENTANYL CITRATE 50 MCG: 50 INJECTION, SOLUTION INTRAMUSCULAR; INTRAVENOUS at 12:24

## 2019-12-05 RX ADMIN — FENTANYL CITRATE 25 MCG: 50 INJECTION, SOLUTION INTRAMUSCULAR; INTRAVENOUS at 13:17

## 2019-12-05 RX ADMIN — Medication 2 G: at 12:15

## 2019-12-05 RX ADMIN — SODIUM CHLORIDE, SODIUM GLUCONATE, SODIUM ACETATE, POTASSIUM CHLORIDE, AND MAGNESIUM CHLORIDE: 526; 502; 368; 37; 30 INJECTION, SOLUTION INTRAVENOUS at 12:50

## 2019-12-05 RX ADMIN — DEXAMETHASONE SODIUM PHOSPHATE 10 MG: 4 INJECTION, SOLUTION INTRAMUSCULAR; INTRAVENOUS at 12:00

## 2019-12-05 RX ADMIN — ROPIVACAINE HYDROCHLORIDE 30 ML: 5 INJECTION, SOLUTION EPIDURAL; INFILTRATION; PERINEURAL at 12:00

## 2019-12-05 RX ADMIN — ONDANSETRON 4 MG: 2 INJECTION INTRAMUSCULAR; INTRAVENOUS at 14:37

## 2019-12-05 RX ADMIN — LIDOCAINE HYDROCHLORIDE 50 MG: 20 INJECTION, SOLUTION INFILTRATION; PERINEURAL at 12:18

## 2019-12-05 RX ADMIN — MIDAZOLAM HYDROCHLORIDE 1 MG: 2 INJECTION, SOLUTION INTRAMUSCULAR; INTRAVENOUS at 12:10

## 2019-12-05 RX ADMIN — EPHEDRINE SULFATE 20 MG: 50 INJECTION INTRAMUSCULAR; INTRAVENOUS; SUBCUTANEOUS at 12:31

## 2019-12-05 NOTE — ANESTHESIA PROCEDURE NOTES
Peripheral Block      Patient reassessed immediately prior to procedure    Patient location during procedure: pre-op  Start time: 12/5/2019 11:49 AM  Stop time: 12/5/2019 12:00 PM  Reason for block: at surgeon's request and post-op pain management  Performed by  CRNA: Merly Read SRNA  Assisted by: David Herrera MD  Preanesthetic Checklist  Completed: patient identified, site marked, surgical consent, pre-op evaluation, timeout performed, IV checked, risks and benefits discussed and monitors and equipment checked  Prep:  Pt Position: right lateral decubitus  Sterile barriers:cap, gloves and mask  Prep: ChloraPrep  Patient monitoring: blood pressure monitoring and continuous pulse oximetry  Procedure  Sedation:no  Performed under: local infiltration  Guidance:ultrasound guided  ULTRASOUND INTERPRETATION.  Using ultrasound guidance a 21 G gauge needle was placed in close proximity to the sciatic nerve, at which point, under ultrasound guidance anesthetic was injected in the area of the nerve and spread of the anesthesia was seen on ultrasound in close proximity thereto.  There were no abnormalities seen on ultrasound; a digital image was taken; and the patient tolerated the procedure with no complications. Images:still images obtained, printed/placed on chart    Laterality:left  Block Type:popliteal  Injection Technique:single-shot  Needle Type:echogenic  Needle Gauge:21 G      Medications Used: ropivacaine (NAROPIN) 0.5 % injection, 30 mL  Med admintered at 12/5/2019 12:00 PM      Medications  Comment:Pt ID'd, US guided  Needle seen throughout  Local infiltration appropriate    Post Assessment  Injection Assessment: negative aspiration for heme, no paresthesia on injection and incremental injection  Patient Tolerance:comfortable throughout block  Complications:no

## 2019-12-05 NOTE — ANESTHESIA POSTPROCEDURE EVALUATION
Patient: Jose Zazueta    Procedure Summary     Date:  12/05/19 Room / Location:  [facility] OR 08 / [facility] OR    Anesthesia Start:  1212 Anesthesia Stop:  1448    Procedure:  FIRST METATARSOPHALANGEAL JOINT ARTHRODESIS AND SECOND METATARSAL OSTEOTOMY AND ALL OTHER INDICATED PROCEDURES (Left Foot) Diagnosis:       Hallux rigidus, left foot      (Hallux rigidus, left foot [M20.22])    Surgeon:  Anthony Moore DPM Provider:  David Herrera MD    Anesthesia Type:  general, regional ASA Status:  3          Anesthesia Type: general, regional  Last vitals  BP   116/76 (12/05/19 0623)   Temp   97.2 °F (36.2 °C) (12/05/19 0623)   Pulse   55 (12/05/19 0623)   Resp   16 (12/05/19 0623)     SpO2   96 % (12/05/19 0623)     Post Anesthesia Care and Evaluation    Patient location during evaluation: PACU  Level of consciousness: sleepy but conscious  Pain score: 0  Pain management: adequate  Airway patency: patent  Anesthetic complications: No anesthetic complications  PONV Status: none  Cardiovascular status: acceptable and hemodynamically stable  Respiratory status: acceptable and spontaneous ventilation  Hydration status: acceptable    Comments: o2 via simple mask in pacu

## 2019-12-05 NOTE — ANESTHESIA PREPROCEDURE EVALUATION
Anesthesia Evaluation     no history of anesthetic complications:  NPO Solid Status: > 8 hours  NPO Liquid Status: > 2 hours           Airway   Mallampati: II  TM distance: >3 FB  Neck ROM: full  No difficulty expected  Dental    (+) partials and poor dentition    Pulmonary - normal exam    breath sounds clear to auscultation  (+) pulmonary embolism,   (-) COPD, sleep apnea, not a smoker  Cardiovascular - normal exam  Exercise tolerance: good (4-7 METS)    ECG reviewed  PT is on anticoagulation therapy  Rhythm: regular  Rate: normal    (+) hypertension well controlled, DVT resolved, hyperlipidemia,   (-) valvular problems/murmurs, past MI, dysrhythmias, angina, cardiac stents, PVD    ROS comment: Sinus bradycardia  Right bundle branch block  Abnormal ECG  When compared with ECG of 06-FEB-2019 20:27,  QT has shortened  Confirmed by GAETANO    EF 56-60%  · Right ventricular cavity is mildly dilated.  · Left ventricular wall thickness is consistent with borderline concentric hypertrophy.  · Mild mitral valve regurgitation is present  · Mild to moderate tricuspid valve regurgitation is present.    Neuro/Psych- negative ROS  (-) seizures, CVA, dizziness/light headedness, psychiatric history  GI/Hepatic/Renal/Endo    (+)  GERD well controlled, PUD,    (-) hepatitis, liver disease, no renal disease, diabetes    Musculoskeletal         ROS comment: Right foot surgery in the past and now working on left  Abdominal    Substance History   (+) alcohol use (daily beer),   (-) drug use     OB/GYN          Other   arthritis,    history of cancer (skin)                      Anesthesia Plan    ASA 3     general and regional   (Popliteal block discussed and patient agrees to proceed especially after great results from previous block)  intravenous induction     Anesthetic plan, all risks, benefits, and alternatives have been provided, discussed and informed consent has been obtained with: patient and spouse/significant other.

## 2019-12-05 NOTE — ANESTHESIA PROCEDURE NOTES
Airway  Urgency: elective    Date/Time: 12/5/2019 12:20 PM  Airway not difficult    General Information and Staff    Patient location during procedure: OR  CRNA: Enrique You CRNA    Indications and Patient Condition  Indications for airway management: airway protection    Preoxygenated: yes  MILS maintained throughout  Mask difficulty assessment: 0 - not attempted    Final Airway Details  Final airway type: supraglottic airway      Successful airway: I-gel  Size 4    Number of attempts at approach: 1  Assessment: lips, teeth, and gum same as pre-op

## 2019-12-06 LAB
LAB AP CASE REPORT: NORMAL
PATH REPORT.FINAL DX SPEC: NORMAL
PATH REPORT.GROSS SPEC: NORMAL

## 2019-12-09 ENCOUNTER — ANTICOAGULATION VISIT (OUTPATIENT)
Dept: CARDIAC SURGERY | Facility: CLINIC | Age: 69
End: 2019-12-09

## 2019-12-09 ENCOUNTER — OFFICE VISIT (OUTPATIENT)
Dept: PODIATRY | Facility: CLINIC | Age: 69
End: 2019-12-09

## 2019-12-09 ENCOUNTER — OFFICE VISIT (OUTPATIENT)
Dept: GASTROENTEROLOGY | Facility: CLINIC | Age: 69
End: 2019-12-09

## 2019-12-09 VITALS
HEIGHT: 71 IN | WEIGHT: 188.6 LBS | SYSTOLIC BLOOD PRESSURE: 134 MMHG | DIASTOLIC BLOOD PRESSURE: 75 MMHG | BODY MASS INDEX: 26.4 KG/M2 | HEART RATE: 61 BPM

## 2019-12-09 VITALS — DIASTOLIC BLOOD PRESSURE: 82 MMHG | OXYGEN SATURATION: 99 % | SYSTOLIC BLOOD PRESSURE: 142 MMHG | HEART RATE: 71 BPM

## 2019-12-09 VITALS — HEIGHT: 71 IN | HEART RATE: 64 BPM | BODY MASS INDEX: 25.48 KG/M2 | OXYGEN SATURATION: 98 % | WEIGHT: 182 LBS

## 2019-12-09 DIAGNOSIS — I26.99 OTHER ACUTE PULMONARY EMBOLISM WITHOUT ACUTE COR PULMONALE (HCC): ICD-10-CM

## 2019-12-09 DIAGNOSIS — I82.403 DEEP VEIN THROMBOSIS (DVT) OF BOTH LOWER EXTREMITIES, UNSPECIFIED CHRONICITY, UNSPECIFIED VEIN (HCC): ICD-10-CM

## 2019-12-09 DIAGNOSIS — M20.22 HALLUX RIGIDUS, LEFT FOOT: Primary | ICD-10-CM

## 2019-12-09 DIAGNOSIS — Z79.01 LONG TERM (CURRENT) USE OF ANTICOAGULANTS: ICD-10-CM

## 2019-12-09 DIAGNOSIS — M77.42 METATARSALGIA, LEFT FOOT: ICD-10-CM

## 2019-12-09 DIAGNOSIS — K21.9 GASTROESOPHAGEAL REFLUX DISEASE WITHOUT ESOPHAGITIS: Primary | ICD-10-CM

## 2019-12-09 LAB — INR PPP: 1.3 (ref 0.9–1.1)

## 2019-12-09 PROCEDURE — 99024 POSTOP FOLLOW-UP VISIT: CPT | Performed by: PODIATRIST

## 2019-12-09 PROCEDURE — 85610 PROTHROMBIN TIME: CPT | Performed by: NURSE PRACTITIONER

## 2019-12-09 PROCEDURE — 99212 OFFICE O/P EST SF 10 MIN: CPT | Performed by: INTERNAL MEDICINE

## 2019-12-09 RX ORDER — OMEPRAZOLE 40 MG/1
40 CAPSULE, DELAYED RELEASE ORAL DAILY
Qty: 30 CAPSULE | Refills: 11 | Status: SHIPPED | OUTPATIENT
Start: 2019-12-09 | End: 2020-12-15

## 2019-12-09 RX ORDER — LANSOPRAZOLE 30 MG/1
30 CAPSULE, DELAYED RELEASE ORAL DAILY
COMMUNITY
End: 2020-07-01
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
## 2019-12-09 NOTE — PATIENT INSTRUCTIONS
"BMI for Adults    Body mass index (BMI) is a number that is calculated from a person's weight and height. BMI may help to estimate how much of a person's weight is composed of fat. BMI can help identify those who may be at higher risk for certain medical problems.  How is BMI used with adults?  BMI is used as a screening tool to identify possible weight problems. It is used to check whether a person is obese, overweight, healthy weight, or underweight.  How is BMI calculated?  BMI measures your weight and compares it to your height. This can be done either in English ([place] or metric measurements. Note that charts are available to help you find your BMI quickly and easily without having to do these calculations yourself.  To calculate your BMI in English ([place] measurements, your health care provider will:  1. Measure your weight in pounds (lb).  2. Multiply the number of pounds by 703.  ? For example, for a person who weighs 180 lb, multiply that number by 703, which equals 126,540.  3. Measure your height in inches (in). Then multiply that number by itself to get a measurement called \"inches squared.\"  ? For example, for a person who is 70 in tall, the \"inches squared\" measurement is 70 in x 70 in, which equals 4900 inches squared.  4. Divide the total from Step 2 (number of lb x 703) by the total from Step 3 (inches squared): 126,540 ÷ 4900 = 25.8. This is your BMI.  To calculate your BMI in metric measurements, your health care provider will:  1. Measure your weight in kilograms (kg).  2. Measure your height in meters (m). Then multiply that number by itself to get a measurement called \"meters squared.\"  ? For example, for a person who is 1.75 m tall, the \"meters squared\" measurement is 1.75 m x 1.75 m, which is equal to 3.1 meters squared.  3. Divide the number of kilograms (your weight) by the meters squared number. In this example: 70 ÷ 3.1 = 22.6. This is your BMI.  How is BMI interpreted?  To interpret your " results, your health care provider will use BMI charts to identify whether you are underweight, normal weight, overweight, or obese. The following guidelines will be used:  · Underweight: BMI less than 18.5.  · Normal weight: BMI between 18.5 and 24.9.  · Overweight: BMI between 25 and 29.9.  · Obese: BMI of 30 and above.  Please note:  · Weight includes both fat and muscle, so someone with a muscular build, such as an athlete, may have a BMI that is higher than 24.9. In cases like these, BMI is not an accurate measure of body fat.  · To determine if excess body fat is the cause of a BMI of 25 or higher, further assessments may need to be done by a health care provider.  · BMI is usually interpreted in the same way for men and women.  Why is BMI a useful tool?  BMI is useful in two ways:  · Identifying a weight problem that may be related to a medical condition, or that may increase the risk for medical problems.  · Promoting lifestyle and diet changes in order to reach a healthy weight.  Summary  · Body mass index (BMI) is a number that is calculated from a person's weight and height.  · BMI may help to estimate how much of a person's weight is composed of fat. BMI can help identify those who may be at higher risk for certain medical problems.  · BMI can be measured using English measurements or metric measurements.  · To interpret your results, your health care provider will use BMI charts to identify whether you are underweight, normal weight, overweight, or obese.  This information is not intended to replace advice given to you by your health care provider. Make sure you discuss any questions you have with your health care provider.  Document Released: 08/29/2005 Document Revised: 10/31/2018 Document Reviewed: 10/31/2018  [employer] Interactive Patient Education © 2019 [employer]  Heartburn  Heartburn is a type of pain or discomfort that can happen in the throat or chest. It is often described as a burning  pain. It may also cause a bad, acid-like taste in the mouth. Heartburn may feel worse when you lie down or bend over. It may be worse at night. It may be caused by stomach contents that move back up (reflux) into the tube that connects the mouth with the stomach (esophagus).  Follow these instructions at home:  Eating and drinking    · Avoid certain foods and drinks as told by your doctor. This may include:  ? Coffee and tea (with or without caffeine).  ? Drinks that have alcohol.  ? Energy drinks and sports drinks.  ? Carbonated drinks or sodas.  ? Chocolate and cocoa.  ? Peppermint and mint flavorings.  ? Garlic and onions.  ? Horseradish.  ? Spicy and acidic foods, such as:  § Peppers.  § Chili powder and lepe powder.  § Vinegar.  § Hot sauces and BBQ sauce.  ? Citrus fruit juices and citrus fruits, such as:  § Oranges.  § Sandra.  § Limes.  ? Tomato-based foods, such as:  § Red sauce and pizza with red sauce.  § Chili.  § Salsa.  ? Fried and fatty foods, such as:  § Donuts.  § French fries and potato chips.  § High-fat dressings.  ? High-fat meats, such as:  § Hot dogs and sausage.  § Rib eye steak.  § Ham and ling.  ? High-fat dairy items, such as:  § Whole milk.  § Butter.  § Cream cheese.  · Eat small meals often. Avoid eating large meals.  · Avoid drinking large amounts of liquid with your meals.  · Avoid eating meals during the 2-3 hours before bedtime.  · Avoid lying down right after you eat.  · Do not exercise right after you eat.  Lifestyle         · If you are overweight, lose an amount of weight that is healthy for you. Ask your doctor about a safe weight loss goal.  · Do not use any products that contain nicotine or tobacco, including cigarettes, e-cigarettes, and chewing tobacco. These can make your symptoms worse. If you need help quitting, ask your doctor.  · Wear loose clothes. Do not wear anything tight around your waist.  · Raise (elevate) the head of your bed about 6 inches (15 cm) when you  sleep.  · Try to lower your stress. If you need help doing this, ask your doctor.  General instructions  · Pay attention to any changes in your symptoms.  · Take over-the-counter and prescription medicines only as told by your doctor.  ? Do not take aspirin, ibuprofen, or other NSAIDs unless your doctor says it is okay.  ? Stop medicines only as told by your doctor.  · Keep all follow-up visits as told by your doctor. This is important.  Contact a doctor if:  · You have new symptoms.  · You lose weight and you do not know why it is happening.  · You have trouble swallowing, or it hurts to swallow.  · You have wheezing or a cough that keeps happening.  · Your symptoms do not get better with treatment.  · You have heartburn often for more than 2 weeks.  Get help right away if:  · You have pain in your arms, neck, jaw, teeth, or back.  · You feel sweaty, dizzy, or light-headed.  · You have chest pain or shortness of breath.  · You throw up (vomit) and your throw up looks like blood or coffee grounds.  · Your poop (stool) is bloody or black.  These symptoms may represent a serious problem that is an emergency. Do not wait to see if the symptoms will go away. Get medical help right away. Call your local emergency services (911 in the [place] Do not drive yourself to the hospital.  Summary  · Heartburn is a type of pain that can happen in the throat or chest. It can feel like a burning pain. It may also cause a bad, acid-like taste in the mouth.  · You may need to avoid certain foods and drinks to help your symptoms. Ask your doctor what foods and drinks you should avoid.  · Take over-the-counter and prescription medicines only as told by your doctor. Do not take aspirin, ibuprofen, or other NSAIDs unless your doctor told you to do so.  · Contact your doctor if your symptoms do not get better or they get worse.  This information is not intended to replace advice given to you by your health care provider. Make sure you  discuss any questions you have with your health care provider.  Document Released: 08/29/2012 Document Revised: 05/20/2019 Document Reviewed: 05/20/2019  [employer] Interactive Patient Education © 2019 [employer]

## 2019-12-09 NOTE — PROGRESS NOTES
Today's INR is 1.3. PT states he stopped Carafate and Prilosec on 12/2. PT restarted Prevacid on 12/4. PT was able to restart Coumadin and Lovenox on night of procedure (12/5) and has been taking Coumadin and Lovenox at 8pm post procedure. Denies any bleeding issues or s/s of blood clot. Instructed pt on dosing and to continue Lovenox 120mg sq daily at 8pm. Avoid anything green. Recheck INR on Thursday. Patient instructed regarding medication; results given and questions answered. Nutritional counseling given.  Dietary factors affecting therapy addressed.  Patient instructed to monitor for excessive bruising or bleeding. PT verbalizes understanding. Findings reported by Shahana Damon RN.   PT saw Dr Fraser after CC appt. PT was restarted on Prilosec.         This document has been electronically signed by Fara Combs, NIKOPBELO @ on December 9, 2019 9:07 AM

## 2019-12-10 RX ORDER — HYDROCODONE BITARTRATE AND ACETAMINOPHEN 10; 325 MG/1; MG/1
1 TABLET ORAL EVERY 4 HOURS PRN
Qty: 42 TABLET | Refills: 0 | Status: SHIPPED | OUTPATIENT
Start: 2019-12-10 | End: 2020-03-10

## 2019-12-12 ENCOUNTER — ANTICOAGULATION VISIT (OUTPATIENT)
Dept: CARDIAC SURGERY | Facility: CLINIC | Age: 69
End: 2019-12-12

## 2019-12-12 VITALS — DIASTOLIC BLOOD PRESSURE: 80 MMHG | HEART RATE: 67 BPM | SYSTOLIC BLOOD PRESSURE: 132 MMHG | OXYGEN SATURATION: 98 %

## 2019-12-12 DIAGNOSIS — Z79.01 LONG TERM (CURRENT) USE OF ANTICOAGULANTS: ICD-10-CM

## 2019-12-12 DIAGNOSIS — I26.99 OTHER ACUTE PULMONARY EMBOLISM WITHOUT ACUTE COR PULMONALE (HCC): ICD-10-CM

## 2019-12-12 DIAGNOSIS — I82.403 DEEP VEIN THROMBOSIS (DVT) OF BOTH LOWER EXTREMITIES, UNSPECIFIED CHRONICITY, UNSPECIFIED VEIN (HCC): ICD-10-CM

## 2019-12-12 LAB — INR PPP: 1.8 (ref 0.9–1.1)

## 2019-12-12 PROCEDURE — 85610 PROTHROMBIN TIME: CPT | Performed by: NURSE PRACTITIONER

## 2019-12-12 PROCEDURE — 99211 OFF/OP EST MAY X REQ PHY/QHP: CPT | Performed by: NURSE PRACTITIONER

## 2019-12-12 NOTE — PROGRESS NOTES
Today's INR is 1.8.  Pt denied med changes or bleeding problems. Pt was instructed to take last Lovenox injection tonight at 8 pm; pt verbalized. Dose adjusted and pt was instructed on coumadin dosing and to hold green veggies until Sunday; pt verbalized. Patient instructed regarding medication; results given and questions answered. Nutritional counseling given.  Dietary factors affecting therapy addressed.  Patient instructed to monitor for excessive bruising or bleeding. Will recheck in 6 days. Findings reported by Namita Kinsey RN.          This document has been electronically signed by Fara Combs, DOMENIC @ on December 12, 2019 8:44 AM

## 2019-12-18 ENCOUNTER — ANTICOAGULATION VISIT (OUTPATIENT)
Dept: CARDIAC SURGERY | Facility: CLINIC | Age: 69
End: 2019-12-18

## 2019-12-18 ENCOUNTER — OFFICE VISIT (OUTPATIENT)
Dept: PODIATRY | Facility: CLINIC | Age: 69
End: 2019-12-18

## 2019-12-18 VITALS — BODY MASS INDEX: 26.32 KG/M2 | HEIGHT: 71 IN | WEIGHT: 188 LBS | HEART RATE: 67 BPM | OXYGEN SATURATION: 98 %

## 2019-12-18 VITALS — HEART RATE: 78 BPM | OXYGEN SATURATION: 98 % | SYSTOLIC BLOOD PRESSURE: 122 MMHG | DIASTOLIC BLOOD PRESSURE: 70 MMHG

## 2019-12-18 DIAGNOSIS — I26.99 OTHER ACUTE PULMONARY EMBOLISM WITHOUT ACUTE COR PULMONALE (HCC): ICD-10-CM

## 2019-12-18 DIAGNOSIS — Z79.01 LONG TERM (CURRENT) USE OF ANTICOAGULANTS: ICD-10-CM

## 2019-12-18 DIAGNOSIS — M77.42 METATARSALGIA, LEFT FOOT: ICD-10-CM

## 2019-12-18 DIAGNOSIS — M20.22 HALLUX RIGIDUS, LEFT FOOT: Primary | ICD-10-CM

## 2019-12-18 DIAGNOSIS — I82.403 DEEP VEIN THROMBOSIS (DVT) OF BOTH LOWER EXTREMITIES, UNSPECIFIED CHRONICITY, UNSPECIFIED VEIN (HCC): ICD-10-CM

## 2019-12-18 LAB — INR PPP: 3.4 (ref 0.9–1.1)

## 2019-12-18 PROCEDURE — 99024 POSTOP FOLLOW-UP VISIT: CPT | Performed by: PODIATRIST

## 2019-12-18 PROCEDURE — 85610 PROTHROMBIN TIME: CPT | Performed by: NURSE PRACTITIONER

## 2019-12-18 NOTE — PROGRESS NOTES
Today's INR is 3.4.   Pt states no changes to meds or diet.  No bleeding issues.  Adjusted coumadin dose and instructed pt to increase Vit K intake today with an asparagus serving.  Recheck INR next Monday.  Pt verbalizes.  Patient instructed regarding medication; results given and questions answered. Nutritional counseling given.  Dietary factors affecting therapy addressed.  Patient instructed to monitor for excessive bruising or bleeding.  Findings reported by [place] Kim RN.       This document has been electronically signed by Everett Ramirez AGACNP-BC @  On December 18, 2019 8:37 AM
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
## 2019-12-19 RX ORDER — HYDROCODONE BITARTRATE AND ACETAMINOPHEN 10; 325 MG/1; MG/1
1 TABLET ORAL EVERY 6 HOURS PRN
Qty: 28 TABLET | Refills: 0 | Status: SHIPPED | OUTPATIENT
Start: 2019-12-19 | End: 2020-07-01

## 2019-12-23 ENCOUNTER — ANTICOAGULATION VISIT (OUTPATIENT)
Dept: CARDIAC SURGERY | Facility: CLINIC | Age: 69
End: 2019-12-23

## 2019-12-23 VITALS — SYSTOLIC BLOOD PRESSURE: 112 MMHG | HEART RATE: 72 BPM | DIASTOLIC BLOOD PRESSURE: 72 MMHG | OXYGEN SATURATION: 96 %

## 2019-12-23 DIAGNOSIS — I26.99 OTHER ACUTE PULMONARY EMBOLISM WITHOUT ACUTE COR PULMONALE (HCC): ICD-10-CM

## 2019-12-23 DIAGNOSIS — I82.403 DEEP VEIN THROMBOSIS (DVT) OF BOTH LOWER EXTREMITIES, UNSPECIFIED CHRONICITY, UNSPECIFIED VEIN (HCC): ICD-10-CM

## 2019-12-23 DIAGNOSIS — Z79.01 LONG TERM (CURRENT) USE OF ANTICOAGULANTS: ICD-10-CM

## 2019-12-23 LAB — INR PPP: 3.1 (ref 0.9–1.1)

## 2019-12-23 PROCEDURE — 85610 PROTHROMBIN TIME: CPT | Performed by: NURSE PRACTITIONER

## 2019-12-23 NOTE — PROGRESS NOTES
Today's INR is 3.1. Denies any new medications or bleeding issues. Denies any s/s of blood clot. Adjusted pt's dose slightly and instructed to increase vit k today. Recheck INR next week. Patient instructed regarding medication; results given and questions answered. Nutritional counseling given.  Dietary factors affecting therapy addressed.  Patient instructed to monitor for excessive bruising or bleeding. PT verbalizes understanding. Findings reported by Shahana Damon RN.         This document has been electronically signed by Fara Combs, DOMENIC @ on December 23, 2019 1:31 PM

## 2019-12-24 ENCOUNTER — OFFICE VISIT (OUTPATIENT)
Dept: PODIATRY | Facility: CLINIC | Age: 69
End: 2019-12-24

## 2019-12-24 VITALS — HEIGHT: 71 IN | HEART RATE: 68 BPM | BODY MASS INDEX: 26.32 KG/M2 | OXYGEN SATURATION: 98 % | WEIGHT: 188 LBS

## 2019-12-24 DIAGNOSIS — M20.22 HALLUX RIGIDUS, LEFT FOOT: Primary | ICD-10-CM

## 2019-12-24 DIAGNOSIS — M77.42 METATARSALGIA, LEFT FOOT: ICD-10-CM

## 2019-12-24 PROCEDURE — 99024 POSTOP FOLLOW-UP VISIT: CPT | Performed by: PODIATRIST
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
## 2019-12-31 ENCOUNTER — ANTICOAGULATION VISIT (OUTPATIENT)
Dept: CARDIAC SURGERY | Facility: CLINIC | Age: 69
End: 2019-12-31

## 2019-12-31 VITALS — OXYGEN SATURATION: 98 % | HEART RATE: 79 BPM | SYSTOLIC BLOOD PRESSURE: 118 MMHG | DIASTOLIC BLOOD PRESSURE: 70 MMHG

## 2019-12-31 DIAGNOSIS — I82.403 DEEP VEIN THROMBOSIS (DVT) OF BOTH LOWER EXTREMITIES, UNSPECIFIED CHRONICITY, UNSPECIFIED VEIN (HCC): ICD-10-CM

## 2019-12-31 DIAGNOSIS — Z79.01 LONG TERM (CURRENT) USE OF ANTICOAGULANTS: ICD-10-CM

## 2019-12-31 DIAGNOSIS — I26.99 OTHER ACUTE PULMONARY EMBOLISM WITHOUT ACUTE COR PULMONALE (HCC): ICD-10-CM

## 2019-12-31 LAB — INR PPP: 2.5 (ref 0.9–1.1)

## 2019-12-31 PROCEDURE — 99211 OFF/OP EST MAY X REQ PHY/QHP: CPT | Performed by: NURSE PRACTITIONER

## 2019-12-31 PROCEDURE — 85610 PROTHROMBIN TIME: CPT | Performed by: NURSE PRACTITIONER

## 2019-12-31 NOTE — PROGRESS NOTES
Today's INR is 2.5.   Pt here today for recheck of slightly elevated INR.  Pt states no changes to meds or diet.  No bleeding issues.  Pt prefers his INR closer to 3 and wishes to increase coumadin dose slightly.  Recheck INR next wk.  Pt verbalizes.  Patient instructed regarding medication; results given and questions answered. Nutritional counseling given.  Dietary factors affecting therapy addressed.  Patient instructed to monitor for excessive bruising or bleeding.  Findings reported by Florida Kim RN.       This document has been electronically signed by HERIBERTO Santos @  On December 31, 2019 8:35 AM

## 2020-01-06 ENCOUNTER — ANTICOAGULATION VISIT (OUTPATIENT)
Dept: CARDIAC SURGERY | Facility: CLINIC | Age: 70
End: 2020-01-06

## 2020-01-06 ENCOUNTER — OFFICE VISIT (OUTPATIENT)
Dept: PODIATRY | Facility: CLINIC | Age: 70
End: 2020-01-06

## 2020-01-06 VITALS — OXYGEN SATURATION: 99 % | WEIGHT: 188 LBS | BODY MASS INDEX: 26.32 KG/M2 | HEIGHT: 71 IN | HEART RATE: 76 BPM

## 2020-01-06 VITALS — OXYGEN SATURATION: 99 % | SYSTOLIC BLOOD PRESSURE: 142 MMHG | DIASTOLIC BLOOD PRESSURE: 88 MMHG | HEART RATE: 66 BPM

## 2020-01-06 DIAGNOSIS — I82.403 DEEP VEIN THROMBOSIS (DVT) OF BOTH LOWER EXTREMITIES, UNSPECIFIED CHRONICITY, UNSPECIFIED VEIN (HCC): ICD-10-CM

## 2020-01-06 DIAGNOSIS — Z79.01 LONG TERM (CURRENT) USE OF ANTICOAGULANTS: ICD-10-CM

## 2020-01-06 DIAGNOSIS — M20.22 HALLUX RIGIDUS, LEFT FOOT: Primary | ICD-10-CM

## 2020-01-06 DIAGNOSIS — I26.99 OTHER ACUTE PULMONARY EMBOLISM WITHOUT ACUTE COR PULMONALE (HCC): ICD-10-CM

## 2020-01-06 DIAGNOSIS — M77.42 METATARSALGIA, LEFT FOOT: ICD-10-CM

## 2020-01-06 LAB — INR PPP: 3.2 (ref 0.9–1.1)

## 2020-01-06 PROCEDURE — 99024 POSTOP FOLLOW-UP VISIT: CPT | Performed by: PODIATRIST

## 2020-01-06 PROCEDURE — 99211 OFF/OP EST MAY X REQ PHY/QHP: CPT | Performed by: NURSE PRACTITIONER

## 2020-01-06 PROCEDURE — 85610 PROTHROMBIN TIME: CPT | Performed by: NURSE PRACTITIONER
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
## 2020-01-06 NOTE — PROGRESS NOTES
Today's INR is 3.2. Denies any med changes or bleeding issues. PT is completely off pain meds. Denies any s/s of blood clot. Adjusted pt's dose and instructed to increase vit k today slightly. Recheck INR next week. Patient instructed regarding medication; results given and questions answered. Nutritional counseling given.  Dietary factors affecting therapy addressed.  Patient instructed to monitor for excessive bruising or bleeding. PT verbalizes understanding. Findings reported by Shahana Damon RN.       This document has been electronically signed by SIMONE Santos-BC @  On January 6, 2020 8:21 AM

## 2020-01-16 ENCOUNTER — ANTICOAGULATION VISIT (OUTPATIENT)
Dept: CARDIAC SURGERY | Facility: CLINIC | Age: 70
End: 2020-01-16

## 2020-01-16 VITALS — DIASTOLIC BLOOD PRESSURE: 90 MMHG | OXYGEN SATURATION: 99 % | HEART RATE: 77 BPM | SYSTOLIC BLOOD PRESSURE: 139 MMHG

## 2020-01-16 DIAGNOSIS — I26.99 OTHER ACUTE PULMONARY EMBOLISM WITHOUT ACUTE COR PULMONALE (HCC): ICD-10-CM

## 2020-01-16 DIAGNOSIS — I82.403 DEEP VEIN THROMBOSIS (DVT) OF BOTH LOWER EXTREMITIES, UNSPECIFIED CHRONICITY, UNSPECIFIED VEIN (HCC): ICD-10-CM

## 2020-01-16 DIAGNOSIS — Z79.01 LONG TERM (CURRENT) USE OF ANTICOAGULANTS: ICD-10-CM

## 2020-01-16 LAB — INR PPP: 2.6 (ref 0.9–1.1)

## 2020-01-16 PROCEDURE — 85610 PROTHROMBIN TIME: CPT | Performed by: NURSE PRACTITIONER

## 2020-01-16 NOTE — PROGRESS NOTES
Today's INR is 2.6.  Patient states no med changes or bleeding problems or unexplained bruising. Patient instructed to continue current dosing schedule. Verbalizes understanding. Will recheck in 2 weeks. Patient instructed regarding medication; results given and questions answered. Nutritional counseling given.  Dietary factors affecting therapy addressed.  Patient instructed to monitor for excessive bruising or bleeding. Findings reported by Namita Kinsey RN.         This document has been electronically signed by DOMENIC Kim @ on January 16, 2020 8:20 AM

## 2020-01-20 ENCOUNTER — OFFICE VISIT (OUTPATIENT)
Dept: PODIATRY | Facility: CLINIC | Age: 70
End: 2020-01-20

## 2020-01-20 VITALS — HEIGHT: 71 IN | HEART RATE: 82 BPM | WEIGHT: 188 LBS | OXYGEN SATURATION: 99 % | BODY MASS INDEX: 26.32 KG/M2

## 2020-01-20 DIAGNOSIS — M20.22 HALLUX RIGIDUS, LEFT FOOT: Primary | ICD-10-CM

## 2020-01-20 DIAGNOSIS — M77.42 METATARSALGIA, LEFT FOOT: ICD-10-CM

## 2020-01-20 PROCEDURE — 99024 POSTOP FOLLOW-UP VISIT: CPT | Performed by: PODIATRIST
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
## 2020-01-29 RX ORDER — SUCRALFATE 1 G/1
TABLET ORAL
Qty: 120 TABLET | Refills: 1 | OUTPATIENT
Start: 2020-01-29

## 2020-01-31 ENCOUNTER — ANTICOAGULATION VISIT (OUTPATIENT)
Dept: CARDIAC SURGERY | Facility: CLINIC | Age: 70
End: 2020-01-31

## 2020-01-31 VITALS — DIASTOLIC BLOOD PRESSURE: 82 MMHG | SYSTOLIC BLOOD PRESSURE: 148 MMHG | HEART RATE: 78 BPM | OXYGEN SATURATION: 98 %

## 2020-01-31 DIAGNOSIS — I26.99 OTHER ACUTE PULMONARY EMBOLISM WITHOUT ACUTE COR PULMONALE (HCC): ICD-10-CM

## 2020-01-31 DIAGNOSIS — Z79.01 LONG TERM (CURRENT) USE OF ANTICOAGULANTS: ICD-10-CM

## 2020-01-31 DIAGNOSIS — I82.403 DEEP VEIN THROMBOSIS (DVT) OF BOTH LOWER EXTREMITIES, UNSPECIFIED CHRONICITY, UNSPECIFIED VEIN (HCC): ICD-10-CM

## 2020-01-31 LAB — INR PPP: 4.9 (ref 0.9–1.1)

## 2020-01-31 PROCEDURE — 99211 OFF/OP EST MAY X REQ PHY/QHP: CPT | Performed by: NURSE PRACTITIONER

## 2020-01-31 PROCEDURE — 85610 PROTHROMBIN TIME: CPT | Performed by: NURSE PRACTITIONER

## 2020-01-31 NOTE — PROGRESS NOTES
"Today's INR is 4.9. PT had Dexamethasone and Kenalog shot on Monday. PT did have 2 \"hot toddies\" in the past week. Denies any bleeding issues or s/s of blood clot. PT instructed to notify provider if you experience excessive bleeding from the nose, cuts, gums, rectum, urinary tract. Reddish or brown urine or stool. Vomiting of blood or hemorrhoidal bleeding. If major injury occurs present to the Emergency Department. PT instructed to increase vit k today and tomorrow. Recheck INR on Monday. Findings reported by Shahana Damon RN.         This document has been electronically signed by DOMENIC Kim @ on January 31, 2020 8:17 AM        "

## 2020-02-03 ENCOUNTER — ANTICOAGULATION VISIT (OUTPATIENT)
Dept: CARDIAC SURGERY | Facility: CLINIC | Age: 70
End: 2020-02-03

## 2020-02-03 VITALS — OXYGEN SATURATION: 98 % | DIASTOLIC BLOOD PRESSURE: 80 MMHG | SYSTOLIC BLOOD PRESSURE: 132 MMHG | HEART RATE: 67 BPM

## 2020-02-03 DIAGNOSIS — I26.99 OTHER ACUTE PULMONARY EMBOLISM WITHOUT ACUTE COR PULMONALE (HCC): ICD-10-CM

## 2020-02-03 DIAGNOSIS — I82.403 DEEP VEIN THROMBOSIS (DVT) OF BOTH LOWER EXTREMITIES, UNSPECIFIED CHRONICITY, UNSPECIFIED VEIN (HCC): ICD-10-CM

## 2020-02-03 DIAGNOSIS — Z79.01 LONG TERM (CURRENT) USE OF ANTICOAGULANTS: ICD-10-CM

## 2020-02-03 LAB — INR PPP: 2.8 (ref 0.9–1.1)

## 2020-02-03 PROCEDURE — 93793 ANTICOAG MGMT PT WARFARIN: CPT | Performed by: NURSE PRACTITIONER

## 2020-02-03 PROCEDURE — 85610 PROTHROMBIN TIME: CPT | Performed by: NURSE PRACTITIONER

## 2020-02-03 NOTE — PROGRESS NOTES
"Today's INR is 2.8.   Pt here today for recheck of elevated INR r/t medication and \"hot toddie\".  Pt denies bleeding issues.  Pt will resume previous coumadin dose and usual twice weekly green.  Recheck INR next wk prior to spacing appts.  Instructions verbalized.  Patient instructed regarding medication; results given and questions answered. Nutritional counseling given.  Dietary factors affecting therapy addressed.  Patient instructed to monitor for excessive bruising or bleeding.  Findings reported by [place] Kim RN.         This document has been electronically signed by DOMENIC Kim @ on February 3, 2020 8:18 AM      "

## 2020-02-10 DIAGNOSIS — I82.622 ACUTE EMBOLISM AND THROMBOSIS OF DEEP VEINS OF LEFT UPPER EXTREMITY (HCC): ICD-10-CM

## 2020-02-10 DIAGNOSIS — I82.4Y9 DEEP VEIN THROMBOSIS (DVT) OF PROXIMAL LOWER EXTREMITY, UNSPECIFIED CHRONICITY, UNSPECIFIED LATERALITY (HCC): Primary | ICD-10-CM

## 2020-02-10 DIAGNOSIS — I82.403 DEEP VEIN THROMBOSIS (DVT) OF BOTH LOWER EXTREMITIES, UNSPECIFIED CHRONICITY, UNSPECIFIED VEIN (HCC): ICD-10-CM

## 2020-02-11 ENCOUNTER — ANTICOAGULATION VISIT (OUTPATIENT)
Dept: CARDIAC SURGERY | Facility: CLINIC | Age: 70
End: 2020-02-11

## 2020-02-11 DIAGNOSIS — I26.99 OTHER ACUTE PULMONARY EMBOLISM WITHOUT ACUTE COR PULMONALE (HCC): ICD-10-CM

## 2020-02-11 DIAGNOSIS — I82.403 DEEP VEIN THROMBOSIS (DVT) OF BOTH LOWER EXTREMITIES, UNSPECIFIED CHRONICITY, UNSPECIFIED VEIN (HCC): ICD-10-CM

## 2020-02-11 DIAGNOSIS — Z79.01 LONG TERM (CURRENT) USE OF ANTICOAGULANTS: ICD-10-CM

## 2020-02-11 LAB — INR PPP: 5.8 (ref 0.9–1.1)

## 2020-02-11 PROCEDURE — 99211 OFF/OP EST MAY X REQ PHY/QHP: CPT | Performed by: NURSE PRACTITIONER

## 2020-02-11 PROCEDURE — 85610 PROTHROMBIN TIME: CPT | Performed by: NURSE PRACTITIONER

## 2020-02-11 NOTE — PROGRESS NOTES
Today's INR is 5.8.   Pt states he has not eaten very much at all the last two days and has consumed whiskey; pt is aware of alcohol interaction with coumadin.  Pt does not wish to have INR verified by venipuncture.   I adjusted coumadin dose and instructed pt to increase Vit K intake today at lunch and then again for supper this evening.  Notify provider if you experience excessive bleeding from the nose, cuts, gums, rectum, urinary tract. Reddish or brown urine or stool. Vomiting of blood or hemorrhoidal bleeding. If major injury occurs present to the Emergency Department.  Recheck INR tomorrow.  Pt verbalizes.  Patient instructed regarding medication; results given and questions answered. Nutritional counseling given.  Dietary factors affecting therapy addressed.  Patient instructed to monitor for excessive bruising or bleeding.  Findings reported by [place] Kim RN.         This document has been electronically signed by Fara Combs, DOMENIC @ on February 11, 2020 8:44 AM

## 2020-02-12 ENCOUNTER — ANTICOAGULATION VISIT (OUTPATIENT)
Dept: CARDIAC SURGERY | Facility: CLINIC | Age: 70
End: 2020-02-12

## 2020-02-12 VITALS — HEART RATE: 70 BPM | OXYGEN SATURATION: 98 % | SYSTOLIC BLOOD PRESSURE: 117 MMHG | DIASTOLIC BLOOD PRESSURE: 73 MMHG

## 2020-02-12 DIAGNOSIS — I82.403 DEEP VEIN THROMBOSIS (DVT) OF BOTH LOWER EXTREMITIES, UNSPECIFIED CHRONICITY, UNSPECIFIED VEIN (HCC): ICD-10-CM

## 2020-02-12 DIAGNOSIS — Z79.01 LONG TERM (CURRENT) USE OF ANTICOAGULANTS: ICD-10-CM

## 2020-02-12 DIAGNOSIS — I26.99 OTHER ACUTE PULMONARY EMBOLISM WITHOUT ACUTE COR PULMONALE (HCC): ICD-10-CM

## 2020-02-12 LAB — INR PPP: 3.2 (ref 0.9–1.1)

## 2020-02-12 PROCEDURE — 85610 PROTHROMBIN TIME: CPT | Performed by: NURSE PRACTITIONER

## 2020-02-12 PROCEDURE — 99211 OFF/OP EST MAY X REQ PHY/QHP: CPT | Performed by: NURSE PRACTITIONER

## 2020-02-12 NOTE — PROGRESS NOTES
Today's INR is 3.2.  Pt denies med changes or bleeding problems. Pt states he was able to eat liver and gizzards last night. Pt states he does not plan on having any alcoholic beverages. Pt placed back on usual dose as we are aware of the cause for elevation. Pt instructed on dosing and to have a serving of green veggies today and every 3-4 days thereafter; pt verbalized. Patient instructed regarding medication; results given and questions answered. Nutritional counseling given.  Dietary factors affecting therapy addressed.  Patient instructed to monitor for excessive bruising or bleeding. Will recheck next week. Findings reported by Namita Kinsey RN.          This document has been electronically signed by Fara Combs, DOMENIC @ on February 12, 2020 8:14 AM

## 2020-02-19 ENCOUNTER — LAB (OUTPATIENT)
Dept: ONCOLOGY | Facility: HOSPITAL | Age: 70
End: 2020-02-19

## 2020-02-19 ENCOUNTER — OFFICE VISIT (OUTPATIENT)
Dept: ONCOLOGY | Facility: CLINIC | Age: 70
End: 2020-02-19

## 2020-02-19 ENCOUNTER — ANTICOAGULATION VISIT (OUTPATIENT)
Dept: CARDIAC SURGERY | Facility: CLINIC | Age: 70
End: 2020-02-19

## 2020-02-19 VITALS
RESPIRATION RATE: 18 BRPM | DIASTOLIC BLOOD PRESSURE: 86 MMHG | SYSTOLIC BLOOD PRESSURE: 162 MMHG | WEIGHT: 190.1 LBS | TEMPERATURE: 98.5 F | HEART RATE: 61 BPM | BODY MASS INDEX: 26.61 KG/M2 | HEIGHT: 71 IN

## 2020-02-19 VITALS — HEART RATE: 81 BPM | OXYGEN SATURATION: 99 % | SYSTOLIC BLOOD PRESSURE: 144 MMHG | DIASTOLIC BLOOD PRESSURE: 80 MMHG

## 2020-02-19 DIAGNOSIS — I26.99 OTHER ACUTE PULMONARY EMBOLISM WITHOUT ACUTE COR PULMONALE (HCC): ICD-10-CM

## 2020-02-19 DIAGNOSIS — I82.622 ACUTE EMBOLISM AND THROMBOSIS OF DEEP VEINS OF LEFT UPPER EXTREMITY (HCC): ICD-10-CM

## 2020-02-19 DIAGNOSIS — D64.9 ANEMIA, UNSPECIFIED TYPE: ICD-10-CM

## 2020-02-19 DIAGNOSIS — I82.403 DEEP VEIN THROMBOSIS (DVT) OF BOTH LOWER EXTREMITIES, UNSPECIFIED CHRONICITY, UNSPECIFIED VEIN (HCC): ICD-10-CM

## 2020-02-19 DIAGNOSIS — I82.403 DEEP VEIN THROMBOSIS (DVT) OF BOTH LOWER EXTREMITIES, UNSPECIFIED CHRONICITY, UNSPECIFIED VEIN (HCC): Primary | ICD-10-CM

## 2020-02-19 DIAGNOSIS — Z79.01 LONG TERM (CURRENT) USE OF ANTICOAGULANTS: ICD-10-CM

## 2020-02-19 LAB
ALBUMIN SERPL-MCNC: 4.3 G/DL (ref 3.5–5.2)
ALBUMIN/GLOB SERPL: 1.9 G/DL
ALP SERPL-CCNC: 55 U/L (ref 39–117)
ALT SERPL W P-5'-P-CCNC: 15 U/L (ref 1–41)
ANION GAP SERPL CALCULATED.3IONS-SCNC: 12 MMOL/L (ref 5–15)
AST SERPL-CCNC: 18 U/L (ref 1–40)
BASOPHILS # BLD AUTO: 0.04 10*3/MM3 (ref 0–0.2)
BASOPHILS NFR BLD AUTO: 0.5 % (ref 0–1.5)
BILIRUB SERPL-MCNC: 0.4 MG/DL (ref 0.2–1.2)
BUN BLD-MCNC: 25 MG/DL (ref 8–23)
BUN/CREAT SERPL: 27.5 (ref 7–25)
CALCIUM SPEC-SCNC: 9.5 MG/DL (ref 8.6–10.5)
CHLORIDE SERPL-SCNC: 105 MMOL/L (ref 98–107)
CO2 SERPL-SCNC: 24 MMOL/L (ref 22–29)
CREAT BLD-MCNC: 0.91 MG/DL (ref 0.76–1.27)
DEPRECATED RDW RBC AUTO: 48.1 FL (ref 37–54)
EOSINOPHIL # BLD AUTO: 0.08 10*3/MM3 (ref 0–0.4)
EOSINOPHIL NFR BLD AUTO: 1 % (ref 0.3–6.2)
ERYTHROCYTE [DISTWIDTH] IN BLOOD BY AUTOMATED COUNT: 14.2 % (ref 12.3–15.4)
FERRITIN SERPL-MCNC: 77.05 NG/ML (ref 30–400)
FOLATE SERPL-MCNC: 15.2 NG/ML (ref 4.78–24.2)
GFR SERPL CREATININE-BSD FRML MDRD: 83 ML/MIN/1.73
GLOBULIN UR ELPH-MCNC: 2.3 GM/DL
GLUCOSE BLD-MCNC: 103 MG/DL (ref 65–99)
HCT VFR BLD AUTO: 40.2 % (ref 37.5–51)
HGB BLD-MCNC: 13.4 G/DL (ref 13–17.7)
HOLD SPECIMEN: NORMAL
IMM GRANULOCYTES # BLD AUTO: 0.04 10*3/MM3 (ref 0–0.05)
IMM GRANULOCYTES NFR BLD AUTO: 0.5 % (ref 0–0.5)
INR PPP: 1.2 (ref 0.9–1.1)
IRON 24H UR-MRATE: 88 MCG/DL (ref 59–158)
IRON SATN MFR SERPL: 23 % (ref 20–50)
LYMPHOCYTES # BLD AUTO: 1.97 10*3/MM3 (ref 0.7–3.1)
LYMPHOCYTES NFR BLD AUTO: 23.9 % (ref 19.6–45.3)
MCH RBC QN AUTO: 30.9 PG (ref 26.6–33)
MCHC RBC AUTO-ENTMCNC: 33.3 G/DL (ref 31.5–35.7)
MCV RBC AUTO: 92.6 FL (ref 79–97)
MONOCYTES # BLD AUTO: 0.84 10*3/MM3 (ref 0.1–0.9)
MONOCYTES NFR BLD AUTO: 10.2 % (ref 5–12)
NEUTROPHILS # BLD AUTO: 5.28 10*3/MM3 (ref 1.7–7)
NEUTROPHILS NFR BLD AUTO: 63.9 % (ref 42.7–76)
NRBC BLD AUTO-RTO: 0 /100 WBC (ref 0–0.2)
PLATELET # BLD AUTO: 200 10*3/MM3 (ref 140–450)
PMV BLD AUTO: 9.7 FL (ref 6–12)
POTASSIUM BLD-SCNC: 3.8 MMOL/L (ref 3.5–5.2)
PROT SERPL-MCNC: 6.6 G/DL (ref 6–8.5)
RBC # BLD AUTO: 4.34 10*6/MM3 (ref 4.14–5.8)
SODIUM BLD-SCNC: 141 MMOL/L (ref 136–145)
TIBC SERPL-MCNC: 384 MCG/DL (ref 298–536)
TRANSFERRIN SERPL-MCNC: 258 MG/DL (ref 200–360)
VIT B12 BLD-MCNC: >2000 PG/ML (ref 211–946)
WBC NRBC COR # BLD: 8.25 10*3/MM3 (ref 3.4–10.8)

## 2020-02-19 PROCEDURE — 84466 ASSAY OF TRANSFERRIN: CPT | Performed by: INTERNAL MEDICINE

## 2020-02-19 PROCEDURE — 80053 COMPREHEN METABOLIC PANEL: CPT | Performed by: NURSE PRACTITIONER

## 2020-02-19 PROCEDURE — 82728 ASSAY OF FERRITIN: CPT | Performed by: INTERNAL MEDICINE

## 2020-02-19 PROCEDURE — 82607 VITAMIN B-12: CPT | Performed by: INTERNAL MEDICINE

## 2020-02-19 PROCEDURE — G9903 PT SCRN TBCO ID AS NON USER: HCPCS | Performed by: INTERNAL MEDICINE

## 2020-02-19 PROCEDURE — 85610 PROTHROMBIN TIME: CPT | Performed by: NURSE PRACTITIONER

## 2020-02-19 PROCEDURE — 83540 ASSAY OF IRON: CPT | Performed by: INTERNAL MEDICINE

## 2020-02-19 PROCEDURE — 99214 OFFICE O/P EST MOD 30 MIN: CPT | Performed by: INTERNAL MEDICINE

## 2020-02-19 PROCEDURE — 99211 OFF/OP EST MAY X REQ PHY/QHP: CPT | Performed by: NURSE PRACTITIONER

## 2020-02-19 PROCEDURE — G8731 PAIN NEG NO PLAN: HCPCS | Performed by: INTERNAL MEDICINE

## 2020-02-19 PROCEDURE — G0463 HOSPITAL OUTPT CLINIC VISIT: HCPCS | Performed by: INTERNAL MEDICINE

## 2020-02-19 PROCEDURE — 85025 COMPLETE CBC W/AUTO DIFF WBC: CPT | Performed by: NURSE PRACTITIONER

## 2020-02-19 PROCEDURE — 1157F ADVNC CARE PLAN IN RCRD: CPT | Performed by: INTERNAL MEDICINE

## 2020-02-19 PROCEDURE — 82746 ASSAY OF FOLIC ACID SERUM: CPT | Performed by: INTERNAL MEDICINE
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
## 2020-02-19 NOTE — PROGRESS NOTES
Today's INR is 1.2.   Pt here today for recheck of elevated INR.  Pt denies missed coumadin doses or consuming too much green.  Pt states medications have not changed.  Pt denies bleeding issues or new s/s blood clots at this time.  Adjusted coumadin dose and instructed pt to limit green intake for now.  Pt has 120mg Lovenox injections at home from recent previous procedure.  I instructed pt to begin daily Lovenox injections at noon today; pt will do his injections and take nightly coumadin as instructed through the weekend.  CC appt made for Monday of next wk.  Instructions verbalized.  Patient instructed regarding medication; results given and questions answered. Nutritional counseling given.  Dietary factors affecting therapy addressed.  Patient instructed to monitor for excessive bruising or bleeding.  Findings reported by Florida Kim RN.       This document has been electronically signed by Everett Ramirez AGACNP-BC @  On February 19, 2020 10:02 AM

## 2020-02-20 RX ORDER — FOLIC ACID 1 MG/1
TABLET ORAL
Qty: 36 TABLET | Refills: 1 | Status: SHIPPED | OUTPATIENT
Start: 2020-02-20 | End: 2023-03-22

## 2020-02-20 RX ORDER — LANOLIN ALCOHOL/MO/W.PET/CERES
CREAM (GRAM) TOPICAL
Qty: 36 TABLET | Refills: 1 | Status: SHIPPED | OUTPATIENT
Start: 2020-02-20 | End: 2023-03-22

## 2020-02-21 ENCOUNTER — TELEPHONE (OUTPATIENT)
Dept: ONCOLOGY | Facility: HOSPITAL | Age: 70
End: 2020-02-21

## 2020-02-21 NOTE — TELEPHONE ENCOUNTER
----- Message from Dickson Vega MD sent at 2/20/2020  5:11 PM CST -----  Please let patient know, his B12 level is higher than normal.  He can decrease his B12 to 1 tablet 3 times a week.  He needs to restart taking folic acid 1 tablet 3 times a week.  I have sent prescription for B12 and folic acid to his pharmacy.  Thank you

## 2020-02-24 ENCOUNTER — ANTICOAGULATION VISIT (OUTPATIENT)
Dept: CARDIAC SURGERY | Facility: CLINIC | Age: 70
End: 2020-02-24

## 2020-02-24 ENCOUNTER — OFFICE VISIT (OUTPATIENT)
Dept: PODIATRY | Facility: CLINIC | Age: 70
End: 2020-02-24

## 2020-02-24 VITALS — OXYGEN SATURATION: 99 % | HEART RATE: 69 BPM | BODY MASS INDEX: 26.6 KG/M2 | WEIGHT: 190 LBS | HEIGHT: 71 IN

## 2020-02-24 VITALS — SYSTOLIC BLOOD PRESSURE: 138 MMHG | HEART RATE: 70 BPM | DIASTOLIC BLOOD PRESSURE: 80 MMHG | OXYGEN SATURATION: 95 %

## 2020-02-24 DIAGNOSIS — Z79.01 LONG TERM (CURRENT) USE OF ANTICOAGULANTS: ICD-10-CM

## 2020-02-24 DIAGNOSIS — I26.99 OTHER ACUTE PULMONARY EMBOLISM WITHOUT ACUTE COR PULMONALE (HCC): ICD-10-CM

## 2020-02-24 DIAGNOSIS — M77.42 METATARSALGIA, LEFT FOOT: ICD-10-CM

## 2020-02-24 DIAGNOSIS — M20.22 HALLUX RIGIDUS, LEFT FOOT: Primary | ICD-10-CM

## 2020-02-24 DIAGNOSIS — I82.403 DEEP VEIN THROMBOSIS (DVT) OF BOTH LOWER EXTREMITIES, UNSPECIFIED CHRONICITY, UNSPECIFIED VEIN (HCC): ICD-10-CM

## 2020-02-24 LAB — INR PPP: 2.2 (ref 0.9–1.1)

## 2020-02-24 PROCEDURE — 99024 POSTOP FOLLOW-UP VISIT: CPT | Performed by: PODIATRIST

## 2020-02-24 PROCEDURE — 85610 PROTHROMBIN TIME: CPT | Performed by: NURSE PRACTITIONER
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
## 2020-02-24 NOTE — PROGRESS NOTES
Today's INR is 2.2.   Pt states no changes to meds or diet.  No bleeding issues.  Instructed pt to discontinue Lovenox injections.  Recheck INR next wk.  Pt verbalizes.  Patient instructed regarding medication; results given and questions answered. Nutritional counseling given.  Dietary factors affecting therapy addressed.  Patient instructed to monitor for excessive bruising or bleeding.  Findings reported by Florida Kim RN.         This document has been electronically signed by Fara Combs, DOMENIC @ on February 24, 2020 9:03 AM

## 2020-03-02 ENCOUNTER — ANTICOAGULATION VISIT (OUTPATIENT)
Dept: CARDIAC SURGERY | Facility: CLINIC | Age: 70
End: 2020-03-02

## 2020-03-02 VITALS — SYSTOLIC BLOOD PRESSURE: 148 MMHG | HEART RATE: 67 BPM | OXYGEN SATURATION: 96 % | DIASTOLIC BLOOD PRESSURE: 78 MMHG

## 2020-03-02 DIAGNOSIS — I82.403 DEEP VEIN THROMBOSIS (DVT) OF BOTH LOWER EXTREMITIES, UNSPECIFIED CHRONICITY, UNSPECIFIED VEIN (HCC): ICD-10-CM

## 2020-03-02 DIAGNOSIS — Z79.01 LONG TERM (CURRENT) USE OF ANTICOAGULANTS: ICD-10-CM

## 2020-03-02 DIAGNOSIS — I26.99 OTHER ACUTE PULMONARY EMBOLISM WITHOUT ACUTE COR PULMONALE (HCC): ICD-10-CM

## 2020-03-02 LAB — INR PPP: 1.5 (ref 0.9–1.1)

## 2020-03-02 PROCEDURE — 85610 PROTHROMBIN TIME: CPT | Performed by: NURSE PRACTITIONER

## 2020-03-02 PROCEDURE — 99211 OFF/OP EST MAY X REQ PHY/QHP: CPT | Performed by: NURSE PRACTITIONER

## 2020-03-02 NOTE — PROGRESS NOTES
Today's INR is 1.5.   Pt denies missed coumadin doses or consuming too much green.  Pt states medications have not changed.  No bleeding issues.  Adjusted coumadin dose and instructed pt to limit all green intake until INR rechecked this Friday.  Pt wll take today's coumadin dose early today.  Instructions verbalized.  cctea  Findings reported by Florida Kim RN.       This document has been electronically signed by MAUREEN SantosCNP-BC @  On March 2, 2020 8:21 AM

## 2020-03-05 RX ORDER — SUCRALFATE 1 G/1
1 TABLET ORAL 4 TIMES DAILY
Qty: 120 TABLET | Refills: 6 | Status: SHIPPED | OUTPATIENT
Start: 2020-03-05 | End: 2020-10-07

## 2020-03-06 ENCOUNTER — ANTICOAGULATION VISIT (OUTPATIENT)
Dept: CARDIAC SURGERY | Facility: CLINIC | Age: 70
End: 2020-03-06

## 2020-03-06 VITALS — SYSTOLIC BLOOD PRESSURE: 132 MMHG | HEART RATE: 60 BPM | DIASTOLIC BLOOD PRESSURE: 88 MMHG | OXYGEN SATURATION: 98 %

## 2020-03-06 DIAGNOSIS — I82.403 DEEP VEIN THROMBOSIS (DVT) OF BOTH LOWER EXTREMITIES, UNSPECIFIED CHRONICITY, UNSPECIFIED VEIN (HCC): ICD-10-CM

## 2020-03-06 DIAGNOSIS — I26.99 OTHER ACUTE PULMONARY EMBOLISM WITHOUT ACUTE COR PULMONALE (HCC): ICD-10-CM

## 2020-03-06 DIAGNOSIS — Z79.01 LONG TERM (CURRENT) USE OF ANTICOAGULANTS: ICD-10-CM

## 2020-03-06 LAB
INR PPP: 3.3 (ref 0.9–1.1)
INR PPP: 3.3 (ref 0.9–1.1)

## 2020-03-06 PROCEDURE — 85610 PROTHROMBIN TIME: CPT | Performed by: NURSE PRACTITIONER

## 2020-03-06 NOTE — PROGRESS NOTES
Today's INR is 3.3. PT took Coumadin and ate green as directed. Denies any med changes or bleeding issues. Denies any s/s of blood clot. PT did not eat green. Adjusted pt's dose and instructed to have salad today. Recheck next week when returning for another appt. Patient instructed regarding medication; results given and questions answered. Nutritional counseling given.  Dietary factors affecting therapy addressed.  Patient instructed to monitor for excessive bruising or bleeding. PT verbalizes understanding. Findings reported by Shahana Damon RN.       This document has been electronically signed by SIMONE Santos-BC @  On March 6, 2020 8:17 AM

## 2020-03-10 ENCOUNTER — OFFICE VISIT (OUTPATIENT)
Dept: GASTROENTEROLOGY | Facility: CLINIC | Age: 70
End: 2020-03-10

## 2020-03-10 ENCOUNTER — ANTICOAGULATION VISIT (OUTPATIENT)
Dept: CARDIAC SURGERY | Facility: CLINIC | Age: 70
End: 2020-03-10

## 2020-03-10 VITALS
BODY MASS INDEX: 26.71 KG/M2 | WEIGHT: 190.8 LBS | HEART RATE: 60 BPM | DIASTOLIC BLOOD PRESSURE: 80 MMHG | SYSTOLIC BLOOD PRESSURE: 132 MMHG | HEIGHT: 71 IN

## 2020-03-10 VITALS — SYSTOLIC BLOOD PRESSURE: 150 MMHG | OXYGEN SATURATION: 98 % | DIASTOLIC BLOOD PRESSURE: 76 MMHG | HEART RATE: 68 BPM

## 2020-03-10 DIAGNOSIS — I82.403 DEEP VEIN THROMBOSIS (DVT) OF BOTH LOWER EXTREMITIES, UNSPECIFIED CHRONICITY, UNSPECIFIED VEIN (HCC): ICD-10-CM

## 2020-03-10 DIAGNOSIS — K21.9 GASTROESOPHAGEAL REFLUX DISEASE WITHOUT ESOPHAGITIS: Primary | ICD-10-CM

## 2020-03-10 DIAGNOSIS — I26.99 OTHER ACUTE PULMONARY EMBOLISM WITHOUT ACUTE COR PULMONALE (HCC): ICD-10-CM

## 2020-03-10 DIAGNOSIS — Z79.01 LONG TERM (CURRENT) USE OF ANTICOAGULANTS: ICD-10-CM

## 2020-03-10 PROBLEM — K22.70 BARRETT'S ESOPHAGUS WITHOUT DYSPLASIA: Status: ACTIVE | Noted: 2020-03-10

## 2020-03-10 LAB — INR PPP: 3.4 (ref 0.9–1.1)

## 2020-03-10 PROCEDURE — 99212 OFFICE O/P EST SF 10 MIN: CPT | Performed by: INTERNAL MEDICINE

## 2020-03-10 PROCEDURE — 85610 PROTHROMBIN TIME: CPT | Performed by: NURSE PRACTITIONER

## 2020-03-10 PROCEDURE — 99211 OFF/OP EST MAY X REQ PHY/QHP: CPT | Performed by: NURSE PRACTITIONER
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
## 2020-03-10 NOTE — PROGRESS NOTES
Today's INR is 3.4.   Pt denies medication changes or bleeding issues.  Pt states he did consume green this past wk. I adjusted coumadin dose and instructed pt to have a half-cup serving turnip greens today.  Recheck INR next wk.  Pt verbalizes.  Patient instructed regarding medication; results given and questions answered. Nutritional counseling given.  Dietary factors affecting therapy addressed.  Patient instructed to monitor for excessive bruising or bleeding.  Findings reported by [place] Kim RN.         This document has been electronically signed by Fara Combs, DOMENIC @ on March 10, 2020 08:57

## 2020-03-17 ENCOUNTER — ANTICOAGULATION VISIT (OUTPATIENT)
Dept: CARDIAC SURGERY | Facility: CLINIC | Age: 70
End: 2020-03-17

## 2020-03-17 VITALS — OXYGEN SATURATION: 98 % | HEART RATE: 71 BPM | DIASTOLIC BLOOD PRESSURE: 88 MMHG | SYSTOLIC BLOOD PRESSURE: 148 MMHG

## 2020-03-17 DIAGNOSIS — I26.99 OTHER ACUTE PULMONARY EMBOLISM WITHOUT ACUTE COR PULMONALE (HCC): ICD-10-CM

## 2020-03-17 DIAGNOSIS — I82.403 DEEP VEIN THROMBOSIS (DVT) OF BOTH LOWER EXTREMITIES, UNSPECIFIED CHRONICITY, UNSPECIFIED VEIN (HCC): ICD-10-CM

## 2020-03-17 DIAGNOSIS — Z79.01 LONG TERM (CURRENT) USE OF ANTICOAGULANTS: ICD-10-CM

## 2020-03-17 LAB — INR PPP: 2.9 (ref 0.9–1.1)

## 2020-03-17 PROCEDURE — 85610 PROTHROMBIN TIME: CPT | Performed by: NURSE PRACTITIONER

## 2020-03-17 NOTE — PROGRESS NOTES
Today's INR is 2.9.   Pt states no changes to meds or diet.  No bleeding issues.  Instructed pt on coumadin dosing schedule.  Recheck INR next wk.  Pt verbalizes.  Patient instructed regarding medication; results given and questions answered. Nutritional counseling given.  Dietary factors affecting therapy addressed.  Patient instructed to monitor for excessive bruising or bleeding.  Findings reported by Florida Kim RN.       This document has been electronically signed by MAUREEN SantosCNP-BC @  On March 17, 2020 08:51

## 2020-03-25 ENCOUNTER — ANTICOAGULATION VISIT (OUTPATIENT)
Dept: CARDIAC SURGERY | Facility: CLINIC | Age: 70
End: 2020-03-25

## 2020-03-25 VITALS — HEART RATE: 67 BPM | SYSTOLIC BLOOD PRESSURE: 152 MMHG | DIASTOLIC BLOOD PRESSURE: 80 MMHG | OXYGEN SATURATION: 98 %

## 2020-03-25 DIAGNOSIS — I26.99 OTHER ACUTE PULMONARY EMBOLISM WITHOUT ACUTE COR PULMONALE (HCC): ICD-10-CM

## 2020-03-25 DIAGNOSIS — I82.403 DEEP VEIN THROMBOSIS (DVT) OF BOTH LOWER EXTREMITIES, UNSPECIFIED CHRONICITY, UNSPECIFIED VEIN (HCC): ICD-10-CM

## 2020-03-25 DIAGNOSIS — Z79.01 LONG TERM (CURRENT) USE OF ANTICOAGULANTS: ICD-10-CM

## 2020-03-25 LAB — INR PPP: 2.5 (ref 0.9–1.1)

## 2020-03-25 PROCEDURE — 85610 PROTHROMBIN TIME: CPT | Performed by: NURSE PRACTITIONER

## 2020-03-25 NOTE — PROGRESS NOTES
Today's INR is 2.5.   Patient states no med changes or bleeding problems or unexplained bruising. Patient instructed to continue current dosing schedule. Verbalizes understanding. Will recheck in 2 wks.  Patient instructed regarding medication; results given and questions answered. Nutritional counseling given.  Dietary factors affecting therapy addressed.  Patient instructed to monitor for excessive bruising or bleeding.  Findings reported by Florida Kim RN.         This document has been electronically signed by DOMENIC Kim @ on March 25, 2020 08:16

## 2020-03-30 ENCOUNTER — DOCUMENTATION (OUTPATIENT)
Dept: CARDIAC SURGERY | Facility: CLINIC | Age: 70
End: 2020-03-30

## 2020-03-30 RX ORDER — TELMISARTAN 80 MG/1
80 TABLET ORAL DAILY
COMMUNITY
End: 2020-09-02

## 2020-03-30 NOTE — PROGRESS NOTES
Pt's wife called to say pt was taken off Losartan and placed on Telmisartan which shows minor interaction.  No changes.  Keep same appt.

## 2020-04-06 ENCOUNTER — OFFICE VISIT (OUTPATIENT)
Dept: PODIATRY | Facility: CLINIC | Age: 70
End: 2020-04-06

## 2020-04-06 ENCOUNTER — ANTICOAGULATION VISIT (OUTPATIENT)
Dept: CARDIAC SURGERY | Facility: CLINIC | Age: 70
End: 2020-04-06

## 2020-04-06 ENCOUNTER — TELEPHONE (OUTPATIENT)
Dept: PODIATRY | Facility: CLINIC | Age: 70
End: 2020-04-06

## 2020-04-06 VITALS — HEIGHT: 71 IN | WEIGHT: 190 LBS | OXYGEN SATURATION: 98 % | BODY MASS INDEX: 26.6 KG/M2 | HEART RATE: 73 BPM

## 2020-04-06 DIAGNOSIS — I82.403 DEEP VEIN THROMBOSIS (DVT) OF BOTH LOWER EXTREMITIES, UNSPECIFIED CHRONICITY, UNSPECIFIED VEIN (HCC): ICD-10-CM

## 2020-04-06 DIAGNOSIS — S92.315A CLOSED NONDISPLACED FRACTURE OF FIRST METATARSAL BONE OF LEFT FOOT, INITIAL ENCOUNTER: ICD-10-CM

## 2020-04-06 DIAGNOSIS — Z79.01 LONG TERM (CURRENT) USE OF ANTICOAGULANTS: ICD-10-CM

## 2020-04-06 DIAGNOSIS — I26.99 OTHER ACUTE PULMONARY EMBOLISM WITHOUT ACUTE COR PULMONALE (HCC): ICD-10-CM

## 2020-04-06 DIAGNOSIS — M79.672 LEFT FOOT PAIN: Primary | ICD-10-CM

## 2020-04-06 LAB — INR PPP: 3.7 (ref 0.9–1.1)

## 2020-04-06 PROCEDURE — 99211 OFF/OP EST MAY X REQ PHY/QHP: CPT | Performed by: NURSE PRACTITIONER

## 2020-04-06 PROCEDURE — 99213 OFFICE O/P EST LOW 20 MIN: CPT | Performed by: PODIATRIST

## 2020-04-06 PROCEDURE — 85610 PROTHROMBIN TIME: CPT | Performed by: NURSE PRACTITIONER

## 2020-04-06 PROCEDURE — 28470 CLTX METATARSAL FX WO MNP EA: CPT | Performed by: PODIATRIST

## 2020-04-06 RX ORDER — HYDROCODONE BITARTRATE AND ACETAMINOPHEN 10; 325 MG/1; MG/1
1 TABLET ORAL EVERY 8 HOURS PRN
Qty: 42 TABLET | Refills: 0 | Status: SHIPPED | OUTPATIENT
Start: 2020-04-06 | End: 2021-03-23 | Stop reason: SDUPTHER

## 2020-04-06 NOTE — PROGRESS NOTES
Patient checked curbside due to COVID 19 pandemic precautions.  Today's INR is 3.7. PT was seen today due to seeing Dr Moore today for metatarsal fx. Injury happened on Friday and pt has been taking pain meds consistently since then. PT is off Losartan. Denies any s/s of bleeding or blood clot. Adjusted pt's dose and instructed to increase vit k today. Recheck INR in 1 week. Patient instructed regarding medication; results given and questions answered. Nutritional counseling given.  Dietary factors affecting therapy addressed.  Patient instructed to monitor for excessive bruising or bleeding. PT verbalizes understanding. Findings reported by Shahana Damon RN.       This document has been electronically signed by MAUREEN SantosCNP-BC @  On April 6, 2020 10:59

## 2020-04-06 NOTE — TELEPHONE ENCOUNTER
Teresa:    PATIENT WIFE LEFT A VOICEMAIL AT 1130 AM, NEEDS NURSE TO CALL PATIENT IN REGARDS TO PAIN MEDICATION.  PATIENT WAS SEEN TODAY 4/6
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
## 2020-04-13 ENCOUNTER — ANTICOAGULATION VISIT (OUTPATIENT)
Dept: CARDIAC SURGERY | Facility: CLINIC | Age: 70
End: 2020-04-13

## 2020-04-13 VITALS — TEMPERATURE: 98.3 F

## 2020-04-13 DIAGNOSIS — I26.99 OTHER ACUTE PULMONARY EMBOLISM WITHOUT ACUTE COR PULMONALE (HCC): ICD-10-CM

## 2020-04-13 DIAGNOSIS — I82.403 DEEP VEIN THROMBOSIS (DVT) OF BOTH LOWER EXTREMITIES, UNSPECIFIED CHRONICITY, UNSPECIFIED VEIN (HCC): ICD-10-CM

## 2020-04-13 DIAGNOSIS — Z79.01 LONG TERM (CURRENT) USE OF ANTICOAGULANTS: ICD-10-CM

## 2020-04-13 LAB — INR PPP: 2 (ref 0.9–1.1)

## 2020-04-13 PROCEDURE — 99211 OFF/OP EST MAY X REQ PHY/QHP: CPT | Performed by: NURSE PRACTITIONER

## 2020-04-13 PROCEDURE — 85610 PROTHROMBIN TIME: CPT | Performed by: NURSE PRACTITIONER

## 2020-04-13 NOTE — PROGRESS NOTES
Patient checked curbside due to COVID 19 pandemic precautions.  Today's INR is 2.0. PT states he is taking less pain meds and ate broccoli x2. Denies any other med changes or bleeding issues. Denies any s/s of blood clot. Due to rapid rise in INR, pt was placed back on previous dose and will be seen next week when returning to see Dr Moore. Patient instructed regarding medication; results given and questions answered. Nutritional counseling given.  Dietary factors affecting therapy addressed.  Patient instructed to monitor for excessive bruising or bleeding. PT verbalizes understanding. Findings reported by Shahana Damon RN.       This document has been electronically signed by MAUREEN SantosCNP-BC @  On April 13, 2020 08:47    .

## 2020-04-20 ENCOUNTER — OFFICE VISIT (OUTPATIENT)
Dept: PODIATRY | Facility: CLINIC | Age: 70
End: 2020-04-20

## 2020-04-20 ENCOUNTER — ANTICOAGULATION VISIT (OUTPATIENT)
Dept: CARDIAC SURGERY | Facility: CLINIC | Age: 70
End: 2020-04-20

## 2020-04-20 VITALS — WEIGHT: 190 LBS | HEART RATE: 60 BPM | HEIGHT: 71 IN | OXYGEN SATURATION: 98 % | BODY MASS INDEX: 26.6 KG/M2

## 2020-04-20 VITALS — TEMPERATURE: 98.4 F

## 2020-04-20 DIAGNOSIS — S92.315D CLOSED NONDISPLACED FRACTURE OF FIRST METATARSAL BONE OF LEFT FOOT WITH ROUTINE HEALING, SUBSEQUENT ENCOUNTER: Primary | ICD-10-CM

## 2020-04-20 DIAGNOSIS — I82.403 DEEP VEIN THROMBOSIS (DVT) OF BOTH LOWER EXTREMITIES, UNSPECIFIED CHRONICITY, UNSPECIFIED VEIN (HCC): ICD-10-CM

## 2020-04-20 DIAGNOSIS — I26.99 OTHER ACUTE PULMONARY EMBOLISM WITHOUT ACUTE COR PULMONALE (HCC): ICD-10-CM

## 2020-04-20 DIAGNOSIS — Z79.01 LONG TERM (CURRENT) USE OF ANTICOAGULANTS: ICD-10-CM

## 2020-04-20 LAB — INR PPP: 2.5 (ref 0.9–1.1)

## 2020-04-20 PROCEDURE — 85610 PROTHROMBIN TIME: CPT | Performed by: NURSE PRACTITIONER

## 2020-04-20 PROCEDURE — 99024 POSTOP FOLLOW-UP VISIT: CPT | Performed by: PODIATRIST

## 2020-04-20 NOTE — PROGRESS NOTES
Today's INR is 2.5.   Patient checked curbside due to COVID 19 pandemic precautions.   Patient states no med changes or bleeding problems or unexplained bruising. Patient instructed to continue current dosing schedule. Verbalizes understanding. Will recheck in 2 wks.  Patient instructed regarding medication; results given and questions answered. Nutritional counseling given.  Dietary factors affecting therapy addressed.  Patient instructed to monitor for excessive bruising or bleeding.  Findings reported by [place] Kim RN.         This document has been electronically signed by DOMENIC Kim @ on April 20, 2020 08:46
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
## 2020-05-06 ENCOUNTER — ANTICOAGULATION VISIT (OUTPATIENT)
Dept: CARDIAC SURGERY | Facility: CLINIC | Age: 70
End: 2020-05-06

## 2020-05-06 DIAGNOSIS — Z79.01 LONG TERM (CURRENT) USE OF ANTICOAGULANTS: ICD-10-CM

## 2020-05-06 DIAGNOSIS — I82.403 DEEP VEIN THROMBOSIS (DVT) OF BOTH LOWER EXTREMITIES, UNSPECIFIED CHRONICITY, UNSPECIFIED VEIN (HCC): ICD-10-CM

## 2020-05-06 DIAGNOSIS — I26.99 OTHER ACUTE PULMONARY EMBOLISM WITHOUT ACUTE COR PULMONALE (HCC): ICD-10-CM

## 2020-05-06 LAB — INR PPP: 6.3 (ref 0.9–1.1)

## 2020-05-06 PROCEDURE — 85610 PROTHROMBIN TIME: CPT | Performed by: NURSE PRACTITIONER

## 2020-05-06 PROCEDURE — 99211 OFF/OP EST MAY X REQ PHY/QHP: CPT | Performed by: NURSE PRACTITIONER

## 2020-05-06 NOTE — PROGRESS NOTES
Today's INR is 6.3.   Patient checked curbside due to COVID 19 pandemic precautions.   Pt states he did consume alcohol 3 days ago of which he has been counseled about.  Pt denies med changes or bleeding issues.  Pt does not wish to have venipuncture for verification. Adjusted coumadin dose and instructed pt increase Vit K intake today with a two cup serving of spinach or mustard greens.  Notify provider if you experience excessive bleeding from the nose, cuts, gums, rectum, urinary tract. Reddish or brown urine or stool. Vomiting of blood or hemorrhoidal bleeding. If major injury occurs present to the Emergency Department. Recheck INR this Friday when pt is able to return.  Instructions verbalized.  Patient instructed regarding medication; results given and questions answered. Nutritional counseling given.  Dietary factors affecting therapy addressed.  Patient instructed to monitor for excessive bruising or bleeding.  Findings reported by [place] Kim RN.         This document has been electronically signed by DOMENIC Kim @ on May 6, 2020 10:39

## 2020-05-08 ENCOUNTER — ANTICOAGULATION VISIT (OUTPATIENT)
Dept: CARDIAC SURGERY | Facility: CLINIC | Age: 70
End: 2020-05-08

## 2020-05-08 VITALS — TEMPERATURE: 97.5 F

## 2020-05-08 DIAGNOSIS — I26.99 OTHER ACUTE PULMONARY EMBOLISM WITHOUT ACUTE COR PULMONALE (HCC): ICD-10-CM

## 2020-05-08 DIAGNOSIS — I82.403 DEEP VEIN THROMBOSIS (DVT) OF BOTH LOWER EXTREMITIES, UNSPECIFIED CHRONICITY, UNSPECIFIED VEIN (HCC): ICD-10-CM

## 2020-05-08 DIAGNOSIS — Z79.01 LONG TERM (CURRENT) USE OF ANTICOAGULANTS: ICD-10-CM

## 2020-05-08 LAB — INR PPP: 2.1 (ref 0.9–1.1)

## 2020-05-08 PROCEDURE — 85610 PROTHROMBIN TIME: CPT | Performed by: NURSE PRACTITIONER

## 2020-05-08 PROCEDURE — 99211 OFF/OP EST MAY X REQ PHY/QHP: CPT | Performed by: NURSE PRACTITIONER

## 2020-05-08 NOTE — PROGRESS NOTES
Today's INR is 2.1.  Patient checked curbside due to COVID 19 pandemic precautions. Pt denies med changes or bleeding problems.  Pt instructed to resume usual dose and appt made for 5 days to fill in missed doses from this week; pt verbalized. Patient instructed regarding medication; results given and questions answered. Nutritional counseling given.  Dietary factors affecting therapy addressed.  Patient instructed to monitor for excessive bruising or bleeding. Findings reported by Namita Kinsey RN.       This document has been electronically signed by SIMONE Santos-BC @  On May 8, 2020 08:32

## 2020-05-13 ENCOUNTER — ANTICOAGULATION VISIT (OUTPATIENT)
Dept: CARDIAC SURGERY | Facility: CLINIC | Age: 70
End: 2020-05-13

## 2020-05-13 VITALS — OXYGEN SATURATION: 99 % | HEART RATE: 67 BPM

## 2020-05-13 DIAGNOSIS — Z79.01 LONG TERM (CURRENT) USE OF ANTICOAGULANTS: ICD-10-CM

## 2020-05-13 DIAGNOSIS — I82.403 DEEP VEIN THROMBOSIS (DVT) OF BOTH LOWER EXTREMITIES, UNSPECIFIED CHRONICITY, UNSPECIFIED VEIN (HCC): ICD-10-CM

## 2020-05-13 DIAGNOSIS — I26.99 OTHER ACUTE PULMONARY EMBOLISM WITHOUT ACUTE COR PULMONALE (HCC): ICD-10-CM

## 2020-05-13 LAB — INR PPP: 1.6 (ref 0.9–1.1)

## 2020-05-13 PROCEDURE — 99211 OFF/OP EST MAY X REQ PHY/QHP: CPT | Performed by: NURSE PRACTITIONER

## 2020-05-13 PROCEDURE — 85610 PROTHROMBIN TIME: CPT | Performed by: NURSE PRACTITIONER

## 2020-05-13 NOTE — PROGRESS NOTES
Today's INR is 1.6.  Patient states no med changes or bleeding problems or unexplained bruising. Dose adjusted back up to most recent therapeutic dose. Patient instructed on dosing schedule. Verbalizes understanding. Will recheck in 1 week. Pt instructed to hold green veggies 3 days; pt verbalized.  Patient instructed regarding medication; results given and questions answered. Nutritional counseling given.  Dietary factors affecting therapy addressed.  Patient instructed to monitor for excessive bruising or bleeding. Findings reported by Namita Kinsey RN.        This document has been electronically signed by HERIBERTO Santos  On May 13, 2020 08:59

## 2020-05-20 ENCOUNTER — ANTICOAGULATION VISIT (OUTPATIENT)
Dept: CARDIAC SURGERY | Facility: CLINIC | Age: 70
End: 2020-05-20

## 2020-05-20 ENCOUNTER — TELEPHONE (OUTPATIENT)
Dept: PODIATRY | Facility: CLINIC | Age: 70
End: 2020-05-20

## 2020-05-20 ENCOUNTER — OFFICE VISIT (OUTPATIENT)
Dept: PODIATRY | Facility: CLINIC | Age: 70
End: 2020-05-20

## 2020-05-20 VITALS — HEART RATE: 69 BPM | OXYGEN SATURATION: 98 % | BODY MASS INDEX: 26.6 KG/M2 | HEIGHT: 71 IN | WEIGHT: 190 LBS

## 2020-05-20 VITALS — OXYGEN SATURATION: 98 % | HEART RATE: 69 BPM | TEMPERATURE: 97.9 F

## 2020-05-20 DIAGNOSIS — S92.315D CLOSED NONDISPLACED FRACTURE OF FIRST METATARSAL BONE OF LEFT FOOT WITH ROUTINE HEALING, SUBSEQUENT ENCOUNTER: Primary | ICD-10-CM

## 2020-05-20 DIAGNOSIS — I26.99 OTHER ACUTE PULMONARY EMBOLISM WITHOUT ACUTE COR PULMONALE (HCC): ICD-10-CM

## 2020-05-20 DIAGNOSIS — I82.403 DEEP VEIN THROMBOSIS (DVT) OF BOTH LOWER EXTREMITIES, UNSPECIFIED CHRONICITY, UNSPECIFIED VEIN (HCC): ICD-10-CM

## 2020-05-20 DIAGNOSIS — Z79.01 LONG TERM (CURRENT) USE OF ANTICOAGULANTS: ICD-10-CM

## 2020-05-20 LAB — INR PPP: 2.8 (ref 0.9–1.1)

## 2020-05-20 PROCEDURE — 85610 PROTHROMBIN TIME: CPT | Performed by: NURSE PRACTITIONER

## 2020-05-20 PROCEDURE — 99024 POSTOP FOLLOW-UP VISIT: CPT | Performed by: PODIATRIST

## 2020-05-20 NOTE — PROGRESS NOTES
Today's INR is 2.8.    Patient states no med changes or bleeding problems or unexplained bruising. Patient instructed to continue current dosing schedule. Verbalizes understanding. Will recheck in 2 wks.  Patient instructed regarding medication; results given and questions answered. Nutritional counseling given.  Dietary factors affecting therapy addressed.  Patient instructed to monitor for excessive bruising or bleeding.  Findings reported by [place] Kim RN.         This document has been electronically signed by DOMENIC Kim @ on May 20, 2020 09:29
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
## 2020-05-27 ENCOUNTER — DOCUMENTATION (OUTPATIENT)
Dept: CARDIAC SURGERY | Facility: CLINIC | Age: 70
End: 2020-05-27

## 2020-05-27 ENCOUNTER — OFFICE VISIT (OUTPATIENT)
Dept: ONCOLOGY | Facility: CLINIC | Age: 70
End: 2020-05-27

## 2020-05-27 ENCOUNTER — LAB (OUTPATIENT)
Dept: ONCOLOGY | Facility: HOSPITAL | Age: 70
End: 2020-05-27

## 2020-05-27 VITALS
SYSTOLIC BLOOD PRESSURE: 142 MMHG | HEART RATE: 67 BPM | WEIGHT: 193.9 LBS | BODY MASS INDEX: 27.15 KG/M2 | TEMPERATURE: 97.7 F | HEIGHT: 71 IN | DIASTOLIC BLOOD PRESSURE: 64 MMHG | RESPIRATION RATE: 18 BRPM

## 2020-05-27 DIAGNOSIS — D64.9 ANEMIA, UNSPECIFIED TYPE: ICD-10-CM

## 2020-05-27 DIAGNOSIS — I26.99 OTHER ACUTE PULMONARY EMBOLISM WITHOUT ACUTE COR PULMONALE (HCC): ICD-10-CM

## 2020-05-27 DIAGNOSIS — Z79.01 LONG TERM (CURRENT) USE OF ANTICOAGULANTS: ICD-10-CM

## 2020-05-27 DIAGNOSIS — I82.403 DEEP VEIN THROMBOSIS (DVT) OF BOTH LOWER EXTREMITIES, UNSPECIFIED CHRONICITY, UNSPECIFIED VEIN (HCC): ICD-10-CM

## 2020-05-27 LAB
ALBUMIN SERPL-MCNC: 4.1 G/DL (ref 3.5–5.2)
ALBUMIN/GLOB SERPL: 2 G/DL
ALP SERPL-CCNC: 61 U/L (ref 39–117)
ALT SERPL W P-5'-P-CCNC: 21 U/L (ref 1–41)
ANION GAP SERPL CALCULATED.3IONS-SCNC: 10 MMOL/L (ref 5–15)
AST SERPL-CCNC: 28 U/L (ref 1–40)
BASOPHILS # BLD AUTO: 0.04 10*3/MM3 (ref 0–0.2)
BASOPHILS NFR BLD AUTO: 0.7 % (ref 0–1.5)
BILIRUB SERPL-MCNC: 0.2 MG/DL (ref 0.2–1.2)
BUN BLD-MCNC: 20 MG/DL (ref 8–23)
BUN/CREAT SERPL: 23.5 (ref 7–25)
CALCIUM SPEC-SCNC: 8.3 MG/DL (ref 8.6–10.5)
CHLORIDE SERPL-SCNC: 108 MMOL/L (ref 98–107)
CO2 SERPL-SCNC: 22 MMOL/L (ref 22–29)
CREAT BLD-MCNC: 0.85 MG/DL (ref 0.76–1.27)
DEPRECATED RDW RBC AUTO: 47.6 FL (ref 37–54)
EOSINOPHIL # BLD AUTO: 0.13 10*3/MM3 (ref 0–0.4)
EOSINOPHIL NFR BLD AUTO: 2.2 % (ref 0.3–6.2)
ERYTHROCYTE [DISTWIDTH] IN BLOOD BY AUTOMATED COUNT: 13.9 % (ref 12.3–15.4)
FERRITIN SERPL-MCNC: 67.21 NG/ML (ref 30–400)
FOLATE SERPL-MCNC: >20 NG/ML (ref 4.78–24.2)
GFR SERPL CREATININE-BSD FRML MDRD: 90 ML/MIN/1.73
GLOBULIN UR ELPH-MCNC: 2.1 GM/DL
GLUCOSE BLD-MCNC: 113 MG/DL (ref 65–99)
HCT VFR BLD AUTO: 38.7 % (ref 37.5–51)
HGB BLD-MCNC: 12.9 G/DL (ref 13–17.7)
IMM GRANULOCYTES # BLD AUTO: 0.02 10*3/MM3 (ref 0–0.05)
IMM GRANULOCYTES NFR BLD AUTO: 0.3 % (ref 0–0.5)
IRON 24H UR-MRATE: 57 MCG/DL (ref 59–158)
IRON SATN MFR SERPL: 15 % (ref 20–50)
LYMPHOCYTES # BLD AUTO: 2.21 10*3/MM3 (ref 0.7–3.1)
LYMPHOCYTES NFR BLD AUTO: 36.8 % (ref 19.6–45.3)
MCH RBC QN AUTO: 31.2 PG (ref 26.6–33)
MCHC RBC AUTO-ENTMCNC: 33.3 G/DL (ref 31.5–35.7)
MCV RBC AUTO: 93.5 FL (ref 79–97)
MONOCYTES # BLD AUTO: 0.76 10*3/MM3 (ref 0.1–0.9)
MONOCYTES NFR BLD AUTO: 12.7 % (ref 5–12)
NEUTROPHILS # BLD AUTO: 2.84 10*3/MM3 (ref 1.7–7)
NEUTROPHILS NFR BLD AUTO: 47.3 % (ref 42.7–76)
NRBC BLD AUTO-RTO: 0 /100 WBC (ref 0–0.2)
PLATELET # BLD AUTO: 185 10*3/MM3 (ref 140–450)
PMV BLD AUTO: 9.4 FL (ref 6–12)
POTASSIUM BLD-SCNC: 3.9 MMOL/L (ref 3.5–5.2)
PROT SERPL-MCNC: 6.2 G/DL (ref 6–8.5)
RBC # BLD AUTO: 4.14 10*6/MM3 (ref 4.14–5.8)
SODIUM BLD-SCNC: 140 MMOL/L (ref 136–145)
TIBC SERPL-MCNC: 381 MCG/DL (ref 298–536)
TRANSFERRIN SERPL-MCNC: 256 MG/DL (ref 200–360)
VIT B12 BLD-MCNC: 1248 PG/ML (ref 211–946)
WBC NRBC COR # BLD: 6 10*3/MM3 (ref 3.4–10.8)

## 2020-05-27 PROCEDURE — 99214 OFFICE O/P EST MOD 30 MIN: CPT | Performed by: NURSE PRACTITIONER

## 2020-05-27 PROCEDURE — 82746 ASSAY OF FOLIC ACID SERUM: CPT | Performed by: INTERNAL MEDICINE

## 2020-05-27 PROCEDURE — 80053 COMPREHEN METABOLIC PANEL: CPT | Performed by: INTERNAL MEDICINE

## 2020-05-27 PROCEDURE — 82728 ASSAY OF FERRITIN: CPT | Performed by: INTERNAL MEDICINE

## 2020-05-27 PROCEDURE — 83540 ASSAY OF IRON: CPT | Performed by: INTERNAL MEDICINE

## 2020-05-27 PROCEDURE — 85025 COMPLETE CBC W/AUTO DIFF WBC: CPT | Performed by: INTERNAL MEDICINE

## 2020-05-27 PROCEDURE — 82607 VITAMIN B-12: CPT | Performed by: INTERNAL MEDICINE

## 2020-05-27 PROCEDURE — G0463 HOSPITAL OUTPT CLINIC VISIT: HCPCS | Performed by: NURSE PRACTITIONER

## 2020-05-27 PROCEDURE — 84466 ASSAY OF TRANSFERRIN: CPT | Performed by: INTERNAL MEDICINE

## 2020-05-27 RX ORDER — FERROUS SULFATE 325(65) MG
325 TABLET ORAL
Qty: 30 TABLET | Refills: 3 | Status: SHIPPED | OUTPATIENT
Start: 2020-05-27 | End: 2020-08-18 | Stop reason: ALTCHOICE
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
## 2020-05-28 ENCOUNTER — OFFICE VISIT (OUTPATIENT)
Dept: GASTROENTEROLOGY | Facility: CLINIC | Age: 70
End: 2020-05-28

## 2020-05-28 ENCOUNTER — PREP FOR SURGERY (OUTPATIENT)
Dept: OTHER | Facility: HOSPITAL | Age: 70
End: 2020-05-28

## 2020-05-28 VITALS
HEIGHT: 71 IN | SYSTOLIC BLOOD PRESSURE: 138 MMHG | WEIGHT: 190.8 LBS | DIASTOLIC BLOOD PRESSURE: 88 MMHG | HEART RATE: 72 BPM | BODY MASS INDEX: 26.71 KG/M2

## 2020-05-28 DIAGNOSIS — D50.0 IRON DEFICIENCY ANEMIA DUE TO CHRONIC BLOOD LOSS: Primary | ICD-10-CM

## 2020-05-28 PROCEDURE — 99213 OFFICE O/P EST LOW 20 MIN: CPT | Performed by: INTERNAL MEDICINE

## 2020-05-28 RX ORDER — SIMETHICONE 20 MG/.3ML
333 EMULSION ORAL ONCE
Status: CANCELLED | OUTPATIENT
Start: 2021-03-16 | End: 2020-05-28
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
## 2020-06-03 ENCOUNTER — ANTICOAGULATION VISIT (OUTPATIENT)
Dept: CARDIAC SURGERY | Facility: CLINIC | Age: 70
End: 2020-06-03

## 2020-06-03 VITALS — HEART RATE: 66 BPM | OXYGEN SATURATION: 100 % | TEMPERATURE: 98 F

## 2020-06-03 DIAGNOSIS — I26.99 OTHER ACUTE PULMONARY EMBOLISM WITHOUT ACUTE COR PULMONALE (HCC): ICD-10-CM

## 2020-06-03 DIAGNOSIS — Z79.01 LONG TERM (CURRENT) USE OF ANTICOAGULANTS: ICD-10-CM

## 2020-06-03 DIAGNOSIS — I82.403 DEEP VEIN THROMBOSIS (DVT) OF BOTH LOWER EXTREMITIES, UNSPECIFIED CHRONICITY, UNSPECIFIED VEIN (HCC): ICD-10-CM

## 2020-06-03 LAB — INR PPP: 5.7 (ref 0.9–1.1)

## 2020-06-03 PROCEDURE — 85610 PROTHROMBIN TIME: CPT | Performed by: NURSE PRACTITIONER

## 2020-06-03 PROCEDURE — 99211 OFF/OP EST MAY X REQ PHY/QHP: CPT | Performed by: NURSE PRACTITIONER

## 2020-06-03 NOTE — PROGRESS NOTES
Today's INR is 5.7.  Pt states he had alcohol last weekend and hasn't drank any since Sunday. Pt states he didn't eat any extra green veggies as his wife was out of town. Pt was reminded that alcohol is not recommended with coumadin but if he chooses to have alcohol he will have to increase vitamin K intake; pt verbalized. Pt states he is now on iron and denied bleeding problems. Pt was instructed on dosing, to avoid alcohol, and eat a can of spinach today; pt verbalized. Patient instructed regarding medication; results given and questions answered. Nutritional counseling given.  Dietary factors affecting therapy addressed.  Patient instructed to monitor for excessive bruising or bleeding. Notify provider if you experience excessive bleeding from the nose, cuts, gums, rectum, or urinary tract. Reddish or brown urine or stool. Vomiting of blood or hemorrhoidal bleeding. If major injury occurs present to the Emergency Department. Will recheck in 2 days. Findings reported by Namita Kinsey RN.          This document has been electronically signed by Fara Combs, DOMENIC @ on Marleen 3, 2020 08:35

## 2020-06-05 ENCOUNTER — ANTICOAGULATION VISIT (OUTPATIENT)
Dept: CARDIAC SURGERY | Facility: CLINIC | Age: 70
End: 2020-06-05

## 2020-06-05 VITALS — OXYGEN SATURATION: 98 % | HEART RATE: 66 BPM

## 2020-06-05 DIAGNOSIS — I26.99 OTHER ACUTE PULMONARY EMBOLISM WITHOUT ACUTE COR PULMONALE (HCC): ICD-10-CM

## 2020-06-05 DIAGNOSIS — I82.403 DEEP VEIN THROMBOSIS (DVT) OF BOTH LOWER EXTREMITIES, UNSPECIFIED CHRONICITY, UNSPECIFIED VEIN (HCC): ICD-10-CM

## 2020-06-05 DIAGNOSIS — Z79.01 LONG TERM (CURRENT) USE OF ANTICOAGULANTS: ICD-10-CM

## 2020-06-05 LAB — INR PPP: 1.9 (ref 0.9–1.1)

## 2020-06-05 PROCEDURE — U0003 INFECTIOUS AGENT DETECTION BY NUCLEIC ACID (DNA OR RNA); SEVERE ACUTE RESPIRATORY SYNDROME CORONAVIRUS 2 (SARS-COV-2) (CORONAVIRUS DISEASE [COVID-19]), AMPLIFIED PROBE TECHNIQUE, MAKING USE OF HIGH THROUGHPUT TECHNOLOGIES AS DESCRIBED BY CMS-2020-01-R: HCPCS | Performed by: INTERNAL MEDICINE

## 2020-06-05 PROCEDURE — 85610 PROTHROMBIN TIME: CPT | Performed by: NURSE PRACTITIONER

## 2020-06-05 RX ORDER — HYDROCHLOROTHIAZIDE 12.5 MG/1
12.5 TABLET ORAL DAILY
COMMUNITY
End: 2021-07-15 | Stop reason: HOSPADM

## 2020-06-05 NOTE — PROGRESS NOTES
Today's INR is 1.9. PT is now on HCTZ. Denies any bleeding issues. PT states he ate spinach and chicken livers. Denies any s/s of blood clot. PT instructed on dosing and to consume green on Saturday since he will be on clear liquids Sunday for procedure. PT states he will limit alcohol this weekend. Patient instructed regarding medication; results given and questions answered. Nutritional counseling given.  Dietary factors affecting therapy addressed.  Patient instructed to monitor for excessive bruising or bleeding. PT will be seen on Monday since he will be on campus. Findings reported by Shahana Damon RN.         This document has been electronically signed by DOMENIC Kim @ on June 5, 2020 08:36

## 2020-06-06 LAB
COVID LABCORP PRIORITY: NORMAL
SARS-COV-2 RNA RESP QL NAA+PROBE: NOT DETECTED

## 2020-06-08 ENCOUNTER — ANTICOAGULATION VISIT (OUTPATIENT)
Dept: CARDIAC SURGERY | Facility: CLINIC | Age: 70
End: 2020-06-08

## 2020-06-08 ENCOUNTER — HOSPITAL ENCOUNTER (OUTPATIENT)
Dept: GASTROENTEROLOGY | Facility: HOSPITAL | Age: 70
Discharge: HOME | End: 2020-06-08
Admitting: INTERNAL MEDICINE

## 2020-06-08 VITALS — OXYGEN SATURATION: 99 % | HEART RATE: 55 BPM

## 2020-06-08 DIAGNOSIS — I26.99 OTHER ACUTE PULMONARY EMBOLISM WITHOUT ACUTE COR PULMONALE (HCC): ICD-10-CM

## 2020-06-08 DIAGNOSIS — I82.403 DEEP VEIN THROMBOSIS (DVT) OF BOTH LOWER EXTREMITIES, UNSPECIFIED CHRONICITY, UNSPECIFIED VEIN (HCC): ICD-10-CM

## 2020-06-08 DIAGNOSIS — Z79.01 LONG TERM (CURRENT) USE OF ANTICOAGULANTS: ICD-10-CM

## 2020-06-08 DIAGNOSIS — D50.0 IRON DEFICIENCY ANEMIA DUE TO CHRONIC BLOOD LOSS: ICD-10-CM

## 2020-06-08 LAB — INR PPP: 1.6 (ref 0.9–1.1)

## 2020-06-08 PROCEDURE — 91110 GI TRC IMG INTRAL ESOPH-ILE: CPT

## 2020-06-08 PROCEDURE — 99211 OFF/OP EST MAY X REQ PHY/QHP: CPT | Performed by: NURSE PRACTITIONER

## 2020-06-08 PROCEDURE — 85610 PROTHROMBIN TIME: CPT | Performed by: NURSE PRACTITIONER

## 2020-06-08 NOTE — NURSING NOTE
Room arrival time___________0727__________________    Does patient have a pacemaker or implantable device (if yes, this is contraindicated) :   No  B/P: 136/89    Heart Rate:63    O2 Sat %:97    Temp:97.6      Pain:0            If yes, location and VAS _____________________________    Allergies:Sulfa, Codeine  NPO status:2130  Pillcam Lot# :[id number]  Pillcam expiration date:06/02/2021    Patient swallowed capsule @ ______0747_____      Patient arrived to Endoscopy department ambulatory, alert and oriented.  Procedure explained to patient, patient verbalized understanding.  Consent obtained.  Patient swallowed capsule without difficulty.  Dietary instructions given and patient instructed on time to return to the Endoscopy department.    Discharged from endo time___________0750______________

## 2020-06-08 NOTE — PROGRESS NOTES
Today's INR is 1.6. PT did not drink alcohol this weekend. Denies any medications or bleeding issues. Denies any missed doses or excessive k. Denies any s/s of blood clot. Adjusted pt's dose and instructed to hold green vegs until Wednesday. Recheck INR in 1 week. Patient instructed regarding medication; results given and questions answered. Nutritional counseling given.  Dietary factors affecting therapy addressed.  Patient instructed to monitor for excessive bruising or bleeding. PT verbalizes understanding. Findings reported by Shahana Damon RN.       This document has been electronically signed by SIMONE Santos-BC @  On June 8, 2020 08:13

## 2020-06-08 NOTE — NURSING NOTE
Pt returned recorder with no complaints.  Instructed pt that Dr. Fraser would go over results of pill cam at next appointment which pt stated was in a week.

## 2020-06-15 ENCOUNTER — ANTICOAGULATION VISIT (OUTPATIENT)
Dept: CARDIAC SURGERY | Facility: CLINIC | Age: 70
End: 2020-06-15

## 2020-06-15 ENCOUNTER — OFFICE VISIT (OUTPATIENT)
Dept: GASTROENTEROLOGY | Facility: CLINIC | Age: 70
End: 2020-06-15

## 2020-06-15 VITALS
WEIGHT: 190.2 LBS | OXYGEN SATURATION: 96 % | SYSTOLIC BLOOD PRESSURE: 140 MMHG | DIASTOLIC BLOOD PRESSURE: 80 MMHG | HEIGHT: 71 IN | BODY MASS INDEX: 26.63 KG/M2 | HEART RATE: 74 BPM

## 2020-06-15 VITALS — OXYGEN SATURATION: 97 % | HEART RATE: 74 BPM

## 2020-06-15 DIAGNOSIS — Z79.01 LONG TERM (CURRENT) USE OF ANTICOAGULANTS: ICD-10-CM

## 2020-06-15 DIAGNOSIS — I26.99 OTHER ACUTE PULMONARY EMBOLISM WITHOUT ACUTE COR PULMONALE (HCC): ICD-10-CM

## 2020-06-15 DIAGNOSIS — D50.9 IRON DEFICIENCY ANEMIA, UNSPECIFIED IRON DEFICIENCY ANEMIA TYPE: Primary | ICD-10-CM

## 2020-06-15 DIAGNOSIS — I82.403 DEEP VEIN THROMBOSIS (DVT) OF BOTH LOWER EXTREMITIES, UNSPECIFIED CHRONICITY, UNSPECIFIED VEIN (HCC): ICD-10-CM

## 2020-06-15 LAB — INR PPP: 3.3 (ref 0.9–1.1)

## 2020-06-15 PROCEDURE — 85610 PROTHROMBIN TIME: CPT | Performed by: NURSE PRACTITIONER

## 2020-06-15 PROCEDURE — 99212 OFFICE O/P EST SF 10 MIN: CPT | Performed by: INTERNAL MEDICINE

## 2020-06-15 NOTE — PATIENT INSTRUCTIONS

## 2020-06-15 NOTE — PROGRESS NOTES
Today's INR is 3.3.   Pt denies med changes or bleeding issues.  Pt states he would rather increase green intake than changes weekly coumadin dose.  Instructed pt to have an extra broccoli serving today and to have green every third day thereafter.  Recheck INR next wk.  Pt verbalizes.  Patient instructed regarding medication; results given and questions answered. Nutritional counseling given.  Dietary factors affecting therapy addressed.  Patient instructed to monitor for excessive bruising or bleeding.  Findings reported by [place] Kim RN.         This document has been electronically signed by DOMENIC Kim @ on Marleen 15, 2020 14:03
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
## 2020-06-22 ENCOUNTER — ANTICOAGULATION VISIT (OUTPATIENT)
Dept: CARDIAC SURGERY | Facility: CLINIC | Age: 70
End: 2020-06-22

## 2020-06-22 VITALS — HEART RATE: 70 BPM | OXYGEN SATURATION: 98 %

## 2020-06-22 DIAGNOSIS — Z79.01 LONG TERM (CURRENT) USE OF ANTICOAGULANTS: ICD-10-CM

## 2020-06-22 DIAGNOSIS — I26.99 OTHER ACUTE PULMONARY EMBOLISM WITHOUT ACUTE COR PULMONALE (HCC): ICD-10-CM

## 2020-06-22 DIAGNOSIS — I82.403 DEEP VEIN THROMBOSIS (DVT) OF BOTH LOWER EXTREMITIES, UNSPECIFIED CHRONICITY, UNSPECIFIED VEIN (HCC): ICD-10-CM

## 2020-06-22 LAB — INR PPP: 1.9 (ref 0.9–1.1)

## 2020-06-22 PROCEDURE — 99211 OFF/OP EST MAY X REQ PHY/QHP: CPT | Performed by: NURSE PRACTITIONER

## 2020-06-22 PROCEDURE — 85610 PROTHROMBIN TIME: CPT | Performed by: NURSE PRACTITIONER

## 2020-06-22 NOTE — PROGRESS NOTES
Today's INR is 1.9. Pt states he did eat kale last night. Denies any other med changes or bleeding issues. Denies any s/s of blood clot. Due to drop in INR and pt's previous dose, dose was increased slightly but pt will consume green every 3 days. Recheck INR next week when returning for another appt. Patient instructed regarding medication; results given and questions answered. Nutritional counseling given.  Dietary factors affecting therapy addressed.  Patient instructed to monitor for excessive bruising or bleeding. PT verbalizes understanding. Findings reported by Shahana Damon RN.       This document has been electronically signed by SIMONE Santos-BC @  On June 22, 2020 08:25

## 2020-07-01 ENCOUNTER — ANTICOAGULATION VISIT (OUTPATIENT)
Dept: CARDIAC SURGERY | Facility: CLINIC | Age: 70
End: 2020-07-01

## 2020-07-01 ENCOUNTER — OFFICE VISIT (OUTPATIENT)
Dept: FAMILY MEDICINE CLINIC | Facility: CLINIC | Age: 70
End: 2020-07-01

## 2020-07-01 VITALS
DIASTOLIC BLOOD PRESSURE: 82 MMHG | WEIGHT: 185 LBS | BODY MASS INDEX: 25.9 KG/M2 | HEIGHT: 71 IN | SYSTOLIC BLOOD PRESSURE: 138 MMHG | TEMPERATURE: 98.9 F

## 2020-07-01 VITALS — HEART RATE: 66 BPM | OXYGEN SATURATION: 99 %

## 2020-07-01 DIAGNOSIS — Z13.6 SCREENING FOR AAA (ABDOMINAL AORTIC ANEURYSM): ICD-10-CM

## 2020-07-01 DIAGNOSIS — Z12.5 PROSTATE CANCER SCREENING: ICD-10-CM

## 2020-07-01 DIAGNOSIS — Z00.00 GENERAL MEDICAL EXAM: Primary | ICD-10-CM

## 2020-07-01 DIAGNOSIS — I10 BENIGN ESSENTIAL HTN: ICD-10-CM

## 2020-07-01 DIAGNOSIS — I26.99 OTHER ACUTE PULMONARY EMBOLISM WITHOUT ACUTE COR PULMONALE (HCC): ICD-10-CM

## 2020-07-01 DIAGNOSIS — Z79.01 LONG TERM (CURRENT) USE OF ANTICOAGULANTS: ICD-10-CM

## 2020-07-01 DIAGNOSIS — I82.403 DEEP VEIN THROMBOSIS (DVT) OF BOTH LOWER EXTREMITIES, UNSPECIFIED CHRONICITY, UNSPECIFIED VEIN (HCC): ICD-10-CM

## 2020-07-01 DIAGNOSIS — K21.9 GASTROESOPHAGEAL REFLUX DISEASE WITHOUT ESOPHAGITIS: ICD-10-CM

## 2020-07-01 LAB — INR PPP: 2.7 (ref 0.9–1.1)

## 2020-07-01 PROCEDURE — 99203 OFFICE O/P NEW LOW 30 MIN: CPT | Performed by: NURSE PRACTITIONER

## 2020-07-01 PROCEDURE — 85610 PROTHROMBIN TIME: CPT | Performed by: NURSE PRACTITIONER

## 2020-07-01 NOTE — PROGRESS NOTES
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     Hypertension   This is a chronic problem. The current episode started more than 1 year ago. The problem has been resolved since onset. The problem is controlled. Associated symptoms include malaise/fatigue. Pertinent negatives include no anxiety, chest pain, headaches, neck pain, orthopnea, palpitations, peripheral edema, PND, shortness of breath or sweats. Risk factors for coronary artery disease include dyslipidemia, male gender, sedentary lifestyle and family history. The current treatment provides significant improvement. Compliance problems include diet.  There is no history of angina, kidney disease, CAD/MI, CVA, heart failure, left ventricular hypertrophy, PVD or retinopathy. There is no history of hypercortisolism or hyperparathyroidism.   Heartburn   He complains of early satiety. He reports no abdominal pain, no chest pain, no coughing, no globus sensation, no heartburn, no sore throat, no stridor or no wheezing. This is a chronic problem. The current episode started more than 1 year ago. The problem occurs frequently. The problem has been gradually improving. Associated symptoms include fatigue. Risk factors include obesity, hiatal hernia and caffeine use. He has tried an antacid and a PPI for the symptoms. The treatment provided moderate relief. Past procedures do not include an abdominal ultrasound, an EGD, esophageal manometry, esophageal pH monitoring, H. pylori antibody titer or a UGI.        The following portions of the patient's history were reviewed and updated as appropriate: allergies, current medications, past social history and problem list.    Review of Systems   Constitutional: Positive for activity change, appetite change, diaphoresis, fatigue and malaise/fatigue. Negative for fever and unexpected weight change.   HENT: Negative.  Negative for congestion,  "dental problem, drooling and sore throat.    Eyes: Negative.  Negative for photophobia, pain, redness, itching and visual disturbance.   Respiratory: Negative.  Negative for apnea, cough, chest tightness, shortness of breath, wheezing and stridor.    Cardiovascular: Positive for leg swelling. Negative for chest pain, palpitations, orthopnea and PND.   Gastrointestinal: Negative.  Negative for abdominal distention, abdominal pain, anal bleeding, blood in stool and heartburn.        Hx of gerd controlled    Endocrine: Negative.  Negative for polydipsia, polyphagia and polyuria.   Genitourinary: Negative.    Musculoskeletal: Positive for arthralgias, back pain, gait problem, joint swelling and myalgias. Negative for neck pain and neck stiffness.   Skin: Negative.  Negative for color change.   Allergic/Immunologic: Negative.  Negative for food allergies and immunocompromised state.   Neurological: Negative for dizziness, tremors, seizures, syncope, facial asymmetry, speech difficulty, weakness, light-headedness and headaches.   Hematological: Negative.  Negative for adenopathy. Does not bruise/bleed easily.   Psychiatric/Behavioral: Negative.  Negative for agitation, behavioral problems, confusion, dysphoric mood and self-injury. The patient is not nervous/anxious and is not hyperactive.        Objective   /82   Temp 98.9 °F (37.2 °C) (Tympanic)   Ht 180.3 cm (71\")   Wt 83.9 kg (185 lb)   BMI 25.80 kg/m²   Physical Exam   Constitutional: He is oriented to person, place, and time. He appears well-developed and well-nourished.   HENT:   Head: Normocephalic and atraumatic.   Right Ear: External ear normal.   Eyes: Pupils are equal, round, and reactive to light.   Neck: Normal range of motion.   Cardiovascular: Normal rate, regular rhythm and normal heart sounds. Exam reveals no gallop and no friction rub.   No murmur heard.  Pulmonary/Chest: Effort normal. No stridor. No respiratory distress. He has no wheezes. " He has no rales.   Abdominal: Soft. Bowel sounds are normal.   Musculoskeletal: Normal range of motion.   Neurological: He is alert and oriented to person, place, and time. He displays normal reflexes. No cranial nerve deficit or sensory deficit. He exhibits normal muscle tone. Coordination normal.   Skin: Skin is warm.   Nursing note and vitals reviewed.      Assessment/Plan   Problem List Items Addressed This Visit        Cardiovascular and Mediastinum    Benign essential HTN    Relevant Orders    CBC & Differential    Comprehensive Metabolic Panel    Lipid Panel    Vitamin B12    TSH    Magnesium    PSA Screen    Hepatitis C Antibody       Digestive    Gastroesophageal reflux disease without esophagitis    Relevant Orders    CBC & Differential    Comprehensive Metabolic Panel    Lipid Panel    Vitamin B12    TSH    Magnesium    PSA Screen    Hepatitis C Antibody       Other    Screening for AAA (abdominal aortic aneurysm) - Primary    Relevant Orders    Abdominal Aortic Aneurysm Screening Medicare CAR    CBC & Differential    Comprehensive Metabolic Panel    Lipid Panel    Vitamin B12    TSH    Magnesium    PSA Screen    Hepatitis C Antibody         No orders of the defined types were placed in this encounter.      It's not just what you eat, but when you eat  Eat breakfast, and eat smaller meals throughout the day. A healthy breakfast can jumpstart your metabolism, while eating small, healthy meals (rather than the standard three large meals) keeps your energy up.   Avoid eating at night. Try to eat dinner earlier and fast for 14-16 hours until breakfast the next morning. Studies suggest that eating only when you’re most active and giving your digestive system a long break each day may help to regulate weight.     Continue meds as directed-prilosec and carafate    Continue follow up with cardio- lexii  Gastro-satoor, hematology -crystal    Labs as directed, diet discussed, continue coumadin clinic   Medicare  wellness in 5 weeks   Labs before visit  Update immunization schedule in chart

## 2020-07-01 NOTE — PROGRESS NOTES
Today's INR is 2.7.  Patient states no med changes or bleeding problems or unexplained bruising. Patient instructed to continue current dosing schedule. Verbalizes understanding. Will recheck next week prior to spacing appt out. Patient instructed regarding medication; results given and questions answered. Nutritional counseling given.  Dietary factors affecting therapy addressed.  Patient instructed to monitor for excessive bruising or bleeding. Findings reported by Namita Kinsey RN.          This document has been electronically signed by DOMENIC Kim @ on July 1, 2020 08:34

## 2020-07-07 ENCOUNTER — ANTICOAGULATION VISIT (OUTPATIENT)
Dept: CARDIAC SURGERY | Facility: CLINIC | Age: 70
End: 2020-07-07

## 2020-07-07 VITALS — OXYGEN SATURATION: 99 % | HEART RATE: 69 BPM

## 2020-07-07 DIAGNOSIS — Z79.01 LONG TERM (CURRENT) USE OF ANTICOAGULANTS: ICD-10-CM

## 2020-07-07 DIAGNOSIS — I82.403 DEEP VEIN THROMBOSIS (DVT) OF BOTH LOWER EXTREMITIES, UNSPECIFIED CHRONICITY, UNSPECIFIED VEIN (HCC): ICD-10-CM

## 2020-07-07 DIAGNOSIS — I26.99 OTHER ACUTE PULMONARY EMBOLISM WITHOUT ACUTE COR PULMONALE (HCC): ICD-10-CM

## 2020-07-07 LAB — INR PPP: 1.5 (ref 0.9–1.1)

## 2020-07-07 PROCEDURE — 85610 PROTHROMBIN TIME: CPT | Performed by: NURSE PRACTITIONER

## 2020-07-07 PROCEDURE — 99211 OFF/OP EST MAY X REQ PHY/QHP: CPT | Performed by: NURSE PRACTITIONER

## 2020-07-07 NOTE — PROGRESS NOTES
Today's INR is 1.5.   Pt denies missed coumadin doses but states he did consume spinach last night.  I did instruct pt to not have spinach unless instructed by CC based on INR level.  Adjusted coumadin dose and instructed pt to limit green intake for four days.  Recheck INR next Monday.  Pt verbalizes.  Patient instructed regarding medication; results given and questions answered. Nutritional counseling given.  Dietary factors affecting therapy addressed.  Patient instructed to monitor for excessive bruising or bleeding.  Findings reported by [place] Kim RN.       This document has been electronically signed by SIMONE Santos-BC @  On July 7, 2020 07:56

## 2020-07-13 ENCOUNTER — ANTICOAGULATION VISIT (OUTPATIENT)
Dept: CARDIAC SURGERY | Facility: CLINIC | Age: 70
End: 2020-07-13

## 2020-07-13 VITALS — HEART RATE: 56 BPM | OXYGEN SATURATION: 99 %

## 2020-07-13 DIAGNOSIS — Z79.01 LONG TERM (CURRENT) USE OF ANTICOAGULANTS: ICD-10-CM

## 2020-07-13 DIAGNOSIS — I26.99 OTHER ACUTE PULMONARY EMBOLISM WITHOUT ACUTE COR PULMONALE (HCC): ICD-10-CM

## 2020-07-13 DIAGNOSIS — I82.403 DEEP VEIN THROMBOSIS (DVT) OF BOTH LOWER EXTREMITIES, UNSPECIFIED CHRONICITY, UNSPECIFIED VEIN (HCC): ICD-10-CM

## 2020-07-13 LAB — INR PPP: 2.4 (ref 0.9–1.1)

## 2020-07-13 PROCEDURE — 85610 PROTHROMBIN TIME: CPT | Performed by: NURSE PRACTITIONER

## 2020-07-13 PROCEDURE — 99211 OFF/OP EST MAY X REQ PHY/QHP: CPT | Performed by: NURSE PRACTITIONER

## 2020-07-13 RX ORDER — WARFARIN SODIUM 7.5 MG/1
TABLET ORAL
Qty: 90 TABLET | Refills: 0 | Status: SHIPPED | OUTPATIENT
Start: 2020-07-13 | End: 2021-03-15

## 2020-07-13 NOTE — PROGRESS NOTES
Today's INR is 2.4.   Pt here today for recheck of subtherapeutic INR level; pt now states he realized he had missed two coumadin doses.  Pt will resume previous coumadin dose.  Recheck INR next wk.  Pt verbalizes.  Patient instructed regarding medication; results given and questions answered. Nutritional counseling given.  Dietary factors affecting therapy addressed.  Patient instructed to monitor for excessive bruising or bleeding.  Findings reported by [place] Kim RN.         This document has been electronically signed by DOMENIC Kim @ on July 13, 2020 08:12

## 2020-07-20 ENCOUNTER — ANTICOAGULATION VISIT (OUTPATIENT)
Dept: CARDIAC SURGERY | Facility: CLINIC | Age: 70
End: 2020-07-20

## 2020-07-20 VITALS — OXYGEN SATURATION: 98 % | HEART RATE: 58 BPM

## 2020-07-20 DIAGNOSIS — Z79.01 LONG TERM (CURRENT) USE OF ANTICOAGULANTS: ICD-10-CM

## 2020-07-20 DIAGNOSIS — I82.403 DEEP VEIN THROMBOSIS (DVT) OF BOTH LOWER EXTREMITIES, UNSPECIFIED CHRONICITY, UNSPECIFIED VEIN (HCC): ICD-10-CM

## 2020-07-20 DIAGNOSIS — I26.99 OTHER ACUTE PULMONARY EMBOLISM WITHOUT ACUTE COR PULMONALE (HCC): ICD-10-CM

## 2020-07-20 LAB — INR PPP: 5.6 (ref 0.9–1.1)

## 2020-07-20 PROCEDURE — 99211 OFF/OP EST MAY X REQ PHY/QHP: CPT | Performed by: NURSE PRACTITIONER

## 2020-07-20 PROCEDURE — 85610 PROTHROMBIN TIME: CPT | Performed by: NURSE PRACTITIONER

## 2020-07-20 NOTE — PROGRESS NOTES
Today's INR is 5.6.   Pt states no changes to meds or diet.  No bleeding issues.  Adjusted coumadin dose and instructed pt to increase Vit K intake today with a spinach serving.  Pt states he cannot return to [facility] until this Wednesday.  Notify provider if you experience excessive bleeding from the nose, cuts, gums, rectum, urinary tract. Reddish or brown urine or stool. Vomiting of blood or hemorrhoidal bleeding. If major injury occurs present to the Emergency Department. Pt verbalizes.  Patient instructed regarding medication; results given and questions answered. Nutritional counseling given.  Dietary factors affecting therapy addressed.  Patient instructed to monitor for excessive bruising or bleeding.  Findings reported by Florida Kim RN.       This document has been electronically signed by SIMONE Santos-BC @  On July 20, 2020 08:31

## 2020-07-22 ENCOUNTER — ANTICOAGULATION VISIT (OUTPATIENT)
Dept: CARDIAC SURGERY | Facility: CLINIC | Age: 70
End: 2020-07-22

## 2020-07-22 VITALS — HEART RATE: 69 BPM | OXYGEN SATURATION: 98 %

## 2020-07-22 DIAGNOSIS — I82.403 DEEP VEIN THROMBOSIS (DVT) OF BOTH LOWER EXTREMITIES, UNSPECIFIED CHRONICITY, UNSPECIFIED VEIN (HCC): ICD-10-CM

## 2020-07-22 DIAGNOSIS — I26.99 OTHER ACUTE PULMONARY EMBOLISM WITHOUT ACUTE COR PULMONALE (HCC): ICD-10-CM

## 2020-07-22 DIAGNOSIS — Z79.01 LONG TERM (CURRENT) USE OF ANTICOAGULANTS: ICD-10-CM

## 2020-07-22 LAB — INR PPP: 1.9 (ref 0.9–1.1)

## 2020-07-22 PROCEDURE — 85610 PROTHROMBIN TIME: CPT | Performed by: NURSE PRACTITIONER

## 2020-07-22 PROCEDURE — 99211 OFF/OP EST MAY X REQ PHY/QHP: CPT | Performed by: NURSE PRACTITIONER

## 2020-07-22 NOTE — PROGRESS NOTES
Today's INR is 1.9.   Pt here today for recheck of elevated INR.  Pt denies bleeding issues.  Adjusted coumadin dose and instructed pt to limit green intake for two days.  Recheck INR next Monday.  Pt verbalizes.  Patient instructed regarding medication; results given and questions answered. Nutritional counseling given.  Dietary factors affecting therapy addressed.  Patient instructed to monitor for excessive bruising or bleeding.  Findings reported by Florida Kim RN.       This document has been electronically signed by SIMONE Santos-BC @  On July 22, 2020 08:44

## 2020-07-27 ENCOUNTER — ANTICOAGULATION VISIT (OUTPATIENT)
Dept: CARDIAC SURGERY | Facility: CLINIC | Age: 70
End: 2020-07-27

## 2020-07-27 VITALS — HEART RATE: 73 BPM | OXYGEN SATURATION: 98 %

## 2020-07-27 DIAGNOSIS — Z79.01 LONG TERM (CURRENT) USE OF ANTICOAGULANTS: ICD-10-CM

## 2020-07-27 DIAGNOSIS — I82.403 DEEP VEIN THROMBOSIS (DVT) OF BOTH LOWER EXTREMITIES, UNSPECIFIED CHRONICITY, UNSPECIFIED VEIN (HCC): ICD-10-CM

## 2020-07-27 DIAGNOSIS — I26.99 OTHER ACUTE PULMONARY EMBOLISM WITHOUT ACUTE COR PULMONALE (HCC): ICD-10-CM

## 2020-07-27 LAB — INR PPP: 2.9 (ref 0.9–1.1)

## 2020-07-27 PROCEDURE — 85610 PROTHROMBIN TIME: CPT | Performed by: NURSE PRACTITIONER

## 2020-07-27 NOTE — PROGRESS NOTES
Today's INR is 2.9.   Patient states no med changes or bleeding problems or unexplained bruising. Patient instructed to continue current dosing schedule. Verbalizes understanding. Will recheck 1 week.  Patient instructed regarding medication; results given and questions answered. Nutritional counseling given.  Dietary factors affecting therapy addressed.  Patient instructed to monitor for excessive bruising or bleeding.  Findings reported by Florida Kim RN.         This document has been electronically signed by DOMENIC Kim @ on July 27, 2020 08:04

## 2020-08-03 ENCOUNTER — ANTICOAGULATION VISIT (OUTPATIENT)
Dept: CARDIAC SURGERY | Facility: CLINIC | Age: 70
End: 2020-08-03

## 2020-08-03 VITALS — OXYGEN SATURATION: 98 % | HEART RATE: 63 BPM

## 2020-08-03 DIAGNOSIS — I26.99 OTHER ACUTE PULMONARY EMBOLISM WITHOUT ACUTE COR PULMONALE (HCC): ICD-10-CM

## 2020-08-03 DIAGNOSIS — I82.403 DEEP VEIN THROMBOSIS (DVT) OF BOTH LOWER EXTREMITIES, UNSPECIFIED CHRONICITY, UNSPECIFIED VEIN (HCC): ICD-10-CM

## 2020-08-03 DIAGNOSIS — Z79.01 LONG TERM (CURRENT) USE OF ANTICOAGULANTS: ICD-10-CM

## 2020-08-03 LAB — INR PPP: 2.5 (ref 0.9–1.1)

## 2020-08-03 PROCEDURE — 85610 PROTHROMBIN TIME: CPT | Performed by: NURSE PRACTITIONER

## 2020-08-03 NOTE — PROGRESS NOTES
Today's INR is 2.5.   Patient states no med changes or bleeding problems or unexplained bruising. Patient instructed to continue current dosing schedule. Verbalizes understanding. Will recheck 1 week.  Pt is seeing Dr. Vega next Wednesday the 12th to see if he can discontinue anticoagulation tx.  Patient instructed regarding medication; results given and questions answered. Nutritional counseling given.  Dietary factors affecting therapy addressed.  Patient instructed to monitor for excessive bruising or bleeding.  Findings reported by Florida Kim RN.       This document has been electronically signed by HERIBERTO Santos @  On August 3, 2020 08:13

## 2020-08-12 ENCOUNTER — HOSPITAL ENCOUNTER (OUTPATIENT)
Dept: ULTRASOUND IMAGING | Facility: HOSPITAL | Age: 70
Discharge: HOME | End: 2020-08-12

## 2020-08-12 ENCOUNTER — ANTICOAGULATION VISIT (OUTPATIENT)
Dept: CARDIAC SURGERY | Facility: CLINIC | Age: 70
End: 2020-08-12

## 2020-08-12 ENCOUNTER — LAB (OUTPATIENT)
Dept: LAB | Facility: HOSPITAL | Age: 70
End: 2020-08-12

## 2020-08-12 ENCOUNTER — TELEPHONE (OUTPATIENT)
Dept: FAMILY MEDICINE CLINIC | Facility: CLINIC | Age: 70
End: 2020-08-12

## 2020-08-12 ENCOUNTER — HOSPITAL ENCOUNTER (OUTPATIENT)
Dept: ULTRASOUND IMAGING | Facility: HOSPITAL | Age: 70
Discharge: HOME | End: 2020-08-12
Admitting: NURSE PRACTITIONER

## 2020-08-12 VITALS — OXYGEN SATURATION: 98 % | HEART RATE: 71 BPM

## 2020-08-12 DIAGNOSIS — Z79.01 LONG TERM (CURRENT) USE OF ANTICOAGULANTS: ICD-10-CM

## 2020-08-12 DIAGNOSIS — I82.403 DEEP VEIN THROMBOSIS (DVT) OF BOTH LOWER EXTREMITIES, UNSPECIFIED CHRONICITY, UNSPECIFIED VEIN (HCC): ICD-10-CM

## 2020-08-12 DIAGNOSIS — I26.99 OTHER ACUTE PULMONARY EMBOLISM WITHOUT ACUTE COR PULMONALE (HCC): ICD-10-CM

## 2020-08-12 DIAGNOSIS — D50.9 IRON DEFICIENCY ANEMIA, UNSPECIFIED IRON DEFICIENCY ANEMIA TYPE: ICD-10-CM

## 2020-08-12 DIAGNOSIS — D50.9 IRON DEFICIENCY ANEMIA, UNSPECIFIED IRON DEFICIENCY ANEMIA TYPE: Primary | ICD-10-CM

## 2020-08-12 LAB
ALBUMIN SERPL-MCNC: 4.3 G/DL (ref 3.5–5.2)
ALBUMIN/GLOB SERPL: 2 G/DL
ALP SERPL-CCNC: 61 U/L (ref 39–117)
ALT SERPL W P-5'-P-CCNC: 21 U/L (ref 1–41)
ANION GAP SERPL CALCULATED.3IONS-SCNC: 13 MMOL/L (ref 5–15)
AST SERPL-CCNC: 26 U/L (ref 1–40)
BASOPHILS # BLD AUTO: 0.03 10*3/MM3 (ref 0–0.2)
BASOPHILS NFR BLD AUTO: 0.6 % (ref 0–1.5)
BILIRUB SERPL-MCNC: 0.3 MG/DL (ref 0–1.2)
BUN SERPL-MCNC: 15 MG/DL (ref 8–23)
BUN/CREAT SERPL: 18.8 (ref 7–25)
CALCIUM SPEC-SCNC: 9 MG/DL (ref 8.6–10.5)
CHLORIDE SERPL-SCNC: 102 MMOL/L (ref 98–107)
CO2 SERPL-SCNC: 25 MMOL/L (ref 22–29)
CREAT SERPL-MCNC: 0.8 MG/DL (ref 0.76–1.27)
DEPRECATED RDW RBC AUTO: 50.6 FL (ref 37–54)
EOSINOPHIL # BLD AUTO: 0.06 10*3/MM3 (ref 0–0.4)
EOSINOPHIL NFR BLD AUTO: 1.2 % (ref 0.3–6.2)
ERYTHROCYTE [DISTWIDTH] IN BLOOD BY AUTOMATED COUNT: 14.4 % (ref 12.3–15.4)
FERRITIN SERPL-MCNC: 139.4 NG/ML (ref 30–400)
FOLATE SERPL-MCNC: >20 NG/ML (ref 4.78–24.2)
GFR SERPL CREATININE-BSD FRML MDRD: 96 ML/MIN/1.73
GLOBULIN UR ELPH-MCNC: 2.2 GM/DL
GLUCOSE SERPL-MCNC: 110 MG/DL (ref 65–99)
HCT VFR BLD AUTO: 39.1 % (ref 37.5–51)
HGB BLD-MCNC: 13.1 G/DL (ref 13–17.7)
IMM GRANULOCYTES # BLD AUTO: 0.02 10*3/MM3 (ref 0–0.05)
IMM GRANULOCYTES NFR BLD AUTO: 0.4 % (ref 0–0.5)
INR PPP: 3.3 (ref 0.9–1.1)
IRON 24H UR-MRATE: 112 MCG/DL (ref 59–158)
IRON SATN MFR SERPL: 31 % (ref 20–50)
LYMPHOCYTES # BLD AUTO: 1.28 10*3/MM3 (ref 0.7–3.1)
LYMPHOCYTES NFR BLD AUTO: 25.5 % (ref 19.6–45.3)
MCH RBC QN AUTO: 32.2 PG (ref 26.6–33)
MCHC RBC AUTO-ENTMCNC: 33.5 G/DL (ref 31.5–35.7)
MCV RBC AUTO: 96.1 FL (ref 79–97)
MONOCYTES # BLD AUTO: 0.49 10*3/MM3 (ref 0.1–0.9)
MONOCYTES NFR BLD AUTO: 9.8 % (ref 5–12)
NEUTROPHILS NFR BLD AUTO: 3.13 10*3/MM3 (ref 1.7–7)
NEUTROPHILS NFR BLD AUTO: 62.5 % (ref 42.7–76)
NRBC BLD AUTO-RTO: 0 /100 WBC (ref 0–0.2)
PLATELET # BLD AUTO: 200 10*3/MM3 (ref 140–450)
PMV BLD AUTO: 9.2 FL (ref 6–12)
POTASSIUM SERPL-SCNC: 3.7 MMOL/L (ref 3.5–5.2)
PROT SERPL-MCNC: 6.5 G/DL (ref 6–8.5)
RBC # BLD AUTO: 4.07 10*6/MM3 (ref 4.14–5.8)
SODIUM SERPL-SCNC: 140 MMOL/L (ref 136–145)
TIBC SERPL-MCNC: 356 MCG/DL (ref 298–536)
TRANSFERRIN SERPL-MCNC: 239 MG/DL (ref 200–360)
VIT B12 BLD-MCNC: 1396 PG/ML (ref 211–946)
WBC # BLD AUTO: 5.01 10*3/MM3 (ref 3.4–10.8)

## 2020-08-12 PROCEDURE — 85610 PROTHROMBIN TIME: CPT | Performed by: NURSE PRACTITIONER

## 2020-08-12 PROCEDURE — 85025 COMPLETE CBC W/AUTO DIFF WBC: CPT

## 2020-08-12 PROCEDURE — 80053 COMPREHEN METABOLIC PANEL: CPT

## 2020-08-12 PROCEDURE — 83540 ASSAY OF IRON: CPT

## 2020-08-12 PROCEDURE — 82607 VITAMIN B-12: CPT

## 2020-08-12 PROCEDURE — 93970 EXTREMITY STUDY: CPT

## 2020-08-12 PROCEDURE — 36415 COLL VENOUS BLD VENIPUNCTURE: CPT

## 2020-08-12 PROCEDURE — 82746 ASSAY OF FOLIC ACID SERUM: CPT

## 2020-08-12 PROCEDURE — 99211 OFF/OP EST MAY X REQ PHY/QHP: CPT | Performed by: NURSE PRACTITIONER

## 2020-08-12 PROCEDURE — 76706 US ABDL AORTA SCREEN AAA: CPT

## 2020-08-12 PROCEDURE — 84466 ASSAY OF TRANSFERRIN: CPT

## 2020-08-12 PROCEDURE — 82728 ASSAY OF FERRITIN: CPT

## 2020-08-12 NOTE — TELEPHONE ENCOUNTER
Per SHIRLEY ORLANDO, Mr. Zazueta  has been called with recent Screening AAA US results & recommendations.  Continue current medications and follow-up as planned or sooner if any problems.      ----- Message from DARION Reyes sent at 8/12/2020  1:46 PM CDT -----  Can you let him know normal

## 2020-08-12 NOTE — PROGRESS NOTES
Today's INR is 3.3.   Pt states no changes to meds or diet.  No bleeding issues.  Adjusted coumadin dose and instructed pt to increase Vit K intake today with a broccoli serving.  Recheck INR next wk.  Pt will see Dr. Vega next Tuesday for evaluation to discontinue of coumadin therapy.  Pt verbalizes instructions.  Patient instructed regarding medication; results given and questions answered. Nutritional counseling given.  Dietary factors affecting therapy addressed.  Patient instructed to monitor for excessive bruising or bleeding.  Findings reported by Florida Kim RN.         This document has been electronically signed by Fara Combs, DOMENIC @ on August 12, 2020 08:11

## 2020-08-18 ENCOUNTER — ANTICOAGULATION VISIT (OUTPATIENT)
Dept: CARDIAC SURGERY | Facility: CLINIC | Age: 70
End: 2020-08-18

## 2020-08-18 ENCOUNTER — OFFICE VISIT (OUTPATIENT)
Dept: ONCOLOGY | Facility: CLINIC | Age: 70
End: 2020-08-18

## 2020-08-18 VITALS
BODY MASS INDEX: 24.56 KG/M2 | SYSTOLIC BLOOD PRESSURE: 136 MMHG | TEMPERATURE: 97.7 F | RESPIRATION RATE: 18 BRPM | HEART RATE: 65 BPM | DIASTOLIC BLOOD PRESSURE: 74 MMHG | HEIGHT: 71 IN | WEIGHT: 175.4 LBS

## 2020-08-18 VITALS — HEART RATE: 82 BPM | OXYGEN SATURATION: 98 %

## 2020-08-18 DIAGNOSIS — I26.99 OTHER ACUTE PULMONARY EMBOLISM WITHOUT ACUTE COR PULMONALE (HCC): ICD-10-CM

## 2020-08-18 DIAGNOSIS — Z79.01 LONG TERM (CURRENT) USE OF ANTICOAGULANTS: ICD-10-CM

## 2020-08-18 DIAGNOSIS — R71.8 OTHER ABNORMALITY OF RED BLOOD CELLS: ICD-10-CM

## 2020-08-18 DIAGNOSIS — I82.403 DEEP VEIN THROMBOSIS (DVT) OF BOTH LOWER EXTREMITIES, UNSPECIFIED CHRONICITY, UNSPECIFIED VEIN (HCC): ICD-10-CM

## 2020-08-18 LAB — INR PPP: 3.1 (ref 0.9–1.1)

## 2020-08-18 PROCEDURE — G0463 HOSPITAL OUTPT CLINIC VISIT: HCPCS | Performed by: INTERNAL MEDICINE

## 2020-08-18 PROCEDURE — 85610 PROTHROMBIN TIME: CPT | Performed by: NURSE PRACTITIONER

## 2020-08-18 PROCEDURE — 99214 OFFICE O/P EST MOD 30 MIN: CPT | Performed by: INTERNAL MEDICINE

## 2020-08-18 PROCEDURE — 1157F ADVNC CARE PLAN IN RCRD: CPT | Performed by: INTERNAL MEDICINE

## 2020-08-18 PROCEDURE — G8731 PAIN NEG NO PLAN: HCPCS | Performed by: INTERNAL MEDICINE

## 2020-08-18 PROCEDURE — G9903 PT SCRN TBCO ID AS NON USER: HCPCS | Performed by: INTERNAL MEDICINE

## 2020-08-18 NOTE — PROGRESS NOTES
Today's INR is 3.1.   Pt did see Dr. Vega today who states pt will need to continue coumadin therapy for another 6 months.  Pt denies bleeding issues.  I instructed pt on weekly coumadin dose and to increase Vit K intake today with a broccoli serving.  Recheck INR on the 31st when pt has another appt.  Pt verbalizes.  Patient instructed regarding medication; results given and questions answered. Nutritional counseling given.  Dietary factors affecting therapy addressed.  Patient instructed to monitor for excessive bruising or bleeding.  Findings reported by Florida Kim RN.       This document has been electronically signed by SIMONE Santos-BC @  On August 18, 2020 09:45
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
## 2020-08-31 ENCOUNTER — ANTICOAGULATION VISIT (OUTPATIENT)
Dept: CARDIAC SURGERY | Facility: CLINIC | Age: 70
End: 2020-08-31

## 2020-08-31 ENCOUNTER — OFFICE VISIT (OUTPATIENT)
Dept: GASTROENTEROLOGY | Facility: CLINIC | Age: 70
End: 2020-08-31

## 2020-08-31 VITALS
HEIGHT: 71 IN | SYSTOLIC BLOOD PRESSURE: 140 MMHG | WEIGHT: 184.2 LBS | DIASTOLIC BLOOD PRESSURE: 90 MMHG | HEART RATE: 71 BPM | BODY MASS INDEX: 25.79 KG/M2

## 2020-08-31 VITALS — OXYGEN SATURATION: 98 % | HEART RATE: 86 BPM

## 2020-08-31 DIAGNOSIS — I26.99 OTHER ACUTE PULMONARY EMBOLISM WITHOUT ACUTE COR PULMONALE (HCC): ICD-10-CM

## 2020-08-31 DIAGNOSIS — D50.9 IRON DEFICIENCY ANEMIA, UNSPECIFIED IRON DEFICIENCY ANEMIA TYPE: Primary | ICD-10-CM

## 2020-08-31 DIAGNOSIS — I82.403 DEEP VEIN THROMBOSIS (DVT) OF BOTH LOWER EXTREMITIES, UNSPECIFIED CHRONICITY, UNSPECIFIED VEIN (HCC): ICD-10-CM

## 2020-08-31 DIAGNOSIS — Z79.01 LONG TERM (CURRENT) USE OF ANTICOAGULANTS: ICD-10-CM

## 2020-08-31 LAB — INR PPP: 1.8 (ref 0.9–1.1)

## 2020-08-31 PROCEDURE — 85610 PROTHROMBIN TIME: CPT | Performed by: NURSE PRACTITIONER

## 2020-08-31 PROCEDURE — 99211 OFF/OP EST MAY X REQ PHY/QHP: CPT | Performed by: NURSE PRACTITIONER

## 2020-08-31 PROCEDURE — 99212 OFFICE O/P EST SF 10 MIN: CPT | Performed by: INTERNAL MEDICINE

## 2020-08-31 NOTE — PROGRESS NOTES
"Today's INR is 1.8.   Pt denies missed coumadin doses or consuming too much green.  Pt states medications have not changed; no bleeding issues.  Adjusted coumadin dose and instructed pt to limit green intake for two days.  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  Instructions verbalized.  Patient instructed regarding medication; results given and questions answered. Nutritional counseling given.  Dietary factors affecting therapy addressed.  Patient instructed to monitor for excessive bruising or bleeding.  Findings reported by Florida Kim RN.       This document has been electronically signed by SIMONE Santos-BC @  On August 31, 2020 09:36      "

## 2020-09-02 RX ORDER — TELMISARTAN 80 MG/1
TABLET ORAL
Qty: 30 TABLET | Refills: 2 | Status: SHIPPED | OUTPATIENT
Start: 2020-09-02 | End: 2020-09-03 | Stop reason: SDUPTHER

## 2020-09-02 RX ORDER — TELMISARTAN 80 MG/1
TABLET ORAL
Qty: 30 TABLET | Refills: 2 | Status: SHIPPED | OUTPATIENT
Start: 2020-09-02 | End: 2020-09-02 | Stop reason: SDUPTHER

## 2020-09-03 RX ORDER — TELMISARTAN 80 MG/1
TABLET ORAL
Qty: 30 TABLET | Refills: 11 | Status: SHIPPED | OUTPATIENT
Start: 2020-09-03 | End: 2021-07-15 | Stop reason: HOSPADM

## 2020-09-11 RX ORDER — GUANFACINE 2 MG/1
TABLET, EXTENDED RELEASE ORAL
Qty: 90 TABLET | Refills: 1 | Status: SHIPPED | OUTPATIENT
Start: 2020-09-11 | End: 2021-06-29 | Stop reason: SDUPTHER

## 2020-09-14 ENCOUNTER — ANTICOAGULATION VISIT (OUTPATIENT)
Dept: CARDIAC SURGERY | Facility: CLINIC | Age: 70
End: 2020-09-14

## 2020-09-14 VITALS — OXYGEN SATURATION: 98 % | HEART RATE: 58 BPM

## 2020-09-14 DIAGNOSIS — I26.99 OTHER ACUTE PULMONARY EMBOLISM WITHOUT ACUTE COR PULMONALE (HCC): ICD-10-CM

## 2020-09-14 DIAGNOSIS — Z79.01 LONG TERM (CURRENT) USE OF ANTICOAGULANTS: ICD-10-CM

## 2020-09-14 DIAGNOSIS — I82.403 DEEP VEIN THROMBOSIS (DVT) OF BOTH LOWER EXTREMITIES, UNSPECIFIED CHRONICITY, UNSPECIFIED VEIN (HCC): ICD-10-CM

## 2020-09-14 LAB — INR PPP: 2.8 (ref 0.9–1.1)

## 2020-09-14 PROCEDURE — 85610 PROTHROMBIN TIME: CPT | Performed by: NURSE PRACTITIONER

## 2020-09-14 NOTE — PROGRESS NOTES
Today's INR is 2.8.   Patient states no med changes or bleeding problems or unexplained bruising. Patient instructed to continue current dosing schedule. Verbalizes understanding. Will recheck in 3 wks.  Patient instructed regarding medication; results given and questions answered. Nutritional counseling given.  Dietary factors affecting therapy addressed.  Patient instructed to monitor for excessive bruising or bleeding.  Findings reported by Florida Kim RN.       This document has been electronically signed by MAUREEN SantosCNP-BC @  On September 14, 2020 08:28 CDT

## 2020-09-24 RX ORDER — ATORVASTATIN CALCIUM 40 MG/1
TABLET, FILM COATED ORAL
Qty: 90 TABLET | Refills: 0 | Status: SHIPPED | OUTPATIENT
Start: 2020-09-24 | End: 2020-12-21

## 2020-10-05 ENCOUNTER — ANTICOAGULATION VISIT (OUTPATIENT)
Dept: CARDIAC SURGERY | Facility: CLINIC | Age: 70
End: 2020-10-05

## 2020-10-05 VITALS — HEART RATE: 77 BPM | OXYGEN SATURATION: 99 %

## 2020-10-05 DIAGNOSIS — Z79.01 LONG TERM (CURRENT) USE OF ANTICOAGULANTS: ICD-10-CM

## 2020-10-05 DIAGNOSIS — I82.403 DEEP VEIN THROMBOSIS (DVT) OF BOTH LOWER EXTREMITIES, UNSPECIFIED CHRONICITY, UNSPECIFIED VEIN (HCC): ICD-10-CM

## 2020-10-05 DIAGNOSIS — I26.99 OTHER ACUTE PULMONARY EMBOLISM WITHOUT ACUTE COR PULMONALE (HCC): ICD-10-CM

## 2020-10-05 LAB — INR PPP: 2.1 (ref 0.9–1.1)

## 2020-10-05 PROCEDURE — 85610 PROTHROMBIN TIME: CPT | Performed by: NURSE PRACTITIONER

## 2020-10-05 NOTE — PROGRESS NOTES
Today's INR is 2.1.   Patient states no med changes or bleeding problems or unexplained bruising. Patient instructed to continue current dosing schedule. Verbalizes understanding. Will recheck 1 month.   Patient instructed regarding medication; results given and questions answered. Nutritional counseling given.  Dietary factors affecting therapy addressed.  Patient instructed to monitor for excessive bruising or bleeding.  Findings reported by Florida Kim RN.       This document has been electronically signed by Everett Ramirez AGACNP-BC @  On October 5, 2020 08:00 CDT

## 2020-10-07 RX ORDER — SUCRALFATE 1 G/1
TABLET ORAL
Qty: 120 TABLET | Refills: 5 | Status: SHIPPED | OUTPATIENT
Start: 2020-10-07 | End: 2020-11-17 | Stop reason: SDUPTHER

## 2020-10-07 RX ORDER — SUCRALFATE 1 G/1
1 TABLET ORAL 4 TIMES DAILY
Qty: 120 TABLET | Refills: 6 | Status: SHIPPED | OUTPATIENT
Start: 2020-10-07 | End: 2021-05-10

## 2020-10-12 RX ORDER — MELOXICAM 15 MG/1
TABLET ORAL
Qty: 90 TABLET | Refills: 1 | Status: SHIPPED | OUTPATIENT
Start: 2020-10-12 | End: 2021-04-16

## 2020-11-02 ENCOUNTER — ANTICOAGULATION VISIT (OUTPATIENT)
Dept: CARDIAC SURGERY | Facility: CLINIC | Age: 70
End: 2020-11-02

## 2020-11-02 ENCOUNTER — LAB (OUTPATIENT)
Dept: LAB | Facility: HOSPITAL | Age: 70
End: 2020-11-02

## 2020-11-02 VITALS — OXYGEN SATURATION: 98 % | HEART RATE: 77 BPM

## 2020-11-02 DIAGNOSIS — I82.403 DEEP VEIN THROMBOSIS (DVT) OF BOTH LOWER EXTREMITIES, UNSPECIFIED CHRONICITY, UNSPECIFIED VEIN (HCC): ICD-10-CM

## 2020-11-02 DIAGNOSIS — I26.99 OTHER ACUTE PULMONARY EMBOLISM WITHOUT ACUTE COR PULMONALE (HCC): ICD-10-CM

## 2020-11-02 DIAGNOSIS — I26.99 OTHER ACUTE PULMONARY EMBOLISM WITHOUT ACUTE COR PULMONALE (HCC): Primary | ICD-10-CM

## 2020-11-02 DIAGNOSIS — Z51.81 ENCOUNTER FOR THERAPEUTIC DRUG MONITORING: ICD-10-CM

## 2020-11-02 DIAGNOSIS — Z79.01 LONG TERM (CURRENT) USE OF ANTICOAGULANTS: ICD-10-CM

## 2020-11-02 LAB
INR PPP: 8.9 (ref 0.8–1.2)
PROTHROMBIN TIME: 80.4 SECONDS (ref 11.1–15.3)

## 2020-11-02 PROCEDURE — 85610 PROTHROMBIN TIME: CPT | Performed by: NURSE PRACTITIONER

## 2020-11-02 PROCEDURE — 36415 COLL VENOUS BLD VENIPUNCTURE: CPT | Performed by: NURSE PRACTITIONER

## 2020-11-02 PROCEDURE — 93793 ANTICOAG MGMT PT WARFARIN: CPT | Performed by: NURSE PRACTITIONER

## 2020-11-02 NOTE — PROGRESS NOTES
Today's INR is 8.9.   INR verified by lab today due to POCT in office reading greater than 8.0.  Pt states this morning when he took his meds, he noticed warfarin in his a.m med planner and is unsure how long he has been doubling up on coumadin, pt states his wife fills his planner.  Pt denies other medication changes, no bleeding issues.  Stat INR value called to DARION Kim per lab.  I instructed pt to hold today's coumadin dose and to increase Vit K intake today with a spinach or lavinia serving now and again for supper this evening.  Notify provider if you experience excessive bleeding from the nose, cuts, gums, rectum, urinary tract. Reddish or brown urine or stool. Vomiting of blood or hemorrhoidal bleeding. If major injury occurs present to the Emergency Department. Pt denies bleeding issues at this time. 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  Recheck INR tomorrow.  Pt verbalizes instructions.  Patient instructed regarding medication; results given and questions answered. Nutritional counseling given.  Dietary factors affecting therapy addressed.  Patient instructed to monitor for excessive bruising or bleeding.  Findings reported by Florida Kim RN.         This document has been electronically signed by Fara Combs, DOMENIC @ on November 2, 2020 09:13 CST

## 2020-11-03 ENCOUNTER — ANTICOAGULATION VISIT (OUTPATIENT)
Dept: CARDIAC SURGERY | Facility: CLINIC | Age: 70
End: 2020-11-03

## 2020-11-03 VITALS — OXYGEN SATURATION: 100 % | HEART RATE: 67 BPM

## 2020-11-03 DIAGNOSIS — Z79.01 LONG TERM (CURRENT) USE OF ANTICOAGULANTS: ICD-10-CM

## 2020-11-03 DIAGNOSIS — Z51.81 ENCOUNTER FOR THERAPEUTIC DRUG MONITORING: ICD-10-CM

## 2020-11-03 DIAGNOSIS — I82.409 DEEP VEIN THROMBOSIS (DVT) OF LOWER EXTREMITY, UNSPECIFIED CHRONICITY, UNSPECIFIED LATERALITY, UNSPECIFIED VEIN (HCC): ICD-10-CM

## 2020-11-03 DIAGNOSIS — I26.99 OTHER ACUTE PULMONARY EMBOLISM WITHOUT ACUTE COR PULMONALE (HCC): ICD-10-CM

## 2020-11-03 LAB — INR PPP: 5.8 (ref 0.9–1.1)

## 2020-11-03 PROCEDURE — 93793 ANTICOAG MGMT PT WARFARIN: CPT | Performed by: NURSE PRACTITIONER

## 2020-11-03 PROCEDURE — 36416 COLLJ CAPILLARY BLOOD SPEC: CPT | Performed by: NURSE PRACTITIONER

## 2020-11-03 PROCEDURE — 85610 PROTHROMBIN TIME: CPT | Performed by: NURSE PRACTITIONER

## 2020-11-03 NOTE — PROGRESS NOTES
Today's INR is 5.8.  Pt states he held coumadin yesterday and ate spinach, kale, and liver. Pt denies med changes or bleeding problems. Pt was instructed to hold coumadin again, eat large serving of spinach or kale today, and appt made for tomorrow; pt verbalized. Patient instructed regarding medication; results given and questions answered. Nutritional counseling given.  Dietary factors affecting therapy addressed.  Patient instructed to monitor for excessive bruising or bleeding. Notify provider if you experience excessive bleeding from the nose, cuts, gums, rectum, or urinary tract. Reddish or brown urine or stool. Vomiting of blood or hemorrhoidal bleeding. If major injury occurs present to the Emergency Department. Findings reported by Namita Kinsey RN.          This document has been electronically signed by Fara Combs, DOMENIC @ on November 3, 2020 08:45 CST

## 2020-11-04 ENCOUNTER — ANTICOAGULATION VISIT (OUTPATIENT)
Dept: CARDIAC SURGERY | Facility: CLINIC | Age: 70
End: 2020-11-04

## 2020-11-04 VITALS — OXYGEN SATURATION: 100 % | HEART RATE: 72 BPM

## 2020-11-04 DIAGNOSIS — I26.99 OTHER ACUTE PULMONARY EMBOLISM WITHOUT ACUTE COR PULMONALE (HCC): ICD-10-CM

## 2020-11-04 DIAGNOSIS — Z51.81 ENCOUNTER FOR THERAPEUTIC DRUG MONITORING: ICD-10-CM

## 2020-11-04 DIAGNOSIS — Z79.01 LONG TERM (CURRENT) USE OF ANTICOAGULANTS: ICD-10-CM

## 2020-11-04 DIAGNOSIS — I82.409 DEEP VEIN THROMBOSIS (DVT) OF LOWER EXTREMITY, UNSPECIFIED CHRONICITY, UNSPECIFIED LATERALITY, UNSPECIFIED VEIN (HCC): ICD-10-CM

## 2020-11-04 LAB — INR PPP: 3.7 (ref 0.9–1.1)

## 2020-11-04 PROCEDURE — 93793 ANTICOAG MGMT PT WARFARIN: CPT | Performed by: NURSE PRACTITIONER

## 2020-11-04 PROCEDURE — 36416 COLLJ CAPILLARY BLOOD SPEC: CPT | Performed by: NURSE PRACTITIONER

## 2020-11-04 PROCEDURE — 85610 PROTHROMBIN TIME: CPT | Performed by: NURSE PRACTITIONER

## 2020-11-04 NOTE — PROGRESS NOTES
Today's INR is 3.7.  Pt here for recheck of elevated INR. Pt denies med changes or bleeding problems. Pt states he ate kale and held coumadin. Dose slightly adjusted and pt instructed to have a serving of broccoli today; pt verbalized. Patient instructed regarding medication; results given and questions answered. Nutritional counseling given.  Dietary factors affecting therapy addressed.  Patient instructed to monitor for excessive bruising or bleeding. Will recheck in 5 days. Findings reported by Namita Kinsey RN.          This document has been electronically signed by DOMENIC Kim @ on November 4, 2020 07:59 CST

## 2020-11-09 ENCOUNTER — ANTICOAGULATION VISIT (OUTPATIENT)
Dept: CARDIAC SURGERY | Facility: CLINIC | Age: 70
End: 2020-11-09

## 2020-11-09 VITALS — OXYGEN SATURATION: 98 % | HEART RATE: 72 BPM

## 2020-11-09 DIAGNOSIS — Z79.01 LONG TERM (CURRENT) USE OF ANTICOAGULANTS: ICD-10-CM

## 2020-11-09 DIAGNOSIS — I26.99 OTHER ACUTE PULMONARY EMBOLISM WITHOUT ACUTE COR PULMONALE (HCC): ICD-10-CM

## 2020-11-09 DIAGNOSIS — Z51.81 ENCOUNTER FOR THERAPEUTIC DRUG MONITORING: ICD-10-CM

## 2020-11-09 LAB — INR PPP: 1.1 (ref 0.9–1.1)

## 2020-11-09 PROCEDURE — 85610 PROTHROMBIN TIME: CPT | Performed by: NURSE PRACTITIONER

## 2020-11-09 PROCEDURE — 36416 COLLJ CAPILLARY BLOOD SPEC: CPT | Performed by: NURSE PRACTITIONER

## 2020-11-09 PROCEDURE — 93793 ANTICOAG MGMT PT WARFARIN: CPT | Performed by: NURSE PRACTITIONER

## 2020-11-09 NOTE — PROGRESS NOTES
PT states he did eat kale last night. Denies any med changes, bleeding issues or s/s of blood clot. PT states he has Lovenox 80mg left from a previous procedure. PT instructed to take Lovenox injection today and tomorrow 24 hours apart. PT instructed to avoid all green vegs. Recheck INR on Friday. Patient instructed regarding medication; results given and questions answered. Nutritional counseling given.  Dietary factors affecting therapy addressed.  Patient instructed to monitor for excessive bruising or bleeding. PT instructed to watch for any s/s of blood clot while INR is low. Recheck INR on Friday. Findings reported by Shahana Damon RN.   Today's INR is   Lab Results - Last 18 Months   Lab Units 11/09/20   INR  1.10

## 2020-11-13 ENCOUNTER — ANTICOAGULATION VISIT (OUTPATIENT)
Dept: CARDIAC SURGERY | Facility: CLINIC | Age: 70
End: 2020-11-13

## 2020-11-13 DIAGNOSIS — Z51.81 ENCOUNTER FOR THERAPEUTIC DRUG MONITORING: ICD-10-CM

## 2020-11-13 DIAGNOSIS — I82.409 DEEP VEIN THROMBOSIS (DVT) OF LOWER EXTREMITY, UNSPECIFIED CHRONICITY, UNSPECIFIED LATERALITY, UNSPECIFIED VEIN (HCC): ICD-10-CM

## 2020-11-13 DIAGNOSIS — Z79.01 LONG TERM (CURRENT) USE OF ANTICOAGULANTS: ICD-10-CM

## 2020-11-13 DIAGNOSIS — I26.99 OTHER ACUTE PULMONARY EMBOLISM WITHOUT ACUTE COR PULMONALE (HCC): ICD-10-CM

## 2020-11-13 LAB — INR PPP: 3.1 (ref 0.9–1.1)

## 2020-11-13 PROCEDURE — 36416 COLLJ CAPILLARY BLOOD SPEC: CPT | Performed by: NURSE PRACTITIONER

## 2020-11-13 PROCEDURE — 93793 ANTICOAG MGMT PT WARFARIN: CPT | Performed by: NURSE PRACTITIONER

## 2020-11-13 PROCEDURE — 85610 PROTHROMBIN TIME: CPT | Performed by: NURSE PRACTITIONER

## 2020-11-13 NOTE — PROGRESS NOTES
Pt denies med changes or bleeding problems. Dose not adjusted but rearranged. Pt instructed on dosing and to have a serving of green veggies today or tomorrow; pt verbalized. Patient instructed regarding medication; results given and questions answered. Nutritional counseling given.  Dietary factors affecting therapy addressed.  Patient instructed to monitor for excessive bruising or bleeding. Will recheck next week. Findings reported by Namita Kinsey RN.    Today's INR is   Lab Results - Last 18 Months   Lab Units 11/13/20   INR  3.10*

## 2020-11-16 ENCOUNTER — LAB (OUTPATIENT)
Dept: ONCOLOGY | Facility: HOSPITAL | Age: 70
End: 2020-11-16

## 2020-11-16 DIAGNOSIS — R71.8 OTHER ABNORMALITY OF RED BLOOD CELLS: ICD-10-CM

## 2020-11-16 DIAGNOSIS — D50.9 IRON DEFICIENCY ANEMIA, UNSPECIFIED IRON DEFICIENCY ANEMIA TYPE: ICD-10-CM

## 2020-11-16 DIAGNOSIS — Z79.01 LONG TERM (CURRENT) USE OF ANTICOAGULANTS: ICD-10-CM

## 2020-11-16 DIAGNOSIS — I26.99 OTHER ACUTE PULMONARY EMBOLISM WITHOUT ACUTE COR PULMONALE (HCC): ICD-10-CM

## 2020-11-16 LAB
ALBUMIN SERPL-MCNC: 4.2 G/DL (ref 3.5–5.2)
ALBUMIN/GLOB SERPL: 1.8 G/DL
ALP SERPL-CCNC: 72 U/L (ref 39–117)
ALT SERPL W P-5'-P-CCNC: 23 U/L (ref 1–41)
ANION GAP SERPL CALCULATED.3IONS-SCNC: 11 MMOL/L (ref 5–15)
AST SERPL-CCNC: 32 U/L (ref 1–40)
BASOPHILS # BLD AUTO: 0.05 10*3/MM3 (ref 0–0.2)
BASOPHILS NFR BLD AUTO: 0.4 % (ref 0–1.5)
BILIRUB SERPL-MCNC: 0.5 MG/DL (ref 0–1.2)
BUN SERPL-MCNC: 17 MG/DL (ref 8–23)
BUN/CREAT SERPL: 20.2 (ref 7–25)
CALCIUM SPEC-SCNC: 9.1 MG/DL (ref 8.6–10.5)
CHLORIDE SERPL-SCNC: 99 MMOL/L (ref 98–107)
CO2 SERPL-SCNC: 25 MMOL/L (ref 22–29)
CREAT SERPL-MCNC: 0.84 MG/DL (ref 0.76–1.27)
DEPRECATED RDW RBC AUTO: 43.4 FL (ref 37–54)
EOSINOPHIL # BLD AUTO: 0.04 10*3/MM3 (ref 0–0.4)
EOSINOPHIL NFR BLD AUTO: 0.3 % (ref 0.3–6.2)
ERYTHROCYTE [DISTWIDTH] IN BLOOD BY AUTOMATED COUNT: 12.3 % (ref 12.3–15.4)
FERRITIN SERPL-MCNC: 120.4 NG/ML (ref 30–400)
FOLATE SERPL-MCNC: >20 NG/ML (ref 4.78–24.2)
GFR SERPL CREATININE-BSD FRML MDRD: 91 ML/MIN/1.73
GLOBULIN UR ELPH-MCNC: 2.4 GM/DL
GLUCOSE SERPL-MCNC: 153 MG/DL (ref 65–99)
HCT VFR BLD AUTO: 40.7 % (ref 37.5–51)
HGB BLD-MCNC: 14.4 G/DL (ref 13–17.7)
IMM GRANULOCYTES # BLD AUTO: 0.05 10*3/MM3 (ref 0–0.05)
IMM GRANULOCYTES NFR BLD AUTO: 0.4 % (ref 0–0.5)
IRON 24H UR-MRATE: 78 MCG/DL (ref 59–158)
IRON SATN MFR SERPL: 21 % (ref 20–50)
LYMPHOCYTES # BLD AUTO: 0.89 10*3/MM3 (ref 0.7–3.1)
LYMPHOCYTES NFR BLD AUTO: 6.8 % (ref 19.6–45.3)
MCH RBC QN AUTO: 33.9 PG (ref 26.6–33)
MCHC RBC AUTO-ENTMCNC: 35.4 G/DL (ref 31.5–35.7)
MCV RBC AUTO: 95.8 FL (ref 79–97)
MONOCYTES # BLD AUTO: 0.92 10*3/MM3 (ref 0.1–0.9)
MONOCYTES NFR BLD AUTO: 7.1 % (ref 5–12)
NEUTROPHILS NFR BLD AUTO: 11.06 10*3/MM3 (ref 1.7–7)
NEUTROPHILS NFR BLD AUTO: 85 % (ref 42.7–76)
NRBC BLD AUTO-RTO: 0 /100 WBC (ref 0–0.2)
PLATELET # BLD AUTO: 218 10*3/MM3 (ref 140–450)
PMV BLD AUTO: 9.5 FL (ref 6–12)
POTASSIUM SERPL-SCNC: 4.2 MMOL/L (ref 3.5–5.2)
PROT SERPL-MCNC: 6.6 G/DL (ref 6–8.5)
RBC # BLD AUTO: 4.25 10*6/MM3 (ref 4.14–5.8)
SODIUM SERPL-SCNC: 135 MMOL/L (ref 136–145)
TIBC SERPL-MCNC: 378 MCG/DL (ref 298–536)
TRANSFERRIN SERPL-MCNC: 254 MG/DL (ref 200–360)
VIT B12 BLD-MCNC: 1333 PG/ML (ref 211–946)
WBC # BLD AUTO: 13.01 10*3/MM3 (ref 3.4–10.8)

## 2020-11-16 PROCEDURE — 83540 ASSAY OF IRON: CPT

## 2020-11-16 PROCEDURE — 82607 VITAMIN B-12: CPT

## 2020-11-16 PROCEDURE — 85025 COMPLETE CBC W/AUTO DIFF WBC: CPT

## 2020-11-16 PROCEDURE — 82728 ASSAY OF FERRITIN: CPT

## 2020-11-16 PROCEDURE — 80053 COMPREHEN METABOLIC PANEL: CPT

## 2020-11-16 PROCEDURE — 82746 ASSAY OF FOLIC ACID SERUM: CPT

## 2020-11-16 PROCEDURE — 84466 ASSAY OF TRANSFERRIN: CPT

## 2020-11-16 PROCEDURE — 36415 COLL VENOUS BLD VENIPUNCTURE: CPT | Performed by: INTERNAL MEDICINE

## 2020-11-17 ENCOUNTER — ANTICOAGULATION VISIT (OUTPATIENT)
Dept: CARDIAC SURGERY | Facility: CLINIC | Age: 70
End: 2020-11-17

## 2020-11-17 ENCOUNTER — OFFICE VISIT (OUTPATIENT)
Dept: ONCOLOGY | Facility: CLINIC | Age: 70
End: 2020-11-17

## 2020-11-17 VITALS — HEART RATE: 75 BPM | OXYGEN SATURATION: 98 %

## 2020-11-17 DIAGNOSIS — I26.99 OTHER ACUTE PULMONARY EMBOLISM WITHOUT ACUTE COR PULMONALE (HCC): Primary | ICD-10-CM

## 2020-11-17 DIAGNOSIS — I82.409 DEEP VEIN THROMBOSIS (DVT) OF LOWER EXTREMITY, UNSPECIFIED CHRONICITY, UNSPECIFIED LATERALITY, UNSPECIFIED VEIN (HCC): ICD-10-CM

## 2020-11-17 DIAGNOSIS — I82.622 ACUTE EMBOLISM AND THROMBOSIS OF DEEP VEINS OF LEFT UPPER EXTREMITY (HCC): ICD-10-CM

## 2020-11-17 DIAGNOSIS — D50.9 IRON DEFICIENCY ANEMIA, UNSPECIFIED IRON DEFICIENCY ANEMIA TYPE: ICD-10-CM

## 2020-11-17 DIAGNOSIS — Z79.01 LONG TERM (CURRENT) USE OF ANTICOAGULANTS: ICD-10-CM

## 2020-11-17 DIAGNOSIS — Z51.81 ENCOUNTER FOR THERAPEUTIC DRUG MONITORING: ICD-10-CM

## 2020-11-17 DIAGNOSIS — I82.403 DEEP VEIN THROMBOSIS (DVT) OF BOTH LOWER EXTREMITIES, UNSPECIFIED CHRONICITY, UNSPECIFIED VEIN (HCC): ICD-10-CM

## 2020-11-17 DIAGNOSIS — I26.99 OTHER ACUTE PULMONARY EMBOLISM WITHOUT ACUTE COR PULMONALE (HCC): ICD-10-CM

## 2020-11-17 LAB — INR PPP: 4.3 (ref 0.9–1.1)

## 2020-11-17 PROCEDURE — 1157F ADVNC CARE PLAN IN RCRD: CPT | Performed by: INTERNAL MEDICINE

## 2020-11-17 PROCEDURE — 85610 PROTHROMBIN TIME: CPT | Performed by: NURSE PRACTITIONER

## 2020-11-17 PROCEDURE — 99442 PR PHYS/QHP TELEPHONE EVALUATION 11-20 MIN: CPT | Performed by: INTERNAL MEDICINE

## 2020-11-17 PROCEDURE — G8731 PAIN NEG NO PLAN: HCPCS | Performed by: INTERNAL MEDICINE

## 2020-11-17 PROCEDURE — G9903 PT SCRN TBCO ID AS NON USER: HCPCS | Performed by: INTERNAL MEDICINE

## 2020-11-17 PROCEDURE — 93793 ANTICOAG MGMT PT WARFARIN: CPT | Performed by: NURSE PRACTITIONER

## 2020-11-17 PROCEDURE — 36416 COLLJ CAPILLARY BLOOD SPEC: CPT | Performed by: NURSE PRACTITIONER

## 2020-11-17 NOTE — PROGRESS NOTES
Pt states no changes to meds or diet.  No bleeding issues.  Unable to determine cause for elevation.  Adjusted coumadin dose and instructed pt to increase Vit K intake today with a broccoli serving.  Recheck INR this Friday.  Pt verbalizes.  Patient instructed regarding medication; results given and questions answered. Nutritional counseling given.  Dietary factors affecting therapy addressed.  Patient instructed to monitor for excessive bruising or bleeding.  Findings reported by Florida Kim RN.   Today's INR is   Lab Results - Last 18 Months   Lab Units 11/17/20   INR  4.30*
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
## 2020-11-20 ENCOUNTER — ANTICOAGULATION VISIT (OUTPATIENT)
Dept: CARDIAC SURGERY | Facility: CLINIC | Age: 70
End: 2020-11-20

## 2020-11-20 VITALS — HEART RATE: 77 BPM | OXYGEN SATURATION: 98 %

## 2020-11-20 DIAGNOSIS — Z79.01 LONG TERM (CURRENT) USE OF ANTICOAGULANTS: ICD-10-CM

## 2020-11-20 DIAGNOSIS — Z51.81 ENCOUNTER FOR THERAPEUTIC DRUG MONITORING: ICD-10-CM

## 2020-11-20 DIAGNOSIS — I26.99 OTHER ACUTE PULMONARY EMBOLISM WITHOUT ACUTE COR PULMONALE (HCC): ICD-10-CM

## 2020-11-20 DIAGNOSIS — I82.409 DEEP VEIN THROMBOSIS (DVT) OF LOWER EXTREMITY, UNSPECIFIED CHRONICITY, UNSPECIFIED LATERALITY, UNSPECIFIED VEIN (HCC): ICD-10-CM

## 2020-11-20 LAB — INR PPP: 2.7 (ref 0.9–1.1)

## 2020-11-20 PROCEDURE — 93793 ANTICOAG MGMT PT WARFARIN: CPT | Performed by: NURSE PRACTITIONER

## 2020-11-20 PROCEDURE — 36416 COLLJ CAPILLARY BLOOD SPEC: CPT | Performed by: NURSE PRACTITIONER

## 2020-11-20 PROCEDURE — 85610 PROTHROMBIN TIME: CPT | Performed by: NURSE PRACTITIONER

## 2020-11-20 NOTE — PROGRESS NOTES
Patient states no med changes or bleeding problems or unexplained bruising. Patient instructed to continue current dosing schedule. Verbalizes understanding. Will recheck in 10 days. Patient instructed regarding medication; results given and questions answered. Nutritional counseling given.  Dietary factors affecting therapy addressed.  Patient instructed to monitor for excessive bruising or bleeding. Findings reported by Namita Kinsey RN.    Today's INR is   Lab Results - Last 18 Months   Lab Units 11/20/20   INR  2.70*

## 2020-11-30 ENCOUNTER — ANTICOAGULATION VISIT (OUTPATIENT)
Dept: CARDIAC SURGERY | Facility: CLINIC | Age: 70
End: 2020-11-30

## 2020-11-30 ENCOUNTER — OFFICE VISIT (OUTPATIENT)
Dept: GASTROENTEROLOGY | Facility: CLINIC | Age: 70
End: 2020-11-30

## 2020-11-30 VITALS
SYSTOLIC BLOOD PRESSURE: 176 MMHG | DIASTOLIC BLOOD PRESSURE: 93 MMHG | WEIGHT: 191.4 LBS | HEART RATE: 77 BPM | HEIGHT: 71 IN | BODY MASS INDEX: 26.8 KG/M2

## 2020-11-30 VITALS — OXYGEN SATURATION: 96 % | HEART RATE: 83 BPM

## 2020-11-30 DIAGNOSIS — K21.9 GASTROESOPHAGEAL REFLUX DISEASE WITHOUT ESOPHAGITIS: Primary | ICD-10-CM

## 2020-11-30 DIAGNOSIS — I26.99 OTHER ACUTE PULMONARY EMBOLISM WITHOUT ACUTE COR PULMONALE (HCC): ICD-10-CM

## 2020-11-30 DIAGNOSIS — Z51.81 ENCOUNTER FOR THERAPEUTIC DRUG MONITORING: ICD-10-CM

## 2020-11-30 DIAGNOSIS — Z79.01 LONG TERM (CURRENT) USE OF ANTICOAGULANTS: ICD-10-CM

## 2020-11-30 DIAGNOSIS — I82.409 DEEP VEIN THROMBOSIS (DVT) OF LOWER EXTREMITY, UNSPECIFIED CHRONICITY, UNSPECIFIED LATERALITY, UNSPECIFIED VEIN (HCC): ICD-10-CM

## 2020-11-30 LAB — INR PPP: 4.4 (ref 0.9–1.1)

## 2020-11-30 PROCEDURE — 93793 ANTICOAG MGMT PT WARFARIN: CPT | Performed by: NURSE PRACTITIONER

## 2020-11-30 PROCEDURE — 99213 OFFICE O/P EST LOW 20 MIN: CPT | Performed by: INTERNAL MEDICINE

## 2020-11-30 PROCEDURE — 85610 PROTHROMBIN TIME: CPT | Performed by: NURSE PRACTITIONER

## 2020-11-30 PROCEDURE — 36416 COLLJ CAPILLARY BLOOD SPEC: CPT | Performed by: NURSE PRACTITIONER

## 2020-11-30 NOTE — PROGRESS NOTES
Pt states he has not consumed green in several days.  Pt denies bleeding issues.  Adjusted coumadin dose and instructed pt to increase Vit K intake today with a cup serving of turnip greens or broccoli.  Recheck INR this Thursday.  Pt verbalizes.  Patient instructed regarding medication; results given and questions answered. Nutritional counseling given.  Dietary factors affecting therapy addressed.  Patient instructed to monitor for excessive bruising or bleeding.  Findings reported by Florida Kim RN.   Today's INR is   Lab Results - Last 18 Months   Lab Units 11/30/20   INR  4.40*

## 2020-11-30 NOTE — PATIENT INSTRUCTIONS
"BMI for Adults  What is BMI?  Body mass index (BMI) is a number that is calculated from a person's weight and height. BMI can help estimate how much of a person's weight is composed of fat. BMI does not measure body fat directly. Rather, it is an alternative to procedures that directly measure body fat, which can be difficult and expensive.  BMI can help identify people who may be at higher risk for certain medical problems.  What are BMI measurements used for?  BMI is used as a screening tool to identify possible weight problems. It helps determine whether a person is obese, overweight, a healthy weight, or underweight.  BMI is useful for:  · Identifying a weight problem that may be related to a medical condition or may increase the risk for medical problems.  · Promoting changes, such as changes in diet and exercise, to help reach a healthy weight. BMI screening can be repeated to see if these changes are working.  How is BMI calculated?  BMI involves measuring your weight in relation to your height. Both height and weight are measured, and the BMI is calculated from those numbers. This can be done either in English ([place] or metric measurements. Note that charts and online BMI calculators are available to help you find your BMI quickly and easily without having to do these calculations yourself.  To calculate your BMI in English ([place] measurements:    1. Measure your weight in pounds (lb).  2. Multiply the number of pounds by 703.  ? For example, for a person who weighs 180 lb, multiply that number by 703, which equals 126,540.  3. Measure your height in inches. Then multiply that number by itself to get a measurement called \"inches squared.\"  ? For example, for a person who is 70 inches tall, the \"inches squared\" measurement is 70 inches x 70 inches, which equals 4,900 inches squared.  4. Divide the total from step 2 (number of lb x 703) by the total from step 3 (inches squared): 126,540 ÷ 4,900 = 25.8. This is " "your BMI.  To calculate your BMI in metric measurements:  1. Measure your weight in kilograms (kg).  2. Measure your height in meters (m). Then multiply that number by itself to get a measurement called \"meters squared.\"  ? For example, for a person who is 1.75 m tall, the \"meters squared\" measurement is 1.75 m x 1.75 m, which is equal to 3.1 meters squared.  3. Divide the number of kilograms (your weight) by the meters squared number. In this example: 70 ÷ 3.1 = 22.6. This is your BMI.  What do the results mean?  BMI charts are used to identify whether you are underweight, normal weight, overweight, or obese. The following guidelines will be used:  · Underweight: BMI less than 18.5.  · Normal weight: BMI between 18.5 and 24.9.  · Overweight: BMI between 25 and 29.9.  · Obese: BMI of 30 or above.  Keep these notes in mind:  · Weight includes both fat and muscle, so someone with a muscular build, such as an athlete, may have a BMI that is higher than 24.9. In cases like these, BMI is not an accurate measure of body fat.  · To determine if excess body fat is the cause of a BMI of 25 or higher, further assessments may need to be done by a health care provider.  · BMI is usually interpreted in the same way for men and women.  Where to find more information  For more information about BMI, including tools to quickly calculate your BMI, go to these websites:  · Centers for Disease Control and Prevention: www.cdc.gov  · American Heart Association: www.heart.org  · National Heart, Lung, and Blood [place]: www.nhlbi.nih.gov  Summary  · Body mass index (BMI) is a number that is calculated from a person's weight and height.  · BMI may help estimate how much of a person's weight is composed of fat. BMI can help identify those who may be at higher risk for certain medical problems.  · BMI can be measured using English measurements or metric measurements.  · BMI charts are used to identify whether you are underweight, normal " weight, overweight, or obese.  This information is not intended to replace advice given to you by your health care provider. Make sure you discuss any questions you have with your health care provider.  Document Revised: 09/09/2020 Document Reviewed: 07/17/2020  Elsevier Patient Education © 2020 [employer]

## 2020-12-03 ENCOUNTER — ANTICOAGULATION VISIT (OUTPATIENT)
Dept: CARDIAC SURGERY | Facility: CLINIC | Age: 70
End: 2020-12-03

## 2020-12-03 VITALS — OXYGEN SATURATION: 98 % | HEART RATE: 73 BPM

## 2020-12-03 DIAGNOSIS — I82.409 DEEP VEIN THROMBOSIS (DVT) OF LOWER EXTREMITY, UNSPECIFIED CHRONICITY, UNSPECIFIED LATERALITY, UNSPECIFIED VEIN (HCC): ICD-10-CM

## 2020-12-03 DIAGNOSIS — Z51.81 ENCOUNTER FOR THERAPEUTIC DRUG MONITORING: ICD-10-CM

## 2020-12-03 DIAGNOSIS — Z79.01 LONG TERM (CURRENT) USE OF ANTICOAGULANTS: ICD-10-CM

## 2020-12-03 DIAGNOSIS — I26.99 OTHER ACUTE PULMONARY EMBOLISM WITHOUT ACUTE COR PULMONALE (HCC): ICD-10-CM

## 2020-12-03 LAB — INR PPP: 2.6 (ref 0.9–1.1)

## 2020-12-03 PROCEDURE — 85610 PROTHROMBIN TIME: CPT | Performed by: NURSE PRACTITIONER

## 2020-12-03 PROCEDURE — 36416 COLLJ CAPILLARY BLOOD SPEC: CPT | Performed by: NURSE PRACTITIONER

## 2020-12-03 PROCEDURE — 93793 ANTICOAG MGMT PT WARFARIN: CPT | Performed by: NURSE PRACTITIONER

## 2020-12-03 NOTE — PROGRESS NOTES
Pt denies med changes or bleeding problems. Dose slightly adjusted from the dose he elevated on and appt made for next week. Patient instructed regarding medication; results given and questions answered. Nutritional counseling given.  Dietary factors affecting therapy addressed.  Patient instructed to monitor for excessive bruising or bleeding. Findings reported by Namita Kinsey RN.    Today's INR is   Lab Results - Last 18 Months   Lab Units 12/03/20   INR  2.60*

## 2020-12-09 ENCOUNTER — ANTICOAGULATION VISIT (OUTPATIENT)
Dept: CARDIAC SURGERY | Facility: CLINIC | Age: 70
End: 2020-12-09

## 2020-12-09 VITALS — HEART RATE: 86 BPM | OXYGEN SATURATION: 98 %

## 2020-12-09 DIAGNOSIS — I82.409 DEEP VEIN THROMBOSIS (DVT) OF LOWER EXTREMITY, UNSPECIFIED CHRONICITY, UNSPECIFIED LATERALITY, UNSPECIFIED VEIN (HCC): ICD-10-CM

## 2020-12-09 DIAGNOSIS — Z51.81 ENCOUNTER FOR THERAPEUTIC DRUG MONITORING: ICD-10-CM

## 2020-12-09 DIAGNOSIS — I26.99 OTHER ACUTE PULMONARY EMBOLISM WITHOUT ACUTE COR PULMONALE (HCC): ICD-10-CM

## 2020-12-09 DIAGNOSIS — Z79.01 LONG TERM (CURRENT) USE OF ANTICOAGULANTS: ICD-10-CM

## 2020-12-09 LAB — INR PPP: 3.2 (ref 0.9–1.1)

## 2020-12-09 PROCEDURE — 93793 ANTICOAG MGMT PT WARFARIN: CPT | Performed by: NURSE PRACTITIONER

## 2020-12-09 PROCEDURE — 85610 PROTHROMBIN TIME: CPT | Performed by: NURSE PRACTITIONER

## 2020-12-09 PROCEDURE — 36416 COLLJ CAPILLARY BLOOD SPEC: CPT | Performed by: NURSE PRACTITIONER

## 2020-12-09 NOTE — PROGRESS NOTES
Pt states no changes to meds or diet.  No bleeding issues.  Instructed pt to increase Vit K intake to 3 X weekly instead of twice.  Recheck INR next wk.  Pt verbalizes.  Patient instructed regarding medication; results given and questions answered. Nutritional counseling given.  Dietary factors affecting therapy addressed.  Patient instructed to monitor for excessive bruising or bleeding.  Findings reported by Florida Kim RN.   Today's INR is   Lab Results - Last 18 Months   Lab Units 12/09/20   INR  3.20*

## 2020-12-15 RX ORDER — OMEPRAZOLE 40 MG/1
CAPSULE, DELAYED RELEASE ORAL
Qty: 30 CAPSULE | Refills: 10 | Status: SHIPPED | OUTPATIENT
Start: 2020-12-15 | End: 2021-11-19

## 2020-12-17 ENCOUNTER — ANTICOAGULATION VISIT (OUTPATIENT)
Dept: CARDIAC SURGERY | Facility: CLINIC | Age: 70
End: 2020-12-17

## 2020-12-17 DIAGNOSIS — I26.99 OTHER ACUTE PULMONARY EMBOLISM WITHOUT ACUTE COR PULMONALE (HCC): ICD-10-CM

## 2020-12-17 DIAGNOSIS — I82.409 DEEP VEIN THROMBOSIS (DVT) OF LOWER EXTREMITY, UNSPECIFIED CHRONICITY, UNSPECIFIED LATERALITY, UNSPECIFIED VEIN (HCC): ICD-10-CM

## 2020-12-17 DIAGNOSIS — Z51.81 ENCOUNTER FOR THERAPEUTIC DRUG MONITORING: ICD-10-CM

## 2020-12-17 DIAGNOSIS — Z79.01 LONG TERM (CURRENT) USE OF ANTICOAGULANTS: ICD-10-CM

## 2020-12-17 LAB — INR PPP: 6.2 (ref 0.9–1.1)

## 2020-12-17 PROCEDURE — 36416 COLLJ CAPILLARY BLOOD SPEC: CPT | Performed by: NURSE PRACTITIONER

## 2020-12-17 PROCEDURE — 93793 ANTICOAG MGMT PT WARFARIN: CPT | Performed by: NURSE PRACTITIONER

## 2020-12-17 PROCEDURE — 85610 PROTHROMBIN TIME: CPT | Performed by: NURSE PRACTITIONER

## 2020-12-17 NOTE — PROGRESS NOTES
Unable to determine cause of elevation. PT states he has been eating less, only one meal and a snack per day. PT has ate green several times since last visit. PT states he has drank some alcohol, but not as much as he had drank prior to elevations in the past. Denies any med changes, bleeding issues or s/s of blood clot. Pt instructed to hold Coumadin tonight and increase vitamin k with a serving of dark green at lunch and supper. Instructed to watch for any s/s of bleeding and report bleeding or blunt trauma to ER. Patient instructed regarding medication; results given and questions answered. Nutritional counseling given.  Dietary factors affecting therapy addressed.  Patient instructed to monitor for excessive bruising or bleeding. PT verbalizes understanding. Findings reported by Shahana Damon RN.   Today's INR is   Lab Results - Last 18 Months   Lab Units 12/17/20   INR  6.20*

## 2020-12-18 ENCOUNTER — ANTICOAGULATION VISIT (OUTPATIENT)
Dept: CARDIAC SURGERY | Facility: CLINIC | Age: 70
End: 2020-12-18

## 2020-12-18 VITALS — OXYGEN SATURATION: 99 % | HEART RATE: 82 BPM

## 2020-12-18 DIAGNOSIS — Z79.01 LONG TERM (CURRENT) USE OF ANTICOAGULANTS: ICD-10-CM

## 2020-12-18 DIAGNOSIS — I82.409 DEEP VEIN THROMBOSIS (DVT) OF LOWER EXTREMITY, UNSPECIFIED CHRONICITY, UNSPECIFIED LATERALITY, UNSPECIFIED VEIN (HCC): ICD-10-CM

## 2020-12-18 DIAGNOSIS — I26.99 OTHER ACUTE PULMONARY EMBOLISM WITHOUT ACUTE COR PULMONALE (HCC): ICD-10-CM

## 2020-12-18 DIAGNOSIS — Z51.81 ENCOUNTER FOR THERAPEUTIC DRUG MONITORING: ICD-10-CM

## 2020-12-18 LAB — INR PPP: 3.9 (ref 0.9–1.1)

## 2020-12-18 PROCEDURE — 36416 COLLJ CAPILLARY BLOOD SPEC: CPT | Performed by: NURSE PRACTITIONER

## 2020-12-18 PROCEDURE — 85610 PROTHROMBIN TIME: CPT | Performed by: NURSE PRACTITIONER

## 2020-12-18 PROCEDURE — 93793 ANTICOAG MGMT PT WARFARIN: CPT | Performed by: NURSE PRACTITIONER

## 2020-12-18 NOTE — PROGRESS NOTES
PT states he held Coumadin and ate green as directed. Denies any med changes or bleeding issues. Unwilling to further decrease pt's dose due to chronic DVT and that pt is on lesser Coumadin dose. Instructed to increase vit k today. Recheck INR next week. Patient instructed regarding medication; results given and questions answered. Nutritional counseling given.  Dietary factors affecting therapy addressed.  Patient instructed to monitor for excessive bruising or bleeding. PT verbalizes understanding. Findings reported by Shahana Damon RN.   Today's INR is   Lab Results - Last 18 Months   Lab Units 12/18/20   INR  3.90*

## 2020-12-21 RX ORDER — ATORVASTATIN CALCIUM 40 MG/1
TABLET, FILM COATED ORAL
Qty: 90 TABLET | Refills: 0 | Status: SHIPPED | OUTPATIENT
Start: 2020-12-21 | End: 2021-04-01

## 2020-12-22 ENCOUNTER — ANTICOAGULATION VISIT (OUTPATIENT)
Dept: CARDIAC SURGERY | Facility: CLINIC | Age: 70
End: 2020-12-22

## 2020-12-22 VITALS — HEART RATE: 77 BPM | OXYGEN SATURATION: 98 %

## 2020-12-22 DIAGNOSIS — Z51.81 ENCOUNTER FOR THERAPEUTIC DRUG MONITORING: ICD-10-CM

## 2020-12-22 DIAGNOSIS — Z79.01 LONG TERM (CURRENT) USE OF ANTICOAGULANTS: ICD-10-CM

## 2020-12-22 DIAGNOSIS — I26.99 OTHER ACUTE PULMONARY EMBOLISM WITHOUT ACUTE COR PULMONALE (HCC): ICD-10-CM

## 2020-12-22 DIAGNOSIS — I82.409 DEEP VEIN THROMBOSIS (DVT) OF LOWER EXTREMITY, UNSPECIFIED CHRONICITY, UNSPECIFIED LATERALITY, UNSPECIFIED VEIN (HCC): ICD-10-CM

## 2020-12-22 LAB — INR PPP: 2 (ref 0.9–1.1)

## 2020-12-22 PROCEDURE — 36416 COLLJ CAPILLARY BLOOD SPEC: CPT | Performed by: NURSE PRACTITIONER

## 2020-12-22 PROCEDURE — 85610 PROTHROMBIN TIME: CPT | Performed by: NURSE PRACTITIONER

## 2020-12-22 PROCEDURE — 93793 ANTICOAG MGMT PT WARFARIN: CPT | Performed by: NURSE PRACTITIONER

## 2020-12-22 NOTE — PROGRESS NOTES
Pt here today for recheck of elevated INR.  Pt denies bleeding issues.  Rearranged coumadin dose and will recheck INR next wk.  Pt verbalizes.  Patient instructed regarding medication; results given and questions answered. Nutritional counseling given.  Dietary factors affecting therapy addressed.  Patient instructed to monitor for excessive bruising or bleeding.  Findings reported by Florida Kim RN.   Today's INR is   Lab Results - Last 18 Months   Lab Units 12/22/20   INR  2.00*

## 2020-12-29 ENCOUNTER — ANTICOAGULATION VISIT (OUTPATIENT)
Dept: CARDIAC SURGERY | Facility: CLINIC | Age: 70
End: 2020-12-29

## 2020-12-29 VITALS — HEART RATE: 86 BPM | OXYGEN SATURATION: 98 %

## 2020-12-29 DIAGNOSIS — I26.99 OTHER ACUTE PULMONARY EMBOLISM WITHOUT ACUTE COR PULMONALE (HCC): ICD-10-CM

## 2020-12-29 DIAGNOSIS — Z79.01 LONG TERM (CURRENT) USE OF ANTICOAGULANTS: ICD-10-CM

## 2020-12-29 DIAGNOSIS — Z51.81 ENCOUNTER FOR THERAPEUTIC DRUG MONITORING: ICD-10-CM

## 2020-12-29 DIAGNOSIS — I82.409 DEEP VEIN THROMBOSIS (DVT) OF LOWER EXTREMITY, UNSPECIFIED CHRONICITY, UNSPECIFIED LATERALITY, UNSPECIFIED VEIN (HCC): ICD-10-CM

## 2020-12-29 LAB — INR PPP: 2.8 (ref 0.9–1.1)

## 2020-12-29 PROCEDURE — 36416 COLLJ CAPILLARY BLOOD SPEC: CPT | Performed by: NURSE PRACTITIONER

## 2020-12-29 PROCEDURE — 85610 PROTHROMBIN TIME: CPT | Performed by: NURSE PRACTITIONER

## 2020-12-29 PROCEDURE — 93793 ANTICOAG MGMT PT WARFARIN: CPT | Performed by: NURSE PRACTITIONER

## 2020-12-29 NOTE — PROGRESS NOTES
Patient states no med changes or bleeding problems or unexplained bruising. Patient instructed to continue current dosing schedule. Verbalizes understanding. Will recheck in 2 wks. Patient instructed regarding medication; results given and questions answered. Nutritional counseling given.  Dietary factors affecting therapy addressed.  Patient instructed to monitor for excessive bruising or bleeding.  Findings reported by Florida Kim RN.   Today's INR is   Lab Results - Last 18 Months   Lab Units 12/29/20   INR  2.80*

## 2021-01-12 ENCOUNTER — ANTICOAGULATION VISIT (OUTPATIENT)
Dept: CARDIAC SURGERY | Facility: CLINIC | Age: 71
End: 2021-01-12

## 2021-01-12 VITALS — OXYGEN SATURATION: 95 % | HEART RATE: 76 BPM

## 2021-01-12 DIAGNOSIS — Z79.01 LONG TERM (CURRENT) USE OF ANTICOAGULANTS: ICD-10-CM

## 2021-01-12 DIAGNOSIS — I26.99 OTHER ACUTE PULMONARY EMBOLISM WITHOUT ACUTE COR PULMONALE (HCC): ICD-10-CM

## 2021-01-12 DIAGNOSIS — I82.409 DEEP VEIN THROMBOSIS (DVT) OF LOWER EXTREMITY, UNSPECIFIED CHRONICITY, UNSPECIFIED LATERALITY, UNSPECIFIED VEIN (HCC): ICD-10-CM

## 2021-01-12 DIAGNOSIS — Z51.81 ENCOUNTER FOR THERAPEUTIC DRUG MONITORING: ICD-10-CM

## 2021-01-12 LAB — INR PPP: 1.8 (ref 0.9–1.1)

## 2021-01-12 PROCEDURE — 36416 COLLJ CAPILLARY BLOOD SPEC: CPT | Performed by: NURSE PRACTITIONER

## 2021-01-12 PROCEDURE — 85610 PROTHROMBIN TIME: CPT | Performed by: NURSE PRACTITIONER

## 2021-01-12 PROCEDURE — 93793 ANTICOAG MGMT PT WARFARIN: CPT | Performed by: NURSE PRACTITIONER

## 2021-01-12 NOTE — PROGRESS NOTES
Pt states he has had lavinia and kale the last two days.  Pt denies new medications or bleeding issue.  Adjusted coumadin dose for today only and instructed pt to limit green intake for two days.  Instructed pt to substitute turnip greens or broccoli for the darker green.  Recheck INR in two weeks.  Pt verbalizes.  Patient instructed regarding medication; results given and questions answered. Nutritional counseling given.  Dietary factors affecting therapy addressed.  Patient instructed to monitor for excessive bruising or bleeding.  Findings reported by Florida Kim RN.   Today's INR is   Lab Results - Last 18 Months   Lab Units 01/12/21   INR  1.80*

## 2021-01-26 ENCOUNTER — ANTICOAGULATION VISIT (OUTPATIENT)
Dept: CARDIAC SURGERY | Facility: CLINIC | Age: 71
End: 2021-01-26

## 2021-01-26 VITALS — OXYGEN SATURATION: 98 % | HEART RATE: 80 BPM

## 2021-01-26 DIAGNOSIS — I26.99 OTHER ACUTE PULMONARY EMBOLISM WITHOUT ACUTE COR PULMONALE (HCC): ICD-10-CM

## 2021-01-26 DIAGNOSIS — Z79.01 LONG TERM (CURRENT) USE OF ANTICOAGULANTS: ICD-10-CM

## 2021-01-26 DIAGNOSIS — Z51.81 ENCOUNTER FOR THERAPEUTIC DRUG MONITORING: ICD-10-CM

## 2021-01-26 DIAGNOSIS — I82.409 DEEP VEIN THROMBOSIS (DVT) OF LOWER EXTREMITY, UNSPECIFIED CHRONICITY, UNSPECIFIED LATERALITY, UNSPECIFIED VEIN (HCC): ICD-10-CM

## 2021-01-26 LAB — INR PPP: 2.7 (ref 0.9–1.1)

## 2021-01-26 PROCEDURE — 93793 ANTICOAG MGMT PT WARFARIN: CPT | Performed by: NURSE PRACTITIONER

## 2021-01-26 PROCEDURE — 85610 PROTHROMBIN TIME: CPT | Performed by: NURSE PRACTITIONER

## 2021-01-26 PROCEDURE — 36416 COLLJ CAPILLARY BLOOD SPEC: CPT | Performed by: NURSE PRACTITIONER

## 2021-01-26 NOTE — PROGRESS NOTES
Patient needs appointment prior to starting any controlled substances   Patient states no med changes or bleeding problems or unexplained bruising. Patient instructed to continue current dosing schedule. Verbalizes understanding. Will recheck in 2.5 wks. Patient instructed regarding medication; results given and questions answered. Nutritional counseling given.  Dietary factors affecting therapy addressed.  Patient instructed to monitor for excessive bruising or bleeding.  Findings reported by Florida Kim RN.   Today's INR is   Lab Results - Last 18 Months   Lab Units 01/26/21   INR  2.70*

## 2021-02-12 ENCOUNTER — ANTICOAGULATION VISIT (OUTPATIENT)
Dept: CARDIAC SURGERY | Facility: CLINIC | Age: 71
End: 2021-02-12

## 2021-02-12 ENCOUNTER — HOSPITAL ENCOUNTER (OUTPATIENT)
Dept: ULTRASOUND IMAGING | Facility: HOSPITAL | Age: 71
Discharge: HOME | End: 2021-02-12
Admitting: INTERNAL MEDICINE

## 2021-02-12 DIAGNOSIS — I26.99 OTHER ACUTE PULMONARY EMBOLISM WITHOUT ACUTE COR PULMONALE (HCC): ICD-10-CM

## 2021-02-12 DIAGNOSIS — Z51.81 ENCOUNTER FOR THERAPEUTIC DRUG MONITORING: ICD-10-CM

## 2021-02-12 DIAGNOSIS — I82.622 ACUTE EMBOLISM AND THROMBOSIS OF DEEP VEINS OF LEFT UPPER EXTREMITY (HCC): ICD-10-CM

## 2021-02-12 DIAGNOSIS — I82.403 DEEP VEIN THROMBOSIS (DVT) OF BOTH LOWER EXTREMITIES, UNSPECIFIED CHRONICITY, UNSPECIFIED VEIN (HCC): ICD-10-CM

## 2021-02-12 DIAGNOSIS — D50.9 IRON DEFICIENCY ANEMIA, UNSPECIFIED IRON DEFICIENCY ANEMIA TYPE: ICD-10-CM

## 2021-02-12 DIAGNOSIS — Z79.01 LONG TERM (CURRENT) USE OF ANTICOAGULANTS: ICD-10-CM

## 2021-02-12 DIAGNOSIS — I82.409 DEEP VEIN THROMBOSIS (DVT) OF LOWER EXTREMITY, UNSPECIFIED CHRONICITY, UNSPECIFIED LATERALITY, UNSPECIFIED VEIN (HCC): ICD-10-CM

## 2021-02-12 LAB — INR PPP: 4 (ref 0.9–1.1)

## 2021-02-12 PROCEDURE — 93970 EXTREMITY STUDY: CPT

## 2021-02-12 PROCEDURE — 36416 COLLJ CAPILLARY BLOOD SPEC: CPT | Performed by: NURSE PRACTITIONER

## 2021-02-12 PROCEDURE — 93793 ANTICOAG MGMT PT WARFARIN: CPT | Performed by: NURSE PRACTITIONER

## 2021-02-12 PROCEDURE — 85610 PROTHROMBIN TIME: CPT | Performed by: NURSE PRACTITIONER

## 2021-02-12 NOTE — PROGRESS NOTES
PT states he consumed alcohol yesterday. Denies any med changes, bleeding issues or s/s of blood clot. Adjusted pt's dose and instructed to have increase vit k today. PT instructed to watch for any s/s of bleeding and report bleeding or blunt trauma to ER. Recheck INR on Tuesday when returning to see [facility]. Patient instructed regarding medication; results given and questions answered. Nutritional counseling given.  Dietary factors affecting therapy addressed.  Patient instructed to monitor for excessive bruising or bleeding. PT verbalizes understanding. Findings reported by Shahana Damon RN.   Today's INR is   Lab Results - Last 18 Months   Lab Units 02/12/21   INR  4.00*

## 2021-02-16 ENCOUNTER — APPOINTMENT (OUTPATIENT)
Dept: ONCOLOGY | Facility: CLINIC | Age: 71
End: 2021-02-16

## 2021-02-22 ENCOUNTER — OFFICE VISIT (OUTPATIENT)
Dept: ONCOLOGY | Facility: CLINIC | Age: 71
End: 2021-02-22

## 2021-02-22 ENCOUNTER — TELEPHONE (OUTPATIENT)
Dept: ONCOLOGY | Facility: CLINIC | Age: 71
End: 2021-02-22

## 2021-02-22 ENCOUNTER — LAB (OUTPATIENT)
Dept: ONCOLOGY | Facility: HOSPITAL | Age: 71
End: 2021-02-22

## 2021-02-22 ENCOUNTER — ANTICOAGULATION VISIT (OUTPATIENT)
Dept: CARDIAC SURGERY | Facility: CLINIC | Age: 71
End: 2021-02-22

## 2021-02-22 VITALS
SYSTOLIC BLOOD PRESSURE: 188 MMHG | HEIGHT: 71 IN | RESPIRATION RATE: 18 BRPM | TEMPERATURE: 98.8 F | BODY MASS INDEX: 27.41 KG/M2 | WEIGHT: 195.8 LBS | DIASTOLIC BLOOD PRESSURE: 95 MMHG | HEART RATE: 73 BPM

## 2021-02-22 VITALS — OXYGEN SATURATION: 97 % | HEART RATE: 117 BPM

## 2021-02-22 DIAGNOSIS — I82.403 DEEP VEIN THROMBOSIS (DVT) OF BOTH LOWER EXTREMITIES, UNSPECIFIED CHRONICITY, UNSPECIFIED VEIN (HCC): ICD-10-CM

## 2021-02-22 DIAGNOSIS — I26.99 OTHER ACUTE PULMONARY EMBOLISM WITHOUT ACUTE COR PULMONALE (HCC): Primary | Chronic | ICD-10-CM

## 2021-02-22 DIAGNOSIS — I26.99 OTHER ACUTE PULMONARY EMBOLISM WITHOUT ACUTE COR PULMONALE (HCC): ICD-10-CM

## 2021-02-22 DIAGNOSIS — I82.409 DEEP VEIN THROMBOSIS (DVT) OF LOWER EXTREMITY, UNSPECIFIED CHRONICITY, UNSPECIFIED LATERALITY, UNSPECIFIED VEIN (HCC): ICD-10-CM

## 2021-02-22 DIAGNOSIS — I82.409 DEEP VEIN THROMBOSIS (DVT) OF LOWER EXTREMITY, UNSPECIFIED CHRONICITY, UNSPECIFIED LATERALITY, UNSPECIFIED VEIN (HCC): Chronic | ICD-10-CM

## 2021-02-22 DIAGNOSIS — D50.9 IRON DEFICIENCY ANEMIA, UNSPECIFIED IRON DEFICIENCY ANEMIA TYPE: ICD-10-CM

## 2021-02-22 DIAGNOSIS — Z79.01 LONG TERM (CURRENT) USE OF ANTICOAGULANTS: Chronic | ICD-10-CM

## 2021-02-22 DIAGNOSIS — Z79.01 LONG TERM (CURRENT) USE OF ANTICOAGULANTS: ICD-10-CM

## 2021-02-22 DIAGNOSIS — I82.622 ACUTE EMBOLISM AND THROMBOSIS OF DEEP VEINS OF LEFT UPPER EXTREMITY (HCC): ICD-10-CM

## 2021-02-22 DIAGNOSIS — Z51.81 ENCOUNTER FOR THERAPEUTIC DRUG MONITORING: ICD-10-CM

## 2021-02-22 PROBLEM — D64.9 ANEMIA: Chronic | Status: ACTIVE | Noted: 2019-02-27

## 2021-02-22 LAB
ALBUMIN SERPL-MCNC: 4.2 G/DL (ref 3.5–5.2)
ALBUMIN/GLOB SERPL: 1.6 G/DL
ALP SERPL-CCNC: 69 U/L (ref 39–117)
ALT SERPL W P-5'-P-CCNC: 29 U/L (ref 1–41)
ANION GAP SERPL CALCULATED.3IONS-SCNC: 11 MMOL/L (ref 5–15)
AST SERPL-CCNC: 30 U/L (ref 1–40)
BASOPHILS # BLD AUTO: 0.04 10*3/MM3 (ref 0–0.2)
BASOPHILS NFR BLD AUTO: 0.5 % (ref 0–1.5)
BILIRUB SERPL-MCNC: 0.6 MG/DL (ref 0–1.2)
BUN SERPL-MCNC: 18 MG/DL (ref 8–23)
BUN/CREAT SERPL: 20 (ref 7–25)
CALCIUM SPEC-SCNC: 8.7 MG/DL (ref 8.6–10.5)
CHLORIDE SERPL-SCNC: 105 MMOL/L (ref 98–107)
CO2 SERPL-SCNC: 26 MMOL/L (ref 22–29)
CREAT SERPL-MCNC: 0.9 MG/DL (ref 0.76–1.27)
DEPRECATED RDW RBC AUTO: 47.6 FL (ref 37–54)
EOSINOPHIL # BLD AUTO: 0.03 10*3/MM3 (ref 0–0.4)
EOSINOPHIL NFR BLD AUTO: 0.4 % (ref 0.3–6.2)
ERYTHROCYTE [DISTWIDTH] IN BLOOD BY AUTOMATED COUNT: 13.3 % (ref 12.3–15.4)
FERRITIN SERPL-MCNC: 125.5 NG/ML (ref 30–400)
FOLATE SERPL-MCNC: >20 NG/ML (ref 4.78–24.2)
GFR SERPL CREATININE-BSD FRML MDRD: 84 ML/MIN/1.73
GLOBULIN UR ELPH-MCNC: 2.7 GM/DL
GLUCOSE SERPL-MCNC: 115 MG/DL (ref 65–99)
HCT VFR BLD AUTO: 39.3 % (ref 37.5–51)
HGB BLD-MCNC: 13.4 G/DL (ref 13–17.7)
IMM GRANULOCYTES # BLD AUTO: 0.03 10*3/MM3 (ref 0–0.05)
IMM GRANULOCYTES NFR BLD AUTO: 0.4 % (ref 0–0.5)
INR PPP: 2.5 (ref 0.9–1.1)
IRON 24H UR-MRATE: 79 MCG/DL (ref 59–158)
IRON SATN MFR SERPL: 22 % (ref 20–50)
LYMPHOCYTES # BLD AUTO: 1.04 10*3/MM3 (ref 0.7–3.1)
LYMPHOCYTES NFR BLD AUTO: 14 % (ref 19.6–45.3)
MCH RBC QN AUTO: 33 PG (ref 26.6–33)
MCHC RBC AUTO-ENTMCNC: 34.1 G/DL (ref 31.5–35.7)
MCV RBC AUTO: 96.8 FL (ref 79–97)
MONOCYTES # BLD AUTO: 0.77 10*3/MM3 (ref 0.1–0.9)
MONOCYTES NFR BLD AUTO: 10.3 % (ref 5–12)
NEUTROPHILS NFR BLD AUTO: 5.53 10*3/MM3 (ref 1.7–7)
NEUTROPHILS NFR BLD AUTO: 74.4 % (ref 42.7–76)
NRBC BLD AUTO-RTO: 0 /100 WBC (ref 0–0.2)
PLATELET # BLD AUTO: 192 10*3/MM3 (ref 140–450)
PMV BLD AUTO: 9.1 FL (ref 6–12)
POTASSIUM SERPL-SCNC: 4.4 MMOL/L (ref 3.5–5.2)
PROT SERPL-MCNC: 6.9 G/DL (ref 6–8.5)
RBC # BLD AUTO: 4.06 10*6/MM3 (ref 4.14–5.8)
SODIUM SERPL-SCNC: 142 MMOL/L (ref 136–145)
TIBC SERPL-MCNC: 365 MCG/DL (ref 298–536)
TRANSFERRIN SERPL-MCNC: 245 MG/DL (ref 200–360)
VIT B12 BLD-MCNC: 1106 PG/ML (ref 211–946)
WBC # BLD AUTO: 7.44 10*3/MM3 (ref 3.4–10.8)

## 2021-02-22 PROCEDURE — 83540 ASSAY OF IRON: CPT

## 2021-02-22 PROCEDURE — 36416 COLLJ CAPILLARY BLOOD SPEC: CPT | Performed by: NURSE PRACTITIONER

## 2021-02-22 PROCEDURE — 84466 ASSAY OF TRANSFERRIN: CPT

## 2021-02-22 PROCEDURE — 82728 ASSAY OF FERRITIN: CPT

## 2021-02-22 PROCEDURE — 99214 OFFICE O/P EST MOD 30 MIN: CPT | Performed by: INTERNAL MEDICINE

## 2021-02-22 PROCEDURE — G9903 PT SCRN TBCO ID AS NON USER: HCPCS | Performed by: INTERNAL MEDICINE

## 2021-02-22 PROCEDURE — 80053 COMPREHEN METABOLIC PANEL: CPT

## 2021-02-22 PROCEDURE — 1126F AMNT PAIN NOTED NONE PRSNT: CPT | Performed by: INTERNAL MEDICINE

## 2021-02-22 PROCEDURE — G0463 HOSPITAL OUTPT CLINIC VISIT: HCPCS | Performed by: INTERNAL MEDICINE

## 2021-02-22 PROCEDURE — 82607 VITAMIN B-12: CPT

## 2021-02-22 PROCEDURE — 93793 ANTICOAG MGMT PT WARFARIN: CPT | Performed by: NURSE PRACTITIONER

## 2021-02-22 PROCEDURE — 85610 PROTHROMBIN TIME: CPT | Performed by: NURSE PRACTITIONER

## 2021-02-22 PROCEDURE — 82746 ASSAY OF FOLIC ACID SERUM: CPT

## 2021-02-22 PROCEDURE — 85025 COMPLETE CBC W/AUTO DIFF WBC: CPT

## 2021-02-22 PROCEDURE — 1157F ADVNC CARE PLAN IN RCRD: CPT | Performed by: INTERNAL MEDICINE

## 2021-02-22 NOTE — PROGRESS NOTES
Pt has been taken off coumadin as of today per Dr. Vega.  Pt denies med changes or bleeding issues.  Will resolve episode.  Findings reported by Florida Kim RN.   Today's INR is   Lab Results - Last 18 Months   Lab Units 02/22/21   INR  2.50*

## 2021-02-22 NOTE — TELEPHONE ENCOUNTER
Caller: NINOSKA ORTIZ    Relationship to patient: [employer]  Best call back number: [phone]    Chief complaint: NEEDS TO R/S June 21 APT TO June 22, KEEP SAME TIME JUST WANT TO SWITCH TO June 22.      Requested date: June 22, 7:30 AND8:00 AM     If rescheduling, when is the original appointment: June 21, 2021     Additional notes:PT STATES IF YOU CANT SWITH IT, THEY WILL KEEP ORIGINAL APT

## 2021-02-22 NOTE — PROGRESS NOTES
"DATE OF VISIT: 2/22/2021      REASON FOR VISIT: Bilateral DVT and pulmonary embolism on anticoagulation with Coumadin, anemia, elevated homocystine      HISTORY OF PRESENT ILLNESS:   70-year-old male with medical problems significant for hypertension, dyslipidemia, history of bilateral pulmonary embolism and bilateral DVT diagnosed in February 2019 for which patient is currently on Coumadin.  Patient is here for follow-up appointment today to discuss recent renal ultrasound as well as blood work.  Denies any bleeding.  Denies any new lymph node enlargement.  Denies any new chest pain or shortness of breath.  Denies any new onset of swelling of lower extremity.          Past Medical History, Past Surgical History, Social History, Family History have been reviewed and are without significant changes except as mentioned.    Review of Systems   Constitutional: Positive for fatigue.   Gastrointestinal: Negative for blood in stool.   Musculoskeletal: Positive for arthralgias.      A comprehensive 14 point review of systems was performed and was negative except as mentioned.    Medications:  The current medication list was reviewed in the EMR    ALLERGIES:    Allergies   Allergen Reactions   • Sulfa Antibiotics Rash   • Sulfamethoxazole-Trimethoprim Swelling   • Clindamycin/Lincomycin Hives   • Codeine Nausea Only       Objective      Vitals:    02/22/21 0926   BP: (!) 188/95   Pulse: 73   Resp: 18   Temp: 98.8 °F (37.1 °C)   TempSrc: Temporal   Weight: 88.8 kg (195 lb 12.8 oz)   Height: 180.3 cm (70.98\")   PainSc: 0-No pain     Current Status 2/22/2021   ECOG score 0       Physical Exam  Cardiovascular:      Rate and Rhythm: Normal rate and regular rhythm.      Heart sounds: Normal heart sounds.   Pulmonary:      Effort: Pulmonary effort is normal.      Breath sounds: Normal breath sounds.   Abdominal:      General: Bowel sounds are normal.      Palpations: Abdomen is soft.      Tenderness: There is no abdominal " tenderness.   Neurological:      Mental Status: He is alert and oriented to person, place, and time.           RECENT LABS:  Glucose   Date Value Ref Range Status   02/22/2021 115 (H) 65 - 99 mg/dL Final     Sodium   Date Value Ref Range Status   02/22/2021 142 136 - 145 mmol/L Final     Potassium   Date Value Ref Range Status   02/22/2021 4.4 3.5 - 5.2 mmol/L Final     CO2   Date Value Ref Range Status   02/22/2021 26.0 22.0 - 29.0 mmol/L Final     Chloride   Date Value Ref Range Status   02/22/2021 105 98 - 107 mmol/L Final     Anion Gap   Date Value Ref Range Status   02/22/2021 11.0 5.0 - 15.0 mmol/L Final     Creatinine   Date Value Ref Range Status   02/22/2021 0.90 0.76 - 1.27 mg/dL Final     BUN   Date Value Ref Range Status   02/22/2021 18 8 - 23 mg/dL Final     BUN/Creatinine Ratio   Date Value Ref Range Status   02/22/2021 20.0 7.0 - 25.0 Final     Calcium   Date Value Ref Range Status   02/22/2021 8.7 8.6 - 10.5 mg/dL Final     eGFR Non  Amer   Date Value Ref Range Status   02/22/2021 84 >60 mL/min/1.73 Final     Alkaline Phosphatase   Date Value Ref Range Status   02/22/2021 69 39 - 117 U/L Final     Total Protein   Date Value Ref Range Status   02/22/2021 6.9 6.0 - 8.5 g/dL Final     ALT (SGPT)   Date Value Ref Range Status   02/22/2021 29 1 - 41 U/L Final     AST (SGOT)   Date Value Ref Range Status   02/22/2021 30 1 - 40 U/L Final     Total Bilirubin   Date Value Ref Range Status   02/22/2021 0.6 0.0 - 1.2 mg/dL Final     Albumin   Date Value Ref Range Status   02/22/2021 4.20 3.50 - 5.20 g/dL Final     Globulin   Date Value Ref Range Status   02/22/2021 2.7 gm/dL Final     Lab Results   Component Value Date    WBC 7.44 02/22/2021    HGB 13.4 02/22/2021    HCT 39.3 02/22/2021    MCV 96.8 02/22/2021     02/22/2021     Lab Results   Component Value Date    NEUTROABS 5.53 02/22/2021    IRON 79 02/22/2021    IRON 78 11/16/2020    IRON 112 08/12/2020    TIBC 365 02/22/2021    TIBC 378  11/16/2020    TIBC 356 08/12/2020    LABIRON 22 02/22/2021    LABIRON 21 11/16/2020    LABIRON 31 08/12/2020    FERRITIN 125.50 02/22/2021    FERRITIN 120.40 11/16/2020    FERRITIN 139.40 08/12/2020    IIAMWJQE31 1,333 (H) 11/16/2020    CSTSVPWQ37 1,396 (H) 08/12/2020    TQBBWFFS86 1,248 (H) 05/27/2020    FOLATE >20.00 11/16/2020    FOLATE >20.00 08/12/2020    FOLATE >20.00 05/27/2020     No results found for: , LABCA2, AFPTM, HCGQUANT, , CHROMGRNA, [id number], CEA, REFLABREPO      PATHOLOGY:  * Cannot find OR log *         RADIOLOGY DATA :  Doppler venous ultrasound of bilateral lower extremity done on February 12, 2021 showed:    FINDINGS:      Utilizing compression and augmentation, there is no deep venous  thrombus in the common femoral, femoral, profunda femoris or  popliteal veins.      The peroneal, anterior tibial and the posterior tibial veins are  patent and compressible.       The bilateral greater saphenous veins at their respective  saphenofemoral junctions are patent and compressible.     The imaged portions of the remainder of the bilateral greater  saphenous veins are patent.     There are no subcutaneous fluid collections. The previously noted  thrombus in the bilateral gastrocnemius veins were not seen on  the current study.     IMPRESSION:     There is no acute deep venous thrombosis in the imaged deep veins  of the bilateral lower extremities.         No radiology results for the last 7 days        Assessment/Plan     1.  Bilateral lower extremity DVT and bilateral pulmonary embolism  -Patient was diagnosed with bilateral lower extremity DVT and pulmonary embolism in February 2019  -Hypercoagulable work-up done in February 2019 consisting of factor V Leyden mutation, prothrombin gene mutation, protein C, protein S, Antithrombin III, anticardiolipin antibodies, lupus anticoagulant was negative  -Homocystine level was elevated at 16.9 in February 2019  -Homocystine level, beta-2  glycoprotein antibody and anticardiolipin antibody done in May 2019 was negative  -Repeat CT angiogram done in August 2019 showed resolution of pulmonary embolism  -Recent Doppler ultrasound of bilateral lower extremity done shows resolution of gastrocnemius vein DVT.  -Due to resolution of DVT, option of stopping anticoagulation versus lifelong anticoagulation were discussed with patient and his family.  -Patient states he is tired of being on Coumadin for last 2 years and does not want to continue Coumadin at present.  -It was discussed with patient if he develops second episode of DVT or pulmonary embolism in his life, he will need lifelong anticoagulation after that.  -Recommend stopping Coumadin from today.  -Recommend starting aspirin 81 mg p.o. daily from February 24, 2021  -We will ask patient to return to clinic in 4 months with repeat CBC, iron studies, ferritin, B12, folate and CMP to be done on that day.        2.  Anemia:  -Hemoglobin is 13.4  -Patient remains on B12 and folic acid 3 times a week.  -We will ask patient to return to clinic in 4 months with repeat CBC, iron studies, ferritin, B12 and folate to be done on that day    3.  History of peptic ulcer disease    4.  Health maintenance: Patient does not smoke.  Had a colonoscopy in 2018    5. Advance Care Planning   ACP discussion was held with the patient during this visit. Patient has an advance directive in EMR which is still valid.                  PHQ-9 Total Score: 0   -Patient is not homicidal or suicidal.  No acute intervention required.    Jose Salinas Zazueta reports a pain score of 0.  Given his pain assessment as noted, treatment options were discussed and the following options were decided upon as a follow-up plan to address the patient's pain: No acute intervention required.         Dickson Vega MD  2/22/2021  10:57 CST        Part of this note may be an electronic transcription/translation of spoken language to printed text using  the Dragon Dictation System.          CC:

## 2021-02-23 ENCOUNTER — TELEPHONE (OUTPATIENT)
Dept: ONCOLOGY | Facility: HOSPITAL | Age: 71
End: 2021-02-23

## 2021-02-23 NOTE — TELEPHONE ENCOUNTER
----- Message from Dickson Vega MD sent at 2/23/2021  6:56 AM CST -----  Regarding: labs  Please let patient know, his iron level is adequate.  No need to start any iron replacement at present.  He needs to continue taking B12 and folic acid 3 times a week.  Thank you

## 2021-02-23 NOTE — TELEPHONE ENCOUNTER
Called and spoke with pt regarding lab results and continuing medications ordered by dr. Vega.  V/u obtained.

## 2021-03-01 ENCOUNTER — OFFICE VISIT (OUTPATIENT)
Dept: GASTROENTEROLOGY | Facility: CLINIC | Age: 71
End: 2021-03-01

## 2021-03-01 VITALS
SYSTOLIC BLOOD PRESSURE: 132 MMHG | WEIGHT: 191 LBS | DIASTOLIC BLOOD PRESSURE: 82 MMHG | HEIGHT: 70 IN | HEART RATE: 92 BPM | BODY MASS INDEX: 27.35 KG/M2

## 2021-03-01 DIAGNOSIS — K22.70 BARRETT'S ESOPHAGUS WITHOUT DYSPLASIA: Primary | ICD-10-CM

## 2021-03-01 PROCEDURE — 99213 OFFICE O/P EST LOW 20 MIN: CPT | Performed by: INTERNAL MEDICINE

## 2021-03-01 RX ORDER — ASPIRIN 81 MG/1
81 TABLET ORAL DAILY
COMMUNITY
End: 2021-08-09

## 2021-03-01 RX ORDER — DEXTROSE AND SODIUM CHLORIDE 5; .45 G/100ML; G/100ML
30 INJECTION, SOLUTION INTRAVENOUS CONTINUOUS PRN
Status: CANCELLED | OUTPATIENT
Start: 2021-03-16

## 2021-03-01 NOTE — PATIENT INSTRUCTIONS
MyPlate from USDA    MyPlate is an outline of a general healthy diet based on the 2010 Dietary Guidelines for Americans, from the U.[place]. Department of Agriculture (USDA). It sets guidelines for how much food you should eat from each food group based on your age, sex, and level of physical activity.  What are tips for following MyPlate?  To follow MyPlate recommendations:  · Eat a wide variety of fruits and vegetables, grains, and protein foods.  · Serve smaller portions and eat less food throughout the day.  · Limit portion sizes to avoid overeating.  · Enjoy your food.  · Get at least 150 minutes of exercise every week. This is about 30 minutes each day, 5 or more days per week.  It can be difficult to have every meal look like MyPlate. Think about MyPlate as eating guidelines for an entire day, rather than each individual meal.  Fruits and vegetables  · Make half of your plate fruits and vegetables.  · Eat many different colors of fruits and vegetables each day.  · For a 2,000 calorie daily food plan, eat:  ? 2½ cups of vegetables every day.  ? 2 cups of fruit every day.  · 1 cup is equal to:  ? 1 cup raw or cooked vegetables.  ? 1 cup raw fruit.  ? 1 medium-sized orange, apple, or banana.  ? 1 cup 100% fruit or vegetable juice.  ? 2 cups raw leafy greens, such as lettuce, spinach, or kale.  ? ½ cup dried fruit.  Grains  · One fourth of your plate should be grains.  · Make at least half of the grains you eat each day whole grains.  · For a 2,000 calorie daily food plan, eat 6 oz of grains every day.  · 1 oz is equal to:  ? 1 slice bread.  ? 1 cup cereal.  ? ½ cup cooked rice, cereal, or pasta.  Protein  · One fourth of your plate should be protein.  · Eat a wide variety of protein foods, including meat, poultry, fish, eggs, beans, nuts, and tofu.  · For a 2,000 calorie daily food plan, eat 5½ oz of protein every day.  · 1 oz is equal to:  ? 1 oz meat, poultry, or fish.  ? ¼ cup cooked beans.  ? 1 egg.  ? ½ oz nuts  or seeds.  ? 1 Tbsp peanut butter.  Dairy  · Drink fat-free or low-fat (1%) milk.  · Eat or drink dairy as a side to meals.  · For a 2,000 calorie daily food plan, eat or drink 3 cups of dairy every day.  · 1 cup is equal to:  ? 1 cup milk, yogurt, cottage cheese, or soy milk (soy beverage).  ? 2 oz processed cheese.  ? 1½ oz natural cheese.  Fats, oils, salt, and sugars  · Only small amounts of oils are recommended.  · Avoid foods that are high in calories and low in nutritional value (empty calories), like foods high in fat or added sugars.  · Choose foods that are low in salt (sodium). Choose foods that have less than 140 milligrams (mg) of sodium per serving.  · Drink water instead of sugary drinks. Drink enough water each day to keep your urine pale yellow.  Where to find support  · Work with your health care provider or a nutrition specialist (dietitian) to develop a customized eating plan that is right for you.  · Download an vince (mobile application) to help you track your daily food intake.  Where to find more information  · Go to ChooseMyPlate.gov for more information.  Summary  · MyPlate is a general guideline for healthy eating from the USDA. It is based on the 2010 Dietary Guidelines for Americans.  · In general, fruits and vegetables should take up ½ of your plate, grains should take up ¼ of your plate, and protein should take up ¼ of your plate.  This information is not intended to replace advice given to you by your health care provider. Make sure you discuss any questions you have with your health care provider.  Document Revised: 05/21/2020 Document Reviewed: 03/19/2018  Elsevier Patient Education © 2020 [employer]  BMI for Adults  What is BMI?  Body mass index (BMI) is a number that is calculated from a person's weight and height. BMI can help estimate how much of a person's weight is composed of fat. BMI does not measure body fat directly. Rather, it is an alternative to procedures that  "directly measure body fat, which can be difficult and expensive.  BMI can help identify people who may be at higher risk for certain medical problems.  What are BMI measurements used for?  BMI is used as a screening tool to identify possible weight problems. It helps determine whether a person is obese, overweight, a healthy weight, or underweight.  BMI is useful for:  · Identifying a weight problem that may be related to a medical condition or may increase the risk for medical problems.  · Promoting changes, such as changes in diet and exercise, to help reach a healthy weight. BMI screening can be repeated to see if these changes are working.  How is BMI calculated?  BMI involves measuring your weight in relation to your height. Both height and weight are measured, and the BMI is calculated from those numbers. This can be done either in English ([place] or metric measurements. Note that charts and online BMI calculators are available to help you find your BMI quickly and easily without having to do these calculations yourself.  To calculate your BMI in English ([place] measurements:    1. Measure your weight in pounds (lb).  2. Multiply the number of pounds by 703.  ? For example, for a person who weighs 180 lb, multiply that number by 703, which equals 126,540.  3. Measure your height in inches. Then multiply that number by itself to get a measurement called \"inches squared.\"  ? For example, for a person who is 70 inches tall, the \"inches squared\" measurement is 70 inches x 70 inches, which equals 4,900 inches squared.  4. Divide the total from step 2 (number of lb x 703) by the total from step 3 (inches squared): 126,540 ÷ 4,900 = 25.8. This is your BMI.  To calculate your BMI in metric measurements:  1. Measure your weight in kilograms (kg).  2. Measure your height in meters (m). Then multiply that number by itself to get a measurement called \"meters squared.\"  ? For example, for a person who is 1.75 m tall, the " "\"meters squared\" measurement is 1.75 m x 1.75 m, which is equal to 3.1 meters squared.  3. Divide the number of kilograms (your weight) by the meters squared number. In this example: 70 ÷ 3.1 = 22.6. This is your BMI.  What do the results mean?  BMI charts are used to identify whether you are underweight, normal weight, overweight, or obese. The following guidelines will be used:  · Underweight: BMI less than 18.5.  · Normal weight: BMI between 18.5 and 24.9.  · Overweight: BMI between 25 and 29.9.  · Obese: BMI of 30 or above.  Keep these notes in mind:  · Weight includes both fat and muscle, so someone with a muscular build, such as an athlete, may have a BMI that is higher than 24.9. In cases like these, BMI is not an accurate measure of body fat.  · To determine if excess body fat is the cause of a BMI of 25 or higher, further assessments may need to be done by a health care provider.  · BMI is usually interpreted in the same way for men and women.  Where to find more information  For more information about BMI, including tools to quickly calculate your BMI, go to these websites:  · Centers for Disease Control and Prevention: www.cdc.gov  · American Heart Association: www.heart.org  · National Heart, Lung, and Blood [place]: www.nhlbi.nih.gov  Summary  · Body mass index (BMI) is a number that is calculated from a person's weight and height.  · BMI may help estimate how much of a person's weight is composed of fat. BMI can help identify those who may be at higher risk for certain medical problems.  · BMI can be measured using English measurements or metric measurements.  · BMI charts are used to identify whether you are underweight, normal weight, overweight, or obese.  This information is not intended to replace advice given to you by your health care provider. Make sure you discuss any questions you have with your health care provider.  Document Revised: 09/09/2020 Document Reviewed: 07/17/2020  William " Patient Education © 2020 [employer]
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
## 2021-03-13 ENCOUNTER — LAB (OUTPATIENT)
Dept: LAB | Facility: HOSPITAL | Age: 71
End: 2021-03-13

## 2021-03-13 DIAGNOSIS — Z01.818 PREOP TESTING: Primary | ICD-10-CM

## 2021-03-13 PROCEDURE — C9803 HOPD COVID-19 SPEC COLLECT: HCPCS

## 2021-03-13 PROCEDURE — U0004 COV-19 TEST NON-CDC HGH THRU: HCPCS

## 2021-03-14 LAB — SARS-COV-2 ORF1AB RESP QL NAA+PROBE: NOT DETECTED

## 2021-03-16 ENCOUNTER — ANESTHESIA (OUTPATIENT)
Dept: GASTROENTEROLOGY | Facility: HOSPITAL | Age: 71
End: 2021-03-16

## 2021-03-16 ENCOUNTER — ANESTHESIA EVENT (OUTPATIENT)
Dept: GASTROENTEROLOGY | Facility: HOSPITAL | Age: 71
End: 2021-03-16

## 2021-03-16 ENCOUNTER — HOSPITAL ENCOUNTER (OUTPATIENT)
Facility: HOSPITAL | Age: 71
Setting detail: HOSPITAL OUTPATIENT SURGERY
Discharge: HOME | End: 2021-03-16
Attending: INTERNAL MEDICINE | Admitting: INTERNAL MEDICINE

## 2021-03-16 VITALS
RESPIRATION RATE: 20 BRPM | OXYGEN SATURATION: 97 % | HEIGHT: 70 IN | BODY MASS INDEX: 26.2 KG/M2 | SYSTOLIC BLOOD PRESSURE: 104 MMHG | HEART RATE: 55 BPM | DIASTOLIC BLOOD PRESSURE: 62 MMHG | WEIGHT: 182.98 LBS | TEMPERATURE: 98 F

## 2021-03-16 DIAGNOSIS — K22.70 BARRETT'S ESOPHAGUS WITHOUT DYSPLASIA: ICD-10-CM

## 2021-03-16 PROCEDURE — 43239 EGD BIOPSY SINGLE/MULTIPLE: CPT | Performed by: INTERNAL MEDICINE

## 2021-03-16 PROCEDURE — 25010000002 PROPOFOL 10 MG/ML EMULSION: Performed by: NURSE ANESTHETIST, CERTIFIED REGISTERED

## 2021-03-16 PROCEDURE — 88305 TISSUE EXAM BY PATHOLOGIST: CPT

## 2021-03-16 RX ORDER — PROPOFOL 10 MG/ML
VIAL (ML) INTRAVENOUS AS NEEDED
Status: DISCONTINUED | OUTPATIENT
Start: 2021-03-16 | End: 2021-03-16 | Stop reason: SURG

## 2021-03-16 RX ORDER — DEXTROSE AND SODIUM CHLORIDE 5; .45 G/100ML; G/100ML
30 INJECTION, SOLUTION INTRAVENOUS CONTINUOUS PRN
Status: DISCONTINUED | OUTPATIENT
Start: 2021-03-16 | End: 2021-03-16 | Stop reason: HOSPADM

## 2021-03-16 RX ORDER — LIDOCAINE HYDROCHLORIDE 20 MG/ML
INJECTION, SOLUTION INTRAVENOUS AS NEEDED
Status: DISCONTINUED | OUTPATIENT
Start: 2021-03-16 | End: 2021-03-16 | Stop reason: SURG

## 2021-03-16 RX ADMIN — PROPOFOL 100 MG: 10 INJECTION, EMULSION INTRAVENOUS at 16:05

## 2021-03-16 RX ADMIN — PROPOFOL 50 MG: 10 INJECTION, EMULSION INTRAVENOUS at 16:07

## 2021-03-16 RX ADMIN — DEXTROSE AND SODIUM CHLORIDE 30 ML/HR: 5; 450 INJECTION, SOLUTION INTRAVENOUS at 15:50

## 2021-03-16 RX ADMIN — LIDOCAINE HYDROCHLORIDE 60 MG: 20 INJECTION, SOLUTION INTRAVENOUS at 16:05

## 2021-03-16 NOTE — ANESTHESIA POSTPROCEDURE EVALUATION
Patient: Jose Zazueta    Procedure Summary     Date:  12/05/19 Room / Location:  [facility] OR 08 / [facility] OR    Anesthesia Start:  1212 Anesthesia Stop:  1448    Procedure:  FIRST METATARSOPHALANGEAL JOINT ARTHRODESIS AND SECOND METATARSAL OSTEOTOMY AND ALL OTHER INDICATED PROCEDURES (Left Foot) Diagnosis:       Hallux rigidus, left foot      (Hallux rigidus, left foot [M20.22])    Surgeon:  Anthony Moore DPM Provider:  David Herrera MD    Anesthesia Type:  general, regional ASA Status:  3          Anesthesia Type: MAC  Last vitals  BP       Temp       Pulse       Resp         SpO2         Post Anesthesia Care and Evaluation    Patient location during evaluation: PHASE II  Level of consciousness: sleepy but conscious  Pain score: 0  Pain management: adequate  Airway patency: patent  Anesthetic complications: No anesthetic complications  PONV Status: none  Cardiovascular status: acceptable and hemodynamically stable  Respiratory status: acceptable and spontaneous ventilation  Hydration status: acceptable

## 2021-03-16 NOTE — ANESTHESIA PREPROCEDURE EVALUATION
Anesthesia Evaluation     Patient summary reviewed   no history of anesthetic complications:  NPO Solid Status: > 8 hours  NPO Liquid Status: > 4 hours           Airway   Mallampati: II  TM distance: >3 FB  Neck ROM: full  No difficulty expected  Dental    (+) partials and poor dentition    Pulmonary - normal exam    breath sounds clear to auscultation  (+) pulmonary embolism,   (-) COPD, sleep apnea, not a smoker  Cardiovascular - normal exam  Exercise tolerance: good (4-7 METS)    ECG reviewed  PT is on anticoagulation therapy  Rhythm: regular  Rate: normal    (+) hypertension well controlled, DVT resolved, hyperlipidemia,   (-) valvular problems/murmurs, past MI, dysrhythmias, angina, cardiac stents, PVD    ROS comment: Sinus bradycardia  Right bundle branch block  Abnormal ECG  When compared with ECG of 06-FEB-2019 20:27,  QT has shortened  Confirmed by GAETANO    EF 56-60%  · Right ventricular cavity is mildly dilated.  · Left ventricular wall thickness is consistent with borderline concentric hypertrophy.  · Mild mitral valve regurgitation is present  · Mild to moderate tricuspid valve regurgitation is present.    Neuro/Psych- negative ROS  (-) seizures, CVA, dizziness/light headedness, psychiatric history  GI/Hepatic/Renal/Endo    (+)  GERD well controlled, PUD,    (-) hepatitis, liver disease, no renal disease, diabetes    Musculoskeletal         ROS comment: Right foot surgery in the past and now working on left  Abdominal    Substance History   (+) alcohol use (daily beer),   (-) drug use     OB/GYN          Other   arthritis,    history of cancer (skin)                      Anesthesia Plan    ASA 3     MAC     intravenous induction     Anesthetic plan, all risks, benefits, and alternatives have been provided, discussed and informed consent has been obtained with: patient.

## 2021-03-17 ENCOUNTER — OFFICE VISIT (OUTPATIENT)
Dept: FAMILY MEDICINE CLINIC | Facility: CLINIC | Age: 71
End: 2021-03-17

## 2021-03-17 ENCOUNTER — LAB (OUTPATIENT)
Dept: LAB | Facility: HOSPITAL | Age: 71
End: 2021-03-17

## 2021-03-17 VITALS
SYSTOLIC BLOOD PRESSURE: 130 MMHG | BODY MASS INDEX: 26.92 KG/M2 | TEMPERATURE: 97.7 F | WEIGHT: 188 LBS | DIASTOLIC BLOOD PRESSURE: 88 MMHG | HEIGHT: 70 IN

## 2021-03-17 DIAGNOSIS — M47.812 CERVICAL SPINE ARTHRITIS WITH NERVE PAIN: ICD-10-CM

## 2021-03-17 DIAGNOSIS — M79.2 CERVICAL SPINE ARTHRITIS WITH NERVE PAIN: ICD-10-CM

## 2021-03-17 DIAGNOSIS — M54.2 NECK PAIN: Primary | ICD-10-CM

## 2021-03-17 DIAGNOSIS — R20.2 NUMBNESS AND TINGLING OF RIGHT ARM: ICD-10-CM

## 2021-03-17 DIAGNOSIS — R73.9 HIGH BLOOD SUGAR: ICD-10-CM

## 2021-03-17 DIAGNOSIS — M43.6 TORTICOLLIS, ACUTE: ICD-10-CM

## 2021-03-17 DIAGNOSIS — R20.0 NUMBNESS AND TINGLING OF RIGHT ARM: ICD-10-CM

## 2021-03-17 LAB
ALBUMIN SERPL-MCNC: 4.2 G/DL (ref 3.5–5.2)
ALBUMIN/GLOB SERPL: 1.4 G/DL
ALP SERPL-CCNC: 73 U/L (ref 39–117)
ALT SERPL W P-5'-P-CCNC: 36 U/L (ref 1–41)
ANION GAP SERPL CALCULATED.3IONS-SCNC: 14.2 MMOL/L (ref 5–15)
AST SERPL-CCNC: 49 U/L (ref 1–40)
BASOPHILS # BLD AUTO: 0.07 10*3/MM3 (ref 0–0.2)
BASOPHILS NFR BLD AUTO: 1.2 % (ref 0–1.5)
BILIRUB SERPL-MCNC: 0.7 MG/DL (ref 0–1.2)
BUN SERPL-MCNC: 19 MG/DL (ref 8–23)
BUN/CREAT SERPL: 18.8 (ref 7–25)
CALCIUM SPEC-SCNC: 9.1 MG/DL (ref 8.6–10.5)
CHLORIDE SERPL-SCNC: 96 MMOL/L (ref 98–107)
CHOLEST SERPL-MCNC: 182 MG/DL (ref 0–200)
CO2 SERPL-SCNC: 27.8 MMOL/L (ref 22–29)
CREAT SERPL-MCNC: 1.01 MG/DL (ref 0.76–1.27)
DEPRECATED RDW RBC AUTO: 42 FL (ref 37–54)
EOSINOPHIL # BLD AUTO: 0.04 10*3/MM3 (ref 0–0.4)
EOSINOPHIL NFR BLD AUTO: 0.7 % (ref 0.3–6.2)
ERYTHROCYTE [DISTWIDTH] IN BLOOD BY AUTOMATED COUNT: 12.2 % (ref 12.3–15.4)
GFR SERPL CREATININE-BSD FRML MDRD: 73 ML/MIN/1.73
GLOBULIN UR ELPH-MCNC: 3.1 GM/DL
GLUCOSE SERPL-MCNC: 92 MG/DL (ref 65–99)
HCT VFR BLD AUTO: 38.3 % (ref 37.5–51)
HCV AB SER DONR QL: NORMAL
HDLC SERPL-MCNC: 95 MG/DL (ref 40–60)
HGB BLD-MCNC: 13.6 G/DL (ref 13–17.7)
IMM GRANULOCYTES # BLD AUTO: 0.03 10*3/MM3 (ref 0–0.05)
IMM GRANULOCYTES NFR BLD AUTO: 0.5 % (ref 0–0.5)
LDLC SERPL CALC-MCNC: 72 MG/DL (ref 0–100)
LDLC/HDLC SERPL: 0.73 {RATIO}
LYMPHOCYTES # BLD AUTO: 1.45 10*3/MM3 (ref 0.7–3.1)
LYMPHOCYTES NFR BLD AUTO: 25.1 % (ref 19.6–45.3)
MAGNESIUM SERPL-MCNC: 1.6 MG/DL (ref 1.6–2.4)
MCH RBC QN AUTO: 33.7 PG (ref 26.6–33)
MCHC RBC AUTO-ENTMCNC: 35.5 G/DL (ref 31.5–35.7)
MCV RBC AUTO: 95 FL (ref 79–97)
MONOCYTES # BLD AUTO: 0.69 10*3/MM3 (ref 0.1–0.9)
MONOCYTES NFR BLD AUTO: 11.9 % (ref 5–12)
NEUTROPHILS NFR BLD AUTO: 3.5 10*3/MM3 (ref 1.7–7)
NEUTROPHILS NFR BLD AUTO: 60.6 % (ref 42.7–76)
NRBC BLD AUTO-RTO: 0 /100 WBC (ref 0–0.2)
PLATELET # BLD AUTO: 337 10*3/MM3 (ref 140–450)
PMV BLD AUTO: 9.8 FL (ref 6–12)
POTASSIUM SERPL-SCNC: 4.4 MMOL/L (ref 3.5–5.2)
PROT SERPL-MCNC: 7.3 G/DL (ref 6–8.5)
PSA SERPL-MCNC: 3.68 NG/ML (ref 0–4)
RBC # BLD AUTO: 4.03 10*6/MM3 (ref 4.14–5.8)
SODIUM SERPL-SCNC: 138 MMOL/L (ref 136–145)
TRIGL SERPL-MCNC: 86 MG/DL (ref 0–150)
TSH SERPL DL<=0.05 MIU/L-ACNC: 1.02 UIU/ML (ref 0.27–4.2)
VIT B12 BLD-MCNC: >2000 PG/ML (ref 211–946)
VLDLC SERPL-MCNC: 15 MG/DL (ref 5–40)
WBC # BLD AUTO: 5.78 10*3/MM3 (ref 3.4–10.8)

## 2021-03-17 PROCEDURE — 82607 VITAMIN B-12: CPT | Performed by: NURSE PRACTITIONER

## 2021-03-17 PROCEDURE — 84443 ASSAY THYROID STIM HORMONE: CPT | Performed by: NURSE PRACTITIONER

## 2021-03-17 PROCEDURE — 83036 HEMOGLOBIN GLYCOSYLATED A1C: CPT | Performed by: NURSE PRACTITIONER

## 2021-03-17 PROCEDURE — 80053 COMPREHEN METABOLIC PANEL: CPT | Performed by: NURSE PRACTITIONER

## 2021-03-17 PROCEDURE — 36415 COLL VENOUS BLD VENIPUNCTURE: CPT | Performed by: NURSE PRACTITIONER

## 2021-03-17 PROCEDURE — 83735 ASSAY OF MAGNESIUM: CPT | Performed by: NURSE PRACTITIONER

## 2021-03-17 PROCEDURE — 85025 COMPLETE CBC W/AUTO DIFF WBC: CPT | Performed by: NURSE PRACTITIONER

## 2021-03-17 PROCEDURE — 86803 HEPATITIS C AB TEST: CPT | Performed by: NURSE PRACTITIONER

## 2021-03-17 PROCEDURE — 80061 LIPID PANEL: CPT | Performed by: NURSE PRACTITIONER

## 2021-03-17 PROCEDURE — 96372 THER/PROPH/DIAG INJ SC/IM: CPT | Performed by: NURSE PRACTITIONER

## 2021-03-17 PROCEDURE — 99214 OFFICE O/P EST MOD 30 MIN: CPT | Performed by: NURSE PRACTITIONER

## 2021-03-17 PROCEDURE — G0103 PSA SCREENING: HCPCS | Performed by: NURSE PRACTITIONER

## 2021-03-17 RX ORDER — HYDROCODONE BITARTRATE AND ACETAMINOPHEN 10; 325 MG/1; MG/1
1 TABLET ORAL EVERY 6 HOURS PRN
Qty: 15 TABLET | Refills: 0 | Status: SHIPPED | OUTPATIENT
Start: 2021-03-17 | End: 2021-07-15 | Stop reason: HOSPADM

## 2021-03-17 RX ORDER — KETOROLAC TROMETHAMINE 30 MG/ML
60 INJECTION, SOLUTION INTRAMUSCULAR; INTRAVENOUS ONCE
Status: COMPLETED | OUTPATIENT
Start: 2021-03-17 | End: 2021-03-17

## 2021-03-17 RX ORDER — BACLOFEN 10 MG/1
10 TABLET ORAL 3 TIMES DAILY
Qty: 90 TABLET | Refills: 3 | Status: SHIPPED | OUTPATIENT
Start: 2021-03-17 | End: 2021-07-19

## 2021-03-17 RX ORDER — TRIAMCINOLONE ACETONIDE 40 MG/ML
60 INJECTION, SUSPENSION INTRA-ARTICULAR; INTRAMUSCULAR ONCE
Status: COMPLETED | OUTPATIENT
Start: 2021-03-17 | End: 2021-03-17

## 2021-03-17 RX ADMIN — KETOROLAC TROMETHAMINE 60 MG: 30 INJECTION, SOLUTION INTRAMUSCULAR; INTRAVENOUS at 10:18

## 2021-03-17 RX ADMIN — TRIAMCINOLONE ACETONIDE 60 MG: 40 INJECTION, SUSPENSION INTRA-ARTICULAR; INTRAMUSCULAR at 10:19

## 2021-03-17 NOTE — PROGRESS NOTES
Chief Complaint   Patient presents with   • Neck Pain     started last thurs with a crick in his neck and headache     Subjective   Jose Zazueta is a 70 y.o. male.     Presents with neck pain now radiating to right arm, having severe headache related to neck pain-also went to er due to symptoms, presents today with severe pain with radiation of pain to right arm     Hypertension  This is a chronic problem. The current episode started more than 1 year ago. The problem has been resolved since onset. The problem is controlled. Associated symptoms include headaches, malaise/fatigue and neck pain. Pertinent negatives include no anxiety, chest pain, orthopnea, palpitations, peripheral edema, PND, shortness of breath or sweats. Risk factors for coronary artery disease include dyslipidemia, male gender, sedentary lifestyle and family history. The current treatment provides significant improvement. Compliance problems include diet.  There is no history of angina, kidney disease, CAD/MI, CVA, heart failure, left ventricular hypertrophy, PVD or retinopathy. There is no history of hypercortisolism or hyperparathyroidism.   Neck Pain   This is a new problem. The current episode started 1 to 4 weeks ago. The problem occurs daily. The problem has been rapidly worsening. The pain is associated with a twisting injury. The pain is present in the right side. The quality of the pain is described as cramping and stabbing. The pain is at a severity of 10/10. The pain is severe. The pain is same all the time. Stiffness is present all day. Associated symptoms include headaches, numbness, paresis and tingling. Pertinent negatives include no chest pain, fever, leg pain, pain with swallowing, photophobia, syncope, trouble swallowing, visual change, weakness or weight loss. He has tried bed rest, ice, NSAIDs, acetaminophen, heat, neck support, home exercises and muscle relaxants for the symptoms. The treatment provided no relief.          The following portions of the patient's history were reviewed and updated as appropriate: allergies, current medications, past social history and problem list.    Review of Systems   Constitutional: Positive for activity change, appetite change, diaphoresis and malaise/fatigue. Negative for fever, unexpected weight change and weight loss.   HENT: Negative for congestion, dental problem, drooling and trouble swallowing.    Eyes: Negative.  Negative for photophobia, pain, discharge, redness, itching and visual disturbance.   Respiratory: Negative.  Negative for apnea, cough, chest tightness, shortness of breath, wheezing and stridor.    Cardiovascular: Positive for leg swelling. Negative for chest pain, palpitations, orthopnea, syncope and PND.   Gastrointestinal: Negative.  Negative for abdominal distention, anal bleeding, blood in stool, constipation and diarrhea.        Hx of gerd controlled    Endocrine: Negative.  Negative for cold intolerance, heat intolerance, polydipsia, polyphagia and polyuria.   Genitourinary: Negative.    Musculoskeletal: Positive for arthralgias, back pain, gait problem, joint swelling, myalgias, neck pain and neck stiffness.        Neck pain right side with radiation of pain to right side of head radiating to right arm    Skin: Negative.  Negative for color change, pallor and rash.   Allergic/Immunologic: Negative.  Negative for food allergies and immunocompromised state.   Neurological: Positive for tingling, numbness and headaches. Negative for dizziness, tremors, seizures, syncope, facial asymmetry, speech difficulty, weakness and light-headedness.        Numbness tingling right arm    Hematological: Negative.  Negative for adenopathy. Does not bruise/bleed easily.   Psychiatric/Behavioral: Negative.  Negative for agitation, behavioral problems, confusion, decreased concentration, dysphoric mood, hallucinations, self-injury and suicidal ideas. The patient is not nervous/anxious and  "is not hyperactive.        Objective   /88   Temp 97.7 °F (36.5 °C) (Tympanic)   Ht 177.8 cm (70\")   Wt 85.3 kg (188 lb)   BMI 26.98 kg/m²   Physical Exam  Constitutional:       General: He is in acute distress.      Appearance: Normal appearance. He is ill-appearing.      Comments: Reports severe pain -cannot get comfortable today    HENT:      Head: Normocephalic.      Right Ear: Tympanic membrane normal.      Nose: Nose normal.      Mouth/Throat:      Mouth: Mucous membranes are moist.   Eyes:      Pupils: Pupils are equal, round, and reactive to light.   Cardiovascular:      Rate and Rhythm: Normal rate.   Pulmonary:      Effort: Pulmonary effort is normal.   Abdominal:      General: Abdomen is flat. There is no distension.      Palpations: There is no mass.      Tenderness: There is no abdominal tenderness. There is no guarding or rebound.      Hernia: No hernia is present.   Musculoskeletal:         General: Tenderness present. No swelling, deformity or signs of injury.      Cervical back: Spasms, torticollis, tenderness and bony tenderness present. No swelling, edema, deformity, erythema, signs of trauma, lacerations, rigidity or crepitus. Pain with movement present. Decreased range of motion.        Back:       Right lower leg: No edema.      Left lower leg: No edema.   Skin:     General: Skin is warm.      Coloration: Skin is not jaundiced or pale.      Findings: No bruising, erythema, lesion or rash.   Neurological:      Mental Status: He is alert.      Cranial Nerves: No cranial nerve deficit.      Sensory: No sensory deficit.      Motor: No weakness.      Coordination: Coordination normal.      Gait: Gait normal.      Deep Tendon Reflexes: Reflexes normal.         Assessment/Plan   Problems Addressed this Visit     None      Visit Diagnoses     Neck pain    -  Primary    Relevant Medications    HYDROcodone-acetaminophen (NORCO)  MG per tablet    baclofen (LIORESAL) 10 MG tablet    " ketorolac (TORADOL) injection 60 mg (Completed)    triamcinolone acetonide (KENALOG-40) injection 60 mg (Completed)    Other Relevant Orders    XR Spine Cervical 2 or 3 View (Completed)    Hemoglobin A1c    MRI Cervical Spine Without Contrast    High blood sugar        Relevant Medications    HYDROcodone-acetaminophen (NORCO)  MG per tablet    baclofen (LIORESAL) 10 MG tablet    ketorolac (TORADOL) injection 60 mg (Completed)    triamcinolone acetonide (KENALOG-40) injection 60 mg (Completed)    Other Relevant Orders    Hemoglobin A1c    Torticollis, acute        Relevant Orders    MRI Cervical Spine Without Contrast    Cervical spine arthritis with nerve pain        Relevant Orders    MRI Cervical Spine Without Contrast    Numbness and tingling of right arm          Diagnoses       Codes Comments    Neck pain    -  Primary ICD-10-CM: M54.2  ICD-9-CM: 723.1     High blood sugar     ICD-10-CM: R73.9  ICD-9-CM: 790.29     Torticollis, acute     ICD-10-CM: M43.6  ICD-9-CM: 723.5     Cervical spine arthritis with nerve pain     ICD-10-CM: M47.812, M79.2  ICD-9-CM: 720.9, 729.2     Numbness and tingling of right arm     ICD-10-CM: R20.0, R20.2  ICD-9-CM: 782.0            New Medications Ordered This Visit   Medications   • HYDROcodone-acetaminophen (NORCO)  MG per tablet     Sig: Take 1 tablet by mouth Every 6 (Six) Hours As Needed for Moderate Pain .     Dispense:  15 tablet     Refill:  0   • baclofen (LIORESAL) 10 MG tablet     Sig: Take 1 tablet by mouth 3 (Three) Times a Day.     Dispense:  90 tablet     Refill:  3   • ketorolac (TORADOL) injection 60 mg   • triamcinolone acetonide (KENALOG-40) injection 60 mg       It's not just what you eat, but when you eat  Eat breakfast, and eat smaller meals throughout the day. A healthy breakfast can jumpstart your metabolism, while eating small, healthy meals (rather than the standard three large meals) keeps your energy up.   Avoid eating at night. Try to eat  dinner earlier and fast for 14-16 hours until breakfast the next morning. Studies suggest that eating only when you’re most active and giving your digestive system a long break each day may help to regulate weight.     Patient understands the risks associated with this controlled medication, including tolerance and addiction.  he also agrees to only obtain this medication from me, and not from a another provider, unless that provider is covering for me in my absence.  he also agrees to be compliant in dosing, and not self adjust the dose of medication.  A signed controlled substance agreement is on file, and he has received a controlled substance education sheet at this a previous visit.  he has also signed a consent for treatment with a controlled substance as per [facility] policy. ROYAL was obtained.      Study Result    Narrative & Impression   Procedure: Cervical spine     Indication:  Neck pain        Technique:  Four    views     Prior relevant exam:  None     Extensive multilevel degenerative, spondylotic changes. Disc  space narrowing and osteophyte production most pronounced at  C5-C6 and C6-C7. Extensive arthritic changes of the apophyseal  joints most pronounced C4-C5 and C5-C6. Prominent osteophytes  arising from the joints of [place].     IMPRESSION:  Impression: extensive multilevel degenerative, spondylotic  changes.        Mri c spine due to nerve involvement -er if worsen--no chiropractor for now due to nerve involvement -follow up with me after mri-kenalog, toradol, a1c to assess diabetes due to kenalog injection, meds very sparingly prn

## 2021-03-18 LAB — HBA1C MFR BLD: 5.48 % (ref 4.8–5.6)

## 2021-03-19 LAB
LAB AP CASE REPORT: NORMAL
PATH REPORT.FINAL DX SPEC: NORMAL

## 2021-03-23 ENCOUNTER — OFFICE VISIT (OUTPATIENT)
Dept: GASTROENTEROLOGY | Facility: CLINIC | Age: 71
End: 2021-03-23

## 2021-03-23 VITALS
HEART RATE: 64 BPM | SYSTOLIC BLOOD PRESSURE: 152 MMHG | HEIGHT: 70 IN | WEIGHT: 185.8 LBS | DIASTOLIC BLOOD PRESSURE: 88 MMHG | BODY MASS INDEX: 26.6 KG/M2

## 2021-03-23 DIAGNOSIS — K22.70 BARRETT'S ESOPHAGUS WITHOUT DYSPLASIA: Primary | ICD-10-CM

## 2021-03-23 PROCEDURE — 99213 OFFICE O/P EST LOW 20 MIN: CPT | Performed by: INTERNAL MEDICINE

## 2021-03-23 NOTE — PATIENT INSTRUCTIONS
Ferro's Esophagus    Ferro's esophagus occurs when the tissue that lines the esophagus changes or becomes damaged. The esophagus is the tube that carries food from the throat to the stomach. With Ferro's esophagus, the cells that line the esophagus are replaced by cells that are similar to the lining of the intestines (intestinal metaplasia).  Ferro's esophagus itself may not cause any symptoms. However, many people who have Ferro's esophagus also have gastroesophageal reflux disease (GERD), which may cause symptoms such as heartburn. Over time, a few people with this condition may develop cancer of the esophagus. Treatment may include medicines, procedures to destroy the abnormal cells, or surgery.  What are the causes?  The exact cause of this condition is not known. In some cases, the condition develops from damage to the lining of the esophagus caused by GERD. GERD occurs when stomach acids flow up from the stomach into the esophagus. Frequent symptoms of GERD may cause intestinal metaplasia or cause cell changes (dysplasia).  What increases the risk?  You are more likely to develop this condition if you:  · Have GERD.  · Are male.  · Are .  · Are obese.  · Are older than 50.  · Have a hiatal hernia. This is a condition in which part of your stomach bulges into your chest.  · Smoke.  What are the signs or symptoms?  People with Ferro's esophagus often have no symptoms. However, many people with this condition also have GERD. Symptoms of GERD may include:  · Heartburn.  · Difficulty swallowing.  · Dry cough.  How is this diagnosed?  This condition may be diagnosed based on:  · Results of an upper gastrointestinal endoscopy. For this exam, a thin, flexible tube with a light and a camera on the end (endoscope) is passed down your esophagus. Your health care provider can view the inside of your esophagus during this procedure.  · Results of a biopsy. For this procedure, several tissue samples  are removed (biopsy) from your esophagus. They are then checked for intestinal metaplasia or dysplasia.  How is this treated?  Treatment for this condition may include:  · Medicines (proton pump inhibitors, or PPIs) to decrease or stop GERD.  · Periodic endoscopic exams to make sure that cancer is not developing.  · A procedure or surgery for dysplasia. This may include:  ? Removal or destruction of abnormal cells.  ? Removal of part of the esophagus (esophagectomy).  Follow these instructions at home:  Eating and drinking  · Eat more fruits and vegetables.  · Avoid fatty foods.  · Eat small, frequent meals instead of large meals.  · Avoid foods that cause heartburn. These foods include:  ? Coffee and alcoholic drinks.  ? Tomatoes and foods made with tomatoes.  ? Greasy or spicy foods.  ? Chocolate and peppermint.  · Do not drink alcohol.  General instructions  · Take over-the-counter and prescription medicines only as told by your health care provider.  · Do not use any products that contain nicotine or tobacco, such as cigarettes and e-cigarettes. If you need help quitting, ask your health care provider.  · If you are being treated for GERD, make sure you take medicines and follow all instructions as told by your health care provider.  · Keep all follow-up visits as told by your health care provider. This is important.  Contact a health care provider if:  · You have heartburn or GERD symptoms.  · You have difficulty swallowing.  Get help right away if:  · You have chest pain.  · You are unable to swallow.  · You vomit blood or material that looks like coffee grounds.  · Your stool (feces) is bright red or dark.  Summary  · Ferro's esophagus occurs when the tissue that lines the esophagus changes or becomes damaged.  · Ferro's esophagus may be diagnosed with an upper gastrointestinal endoscopy and a biopsy.  · Treatment may include medicines, procedures to remove abnormal cells, or surgery.  · Follow your  health care provider's instructions about what to eat and drink, what medicines to take, and when to call for help.  This information is not intended to replace advice given to you by your health care provider. Make sure you discuss any questions you have with your health care provider.  Document Revised: 04/15/2019 Document Reviewed: 04/15/2019  [employer] Patient Education © 2021 [employer]

## 2021-03-24 ENCOUNTER — TELEPHONE (OUTPATIENT)
Dept: FAMILY MEDICINE CLINIC | Facility: CLINIC | Age: 71
End: 2021-03-24

## 2021-03-24 NOTE — TELEPHONE ENCOUNTER
I have talked with Ms. Zazueta, and relayed her questions to DARION Ignacio.   At this time not changes in his medications need to be made.   He has a return appt with Sandee scheduled for Monday 03/29/2021 for MRI Results and she will go over his medications again at that time.
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
## 2021-03-25 ENCOUNTER — HOSPITAL ENCOUNTER (OUTPATIENT)
Dept: MRI IMAGING | Facility: HOSPITAL | Age: 71
Discharge: HOME | End: 2021-03-25
Admitting: NURSE PRACTITIONER

## 2021-03-25 DIAGNOSIS — M50.30 DDD (DEGENERATIVE DISC DISEASE), CERVICAL: Primary | ICD-10-CM

## 2021-03-25 PROCEDURE — 72141 MRI NECK SPINE W/O DYE: CPT

## 2021-03-29 ENCOUNTER — OFFICE VISIT (OUTPATIENT)
Dept: FAMILY MEDICINE CLINIC | Facility: CLINIC | Age: 71
End: 2021-03-29

## 2021-03-29 VITALS
TEMPERATURE: 99 F | DIASTOLIC BLOOD PRESSURE: 88 MMHG | SYSTOLIC BLOOD PRESSURE: 120 MMHG | BODY MASS INDEX: 25.91 KG/M2 | WEIGHT: 181 LBS | HEIGHT: 70 IN

## 2021-03-29 DIAGNOSIS — M50.30 DDD (DEGENERATIVE DISC DISEASE), CERVICAL: Primary | ICD-10-CM

## 2021-03-29 DIAGNOSIS — I10 ESSENTIAL HYPERTENSION: ICD-10-CM

## 2021-03-29 PROCEDURE — 99213 OFFICE O/P EST LOW 20 MIN: CPT | Performed by: NURSE PRACTITIONER

## 2021-03-29 NOTE — PROGRESS NOTES
Chief Complaint   Patient presents with   • Go over results     and go over meds     Subjective   Jose Zazueta is a 70 y.o. male.     Presents with neck pain now radiating to right arm, having severe headache related to neck pain-symptoms on and off-here to review mri results     Hypertension  This is a chronic problem. The current episode started more than 1 year ago. The problem has been resolved since onset. The problem is controlled. Associated symptoms include headaches, malaise/fatigue and neck pain. Pertinent negatives include no anxiety, chest pain, orthopnea, palpitations, peripheral edema, PND, shortness of breath or sweats. Risk factors for coronary artery disease include dyslipidemia, male gender, sedentary lifestyle and family history. The current treatment provides significant improvement. Compliance problems include diet.  There is no history of angina, kidney disease, CAD/MI, CVA, heart failure, left ventricular hypertrophy, PVD or retinopathy. There is no history of hypercortisolism or hyperparathyroidism.   Neck Pain   This is a new problem. The current episode started 1 to 4 weeks ago. The problem occurs daily. The problem has been waxing and waning. The pain is associated with a twisting injury. The pain is present in the right side. The quality of the pain is described as cramping and stabbing. The pain is at a severity of 10/10. The pain is severe. The pain is same all the time. Stiffness is present all day. Associated symptoms include headaches, numbness, paresis and tingling. Pertinent negatives include no chest pain, fever, leg pain, pain with swallowing, photophobia, syncope, trouble swallowing, visual change, weakness or weight loss. He has tried bed rest, ice, NSAIDs, acetaminophen, heat, neck support, home exercises and muscle relaxants for the symptoms. The treatment provided no relief.        The following portions of the patient's history were reviewed and updated as  appropriate: allergies, current medications, past social history and problem list.    Review of Systems   Constitutional: Positive for activity change, appetite change, diaphoresis and malaise/fatigue. Negative for fever, unexpected weight change and weight loss.   HENT: Negative for congestion, dental problem, drooling and trouble swallowing.    Eyes: Negative.  Negative for photophobia, pain, discharge, redness, itching and visual disturbance.   Respiratory: Negative.  Negative for apnea, cough, chest tightness, shortness of breath, wheezing and stridor.    Cardiovascular: Positive for leg swelling. Negative for chest pain, palpitations, orthopnea, syncope and PND.   Gastrointestinal: Negative.  Negative for abdominal distention, anal bleeding, blood in stool, constipation and diarrhea.        Hx of gerd controlled    Endocrine: Negative.  Negative for cold intolerance, heat intolerance, polydipsia, polyphagia and polyuria.   Genitourinary: Negative.    Musculoskeletal: Positive for arthralgias, back pain, gait problem, joint swelling, myalgias, neck pain and neck stiffness.        Neck pain right side with radiation of pain to right side of head radiating to right arm    Skin: Negative.  Negative for color change, pallor and rash.   Allergic/Immunologic: Negative.  Negative for food allergies and immunocompromised state.   Neurological: Positive for tingling, numbness and headaches. Negative for dizziness, tremors, seizures, syncope, facial asymmetry, speech difficulty, weakness and light-headedness.        Numbness tingling right and left arm, symptoms on and off    Hematological: Negative.  Negative for adenopathy. Does not bruise/bleed easily.   Psychiatric/Behavioral: Negative.  Negative for agitation, behavioral problems, confusion, decreased concentration, dysphoric mood, hallucinations, self-injury and suicidal ideas. The patient is not nervous/anxious and is not hyperactive.        Objective   /88   " Temp 99 °F (37.2 °C) (Tympanic)   Ht 177.8 cm (70\")   Wt 82.1 kg (181 lb)   BMI 25.97 kg/m²   Physical Exam  Constitutional:       General: He is in acute distress.      Appearance: Normal appearance. He is ill-appearing.      Comments: Reports severe pain -cannot get comfortable today    HENT:      Head: Normocephalic.      Right Ear: Tympanic membrane normal.      Nose: Nose normal.      Mouth/Throat:      Mouth: Mucous membranes are moist.   Eyes:      Pupils: Pupils are equal, round, and reactive to light.   Cardiovascular:      Rate and Rhythm: Normal rate.   Pulmonary:      Effort: Pulmonary effort is normal. No respiratory distress.      Breath sounds: No stridor. No wheezing, rhonchi or rales.   Chest:      Chest wall: No tenderness.   Abdominal:      General: Abdomen is flat. There is no distension.      Palpations: There is no mass.      Tenderness: There is no abdominal tenderness. There is no right CVA tenderness, left CVA tenderness, guarding or rebound.      Hernia: No hernia is present.   Musculoskeletal:         General: Tenderness present. No swelling, deformity or signs of injury.      Cervical back: Torticollis and bony tenderness present. No swelling, edema, deformity, erythema, signs of trauma, lacerations, rigidity, spasms, tenderness or crepitus. Pain with movement present. Normal range of motion.        Back:       Right lower leg: No edema.      Left lower leg: No edema.   Skin:     General: Skin is warm.      Coloration: Skin is not jaundiced or pale.      Findings: No bruising, erythema, lesion or rash.   Neurological:      Mental Status: He is alert.      Cranial Nerves: No cranial nerve deficit.      Sensory: No sensory deficit.      Motor: No weakness.      Coordination: Coordination normal.      Gait: Gait normal.      Deep Tendon Reflexes: Reflexes normal.   Psychiatric:         Mood and Affect: Mood normal.         Behavior: Behavior normal.         Assessment/Plan   Problems " Addressed this Visit     None      Visit Diagnoses     DDD (degenerative disc disease), cervical    -  Primary    Essential hypertension          Diagnoses       Codes Comments    DDD (degenerative disc disease), cervical    -  Primary ICD-10-CM: M50.30  ICD-9-CM: 722.4     Essential hypertension     ICD-10-CM: I10  ICD-9-CM: 401.9          No orders of the defined types were placed in this encounter.  Appointment Information    Display Notes    STIFF NECK WITH HEADACHES           PACS Images     Radiology Images   Study Result    Narrative & Impression   MRI CERVICAL SPINE     CLINICAL HISTORY:  70 years Male,Myelopathy, acute or  progressive, M54.2 Cervicalgia M43.6 Torticollis M47.812  Spondylosis without myelopathy or radiculopathy, cervical region  M79.2 Neuralgia and neuritis, unspecified     COMPARISON:  Plain films of the cervical spine dated 3/17/2021     FINDINGS: Sagittal and axial images were obtained without  intravenous contrast.     Normal alignment along the cervical spine. There are endplate  degenerative changes about C6-7, in part edematous. No acute  fracture.     Signal in the cervical cord is normal.     C2-3: Normal.     C3-4: Mild facet degenerative change, left greater than right.  Mild associated left neuroforaminal stenosis.     C4-5: Mild bilateral facet degenerative changes with mild  associated bilateral neuroforaminal stenosis.     C5-6: Posterior disc bulging with mild associated spinal canal  stenosis. Left greater than right cortical joint hypertrophy and  left facet degenerative changes produce moderate/severe left  neuroforaminal stenosis.     C6-7: Mild broad-based posterior disc bulging, bilateral  uncovertebral joint hypertrophy, and bilateral facet changes with  mild spinal canal stenosis and mild bilateral neuroforaminal  stenosis.     C7-T1: No spinal canal or neuroforaminal stenosis.     IMPRESSION:  1. Degenerative changes at C5-6 with moderate/severe left  neuroforaminal  stenosis and mild spinal canal stenosis.  2. Additional mild spinal canal stenosis at C6-7 with edematous  endplate degenerative changes about the C6-7 interspace.     Electronically signed by:  Jayson Stroud MD  3/25/2021 7:44 AM CDT  Workstation: [id number]   Imaging    MRI Cervical Spine Without Contrast (Order: [id number]) - 3/17/2021  Result History    MRI Cervical Spine Without Contrast (Order #[id number]) on 3/25/2021 - Order Result History Report   Reprint Order Requisition    MRI Cervical Spine Without Contrast (Order #[id number]) on 3/17/21   MRI Cervical Spine Without Contrast: Result Notes   Sandee Perla APRN   3/25/2021  8:29 AM CDT      Pt notified referred to neurosurgeon as directed          Signed by    Signed Date/Time  Phone Pager   JAYSON STROUD 3/25/2021  7:44 AM [phone]  7:44 [phone]    Exam Information    Status Exam Begun  Exam Ended    Final [99] 3/25/2021 06:48 3/25/2021  7:19 AM [id number]  7:19 AM   Reviewed by    Sandee Perla APRN 3/25/2021 08:34   Sandee Perla APRN 3/25/2021 08:29   External Results Report    Open External Results Report    Encounter    View Encounter             Intra Procedure Documentation    Intra Procedure Documentation   Order-Level Documents:    There are no order-level documents.  Encounter-Level Documents:    There are no encounter-level documents.  Reason for Exam  Priority: Routine  Myelopathy, acute or progressive   Dx: Neck pain [M54.2 (ICD-10-CM)]; Torticollis, acute [M43.6 (ICD-10-CM)]; Cervical spine arthritis with nerve pain [M47.812, M79.2 (ICD-10-CM)]   Results Routing Tracking    View Results Routing Information   Order Report    Order Details     baclofen as directed, tid prn, has neurosurgeon follow up next week as directed, scheduled for follow up as directed, refer to pain management if symptoms persist or worsen or not a surgical candidate. Patient agrees

## 2021-04-01 RX ORDER — ATORVASTATIN CALCIUM 40 MG/1
TABLET, FILM COATED ORAL
Qty: 90 TABLET | Refills: 0 | Status: SHIPPED | OUTPATIENT
Start: 2021-04-01 | End: 2021-06-29 | Stop reason: SDUPTHER

## 2021-04-16 RX ORDER — MELOXICAM 15 MG/1
TABLET ORAL
Qty: 90 TABLET | Refills: 0 | Status: ON HOLD | OUTPATIENT
Start: 2021-04-16 | End: 2021-07-15

## 2021-04-19 ENCOUNTER — TRANSCRIBE ORDERS (OUTPATIENT)
Dept: PHYSICAL THERAPY | Facility: HOSPITAL | Age: 71
End: 2021-04-19

## 2021-04-19 DIAGNOSIS — M50.120 MID-CERVICAL DISC DISORDER, UNSPECIFIED LEVEL: Primary | ICD-10-CM

## 2021-04-20 ENCOUNTER — HOSPITAL ENCOUNTER (OUTPATIENT)
Dept: PHYSICAL THERAPY | Facility: HOSPITAL | Age: 71
Setting detail: THERAPIES SERIES
Discharge: HOME | End: 2021-04-20

## 2021-04-20 DIAGNOSIS — M50.920 CERVICAL DISC DISORDER OF MID-CERVICAL REGION: Primary | ICD-10-CM

## 2021-04-20 PROCEDURE — 97162 PT EVAL MOD COMPLEX 30 MIN: CPT | Performed by: PHYSICAL THERAPIST
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
## 2021-04-26 ENCOUNTER — HOSPITAL ENCOUNTER (OUTPATIENT)
Dept: PHYSICAL THERAPY | Facility: HOSPITAL | Age: 71
Setting detail: THERAPIES SERIES
Discharge: HOME | End: 2021-04-26

## 2021-04-26 DIAGNOSIS — M50.920 CERVICAL DISC DISORDER OF MID-CERVICAL REGION: Primary | ICD-10-CM

## 2021-04-26 PROCEDURE — 97110 THERAPEUTIC EXERCISES: CPT
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
## 2021-04-28 ENCOUNTER — HOSPITAL ENCOUNTER (OUTPATIENT)
Dept: PHYSICAL THERAPY | Facility: HOSPITAL | Age: 71
Setting detail: THERAPIES SERIES
Discharge: HOME | End: 2021-04-28

## 2021-04-28 DIAGNOSIS — M50.920 CERVICAL DISC DISORDER OF MID-CERVICAL REGION: Primary | ICD-10-CM

## 2021-04-28 PROCEDURE — 97110 THERAPEUTIC EXERCISES: CPT
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
## 2021-05-03 ENCOUNTER — HOSPITAL ENCOUNTER (OUTPATIENT)
Dept: PHYSICAL THERAPY | Facility: HOSPITAL | Age: 71
Setting detail: THERAPIES SERIES
Discharge: HOME | End: 2021-05-03

## 2021-05-03 DIAGNOSIS — M50.920 CERVICAL DISC DISORDER OF MID-CERVICAL REGION: Primary | ICD-10-CM

## 2021-05-03 PROCEDURE — 97140 MANUAL THERAPY 1/> REGIONS: CPT

## 2021-05-03 PROCEDURE — 97110 THERAPEUTIC EXERCISES: CPT
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
## 2021-05-04 ENCOUNTER — TELEPHONE (OUTPATIENT)
Dept: FAMILY MEDICINE CLINIC | Facility: CLINIC | Age: 71
End: 2021-05-04

## 2021-05-05 ENCOUNTER — APPOINTMENT (OUTPATIENT)
Dept: PHYSICAL THERAPY | Facility: HOSPITAL | Age: 71
End: 2021-05-05

## 2021-05-10 RX ORDER — SUCRALFATE 1 G/1
TABLET ORAL
Qty: 120 TABLET | Refills: 5 | Status: SHIPPED | OUTPATIENT
Start: 2021-05-10 | End: 2021-11-19

## 2021-06-02 ENCOUNTER — OFFICE VISIT (OUTPATIENT)
Dept: FAMILY MEDICINE CLINIC | Facility: CLINIC | Age: 71
End: 2021-06-02

## 2021-06-02 VITALS
DIASTOLIC BLOOD PRESSURE: 80 MMHG | HEIGHT: 70 IN | SYSTOLIC BLOOD PRESSURE: 148 MMHG | WEIGHT: 179 LBS | BODY MASS INDEX: 25.62 KG/M2 | TEMPERATURE: 98.1 F

## 2021-06-02 DIAGNOSIS — M25.512 ACUTE PAIN OF LEFT SHOULDER: Primary | ICD-10-CM

## 2021-06-02 PROCEDURE — 99213 OFFICE O/P EST LOW 20 MIN: CPT | Performed by: NURSE PRACTITIONER

## 2021-06-02 NOTE — PROGRESS NOTES
Chief Complaint  Shoulder Pain (left shoulder pain x 3 months but has gradually gotten worse the past few days)    Subjective          Jose Zazueta presents to [facility]  Having issues with his left shoulder. Patient states that he has been going to therapy for his neck, and having to use his arm in a lot of the exercises, this is what started his shoulder pain. He has some weakness in his arm, and pain seems to be in the joint.    Pain  This is a new (Left shoulder pain) problem. The current episode started 1 to 4 weeks ago. The problem occurs constantly. The problem has been gradually worsening. Associated symptoms include arthralgias, myalgias and neck pain. Pertinent negatives include no congestion, coughing, diaphoresis, fatigue, joint swelling or nausea. Associated symptoms comments: Decreased ROM. He has tried acetaminophen, NSAIDs, position changes and rest for the symptoms. The treatment provided no relief.       Review of Systems   Constitutional: Negative for activity change, appetite change, diaphoresis, fatigue and unexpected weight change.   HENT: Negative for congestion and ear pain.    Eyes: Negative for discharge and visual disturbance.   Respiratory: Negative for apnea, cough, choking, shortness of breath, wheezing and stridor.    Gastrointestinal: Negative for abdominal distention, constipation, diarrhea and nausea.   Endocrine: Negative for cold intolerance, heat intolerance, polyphagia and polyuria.   Genitourinary: Negative.  Negative for frequency.   Musculoskeletal: Positive for arthralgias, myalgias and neck pain. Negative for back pain and joint swelling.   Skin: Negative for color change and wound.   Allergic/Immunologic: Negative.    Neurological: Negative for dizziness, syncope and light-headedness.   Psychiatric/Behavioral: Negative for agitation and confusion. The patient is not nervous/anxious.          Objective   Vital Signs:   /80    "Temp 98.1 °F (36.7 °C) (Tympanic)   Ht 177.8 cm (70\")   Wt 81.2 kg (179 lb)   BMI 25.68 kg/m²     Physical Exam  Vitals and nursing note reviewed.   Constitutional:       Appearance: Normal appearance. He is well-developed.   HENT:      Head: Normocephalic and atraumatic.      Right Ear: Hearing normal.      Left Ear: Hearing normal.      Nose: Nose normal. No mucosal edema or rhinorrhea.   Eyes:      General: Lids are normal.      Conjunctiva/sclera: Conjunctivae normal.      Pupils: Pupils are equal, round, and reactive to light.   Neck:      Thyroid: No thyroid mass or thyromegaly.      Trachea: Trachea normal. No tracheal tenderness or tracheal deviation.   Cardiovascular:      Rate and Rhythm: Normal rate.      Pulses: Normal pulses.      Heart sounds: Normal heart sounds.   Pulmonary:      Effort: Pulmonary effort is normal. No respiratory distress.      Breath sounds: Normal breath sounds. No stridor. No wheezing or rales.   Abdominal:      Palpations: Abdomen is soft.   Musculoskeletal:      Left shoulder: Crepitus present. Decreased range of motion. Decreased strength.      Cervical back: Normal range of motion.   Lymphadenopathy:      Head:      Right side of head: No submental, submandibular or tonsillar adenopathy.      Left side of head: No submental, submandibular or tonsillar adenopathy.      Cervical: No cervical adenopathy.   Skin:     General: Skin is warm and dry.   Neurological:      Mental Status: He is alert and oriented to person, place, and time.   Psychiatric:         Mood and Affect: Mood is not anxious. Affect is not inappropriate.         Speech: Speech normal.         Behavior: Behavior normal. Behavior is not agitated or hyperactive.         Thought Content: Thought content normal.         Judgment: Judgment normal.         Result Review :                 Assessment and Plan    Diagnoses and all orders for this visit:    1. Acute pain of left shoulder (Primary)  -     XR Shoulder 2+ " View Left  -     Ambulatory Referral to Orthopedic Surgery      1. Acute pain in left shoulder   Xray today   Continue takiing Mobic  Can take Norco that you have prescribed if pain is unbearable   Referral to ortho placed    Please call the office if you have any issues   I spent 30 minutes caring for Jose on this date of service. This time includes time spent by me in the following activities:preparing for the visit, performing a medically appropriate examination and/or evaluation , counseling and educating the patient/family/caregiver, ordering medications, tests, or procedures, referring and communicating with other health care professionals  and documenting information in the medical record  Follow Up   Return if symptoms worsen or fail to improve, for Next scheduled follow up.  Patient was given instructions and counseling regarding his condition or for health maintenance advice. Please see specific information pulled into the AVS if appropriate.           This document has been electronically signed by DARION Deng on June 2, 2021 12:05 CDT

## 2021-06-07 ENCOUNTER — OFFICE VISIT (OUTPATIENT)
Dept: ORTHOPEDIC SURGERY | Facility: CLINIC | Age: 71
End: 2021-06-07

## 2021-06-07 VITALS
RESPIRATION RATE: 16 BRPM | OXYGEN SATURATION: 97 % | WEIGHT: 181 LBS | DIASTOLIC BLOOD PRESSURE: 94 MMHG | TEMPERATURE: 98.6 F | BODY MASS INDEX: 25.97 KG/M2 | SYSTOLIC BLOOD PRESSURE: 159 MMHG | HEART RATE: 77 BPM

## 2021-06-07 DIAGNOSIS — M25.512 ACUTE PAIN OF LEFT SHOULDER: Primary | ICD-10-CM

## 2021-06-07 DIAGNOSIS — M75.52 SUBACROMIAL BURSITIS OF LEFT SHOULDER JOINT: ICD-10-CM

## 2021-06-07 PROCEDURE — 99203 OFFICE O/P NEW LOW 30 MIN: CPT | Performed by: ORTHOPAEDIC SURGERY

## 2021-06-07 PROCEDURE — 20610 DRAIN/INJ JOINT/BURSA W/O US: CPT | Performed by: ORTHOPAEDIC SURGERY

## 2021-06-07 RX ORDER — BUPIVACAINE HYDROCHLORIDE 5 MG/ML
3 INJECTION, SOLUTION PERINEURAL
Status: COMPLETED | OUTPATIENT
Start: 2021-06-07 | End: 2021-06-07

## 2021-06-07 RX ORDER — TRIAMCINOLONE ACETONIDE 40 MG/ML
80 INJECTION, SUSPENSION INTRA-ARTICULAR; INTRAMUSCULAR
Status: COMPLETED | OUTPATIENT
Start: 2021-06-07 | End: 2021-06-07

## 2021-06-07 RX ADMIN — TRIAMCINOLONE ACETONIDE 80 MG: 40 INJECTION, SUSPENSION INTRA-ARTICULAR; INTRAMUSCULAR at 08:30

## 2021-06-07 RX ADMIN — BUPIVACAINE HYDROCHLORIDE 3 ML: 5 INJECTION, SOLUTION PERINEURAL at 08:30
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
## 2021-06-22 ENCOUNTER — OFFICE VISIT (OUTPATIENT)
Dept: ONCOLOGY | Facility: CLINIC | Age: 71
End: 2021-06-22

## 2021-06-22 ENCOUNTER — APPOINTMENT (OUTPATIENT)
Dept: ONCOLOGY | Facility: HOSPITAL | Age: 71
End: 2021-06-22

## 2021-06-22 ENCOUNTER — LAB (OUTPATIENT)
Dept: LAB | Facility: HOSPITAL | Age: 71
End: 2021-06-22

## 2021-06-22 VITALS
WEIGHT: 178.4 LBS | BODY MASS INDEX: 25.54 KG/M2 | DIASTOLIC BLOOD PRESSURE: 88 MMHG | SYSTOLIC BLOOD PRESSURE: 169 MMHG | RESPIRATION RATE: 20 BRPM | HEIGHT: 70 IN | TEMPERATURE: 98.2 F | HEART RATE: 70 BPM

## 2021-06-22 DIAGNOSIS — I26.99 OTHER ACUTE PULMONARY EMBOLISM WITHOUT ACUTE COR PULMONALE (HCC): Primary | Chronic | ICD-10-CM

## 2021-06-22 DIAGNOSIS — D50.9 IRON DEFICIENCY ANEMIA, UNSPECIFIED IRON DEFICIENCY ANEMIA TYPE: ICD-10-CM

## 2021-06-22 DIAGNOSIS — D50.9 IRON DEFICIENCY ANEMIA, UNSPECIFIED IRON DEFICIENCY ANEMIA TYPE: Chronic | ICD-10-CM

## 2021-06-22 DIAGNOSIS — I26.99 OTHER ACUTE PULMONARY EMBOLISM WITHOUT ACUTE COR PULMONALE (HCC): ICD-10-CM

## 2021-06-22 DIAGNOSIS — Z79.01 LONG TERM (CURRENT) USE OF ANTICOAGULANTS: ICD-10-CM

## 2021-06-22 DIAGNOSIS — I82.409 DEEP VEIN THROMBOSIS (DVT) OF LOWER EXTREMITY, UNSPECIFIED CHRONICITY, UNSPECIFIED LATERALITY, UNSPECIFIED VEIN (HCC): ICD-10-CM

## 2021-06-22 LAB
ALBUMIN SERPL-MCNC: 4.4 G/DL (ref 3.5–5.2)
ALBUMIN/GLOB SERPL: 1.8 G/DL
ALP SERPL-CCNC: 62 U/L (ref 39–117)
ALT SERPL W P-5'-P-CCNC: 20 U/L (ref 1–41)
ANION GAP SERPL CALCULATED.3IONS-SCNC: 9 MMOL/L (ref 5–15)
AST SERPL-CCNC: 20 U/L (ref 1–40)
BASOPHILS # BLD AUTO: 0.05 10*3/MM3 (ref 0–0.2)
BASOPHILS NFR BLD AUTO: 0.5 % (ref 0–1.5)
BILIRUB SERPL-MCNC: 0.7 MG/DL (ref 0–1.2)
BUN SERPL-MCNC: 24 MG/DL (ref 8–23)
BUN/CREAT SERPL: 23.1 (ref 7–25)
CALCIUM SPEC-SCNC: 9.6 MG/DL (ref 8.6–10.5)
CHLORIDE SERPL-SCNC: 98 MMOL/L (ref 98–107)
CO2 SERPL-SCNC: 28 MMOL/L (ref 22–29)
CREAT SERPL-MCNC: 1.04 MG/DL (ref 0.76–1.27)
DEPRECATED RDW RBC AUTO: 48.9 FL (ref 37–54)
EOSINOPHIL # BLD AUTO: 0.08 10*3/MM3 (ref 0–0.4)
EOSINOPHIL NFR BLD AUTO: 0.8 % (ref 0.3–6.2)
ERYTHROCYTE [DISTWIDTH] IN BLOOD BY AUTOMATED COUNT: 13.3 % (ref 12.3–15.4)
FERRITIN SERPL-MCNC: 297.7 NG/ML (ref 30–400)
FOLATE SERPL-MCNC: 16.9 NG/ML (ref 4.78–24.2)
GFR SERPL CREATININE-BSD FRML MDRD: 71 ML/MIN/1.73
GLOBULIN UR ELPH-MCNC: 2.5 GM/DL
GLUCOSE SERPL-MCNC: 116 MG/DL (ref 65–99)
HCT VFR BLD AUTO: 37.5 % (ref 37.5–51)
HGB BLD-MCNC: 12.6 G/DL (ref 13–17.7)
HOLD SPECIMEN: NORMAL
IMM GRANULOCYTES # BLD AUTO: 0.07 10*3/MM3 (ref 0–0.05)
IMM GRANULOCYTES NFR BLD AUTO: 0.7 % (ref 0–0.5)
IRON 24H UR-MRATE: 108 MCG/DL (ref 59–158)
IRON SATN MFR SERPL: 28 % (ref 20–50)
LYMPHOCYTES # BLD AUTO: 1.73 10*3/MM3 (ref 0.7–3.1)
LYMPHOCYTES NFR BLD AUTO: 17.2 % (ref 19.6–45.3)
MCH RBC QN AUTO: 33.7 PG (ref 26.6–33)
MCHC RBC AUTO-ENTMCNC: 33.6 G/DL (ref 31.5–35.7)
MCV RBC AUTO: 100.3 FL (ref 79–97)
MONOCYTES # BLD AUTO: 1.09 10*3/MM3 (ref 0.1–0.9)
MONOCYTES NFR BLD AUTO: 10.8 % (ref 5–12)
NEUTROPHILS NFR BLD AUTO: 7.04 10*3/MM3 (ref 1.7–7)
NEUTROPHILS NFR BLD AUTO: 70 % (ref 42.7–76)
NRBC BLD AUTO-RTO: 0 /100 WBC (ref 0–0.2)
PLATELET # BLD AUTO: 219 10*3/MM3 (ref 140–450)
PMV BLD AUTO: 9.3 FL (ref 6–12)
POTASSIUM SERPL-SCNC: 4.3 MMOL/L (ref 3.5–5.2)
PROT SERPL-MCNC: 6.9 G/DL (ref 6–8.5)
RBC # BLD AUTO: 3.74 10*6/MM3 (ref 4.14–5.8)
SODIUM SERPL-SCNC: 135 MMOL/L (ref 136–145)
TIBC SERPL-MCNC: 390 MCG/DL (ref 298–536)
TRANSFERRIN SERPL-MCNC: 262 MG/DL (ref 200–360)
VIT B12 BLD-MCNC: 1183 PG/ML (ref 211–946)
WBC # BLD AUTO: 10.06 10*3/MM3 (ref 3.4–10.8)

## 2021-06-22 PROCEDURE — 80053 COMPREHEN METABOLIC PANEL: CPT

## 2021-06-22 PROCEDURE — 36415 COLL VENOUS BLD VENIPUNCTURE: CPT

## 2021-06-22 PROCEDURE — 83540 ASSAY OF IRON: CPT

## 2021-06-22 PROCEDURE — G9903 PT SCRN TBCO ID AS NON USER: HCPCS | Performed by: INTERNAL MEDICINE

## 2021-06-22 PROCEDURE — 82746 ASSAY OF FOLIC ACID SERUM: CPT

## 2021-06-22 PROCEDURE — 82728 ASSAY OF FERRITIN: CPT

## 2021-06-22 PROCEDURE — 85025 COMPLETE CBC W/AUTO DIFF WBC: CPT

## 2021-06-22 PROCEDURE — 84466 ASSAY OF TRANSFERRIN: CPT

## 2021-06-22 PROCEDURE — G0463 HOSPITAL OUTPT CLINIC VISIT: HCPCS | Performed by: INTERNAL MEDICINE

## 2021-06-22 PROCEDURE — 1126F AMNT PAIN NOTED NONE PRSNT: CPT | Performed by: INTERNAL MEDICINE

## 2021-06-22 PROCEDURE — 99214 OFFICE O/P EST MOD 30 MIN: CPT | Performed by: INTERNAL MEDICINE

## 2021-06-22 PROCEDURE — 82607 VITAMIN B-12: CPT

## 2021-06-22 PROCEDURE — 1157F ADVNC CARE PLAN IN RCRD: CPT | Performed by: INTERNAL MEDICINE

## 2021-06-22 NOTE — PROGRESS NOTES
"DATE OF VISIT: 6/22/2021      REASON FOR VISIT: Bilateral DVT and pulmonary embolism on anticoagulation with Coumadin, anemia, elevated homocystine      HISTORY OF PRESENT ILLNESS:   70-year-old male with medical problem consisting of hypertension, dyslipidemia, bilateral pulmonary embolism and DVT diagnosed in February 2019 for which patient was on Coumadin until February 22, 2021, anemia, elevated homocystine is here for follow-up appointment today.  Complains of pain in neck secondary to disc problem for which he may need surgery near future.  Denies any bleeding.  Denies any new lymph node enlargement.  Denies any new swelling of lower extremity or any new chest pain or shortness of breath.      Past Medical History, Past Surgical History, Social History, Family History have been reviewed and are without significant changes except as mentioned.    Review of Systems   Constitutional: Positive for fatigue.   Musculoskeletal: Positive for arthralgias.      A comprehensive 14 point review of systems was performed and was negative except as mentioned.    Medications:  The current medication list was reviewed in the EMR    ALLERGIES:    Allergies   Allergen Reactions   • Sulfa Antibiotics Rash   • Sulfamethoxazole-Trimethoprim Swelling   • Clindamycin/Lincomycin Hives   • Codeine Nausea Only       Objective      Vitals:    06/22/21 0802   BP: 169/88   Pulse: 70   Resp: 20   Temp: 98.2 °F (36.8 °C)   TempSrc: Temporal   Weight: 80.9 kg (178 lb 6.4 oz)   Height: 177.8 cm (70\")   PainSc: 0-No pain     Current Status 6/22/2021   ECOG score 0       Physical Exam  Cardiovascular:      Rate and Rhythm: Normal rate and regular rhythm.   Pulmonary:      Breath sounds: Normal breath sounds.   Neurological:      Mental Status: He is alert and oriented to person, place, and time.           RECENT LABS:  Glucose   Date Value Ref Range Status   06/22/2021 116 (H) 65 - 99 mg/dL Final     Sodium   Date Value Ref Range Status "   06/22/2021 135 (L) 136 - 145 mmol/L Final     Potassium   Date Value Ref Range Status   06/22/2021 4.3 3.5 - 5.2 mmol/L Final     CO2   Date Value Ref Range Status   06/22/2021 28.0 22.0 - 29.0 mmol/L Final     Chloride   Date Value Ref Range Status   06/22/2021 98 98 - 107 mmol/L Final     Anion Gap   Date Value Ref Range Status   06/22/2021 9.0 5.0 - 15.0 mmol/L Final     Creatinine   Date Value Ref Range Status   06/22/2021 1.04 0.76 - 1.27 mg/dL Final     BUN   Date Value Ref Range Status   06/22/2021 24 (H) 8 - 23 mg/dL Final     BUN/Creatinine Ratio   Date Value Ref Range Status   06/22/2021 23.1 7.0 - 25.0 Final     Calcium   Date Value Ref Range Status   06/22/2021 9.6 8.6 - 10.5 mg/dL Final     eGFR Non  Amer   Date Value Ref Range Status   06/22/2021 71 >60 mL/min/1.73 Final     Alkaline Phosphatase   Date Value Ref Range Status   06/22/2021 62 39 - 117 U/L Final     Total Protein   Date Value Ref Range Status   06/22/2021 6.9 6.0 - 8.5 g/dL Final     ALT (SGPT)   Date Value Ref Range Status   06/22/2021 20 1 - 41 U/L Final     AST (SGOT)   Date Value Ref Range Status   06/22/2021 20 1 - 40 U/L Final     Total Bilirubin   Date Value Ref Range Status   06/22/2021 0.7 0.0 - 1.2 mg/dL Final     Albumin   Date Value Ref Range Status   06/22/2021 4.40 3.50 - 5.20 g/dL Final     Globulin   Date Value Ref Range Status   06/22/2021 2.5 gm/dL Final     Lab Results   Component Value Date    WBC 10.06 06/22/2021    HGB 12.6 (L) 06/22/2021    HCT 37.5 06/22/2021    .3 (H) 06/22/2021     06/22/2021     Lab Results   Component Value Date    NEUTROABS 7.04 (H) 06/22/2021    IRON 108 06/22/2021    IRON 79 02/22/2021    IRON 78 11/16/2020    TIBC 390 06/22/2021    TIBC 365 02/22/2021    TIBC 378 11/16/2020    LABIRON 28 06/22/2021    LABIRON 22 02/22/2021    LABIRON 21 11/16/2020    FERRITIN 297.70 06/22/2021    FERRITIN 125.50 02/22/2021    FERRITIN 120.40 11/16/2020    MZSKPWWL07 1,183 (H)  06/22/2021    RCVLESJG16 >2,000 (H) 03/17/2021    [id number] 1,106 (H) 02/22/2021    FOLATE 16.90 06/22/2021    FOLATE >20.00 02/22/2021    FOLATE >20.00 11/16/2020     No results found for: , LABCA2, AFPTM, HCGQUANT, , CHROMGRNA, [id number], CEA, REFLABREPO      PATHOLOGY:  * Cannot find OR log *         RADIOLOGY DATA :  No radiology results for the last 7 days        Assessment/Plan     1.  Bilateral lower extremity DVT and bilateral pulmonary embolism:  -Patient was diagnosed with bilateral lower extremity DVT and pulmonary embolism in February 2019  -Hypercoagulable work-up in February 2019 was negative  -Homocystine level was elevated at 16.9 in February 2019.  -CT angiogram done in August 2019 showed resolution of pulmonary embolism  -Doppler ultrasound done on February 12, 2021 showed resolution of DVT  -Patient was taken off anticoagulation on February 22, 2021 and currently remains on aspirin 81 mg p.o. daily  -We will continue with clinical surveillance for now    2.  Anemia:  -Hemoglobin is decreased to 12.6 today.  -Recommend continue with B12 and folic acid 3 times a week  -We will ask patient to return to clinic in 3 months with repeat CBC, iron studies, ferritin, CMP, B12 and folate to be done on that day    3.  History of peptic ulcer disease    4.  Health maintenance: Patient does not smoke.  Had a colonoscopy in 2018    5. Advance Care Planning   ACP discussion was held with the patient during this visit. Patient has an advance directive in EMR which is still valid.                  PHQ-9 Total Score: 0   -Patient is not homicidal or suicidal.  No acute intervention required.    Jose Zazueta reports a pain score of 0.  Given his pain assessment as noted, treatment options were discussed and the following options were decided upon as a follow-up plan to address the patient's pain: continuation of current treatment plan for pain.         Dickson Vega MD  6/22/2021  16:48  CDT        Part of this note may be an electronic transcription/translation of spoken language to printed text using the Dragon Dictation System.          CC:

## 2021-06-23 ENCOUNTER — TELEPHONE (OUTPATIENT)
Dept: ONCOLOGY | Facility: HOSPITAL | Age: 71
End: 2021-06-23

## 2021-06-23 NOTE — TELEPHONE ENCOUNTER
----- Message from Dickson Vega MD sent at 6/22/2021  4:57 PM CDT -----  Please let patient know, his iron level is adequate no need to start any iron replacement.  Recommend continue with B12 and folic acid 3 times a week.  Thank you

## 2021-06-29 RX ORDER — GUANFACINE 2 MG/1
TABLET, EXTENDED RELEASE ORAL
Qty: 90 TABLET | Refills: 1 | Status: SHIPPED | OUTPATIENT
Start: 2021-06-29 | End: 2021-09-02

## 2021-06-29 RX ORDER — ATORVASTATIN CALCIUM 40 MG/1
TABLET, FILM COATED ORAL
Qty: 90 TABLET | Refills: 0 | Status: SHIPPED | OUTPATIENT
Start: 2021-06-29 | End: 2021-10-01

## 2021-07-11 ENCOUNTER — HOSPITAL ENCOUNTER (INPATIENT)
Facility: HOSPITAL | Age: 71
LOS: 4 days | Discharge: HOME | End: 2021-07-15
Attending: EMERGENCY MEDICINE | Admitting: INTERNAL MEDICINE

## 2021-07-11 ENCOUNTER — APPOINTMENT (OUTPATIENT)
Dept: GENERAL RADIOLOGY | Facility: HOSPITAL | Age: 71
End: 2021-07-11

## 2021-07-11 ENCOUNTER — APPOINTMENT (OUTPATIENT)
Dept: CT IMAGING | Facility: HOSPITAL | Age: 71
End: 2021-07-11

## 2021-07-11 DIAGNOSIS — Z78.9 IMPAIRED MOBILITY AND ADLS: ICD-10-CM

## 2021-07-11 DIAGNOSIS — Z74.09 IMPAIRED MOBILITY AND ADLS: ICD-10-CM

## 2021-07-11 DIAGNOSIS — Z74.09 IMPAIRED FUNCTIONAL MOBILITY, BALANCE, GAIT, AND ENDURANCE: ICD-10-CM

## 2021-07-11 DIAGNOSIS — E83.42 HYPOMAGNESEMIA: ICD-10-CM
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
## 2021-07-11 DIAGNOSIS — R65.21 SEPTIC SHOCK (HCC): Primary | ICD-10-CM

## 2021-07-11 DIAGNOSIS — A41.9 SEPTIC SHOCK (HCC): Primary | ICD-10-CM

## 2021-07-11 DIAGNOSIS — E87.6 HYPOKALEMIA: ICD-10-CM

## 2021-07-11 DIAGNOSIS — J18.9 HOSPITAL-ACQUIRED PNEUMONIA: ICD-10-CM

## 2021-07-11 LAB
ALBUMIN SERPL-MCNC: 3.4 G/DL (ref 3.5–5.2)
ALBUMIN/GLOB SERPL: 1.4 G/DL
ALP SERPL-CCNC: 55 U/L (ref 39–117)
ALT SERPL W P-5'-P-CCNC: 16 U/L (ref 1–41)
ANION GAP SERPL CALCULATED.3IONS-SCNC: 16 MMOL/L (ref 5–15)
APTT PPP: 21.2 SECONDS (ref 20–40.3)
AST SERPL-CCNC: 20 U/L (ref 1–40)
B PARAPERT DNA SPEC QL NAA+PROBE: NOT DETECTED
B PERT DNA SPEC QL NAA+PROBE: NOT DETECTED
BASOPHILS # BLD AUTO: 0.01 10*3/MM3 (ref 0–0.2)
BASOPHILS NFR BLD AUTO: 0.2 % (ref 0–1.5)
BILIRUB SERPL-MCNC: 0.7 MG/DL (ref 0–1.2)
BILIRUB UR QL STRIP: NEGATIVE
BUN SERPL-MCNC: 20 MG/DL (ref 8–23)
BUN/CREAT SERPL: 15.2 (ref 7–25)
C PNEUM DNA NPH QL NAA+NON-PROBE: NOT DETECTED
CALCIUM SPEC-SCNC: 8.8 MG/DL (ref 8.6–10.5)
CHLORIDE SERPL-SCNC: 90 MMOL/L (ref 98–107)
CLARITY UR: CLEAR
CO2 SERPL-SCNC: 26 MMOL/L (ref 22–29)
COLOR UR: YELLOW
CREAT SERPL-MCNC: 1.32 MG/DL (ref 0.76–1.27)
D-LACTATE SERPL-SCNC: 3.3 MMOL/L (ref 0.5–2)
D-LACTATE SERPL-SCNC: 4.8 MMOL/L (ref 0.5–2)
DEPRECATED RDW RBC AUTO: 43.6 FL (ref 37–54)
EOSINOPHIL # BLD AUTO: 0.01 10*3/MM3 (ref 0–0.4)
EOSINOPHIL NFR BLD AUTO: 0.2 % (ref 0.3–6.2)
ERYTHROCYTE [DISTWIDTH] IN BLOOD BY AUTOMATED COUNT: 12.5 % (ref 12.3–15.4)
FLUAV RNA RESP QL NAA+PROBE: NOT DETECTED
FLUAV SUBTYP SPEC NAA+PROBE: NOT DETECTED
FLUBV RNA ISLT QL NAA+PROBE: NOT DETECTED
FLUBV RNA RESP QL NAA+PROBE: NOT DETECTED
GFR SERPL CREATININE-BSD FRML MDRD: 54 ML/MIN/1.73
GLOBULIN UR ELPH-MCNC: 2.5 GM/DL
GLUCOSE SERPL-MCNC: 139 MG/DL (ref 65–99)
GLUCOSE UR STRIP-MCNC: NEGATIVE MG/DL
HADV DNA SPEC NAA+PROBE: NOT DETECTED
HCOV 229E RNA SPEC QL NAA+PROBE: NOT DETECTED
HCOV HKU1 RNA SPEC QL NAA+PROBE: NOT DETECTED
HCOV NL63 RNA SPEC QL NAA+PROBE: NOT DETECTED
HCOV OC43 RNA SPEC QL NAA+PROBE: NOT DETECTED
HCT VFR BLD AUTO: 34.6 % (ref 37.5–51)
HGB BLD-MCNC: 12.2 G/DL (ref 13–17.7)
HGB UR QL STRIP.AUTO: NEGATIVE
HMPV RNA NPH QL NAA+NON-PROBE: NOT DETECTED
HOLD SPECIMEN: NORMAL
HPIV1 RNA SPEC QL NAA+PROBE: NOT DETECTED
HPIV2 RNA SPEC QL NAA+PROBE: NOT DETECTED
HPIV3 RNA NPH QL NAA+PROBE: NOT DETECTED
HPIV4 P GENE NPH QL NAA+PROBE: NOT DETECTED
IMM GRANULOCYTES # BLD AUTO: 0.04 10*3/MM3 (ref 0–0.05)
IMM GRANULOCYTES NFR BLD AUTO: 0.6 % (ref 0–0.5)
INR PPP: 0.95 (ref 0.8–1.2)
KETONES UR QL STRIP: NEGATIVE
LEUKOCYTE ESTERASE UR QL STRIP.AUTO: NEGATIVE
LYMPHOCYTES # BLD AUTO: 0.74 10*3/MM3 (ref 0.7–3.1)
LYMPHOCYTES NFR BLD AUTO: 11.3 % (ref 19.6–45.3)
M PNEUMO IGG SER IA-ACNC: NOT DETECTED
MAGNESIUM SERPL-MCNC: 1 MG/DL (ref 1.6–2.4)
MCH RBC QN AUTO: 33.7 PG (ref 26.6–33)
MCHC RBC AUTO-ENTMCNC: 35.3 G/DL (ref 31.5–35.7)
MCV RBC AUTO: 95.6 FL (ref 79–97)
MONOCYTES # BLD AUTO: 0.3 10*3/MM3 (ref 0.1–0.9)
MONOCYTES NFR BLD AUTO: 4.6 % (ref 5–12)
MRSA DNA SPEC QL NAA+PROBE: NEGATIVE
NEUTROPHILS NFR BLD AUTO: 5.43 10*3/MM3 (ref 1.7–7)
NEUTROPHILS NFR BLD AUTO: 83.1 % (ref 42.7–76)
NITRITE UR QL STRIP: NEGATIVE
NRBC BLD AUTO-RTO: 0 /100 WBC (ref 0–0.2)
NT-PROBNP SERPL-MCNC: 285.3 PG/ML (ref 0–900)
PH UR STRIP.AUTO: 5.5 [PH] (ref 5–9)
PLATELET # BLD AUTO: 286 10*3/MM3 (ref 140–450)
PMV BLD AUTO: 8.8 FL (ref 6–12)
POTASSIUM SERPL-SCNC: 2.5 MMOL/L (ref 3.5–5.2)
POTASSIUM SERPL-SCNC: 3.1 MMOL/L (ref 3.5–5.2)
PROT SERPL-MCNC: 5.9 G/DL (ref 6–8.5)
PROT UR QL STRIP: NEGATIVE
PROTHROMBIN TIME: 12.6 SECONDS (ref 11.1–15.3)
QT INTERVAL: 430 MS
QTC INTERVAL: 473 MS
RBC # BLD AUTO: 3.62 10*6/MM3 (ref 4.14–5.8)
RHINOVIRUS RNA SPEC NAA+PROBE: NOT DETECTED
RSV RNA NPH QL NAA+NON-PROBE: NOT DETECTED
SARS-COV-2 RNA RESP QL NAA+PROBE: NOT DETECTED
SODIUM SERPL-SCNC: 132 MMOL/L (ref 136–145)
SP GR UR STRIP: 1.04 (ref 1–1.03)
TROPONIN T SERPL-MCNC: <0.01 NG/ML (ref 0–0.03)
UROBILINOGEN UR QL STRIP: ABNORMAL
WBC # BLD AUTO: 6.53 10*3/MM3 (ref 3.4–10.8)

## 2021-07-11 PROCEDURE — 87641 MR-STAPH DNA AMP PROBE: CPT | Performed by: INTERNAL MEDICINE

## 2021-07-11 PROCEDURE — 25010000002 METHYLPREDNISOLONE PER 40 MG: Performed by: INTERNAL MEDICINE

## 2021-07-11 PROCEDURE — 84484 ASSAY OF TROPONIN QUANT: CPT | Performed by: EMERGENCY MEDICINE

## 2021-07-11 PROCEDURE — 83605 ASSAY OF LACTIC ACID: CPT | Performed by: EMERGENCY MEDICINE

## 2021-07-11 PROCEDURE — 94760 N-INVAS EAR/PLS OXIMETRY 1: CPT

## 2021-07-11 PROCEDURE — 94799 UNLISTED PULMONARY SVC/PX: CPT

## 2021-07-11 PROCEDURE — 83880 ASSAY OF NATRIURETIC PEPTIDE: CPT | Performed by: EMERGENCY MEDICINE

## 2021-07-11 PROCEDURE — 0 IOPAMIDOL PER 1 ML: Performed by: EMERGENCY MEDICINE

## 2021-07-11 PROCEDURE — 25010000002 MAGNESIUM SULFATE 2 GM/50ML SOLUTION: Performed by: EMERGENCY MEDICINE

## 2021-07-11 PROCEDURE — 25010000002 GENTAMICIN PER 80 MG: Performed by: EMERGENCY MEDICINE

## 2021-07-11 PROCEDURE — 25010000003 POTASSIUM CHLORIDE 10 MEQ/100ML SOLUTION: Performed by: EMERGENCY MEDICINE

## 2021-07-11 PROCEDURE — 06HY33Z INSERTION OF INFUSION DEVICE INTO LOWER VEIN, PERCUTANEOUS APPROACH: ICD-10-PCS | Performed by: EMERGENCY MEDICINE

## 2021-07-11 PROCEDURE — 93010 ELECTROCARDIOGRAM REPORT: CPT | Performed by: INTERNAL MEDICINE

## 2021-07-11 PROCEDURE — 25010000002 MAGNESIUM SULFATE 2 GM/50ML SOLUTION: Performed by: INTERNAL MEDICINE

## 2021-07-11 PROCEDURE — 93005 ELECTROCARDIOGRAM TRACING: CPT | Performed by: EMERGENCY MEDICINE

## 2021-07-11 PROCEDURE — 81003 URINALYSIS AUTO W/O SCOPE: CPT | Performed by: EMERGENCY MEDICINE

## 2021-07-11 PROCEDURE — 25010000002 ENOXAPARIN PER 10 MG: Performed by: INTERNAL MEDICINE

## 2021-07-11 PROCEDURE — 84132 ASSAY OF SERUM POTASSIUM: CPT | Performed by: INTERNAL MEDICINE

## 2021-07-11 PROCEDURE — 85025 COMPLETE CBC W/AUTO DIFF WBC: CPT | Performed by: EMERGENCY MEDICINE

## 2021-07-11 PROCEDURE — 71045 X-RAY EXAM CHEST 1 VIEW: CPT

## 2021-07-11 PROCEDURE — 85730 THROMBOPLASTIN TIME PARTIAL: CPT | Performed by: EMERGENCY MEDICINE

## 2021-07-11 PROCEDURE — 71275 CT ANGIOGRAPHY CHEST: CPT

## 2021-07-11 PROCEDURE — 87070 CULTURE OTHR SPECIMN AEROBIC: CPT | Performed by: INTERNAL MEDICINE

## 2021-07-11 PROCEDURE — 25010000002 PIPERACILLIN SOD-TAZOBACTAM PER 1 G: Performed by: INTERNAL MEDICINE

## 2021-07-11 PROCEDURE — 25010000002 VANCOMYCIN 5 G RECONSTITUTED SOLUTION: Performed by: EMERGENCY MEDICINE

## 2021-07-11 PROCEDURE — 99291 CRITICAL CARE FIRST HOUR: CPT

## 2021-07-11 PROCEDURE — 25010000002 PIPERACILLIN SOD-TAZOBACTAM PER 1 G: Performed by: EMERGENCY MEDICINE

## 2021-07-11 PROCEDURE — C1751 CATH, INF, PER/CENT/MIDLINE: HCPCS

## 2021-07-11 PROCEDURE — 87633 RESP VIRUS 12-25 TARGETS: CPT | Performed by: INTERNAL MEDICINE

## 2021-07-11 PROCEDURE — 87040 BLOOD CULTURE FOR BACTERIA: CPT | Performed by: EMERGENCY MEDICINE

## 2021-07-11 PROCEDURE — 94640 AIRWAY INHALATION TREATMENT: CPT

## 2021-07-11 PROCEDURE — 83735 ASSAY OF MAGNESIUM: CPT | Performed by: EMERGENCY MEDICINE

## 2021-07-11 PROCEDURE — 87636 SARSCOV2 & INF A&B AMP PRB: CPT | Performed by: EMERGENCY MEDICINE

## 2021-07-11 PROCEDURE — 80053 COMPREHEN METABOLIC PANEL: CPT | Performed by: EMERGENCY MEDICINE

## 2021-07-11 PROCEDURE — 87205 SMEAR GRAM STAIN: CPT | Performed by: INTERNAL MEDICINE

## 2021-07-11 PROCEDURE — 25010000002 AZITHROMYCIN PER 500 MG: Performed by: EMERGENCY MEDICINE

## 2021-07-11 PROCEDURE — 85610 PROTHROMBIN TIME: CPT | Performed by: EMERGENCY MEDICINE

## 2021-07-11 RX ORDER — SODIUM CHLORIDE 0.9 % (FLUSH) 0.9 %
10 SYRINGE (ML) INJECTION EVERY 12 HOURS SCHEDULED
Status: DISCONTINUED | OUTPATIENT
Start: 2021-07-11 | End: 2021-07-15 | Stop reason: HOSPADM

## 2021-07-11 RX ORDER — MAGNESIUM SULFATE HEPTAHYDRATE 40 MG/ML
4 INJECTION, SOLUTION INTRAVENOUS AS NEEDED
Status: DISCONTINUED | OUTPATIENT
Start: 2021-07-11 | End: 2021-07-15 | Stop reason: HOSPADM

## 2021-07-11 RX ORDER — METHYLPREDNISOLONE SODIUM SUCCINATE 40 MG/ML
10 INJECTION, POWDER, LYOPHILIZED, FOR SOLUTION INTRAMUSCULAR; INTRAVENOUS DAILY
Status: DISCONTINUED | OUTPATIENT
Start: 2021-07-11 | End: 2021-07-15

## 2021-07-11 RX ORDER — SODIUM CHLORIDE 0.9 % (FLUSH) 0.9 %
20 SYRINGE (ML) INJECTION AS NEEDED
Status: DISCONTINUED | OUTPATIENT
Start: 2021-07-11 | End: 2021-07-15 | Stop reason: HOSPADM

## 2021-07-11 RX ORDER — SODIUM CHLORIDE 0.9 % (FLUSH) 0.9 %
10 SYRINGE (ML) INJECTION AS NEEDED
Status: DISCONTINUED | OUTPATIENT
Start: 2021-07-11 | End: 2021-07-15 | Stop reason: HOSPADM

## 2021-07-11 RX ORDER — NOREPINEPHRINE BIT/0.9 % NACL 8 MG/250ML
.02-.3 INFUSION BOTTLE (ML) INTRAVENOUS
Status: DISCONTINUED | OUTPATIENT
Start: 2021-07-11 | End: 2021-07-15

## 2021-07-11 RX ORDER — POTASSIUM CHLORIDE 7.45 MG/ML
10 INJECTION INTRAVENOUS
Status: COMPLETED | OUTPATIENT
Start: 2021-07-11 | End: 2021-07-11

## 2021-07-11 RX ORDER — ATORVASTATIN CALCIUM 40 MG/1
40 TABLET, FILM COATED ORAL DAILY
Status: DISCONTINUED | OUTPATIENT
Start: 2021-07-11 | End: 2021-07-15 | Stop reason: HOSPADM

## 2021-07-11 RX ORDER — SODIUM CHLORIDE 9 MG/ML
150 INJECTION, SOLUTION INTRAVENOUS CONTINUOUS
Status: DISCONTINUED | OUTPATIENT
Start: 2021-07-11 | End: 2021-07-15

## 2021-07-11 RX ORDER — MAGNESIUM SULFATE HEPTAHYDRATE 40 MG/ML
2 INJECTION, SOLUTION INTRAVENOUS AS NEEDED
Status: DISCONTINUED | OUTPATIENT
Start: 2021-07-11 | End: 2021-07-15 | Stop reason: HOSPADM

## 2021-07-11 RX ORDER — POTASSIUM CHLORIDE 750 MG/1
40 CAPSULE, EXTENDED RELEASE ORAL AS NEEDED
Status: DISCONTINUED | OUTPATIENT
Start: 2021-07-11 | End: 2021-07-15 | Stop reason: HOSPADM

## 2021-07-11 RX ORDER — IPRATROPIUM BROMIDE AND ALBUTEROL SULFATE 2.5; .5 MG/3ML; MG/3ML
3 SOLUTION RESPIRATORY (INHALATION)
Status: DISCONTINUED | OUTPATIENT
Start: 2021-07-11 | End: 2021-07-15 | Stop reason: HOSPADM

## 2021-07-11 RX ORDER — SODIUM CHLORIDE 9 MG/ML
125 INJECTION, SOLUTION INTRAVENOUS CONTINUOUS
Status: DISCONTINUED | OUTPATIENT
Start: 2021-07-11 | End: 2021-07-11

## 2021-07-11 RX ORDER — ASPIRIN 81 MG/1
81 TABLET ORAL DAILY
Status: DISCONTINUED | OUTPATIENT
Start: 2021-07-11 | End: 2021-07-15 | Stop reason: HOSPADM

## 2021-07-11 RX ORDER — ACETAMINOPHEN 500 MG
1000 TABLET ORAL ONCE
Status: COMPLETED | OUTPATIENT
Start: 2021-07-11 | End: 2021-07-11

## 2021-07-11 RX ORDER — SUCRALFATE 1 G/1
1 TABLET ORAL
Status: DISCONTINUED | OUTPATIENT
Start: 2021-07-11 | End: 2021-07-15 | Stop reason: HOSPADM

## 2021-07-11 RX ORDER — POTASSIUM CHLORIDE 1.5 G/1.77G
40 POWDER, FOR SOLUTION ORAL AS NEEDED
Status: DISCONTINUED | OUTPATIENT
Start: 2021-07-11 | End: 2021-07-15 | Stop reason: HOSPADM

## 2021-07-11 RX ORDER — MAGNESIUM SULFATE HEPTAHYDRATE 40 MG/ML
2 INJECTION, SOLUTION INTRAVENOUS ONCE
Status: COMPLETED | OUTPATIENT
Start: 2021-07-11 | End: 2021-07-11

## 2021-07-11 RX ORDER — POTASSIUM CHLORIDE 7.45 MG/ML
10 INJECTION INTRAVENOUS
Status: DISCONTINUED | OUTPATIENT
Start: 2021-07-11 | End: 2021-07-15 | Stop reason: HOSPADM

## 2021-07-11 RX ADMIN — SODIUM CHLORIDE 1000 ML: 9 INJECTION, SOLUTION INTRAVENOUS at 08:54

## 2021-07-11 RX ADMIN — SODIUM CHLORIDE 150 ML/HR: 9 INJECTION, SOLUTION INTRAVENOUS at 22:00

## 2021-07-11 RX ADMIN — SODIUM CHLORIDE 427 ML: 900 INJECTION, SOLUTION INTRAVENOUS at 08:13

## 2021-07-11 RX ADMIN — Medication 0.04 MCG/KG/MIN: at 10:32

## 2021-07-11 RX ADMIN — MAGNESIUM SULFATE HEPTAHYDRATE 2 G: 40 INJECTION, SOLUTION INTRAVENOUS at 13:26

## 2021-07-11 RX ADMIN — PIPERACILLIN SODIUM AND TAZOBACTAM SODIUM 3.38 G: 3; .375 INJECTION, POWDER, LYOPHILIZED, FOR SOLUTION INTRAVENOUS at 22:03

## 2021-07-11 RX ADMIN — IPRATROPIUM BROMIDE AND ALBUTEROL SULFATE 3 ML: 2.5; .5 SOLUTION RESPIRATORY (INHALATION) at 16:34

## 2021-07-11 RX ADMIN — SUCRALFATE 1 G: 1 TABLET ORAL at 17:01

## 2021-07-11 RX ADMIN — ENOXAPARIN SODIUM 40 MG: 40 INJECTION SUBCUTANEOUS at 13:22

## 2021-07-11 RX ADMIN — SODIUM CHLORIDE, PRESERVATIVE FREE 10 ML: 5 INJECTION INTRAVENOUS at 21:54

## 2021-07-11 RX ADMIN — AZITHROMYCIN 500 MG: 500 INJECTION, POWDER, LYOPHILIZED, FOR SOLUTION INTRAVENOUS at 08:09

## 2021-07-11 RX ADMIN — SODIUM CHLORIDE 1000 ML: 900 INJECTION, SOLUTION INTRAVENOUS at 07:25

## 2021-07-11 RX ADMIN — MAGNESIUM SULFATE HEPTAHYDRATE 2 G: 40 INJECTION, SOLUTION INTRAVENOUS at 17:34

## 2021-07-11 RX ADMIN — PIPERACILLIN SODIUM AND TAZOBACTAM SODIUM 3.38 G: 3; .375 INJECTION, POWDER, LYOPHILIZED, FOR SOLUTION INTRAVENOUS at 15:00

## 2021-07-11 RX ADMIN — IOPAMIDOL 68 ML: 755 INJECTION, SOLUTION INTRAVENOUS at 08:38

## 2021-07-11 RX ADMIN — SODIUM CHLORIDE 125 ML/HR: 9 INJECTION, SOLUTION INTRAVENOUS at 08:01

## 2021-07-11 RX ADMIN — PIPERACILLIN SODIUM AND TAZOBACTAM SODIUM 4.5 G: 4; .5 INJECTION, POWDER, LYOPHILIZED, FOR SOLUTION INTRAVENOUS at 08:49

## 2021-07-11 RX ADMIN — METHYLPREDNISOLONE SODIUM SUCCINATE 10 MG: 40 INJECTION, POWDER, FOR SOLUTION INTRAMUSCULAR; INTRAVENOUS at 13:20

## 2021-07-11 RX ADMIN — MAGNESIUM SULFATE HEPTAHYDRATE 2 G: 40 INJECTION, SOLUTION INTRAVENOUS at 15:32

## 2021-07-11 RX ADMIN — VANCOMYCIN HYDROCHLORIDE 1250 MG: 5 INJECTION, POWDER, LYOPHILIZED, FOR SOLUTION INTRAVENOUS at 10:10

## 2021-07-11 RX ADMIN — POTASSIUM CHLORIDE 10 MEQ: 7.46 INJECTION, SOLUTION INTRAVENOUS at 11:24

## 2021-07-11 RX ADMIN — POTASSIUM CHLORIDE 10 MEQ: 7.46 INJECTION, SOLUTION INTRAVENOUS at 10:21

## 2021-07-11 RX ADMIN — MAGNESIUM SULFATE HEPTAHYDRATE 2 G: 40 INJECTION, SOLUTION INTRAVENOUS at 08:09

## 2021-07-11 RX ADMIN — POTASSIUM CHLORIDE 10 MEQ: 7.46 INJECTION, SOLUTION INTRAVENOUS at 09:12

## 2021-07-11 RX ADMIN — MAGNESIUM SULFATE HEPTAHYDRATE 2 G: 40 INJECTION, SOLUTION INTRAVENOUS at 20:11

## 2021-07-11 RX ADMIN — SODIUM CHLORIDE 150 ML/HR: 9 INJECTION, SOLUTION INTRAVENOUS at 13:46

## 2021-07-11 RX ADMIN — IPRATROPIUM BROMIDE AND ALBUTEROL SULFATE 3 ML: 2.5; .5 SOLUTION RESPIRATORY (INHALATION) at 20:21

## 2021-07-11 RX ADMIN — ACETAMINOPHEN 1000 MG: 500 TABLET, FILM COATED ORAL at 08:01

## 2021-07-11 RX ADMIN — SUCRALFATE 1 G: 1 TABLET ORAL at 21:54

## 2021-07-11 RX ADMIN — POTASSIUM CHLORIDE 10 MEQ: 7.46 INJECTION, SOLUTION INTRAVENOUS at 08:10

## 2021-07-11 RX ADMIN — SODIUM CHLORIDE 1000 ML: 900 INJECTION, SOLUTION INTRAVENOUS at 07:30

## 2021-07-11 RX ADMIN — POTASSIUM CHLORIDE 40 MEQ: 750 CAPSULE, EXTENDED RELEASE ORAL at 18:21

## 2021-07-11 RX ADMIN — POTASSIUM CHLORIDE 40 MEQ: 750 CAPSULE, EXTENDED RELEASE ORAL at 22:05

## 2021-07-11 RX ADMIN — ATORVASTATIN CALCIUM 40 MG: 40 TABLET, FILM COATED ORAL at 21:53

## 2021-07-11 RX ADMIN — GENTAMICIN SULFATE 520 MG: 40 INJECTION, SOLUTION INTRAMUSCULAR; INTRAVENOUS at 09:35

## 2021-07-11 NOTE — ED NOTES
Consent for central line insertion signed, Dr. Guzman at bedside & explained risks/benefits.     Aliya Meeks, RN  07/11/21 [id number]

## 2021-07-11 NOTE — PLAN OF CARE
Goal Outcome Evaluation:           Progress: improving   A&O x4. Levo gtt on hold at this time, BP remains wnl. O2 at 2L via nasal canula, pulmonary hygiene promoted. Urine output adequate.

## 2021-07-11 NOTE — ED NOTES
Hospitalist at bedside for eval prior to patient transfer to [facility].     Aliya Meeks RN  07/11/21 [id number]
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
## 2021-07-11 NOTE — H&P
[facility] Medicine Admission      Date of Admission: 7/11/2021      Primary Care Physician: Sandee Perla APRN      Chief Complaint: fever    HPI: 69yo male past medical hx significant of htn, hyperlipidemia, dvt/pe, pud, POD cervical fusion C5-C7 comes to the hospital due to acute onset subjective fever, chills, malaise, myalgias, hypotension.  Pt wife reports patient SBP 45mmHg at home this morning. Pt also did vomit couple times this morning before coming to the hospital.  ROS neg headache/neck pain/chest pain/soa/abd pain/syncope. Of note, pt had cervical surgery done at [facility] in [place], [place]. Pt also states he was told to stop taking meloxicam and was given a prednisone dose pack x2 to take post-surgery.    Concurrent Medical History:  has a past medical history of Anemia, Arthritis, Bunion, Dvt femoral (deep venous thrombosis) (CMS/[facility]) (02/2019), GERD (gastroesophageal reflux disease), History of pulmonary embolus (PE) (02/2019), History of transfusion, Hyperlipidemia, Hypertension, PONV (postoperative nausea and vomiting), Right foot pain, Skin cancer, Stomach ulcer, and Tinea pedis.    Past Surgical History:  has a past surgical history that includes Exploratory laparotomy; Appendectomy; Inguinal Hernia Repair (Bilateral); Esophagogastroduodenoscopy; Tonsillectomy; mtp joint fusion (Right, 11/15/2018); Foot/Toe Tendon Repair (Right, 11/15/2018); Esophagogastroduodenoscopy (N/A, 12/3/2019); Toe Fusion (Left, 12/5/2019); Colonoscopy (07/16/2018); Esophagogastroduodenoscopy (N/A, 3/16/2021); Upper gastrointestinal endoscopy (07/16/2018); Upper gastrointestinal endoscopy (03/16/2021); and Neck surgery.    Family History: family history includes COPD in his sister; Heart defect in his mother; Heart disease in his sister; Pneumonia in his brother; Stroke in his father.     Social History:  reports that he quit smoking about 19 years ago. His smoking use  included cigarettes. He has a 52.50 pack-year smoking history. His smokeless tobacco use includes snuff. He reports current alcohol use. He reports that he does not use drugs.    Allergies:   Allergies   Allergen Reactions   • Sulfa Antibiotics Rash   • Sulfamethoxazole-Trimethoprim Swelling   • Clindamycin/Lincomycin Hives   • Codeine Nausea Only       Medications:   Prior to Admission medications    Medication Sig Start Date End Date Taking? Authorizing Provider   aspirin 81 MG EC tablet Take 81 mg by mouth Daily.    Sriram Dong MD   atorvastatin (LIPITOR) 40 MG tablet TAKE 1 TABLET BY MOUTH EVERY DAY 6/29/21   Sandee Perla APRN   baclofen (LIORESAL) 10 MG tablet Take 1 tablet by mouth 3 (Three) Times a Day. 3/17/21   Sandee Perla APRN   cetirizine (zyrTEC) 10 MG tablet Take 10 mg by mouth As Needed.    Sriram Dong MD   fluticasone (FLONASE) 50 MCG/ACT nasal spray 2 sprays into the nostril(s) as directed by provider Daily As Needed for Rhinitis.    Sriram Dong MD   folic acid (FOLVITE) 1 MG tablet Take 1 tablet by mouth on Monday, Wednesday and Friday 2/20/20   Dickson Vega MD   guanFACINE HCl ER 2 MG tablet sustained-release 24 hour TAKE 1 TABLET BY MOUTH EVERY DAY 6/29/21   Sandee Perla APRN   hydroCHLOROthiazide (HYDRODIURIL) 12.5 MG tablet Take 12.5 mg by mouth Daily. Monday, Wednesday, Friday    Sriram Dong MD   HYDROcodone-acetaminophen (NORCO)  MG per tablet Take 1 tablet by mouth Every 6 (Six) Hours As Needed for Moderate Pain . 3/17/21   Sandee Perla APRN   isosorbide mononitrate (IMDUR) 30 MG 24 hr tablet Take 30 mg by mouth Daily.    Sriram Dong MD   losartan 50 MG tablet 100 mg, hydroCHLOROthiazide 12.5 MG 12.5 mg Take 1 dose by mouth Daily.    Sriram Dong MD   meloxicam (MOBIC) 15 MG tablet TAKE 1 TABLET BY MOUTH EVERY DAY WITH MEALS 4/16/21   Sandee Perla APRN   Omega-3 Fatty Acids  (FISH OIL) 1000 MG capsule capsule Take 1,000 mg by mouth Every Night.    ProviderSriram MD   omeprazole (priLOSEC) 40 MG capsule TAKE 1 CAPSULE BY MOUTH EVERY DAY 12/15/20   Lali Fraser MD   Potassium 99 MG tablet Take 1 tablet by mouth Daily.    ProviderSriram MD   sucralfate (CARAFATE) 1 g tablet TAKE 1 TABLET BY MOUTH FOUR TIMES DAILY 30 MINUTES BEFORE EACH MEAL AND A 4TH TABLET AT BEDTIME 5/10/21   Lali Fraser MD   telmisartan (MICARDIS) 80 MG tablet Take 1 tablet daily 9/3/20   Sandee Perla APRN   vitamin B-12 (CYANOCOBALAMIN) 1000 MCG tablet Take 1 tablet by mouth on Monday, Wednesday and Friday 2/20/20   Dickson Vega MD   atorvastatin (LIPITOR) 40 MG tablet TAKE 1 TABLET BY MOUTH EVERY DAY 4/1/21   Sandee Perla APRN   guanFACINE HCl ER 2 MG tablet sustained-release 24 hour 1T PO QD 9/11/20   Sandee Perla APRN       Review of Systems:  Review of Systems   Otherwise complete ROS is negative except as mentioned above.    Physical Exam:   Temp:  [97.7 °F (36.5 °C)-98.2 °F (36.8 °C)] 98.2 °F (36.8 °C)  Heart Rate:  [56-76] 66  Resp:  [16-20] 18  BP: ()/(35-64) 105/64  Physical Exam    Vitals and nursing note reviewed.   Constitutional:       Appearance: Normal appearance.   HENT:      Head: Normocephalic and atraumatic.      Nose: Nose normal.      Mouth/Throat:      Mouth: Mucous membranes are moist.   Eyes:      Pupils: Pupils are equal, round, and reactive to light.   Neck:     Cardiovascular:      Rate and Rhythm: Normal rate and regular rhythm.      Pulses: Normal pulses.      Heart sounds: Heart sounds are distant. No murmur heard.   No friction rub. No gallop.    Pulmonary:      Effort: Pulmonary effort is normal. No respiratory distress.      Breath sounds: Normal breath sounds. No wheezing, rhonchi or rales.   Abdominal:      General: Abdomen is flat. Bowel sounds are normal. There is no distension.      Palpations: Abdomen is soft.       Tenderness: There is no abdominal tenderness. There is no guarding.   Musculoskeletal:         General: No swelling or deformity.      Cervical back: Neck supple. No rigidity or tenderness.      Right lower leg: No edema.      Left lower leg: No edema.   Lymphadenopathy:      Cervical: No cervical adenopathy.   Skin:     General: Skin is warm and dry.   Neurological:      General: No focal deficit present.      Mental Status: He is alert and oriented to person, place, and time.      GCS: GCS eye subscore is 4. GCS verbal subscore is 5. GCS motor subscore is 6.   Results Reviewed:  I have personally reviewed current lab, radiology, and data and agree with results.  Lab Results (last 24 hours)     Procedure Component Value Units Date/Time    STAT Lactic Acid, Reflex [[id number]]  (Abnormal) Collected: 07/11/21 1031    Specimen: Blood Updated: 07/11/21 1057     Lactate 3.3 mmol/L     Extra Tubes [[id number]] Collected: 07/11/21 0726    Specimen: Blood from Arm, Right Updated: 07/11/21 0830    Narrative:      The following orders were created for panel order Extra Tubes.  Procedure                               Abnormality         Status                     ---------                               -----------         ------                     Gold Top - SST[[id number]]                                   Final result                 Please view results for these tests on the individual orders.    Gold Top - SST [[id number]] Collected: 07/11/21 0726    Specimen: Blood from Arm, Right Updated: 07/11/21 0830     Extra Tube Hold for add-ons.     Comment: Auto resulted.       COVID-19 and FLU A/B PCR - Swab, Nasopharynx [[id number]]  (Normal) Collected: 07/11/21 0739    Specimen: Swab from Nasopharynx Updated: 07/11/21 0815     COVID19 Not Detected     Influenza A PCR Not Detected     Influenza B PCR Not Detected    Narrative:      Fact sheet for providers: https://www.fda.gov/media/032340/download    Fact sheet for patients:  https://www.fda.gov/media/435283/download    Test performed by PCR.    Magnesium [[id number]]  (Abnormal) Collected: 07/11/21 0726    Specimen: Blood Updated: 07/11/21 0807     Magnesium 1.0 mg/dL     Protime-INR [[id number]]  (Normal) Collected: 07/11/21 0726    Specimen: Blood Updated: 07/11/21 0755     Protime 12.6 Seconds      INR 0.95    Narrative:      Therapeutic range for most indications is 2.0-3.0 INR,  or 2.5-3.5 for mechanical heart valves.    aPTT [[id number]]  (Normal) Collected: 07/11/21 0726    Specimen: Blood Updated: 07/11/21 0755     PTT 21.2 seconds     Narrative:      The recommended Heparin therapeutic range is 68-97 seconds.    Comprehensive Metabolic Panel [[id number]]  (Abnormal) Collected: 07/11/21 0726    Specimen: Blood Updated: 07/11/21 0754     Glucose 139 mg/dL      BUN 20 mg/dL      Creatinine 1.32 mg/dL      Sodium 132 mmol/L      Potassium 2.5 mmol/L      Chloride 90 mmol/L      CO2 26.0 mmol/L      Calcium 8.8 mg/dL      Total Protein 5.9 g/dL      Albumin 3.40 g/dL      ALT (SGPT) 16 U/L      AST (SGOT) 20 U/L      Alkaline Phosphatase 55 U/L      Total Bilirubin 0.7 mg/dL      eGFR Non African Amer 54 mL/min/1.73      Globulin 2.5 gm/dL      A/G Ratio 1.4 g/dL      BUN/Creatinine Ratio 15.2     Anion Gap 16.0 mmol/L     Narrative:      GFR Normal >60  Chronic Kidney Disease <60  Kidney Failure <15      Troponin [[id number]]  (Normal) Collected: 07/11/21 0726    Specimen: Blood Updated: 07/11/21 0752     Troponin T <0.010 ng/mL     Narrative:      Troponin T Reference Range:  <= 0.03 ng/mL-   Negative for AMI  >0.03 ng/mL-     Abnormal for myocardial necrosis.  Clinicians would have to utilize clinical acumen, EKG, Troponin and serial changes to determine if it is an Acute Myocardial Infarction or myocardial injury due to an underlying chronic condition.       Results may be falsely decreased if patient taking Biotin.      Lactic Acid, Plasma [[id number]]  (Abnormal) Collected: 07/11/21  0726    Specimen: Blood Updated: 07/11/21 0751     Lactate 4.8 mmol/L     BNP [[id number]]  (Normal) Collected: 07/11/21 0726    Specimen: Blood Updated: 07/11/21 0749     proBNP 285.3 pg/mL     Narrative:      Among patients with dyspnea, NT-proBNP is highly sensitive for the detection of acute congestive heart failure. In addition NT-proBNP of <300 pg/ml effectively rules out acute congestive heart failure with 99% negative predictive value.    Results may be falsely decreased if patient taking Biotin.      Blood Culture - Blood, Blood, Venous Line [[id number]] Collected: 07/11/21 0733    Specimen: Blood, Venous Line Updated: 07/11/21 0735    CBC & Differential [[id number]]  (Abnormal) Collected: 07/11/21 0726    Specimen: Blood Updated: 07/11/21 0732    Narrative:      The following orders were created for panel order CBC & Differential.  Procedure                               Abnormality         Status                     ---------                               -----------         ------                     CBC Auto Differential[[id number]]        Abnormal            Final result                 Please view results for these tests on the individual orders.    CBC Auto Differential [[id number]]  (Abnormal) Collected: 07/11/21 0726    Specimen: Blood Updated: 07/11/21 0732     WBC 6.53 10*3/mm3      RBC 3.62 10*6/mm3      Hemoglobin 12.2 g/dL      Hematocrit 34.6 %      MCV 95.6 fL      MCH 33.7 pg      MCHC 35.3 g/dL      RDW 12.5 %      RDW-SD 43.6 fl      MPV 8.8 fL      Platelets 286 10*3/mm3      Neutrophil % 83.1 %      Lymphocyte % 11.3 %      Monocyte % 4.6 %      Eosinophil % 0.2 %      Basophil % 0.2 %      Immature Grans % 0.6 %      Neutrophils, Absolute 5.43 10*3/mm3      Lymphocytes, Absolute 0.74 10*3/mm3      Monocytes, Absolute 0.30 10*3/mm3      Eosinophils, Absolute 0.01 10*3/mm3      Basophils, Absolute 0.01 10*3/mm3      Immature Grans, Absolute 0.04 10*3/mm3      nRBC 0.0 /100 WBC     Blood  Culture - Blood, Arm, Right [[id number]] Collected: 07/11/21 0726    Specimen: Blood from Arm, Right Updated: 07/11/21 0729        Imaging Results (Last 24 Hours)     Procedure Component Value Units Date/Time    CT Angiogram Chest [[id number]] Collected: 07/11/21 0827     Updated: 07/11/21 0918    Narrative:      STUDY: CT CHEST ANGIOGRAPHY WITH IV CONTRAST    COMPARISON: Radiograph dated same day    INDICATION: hypotension    TECHNIQUE: Multiple CT images of the chest were obtained after  the uneventful administration of iodinated intravenous contrast  in the pulmonary arterial phase. Orthogonal and 3-D MIPS  reformats were provided. All CT scans at this facility uses dose  modulation, iterative reconstruction, and/or weight-based dosing  when appropriate to achieve a radiation dose as low as reasonably  achievable.    FINDINGS:   Pulmonary arteries: The examination is technically adequate. No  filling defects within the pulmonary arteries to suggest emboli.    Neck: Visualized thyroid is unremarkable. No supraclavicular or  axillary lymphadenopathy.    Mediastinum: No bulky mediastinal or hilar lymphadenopathy.  Esophagus is unremarkable. Airways are patent.  Aorta: Atherosclerosis of the thoracic aorta.  Heart: Heart size is normal. No pericardial effusion. There is  coronary artery atherosclerosis.    Lungs: Groundglass airspace opacities are seen in the central  right upper and middle lobes and throughout the right lower lobe  with air bronchograms. No mass, effusion, or pneumothorax. No  suspicious nodules. Mild upper lobe paraseptal and centrilobular  emphysema.    Abdomen: Small hiatal hernia.    Musculoskeletal: No acute fracture or suspicious osseous lesion.  Patient status post anterior cervical spine fusion. Mild to  moderate spondylosis and degenerative disc disease of the  visualized spine.      Impression:      No pulmonary embolus.    Diffuse right lung atypical airspace disease to include viral  and  bacterial etiologies.    Mild upper lobe emphysema.    Coronary artery disease.    Small hiatal hernia.    Electronically signed by:  Bree Crawford MD  7/11/2021  9:17 AM CDT Workstation: [id number]    XR Chest 1 View [[id number]] Collected: 07/11/21 0740     Updated: 07/11/21 0756    Narrative:      EXAM: XR CHEST 1 VIEW    COMPARISONS: None    INDICATION: hypotension    FINDINGS:  Frontal view of the chest.    Patchy airspace opacities are seen throughout the right lung,  lower lobe predominant. No effusion or pneumothorax. Heart size  is normal. Atherosclerosis of the thoracic aorta. No acute  osseous abnormality.      Impression:      Diffuse right lung airspace disease/pneumonia.    Electronically signed by:  Bree Crawford MD  7/11/2021  7:55 AM CDT Workstation: [id number]            Assessment:    Active Hospital Problems    Diagnosis    • Septic shock (CMS/HCC)              Plan:    Septic Shock  -likely due to pna but will r/o bactremia since recent surgery, surgical site shows no signs of infection  -blood cx x2 pending  -sputum cx and respiratory panel pending  -02 via nc, titrate to maintain o2 sat >90%  -encourage IS   -cw ivf, empiric abx tx with iv zosyn and vanc, pharmacy to dose  -trend lactic acid    Hypokalemia  -check mg  -electrolyte replacement protocol    BARBER  -likely prerenal  -cw ivf  -avoid nephrotoxic agents    S/p Cervical fusion C5-C7  -will give low dose iv steroids since pt has been on prednisone taper x2 since surgery  - cw outpatient f/u with physician at [facility] whom preformed surgery, f/u appointment already scheduled      dvt ppx lovenox

## 2021-07-11 NOTE — ED NOTES
Urinal at bedside, Call light within reach, patient notified of order for u/a     Aliya Meeks RN  07/11/21 8739

## 2021-07-12 ENCOUNTER — APPOINTMENT (OUTPATIENT)
Dept: ULTRASOUND IMAGING | Facility: HOSPITAL | Age: 71
End: 2021-07-12

## 2021-07-12 ENCOUNTER — APPOINTMENT (OUTPATIENT)
Dept: INTERVENTIONAL RADIOLOGY/VASCULAR | Facility: HOSPITAL | Age: 71
End: 2021-07-12

## 2021-07-12 LAB
ALBUMIN SERPL-MCNC: 2.7 G/DL (ref 3.5–5.2)
ALBUMIN/GLOB SERPL: 1.1 G/DL
ALP SERPL-CCNC: 48 U/L (ref 39–117)
ALT SERPL W P-5'-P-CCNC: 13 U/L (ref 1–41)
ANION GAP SERPL CALCULATED.3IONS-SCNC: 7 MMOL/L (ref 5–15)
AST SERPL-CCNC: 14 U/L (ref 1–40)
BILIRUB SERPL-MCNC: 0.4 MG/DL (ref 0–1.2)
BUN SERPL-MCNC: 13 MG/DL (ref 8–23)
BUN/CREAT SERPL: 16.3 (ref 7–25)
CALCIUM SPEC-SCNC: 7.6 MG/DL (ref 8.6–10.5)
CHLORIDE SERPL-SCNC: 104 MMOL/L (ref 98–107)
CLUMPED PLATELETS: PRESENT
CO2 SERPL-SCNC: 25 MMOL/L (ref 22–29)
CREAT SERPL-MCNC: 0.8 MG/DL (ref 0.76–1.27)
D-LACTATE SERPL-SCNC: 1.8 MMOL/L (ref 0.5–2)
DEPRECATED RDW RBC AUTO: 45 FL (ref 37–54)
ERYTHROCYTE [DISTWIDTH] IN BLOOD BY AUTOMATED COUNT: 12.8 % (ref 12.3–15.4)
GFR SERPL CREATININE-BSD FRML MDRD: 96 ML/MIN/1.73
GLOBULIN UR ELPH-MCNC: 2.5 GM/DL
GLUCOSE SERPL-MCNC: 108 MG/DL (ref 65–99)
HCT VFR BLD AUTO: 29.6 % (ref 37.5–51)
HGB BLD-MCNC: 10.2 G/DL (ref 13–17.7)
LYMPHOCYTES # BLD MANUAL: 1.7 10*3/MM3 (ref 0.7–3.1)
LYMPHOCYTES NFR BLD MANUAL: 11 % (ref 19.6–45.3)
LYMPHOCYTES NFR BLD MANUAL: 3 % (ref 5–12)
MAGNESIUM SERPL-MCNC: 2.4 MG/DL (ref 1.6–2.4)
MCH RBC QN AUTO: 33.4 PG (ref 26.6–33)
MCHC RBC AUTO-ENTMCNC: 34.5 G/DL (ref 31.5–35.7)
MCV RBC AUTO: 97 FL (ref 79–97)
MONOCYTES # BLD AUTO: 0.42 10*3/MM3 (ref 0.1–0.9)
NEUTROPHILS # BLD AUTO: 12.04 10*3/MM3 (ref 1.7–7)
NEUTROPHILS NFR BLD MANUAL: 74 % (ref 42.7–76)
NEUTS BAND NFR BLD MANUAL: 11 % (ref 0–5)
NEUTS VAC BLD QL SMEAR: ABNORMAL
PLATELET # BLD AUTO: 185 10*3/MM3 (ref 140–450)
PMV BLD AUTO: 9 FL (ref 6–12)
POTASSIUM SERPL-SCNC: 3.8 MMOL/L (ref 3.5–5.2)
PROT SERPL-MCNC: 5.2 G/DL (ref 6–8.5)
RBC # BLD AUTO: 3.05 10*6/MM3 (ref 4.14–5.8)
RBC MORPH BLD: NORMAL
SODIUM SERPL-SCNC: 136 MMOL/L (ref 136–145)
TOXIC GRANULATION: ABNORMAL
VARIANT LYMPHS NFR BLD MANUAL: 1 % (ref 0–5)
WBC # BLD AUTO: 14.16 10*3/MM3 (ref 3.4–10.8)

## 2021-07-12 PROCEDURE — 97165 OT EVAL LOW COMPLEX 30 MIN: CPT

## 2021-07-12 PROCEDURE — 85007 BL SMEAR W/DIFF WBC COUNT: CPT | Performed by: INTERNAL MEDICINE

## 2021-07-12 PROCEDURE — 05HY33Z INSERTION OF INFUSION DEVICE INTO UPPER VEIN, PERCUTANEOUS APPROACH: ICD-10-PCS | Performed by: RADIOLOGY

## 2021-07-12 PROCEDURE — 25010000002 PIPERACILLIN SOD-TAZOBACTAM PER 1 G: Performed by: INTERNAL MEDICINE

## 2021-07-12 PROCEDURE — 25010000002 METHYLPREDNISOLONE PER 40 MG: Performed by: INTERNAL MEDICINE

## 2021-07-12 PROCEDURE — 94799 UNLISTED PULMONARY SVC/PX: CPT

## 2021-07-12 PROCEDURE — 83735 ASSAY OF MAGNESIUM: CPT | Performed by: INTERNAL MEDICINE

## 2021-07-12 PROCEDURE — C1751 CATH, INF, PER/CENT/MIDLINE: HCPCS

## 2021-07-12 PROCEDURE — 80053 COMPREHEN METABOLIC PANEL: CPT | Performed by: INTERNAL MEDICINE

## 2021-07-12 PROCEDURE — 85025 COMPLETE CBC W/AUTO DIFF WBC: CPT | Performed by: INTERNAL MEDICINE

## 2021-07-12 PROCEDURE — 76937 US GUIDE VASCULAR ACCESS: CPT

## 2021-07-12 PROCEDURE — 83605 ASSAY OF LACTIC ACID: CPT | Performed by: INTERNAL MEDICINE

## 2021-07-12 PROCEDURE — 36410 VNPNXR 3YR/> PHY/QHP DX/THER: CPT

## 2021-07-12 PROCEDURE — 25010000002 ENOXAPARIN PER 10 MG: Performed by: INTERNAL MEDICINE

## 2021-07-12 PROCEDURE — 97161 PT EVAL LOW COMPLEX 20 MIN: CPT

## 2021-07-12 RX ADMIN — POTASSIUM CHLORIDE 40 MEQ: 750 CAPSULE, EXTENDED RELEASE ORAL at 01:57

## 2021-07-12 RX ADMIN — SUCRALFATE 1 G: 1 TABLET ORAL at 14:08

## 2021-07-12 RX ADMIN — METHYLPREDNISOLONE SODIUM SUCCINATE 10 MG: 40 INJECTION, POWDER, FOR SOLUTION INTRAMUSCULAR; INTRAVENOUS at 08:09

## 2021-07-12 RX ADMIN — SODIUM CHLORIDE, PRESERVATIVE FREE 10 ML: 5 INJECTION INTRAVENOUS at 08:08

## 2021-07-12 RX ADMIN — IPRATROPIUM BROMIDE AND ALBUTEROL SULFATE 3 ML: 2.5; .5 SOLUTION RESPIRATORY (INHALATION) at 14:54

## 2021-07-12 RX ADMIN — SODIUM CHLORIDE, PRESERVATIVE FREE 10 ML: 5 INJECTION INTRAVENOUS at 20:25

## 2021-07-12 RX ADMIN — SODIUM CHLORIDE 150 ML/HR: 9 INJECTION, SOLUTION INTRAVENOUS at 14:08

## 2021-07-12 RX ADMIN — SUCRALFATE 1 G: 1 TABLET ORAL at 20:23

## 2021-07-12 RX ADMIN — IPRATROPIUM BROMIDE AND ALBUTEROL SULFATE 3 ML: 2.5; .5 SOLUTION RESPIRATORY (INHALATION) at 09:41

## 2021-07-12 RX ADMIN — SUCRALFATE 1 G: 1 TABLET ORAL at 08:09

## 2021-07-12 RX ADMIN — SODIUM CHLORIDE 150 ML/HR: 9 INJECTION, SOLUTION INTRAVENOUS at 20:23

## 2021-07-12 RX ADMIN — ATORVASTATIN CALCIUM 40 MG: 40 TABLET, FILM COATED ORAL at 08:09

## 2021-07-12 RX ADMIN — SUCRALFATE 1 G: 1 TABLET ORAL at 17:33

## 2021-07-12 RX ADMIN — IPRATROPIUM BROMIDE AND ALBUTEROL SULFATE 3 ML: 2.5; .5 SOLUTION RESPIRATORY (INHALATION) at 22:24

## 2021-07-12 RX ADMIN — PIPERACILLIN SODIUM AND TAZOBACTAM SODIUM 3.38 G: 3; .375 INJECTION, POWDER, LYOPHILIZED, FOR SOLUTION INTRAVENOUS at 06:06

## 2021-07-12 RX ADMIN — IPRATROPIUM BROMIDE AND ALBUTEROL SULFATE 3 ML: 2.5; .5 SOLUTION RESPIRATORY (INHALATION) at 06:42

## 2021-07-12 RX ADMIN — ENOXAPARIN SODIUM 40 MG: 40 INJECTION SUBCUTANEOUS at 14:08

## 2021-07-12 RX ADMIN — ASPIRIN 81 MG: 81 TABLET, FILM COATED ORAL at 08:09

## 2021-07-12 RX ADMIN — PIPERACILLIN SODIUM AND TAZOBACTAM SODIUM 3.38 G: 3; .375 INJECTION, POWDER, LYOPHILIZED, FOR SOLUTION INTRAVENOUS at 15:25

## 2021-07-12 RX ADMIN — SODIUM CHLORIDE 150 ML/HR: 9 INJECTION, SOLUTION INTRAVENOUS at 05:39

## 2021-07-12 RX ADMIN — PIPERACILLIN SODIUM AND TAZOBACTAM SODIUM 3.38 G: 3; .375 INJECTION, POWDER, LYOPHILIZED, FOR SOLUTION INTRAVENOUS at 22:58

## 2021-07-12 NOTE — PLAN OF CARE
Goal Outcome Evaluation:  Plan of Care Reviewed With: patient           Outcome Summary: Initial PT evaluation complete; co-evaluation with OT.  Patient is alert and cooperative.  He demonstrates (I) with bed mobility, transfers and gait, ambulating 150'x1 without an AD, fast ludwin, no LOB. Tinetti assessed: 26/28 or low fall risk; FWW not necessary.   Patient reports recent cervical spine fusion, has soft collar to use PRN.  Goals established to increase activity tolerance and further lower fall risk; continue skilled PT.

## 2021-07-12 NOTE — PROGRESS NOTES
TWO PATIENT IDENTIFIERS WERE USED. THE PATIENT WAS DRAPED WITH A FULL BODY DRAPE AND THE PATIENT'S RIGHT ARM WAS PREPPED WITH CHLORA PREP. ULTRASOUND WAS USED TO LOCALIZE THERIGHT BASILIC VEIN. SUBCUTANEOUS TISSUE AT THE CATHETER SITE WAS INFILTRATED WITH 2% LIDOCAINE. UNDER ULTRASOUND GUIDANCE, THE VEIN WAS ACCESSED WITH A 21 GAUGE  NEEDLE. AN 0.018 WIRE WAS THEN THREADED THROUGH THE NEEDLE. THE 21 GAUGE NEEDLE WAS REMOVED AND A 4 Wolof SHEATH WAS PLACED OVER THE WIRE INTO THE VEIN.THE MIDLINE CATHETER WAS TRIMMED TO 20CM. THE MIDLINE CATHETER WAS THEN PLACED OVER THE WIRE INTO THE VEIN, THE SHEATH WAS PEELED AWAY, WIRE WAS REMOVED. CATHETER WAS FLUSHED WITH NORMAL SALINE AND CATHETER TIP APPLIED. BIOPATCH PLACED. CATHETER SECURED WITH STAT LOCK AND TEGADERM. PATIENT TOLERATED PROCEDURE WELL. THIS WAS DONE AT BEDSIDE      IMPRESSION:SUCCESSFUL PLACEMENT OF DUAL LUMEN MIDLINE.           Alysia Silveira  7/12/2021  11:35 CDT

## 2021-07-12 NOTE — CONSULTS
Adult Nutrition  Assessment    Patient Name:  Jose Zazueta  YOB: 1950  MRN: [id number]  Admit Date:  7/11/2021    Assessment Date:  7/12/2021    Comments: Pt admitted with Septic shock due to Pneumonia.  He recently had a cervical fusion of C5-C7.  Apparently pt ate immediately following surgery and there is a question of there being Aspiration Pneumonia.  He has lost ~20# in the past 6 months, intentionally.  He states that he did this by just cutting back.  RD encouraged him to eat well to gain strength with acute illness his nutritional needs are greater.  He voiced understanding.  Will add milk and ice cream for optimal calories and protein.  Monitor    Reason for Assessment     Row Name 07/12/21 1429          Reason for Assessment    Reason For Assessment  nurse/nurse practitioner consult     Diagnosis  surgery/postoperative complications     Identified At Risk by Screening Criteria  no indicators present         Nutrition/Diet History     Row Name 07/12/21 1424          Nutrition/Diet History    Typical Food/Fluid Intake  Pt reports that he has lost wt over the past 6 months just by eating less.  It was an intentional wt loss.  Appetite is good.  He feels better with small frequent meals.  Denies any Gi distress at this time.  No eating difficulties.           Labs/Tests/Procedures/Meds     Row Name 07/12/21 1431          Labs/Procedures/Meds    Lab Results Reviewed  reviewed, pertinent     Lab Results Comments  Gluc 108; Al b 2.70        Diagnostic Tests/Procedures    Diagnostic Test/Procedure Reviewed  reviewed, pertinent     Diagnostic Test/Procedures Comments  Zosyn; Nacl 1500cc/hr; Solumedrol        Medications    Pertinent Medications Reviewed  reviewed, pertinent         Physical Findings     Row Name 07/12/21 1432          Physical Findings    Overall Physical Appearance  on oxygen therapy     Skin  surgical incision         Estimated/Assessed Needs     Row Name 07/12/21 1436           Calculation Measurements    Weight Used For Calculations  75.3 kg (166 lb)        Estimated/Assessed Needs    Additional Documentation  Additional Requirements (Group);Fluid Requirements (Group);Saragosa-St. Jeor Equation (Group);Protein Requirements (Group);Calorie Requirements (Group);KCAL/KG (Group)        Calorie Requirements    Estimated Calorie Need Method  Saragosa-St Jeor        Saragosa-St. Jeor Equation    RMR (Saragosa-St. Jeor Equation)  1524.22     Saragosa-St. Jeor Activity Factors  1.4 - 1.5     Activity Factors (Saragosa-St. Jeor)  2133.908 - 2286.33        Protein Requirements    Weight Used For Protein Calculations  75.3 kg (166 lb)     Est Protein Requirement Amount (gms/kg)  1.2 gm protein     Estimated Protein Requirements (gms/day)  90.36        Fluid Requirements    Fluid Requirements (mL/day)  2200     Estimated Fluid Requirement Method  RDA Method     RDA Method (mL)  2200         Nutrition Prescription Ordered     Row Name 07/12/21 1433          Nutrition Prescription PO    Current PO Diet  Regular     Fluid Consistency  Thin     Common Modifiers  Cardiac         Evaluation of Received Nutrient/Fluid Intake     Row Name 07/12/21 1434          PO Evaluation    Number of Days PO Intake Evaluated  Insufficient Data     Number of Meals  1     % PO Intake  50%               Electronically signed by:  Bertha Hendrix RD  07/12/21 14:44 CDT

## 2021-07-12 NOTE — PLAN OF CARE
Goal Outcome Evaluation:           Progress: improving  Outcome Summary: Patient alert and oriented x 4 this shift. Vitals WNL, NSR per monitor with occasional PACs. Tolerating PO intake well, adequate UOP. Healing small incision from cervical fusion to anterior neck. Patient will wear soft c-collar as needed for comfort. Worked with PT/OT this afternoon and tolerated well. Patient to transfer to medical floor with telemetry. Will continue to monitor closely. Wife at bedside, updated and all questions and concerns addressed.

## 2021-07-12 NOTE — PROGRESS NOTES
[facility] Medicine Services  INPATIENT PROGRESS NOTE    Length of Stay: 1  Date of Admission: 7/11/2021  Primary Care Physician: Sandee Perla APRN    Subjective   Chief Complaint: Fever  HPI: Patient states he is feeling better today.  Less short of breath.  He still has some neck soreness and headache.    Review of Systems   Constitutional: Positive for activity change and fatigue. Negative for appetite change, chills and fever.   Respiratory: Positive for shortness of breath. Negative for chest tightness.    Cardiovascular: Negative for chest pain, palpitations and leg swelling.   Gastrointestinal: Negative for abdominal pain, constipation, diarrhea, nausea and vomiting.   Musculoskeletal: Positive for neck pain.   Skin: Negative for wound.   Neurological: Positive for headaches. Negative for dizziness, weakness, light-headedness and numbness.        All pertinent negatives and positives are as above. All other systems have been reviewed and are negative unless otherwise stated.     Objective    Temp:  [98.2 °F (36.8 °C)-99 °F (37.2 °C)] 98.8 °F (37.1 °C)  Heart Rate:  [56-78] 73  Resp:  [16-22] 22  BP: ()/(58-87) 126/75    Physical Exam  Vitals reviewed.   Constitutional:       Appearance: He is well-developed.   HENT:      Head: Normocephalic and atraumatic.   Eyes:      Pupils: Pupils are equal, round, and reactive to light.   Cardiovascular:      Rate and Rhythm: Normal rate and regular rhythm.      Heart sounds: Normal heart sounds. No murmur heard.   No friction rub. No gallop.    Pulmonary:      Effort: Pulmonary effort is normal. No respiratory distress.      Breath sounds: Normal breath sounds. No wheezing or rales.   Chest:      Chest wall: No tenderness.   Abdominal:      General: Bowel sounds are normal. There is no distension.      Palpations: Abdomen is soft.      Tenderness: There is no abdominal tenderness.   Musculoskeletal:      Cervical  back: Normal range of motion and neck supple.   Psychiatric:         Behavior: Behavior normal.       Results Review:  I have reviewed the labs, radiology results, and diagnostic studies.    Laboratory Data:   Results from last 7 days   Lab Units 07/12/21  0600 07/11/21  1732 07/11/21  0726   SODIUM mmol/L 136  --  132*   POTASSIUM mmol/L 3.8 3.1* 2.5*   CHLORIDE mmol/L 104  --  90*   CO2 mmol/L 25.0  --  26.0   BUN mg/dL 13  --  20   CREATININE mg/dL 0.80  --  1.32*   GLUCOSE mg/dL 108*  --  139*   CALCIUM mg/dL 7.6*  --  8.8   BILIRUBIN mg/dL 0.4  --  0.7   ALK PHOS U/L 48  --  55   ALT (SGPT) U/L 13  --  16   AST (SGOT) U/L 14  --  20   ANION GAP mmol/L 7.0  --  16.0*     Estimated Creatinine Clearance: 98.6 mL/min (by C-G formula based on SCr of 0.8 mg/dL).  Results from last 7 days   Lab Units 07/12/21  0600 07/11/21  0726   MAGNESIUM mg/dL 2.4 1.0*         Results from last 7 days   Lab Units 07/12/21  0559 07/11/21  0726   WBC 10*3/mm3 14.16* 6.53   HEMOGLOBIN g/dL 10.2* 12.2*   HEMATOCRIT % 29.6* 34.6*   PLATELETS 10*3/mm3 185 286     Results from last 7 days   Lab Units 07/11/21  0726   INR  0.95       Culture Data:   Blood Culture   Date Value Ref Range Status   07/11/2021 No growth at 24 hours  Preliminary   07/11/2021 No growth at 24 hours  Preliminary     No results found for: URINECX  Respiratory Culture   Date Value Ref Range Status   07/11/2021   Preliminary    Moderate growth (3+) Normal Respiratory Bette: NO S.aureus/MRSA or Pseudomonas aeruginosa     No results found for: WOUNDCX  No results found for: STOOLCX  No components found for: BODYFLD    Radiology Data:   Imaging Results (Last 24 Hours)     Procedure Component Value Units Date/Time    US Guided Vascular Access [[id number]] Resulted: 07/12/21 1157     Updated: 07/12/21 1157    IR Insert Midline Without Port Pump 5 Plus [[id number]] Resulted: 07/12/21 1134     Updated: 07/12/21 1134    Narrative:      This procedure was auto-finalized with no  dictation required.          I have reviewed the patient's current medications.     Assessment/Plan     Active Hospital Problems    Diagnosis    • Septic shock (CMS/[facility])        Plan:    1.  Right-sided pneumonia: Probably aspiration.  Continue empiric antibiotics.  Culture pending.  2.  Septic shock: Resolved.  Off pressors.  Continue fluids for now.  3.  Recent C5-C7 cervical fusion: Continue current treatment.  Plan follow-up with physician at [place].  4.  Acute kidney injury: Resolved with hydration.  5.  DVT prophylaxis: Lovenox.    The patient was evaluated during the global COVID-19 pandemic, and the diagnosis was suspected/considered upon their initial presentation.  Evaluation, treatment, and testing were consistent with current guidelines for patients who present with complaints or symptoms that may be related to COVID-19.    I confirmed that the patient's Advance Care Plan is present, code status is documented, or surrogate decision maker is listed in the patient's medical record.       Discharge Planning: I expect patient to be discharged to home in 2-3 days.        This document has been electronically signed by Doug Marmolejo MD on July 12, 2021 13:11 CDT

## 2021-07-12 NOTE — PLAN OF CARE
Goal Outcome Evaluation:  Plan of Care Reviewed With: patient           Outcome Summary: OT evaluation complete, co-evaluation with PT. Patient independent in bed mobility, LE dressing (donning/doffing socks supine in bed and adjusting sitting EOB), independent with toilet transfer, independent with toileting, independent with grooming standing at sink. No complaints of pain or dizziness seen this session. Recommending return home with wife, dischage inpatient OT.

## 2021-07-12 NOTE — PLAN OF CARE
Goal Outcome Evaluation:  Plan of Care Reviewed With: caregiver, patient        Progress: no change  Outcome Summary: Pt admitted with Septic shock due to Pneumonia. Recnet cervical fusion.  Will add milk and ice cream to optimize intake.  Recent intentional wt loss
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
## 2021-07-12 NOTE — NURSING NOTE
Report called to WILLIAM Obrien on [address]. Patient to transfer to room 426. Pt's wife aware and at bedside.

## 2021-07-12 NOTE — CASE MANAGEMENT/SOCIAL WORK
Discharge Planning Assessment  [facility]     Patient Name: Jose Zazueta  MRN: [id number]  Today's Date: 7/12/2021    Admit Date: 7/11/2021    Discharge Needs Assessment     Row Name 07/12/21 1241       Living Environment    Lives With  spouse    Current Living Arrangements  home/apartment/condo    Primary Care Provided by  self    Provides Primary Care For  no one    Family Caregiver if Needed  spouse;other (see comments)    Quality of Family Relationships  helpful;involved;supportive    Able to Return to Prior Arrangements  yes       Resource/Environmental Concerns    Resource/Environmental Concerns  none    Transportation Concerns  car, none       Transition Planning    Patient/Family Anticipates Transition to  home with family    Patient/Family Anticipated Services at Transition  none    Transportation Anticipated  family or friend will provide       Discharge Needs Assessment    Readmission Within the Last 30 Days  no previous admission in last 30 days    Equipment Currently Used at Home  none    Concerns to be Addressed  no discharge needs identified    Anticipated Changes Related to Illness  none    Equipment Needed After Discharge  none    Current Discharge Risk  chronically ill        Discharge Plan     Row Name 07/12/21 124       Plan    Plan Comments  Consult and LACE complete.  Pharmacy confirmed.  Patient denies using any DME at home.  No DME anticipated.  Patient is open to home health if recommended.  Patient reports a good support system... LATESHA Virgen        Continued Care and Services - Admitted Since 7/11/2021    Coordination has not been started for this encounter.         Demographic Summary     Row Name 07/12/21 1240       General Information    Admission Type  inpatient    Arrived From  emergency department    Referral Source  high risk screening    Reason for Consult  other (see comments) New admit    Preferred Language  English     Used During This  Interaction  no        Functional Status     Row Name 07/12/21 1241       Functional Status    Usual Activity Tolerance  good    Current Activity Tolerance  fair       Functional Status, IADL    Medications  independent    Meal Preparation  independent    Housekeeping  independent    Laundry  independent    Shopping  assistive person       Mental Status    General Appearance WDL  WDL       Mental Status Summary    Recent Changes in Mental Status/Cognitive Functioning  no changes       Employment/    Employment Status  retired        Psychosocial    No documentation.       Abuse/Neglect    No documentation.       Legal    No documentation.       Substance Abuse    No documentation.       Patient Forms    No documentation.           LAETSHA Virgen

## 2021-07-12 NOTE — PLAN OF CARE
Goal Outcome Evaluation:  Plan of Care Reviewed With: patient           Outcome Summary: Pt Levo off since 7/11/21 at 15:30.  Pt VSS, on 2L for comfort.  UOP adequate and pt is resting comfortably with pt performing incentive spirometer as scheduled.  ABX continues. Mag and   K3.1 treated per protocol and awaiting recheck K level.  Will continue to monitor.

## 2021-07-13 LAB
BACTERIA SPEC RESP CULT: NORMAL
GRAM STN SPEC: NORMAL

## 2021-07-13 PROCEDURE — 25010000002 ENOXAPARIN PER 10 MG: Performed by: INTERNAL MEDICINE

## 2021-07-13 PROCEDURE — 94799 UNLISTED PULMONARY SVC/PX: CPT

## 2021-07-13 PROCEDURE — 25010000002 METHYLPREDNISOLONE PER 40 MG: Performed by: INTERNAL MEDICINE

## 2021-07-13 PROCEDURE — 94760 N-INVAS EAR/PLS OXIMETRY 1: CPT

## 2021-07-13 PROCEDURE — 97116 GAIT TRAINING THERAPY: CPT

## 2021-07-13 PROCEDURE — 25010000002 PIPERACILLIN SOD-TAZOBACTAM PER 1 G: Performed by: INTERNAL MEDICINE

## 2021-07-13 RX ORDER — HYDROCODONE BITARTRATE AND ACETAMINOPHEN 10; 325 MG/1; MG/1
1 TABLET ORAL EVERY 6 HOURS PRN
Status: DISCONTINUED | OUTPATIENT
Start: 2021-07-13 | End: 2021-07-15 | Stop reason: HOSPADM

## 2021-07-13 RX ORDER — METAXALONE 800 MG/1
800 TABLET ORAL 3 TIMES DAILY
Status: DISCONTINUED | OUTPATIENT
Start: 2021-07-13 | End: 2021-07-15 | Stop reason: HOSPADM

## 2021-07-13 RX ORDER — ISOSORBIDE MONONITRATE 30 MG/1
30 TABLET, EXTENDED RELEASE ORAL DAILY
Status: DISCONTINUED | OUTPATIENT
Start: 2021-07-13 | End: 2021-07-15 | Stop reason: HOSPADM

## 2021-07-13 RX ORDER — LANOLIN ALCOHOL/MO/W.PET/CERES
3 CREAM (GRAM) TOPICAL NIGHTLY
Status: DISCONTINUED | OUTPATIENT
Start: 2021-07-13 | End: 2021-07-15 | Stop reason: HOSPADM

## 2021-07-13 RX ORDER — PANTOPRAZOLE SODIUM 40 MG/1
40 TABLET, DELAYED RELEASE ORAL EVERY MORNING
Refills: 10 | Status: DISCONTINUED | OUTPATIENT
Start: 2021-07-13 | End: 2021-07-15 | Stop reason: HOSPADM

## 2021-07-13 RX ORDER — HYDROCHLOROTHIAZIDE 12.5 MG/1
12.5 TABLET ORAL DAILY
Status: DISCONTINUED | OUTPATIENT
Start: 2021-07-14 | End: 2021-07-15

## 2021-07-13 RX ORDER — HYDRALAZINE HYDROCHLORIDE 20 MG/ML
10 INJECTION INTRAMUSCULAR; INTRAVENOUS EVERY 6 HOURS PRN
Status: DISCONTINUED | OUTPATIENT
Start: 2021-07-13 | End: 2021-07-15

## 2021-07-13 RX ORDER — LOSARTAN POTASSIUM 50 MG/1
100 TABLET ORAL
Status: DISCONTINUED | OUTPATIENT
Start: 2021-07-14 | End: 2021-07-15

## 2021-07-13 RX ADMIN — IPRATROPIUM BROMIDE AND ALBUTEROL SULFATE 3 ML: 2.5; .5 SOLUTION RESPIRATORY (INHALATION) at 15:05

## 2021-07-13 RX ADMIN — HYDROCODONE BITARTRATE AND ACETAMINOPHEN 1 TABLET: 10; 325 TABLET ORAL at 11:09

## 2021-07-13 RX ADMIN — LOSARTAN POTASSIUM: 50 TABLET, FILM COATED ORAL at 13:54

## 2021-07-13 RX ADMIN — PIPERACILLIN SODIUM AND TAZOBACTAM SODIUM 3.38 G: 3; .375 INJECTION, POWDER, LYOPHILIZED, FOR SOLUTION INTRAVENOUS at 23:39

## 2021-07-13 RX ADMIN — ASPIRIN 81 MG: 81 TABLET, FILM COATED ORAL at 08:51

## 2021-07-13 RX ADMIN — IPRATROPIUM BROMIDE AND ALBUTEROL SULFATE 3 ML: 2.5; .5 SOLUTION RESPIRATORY (INHALATION) at 07:50

## 2021-07-13 RX ADMIN — METAXALONE 800 MG: 800 TABLET ORAL at 20:09

## 2021-07-13 RX ADMIN — SUCRALFATE 1 G: 1 TABLET ORAL at 20:09

## 2021-07-13 RX ADMIN — IPRATROPIUM BROMIDE AND ALBUTEROL SULFATE 3 ML: 2.5; .5 SOLUTION RESPIRATORY (INHALATION) at 11:54

## 2021-07-13 RX ADMIN — ISOSORBIDE MONONITRATE 30 MG: 30 TABLET, EXTENDED RELEASE ORAL at 11:37

## 2021-07-13 RX ADMIN — SODIUM CHLORIDE, PRESERVATIVE FREE 10 ML: 5 INJECTION INTRAVENOUS at 20:10

## 2021-07-13 RX ADMIN — SUCRALFATE 1 G: 1 TABLET ORAL at 08:51

## 2021-07-13 RX ADMIN — SODIUM CHLORIDE, PRESERVATIVE FREE 10 ML: 5 INJECTION INTRAVENOUS at 08:52

## 2021-07-13 RX ADMIN — ENOXAPARIN SODIUM 40 MG: 40 INJECTION SUBCUTANEOUS at 13:55

## 2021-07-13 RX ADMIN — IPRATROPIUM BROMIDE AND ALBUTEROL SULFATE 3 ML: 2.5; .5 SOLUTION RESPIRATORY (INHALATION) at 19:49

## 2021-07-13 RX ADMIN — METHYLPREDNISOLONE SODIUM SUCCINATE 10 MG: 40 INJECTION, POWDER, FOR SOLUTION INTRAMUSCULAR; INTRAVENOUS at 08:51

## 2021-07-13 RX ADMIN — METAXALONE 800 MG: 800 TABLET ORAL at 15:20

## 2021-07-13 RX ADMIN — SUCRALFATE 1 G: 1 TABLET ORAL at 17:40

## 2021-07-13 RX ADMIN — MELATONIN 3 MG: 3 TAB ORAL at 20:09

## 2021-07-13 RX ADMIN — SUCRALFATE 1 G: 1 TABLET ORAL at 11:09

## 2021-07-13 RX ADMIN — ATORVASTATIN CALCIUM 40 MG: 40 TABLET, FILM COATED ORAL at 08:51

## 2021-07-13 RX ADMIN — PIPERACILLIN SODIUM AND TAZOBACTAM SODIUM 3.38 G: 3; .375 INJECTION, POWDER, LYOPHILIZED, FOR SOLUTION INTRAVENOUS at 06:05

## 2021-07-13 RX ADMIN — PIPERACILLIN SODIUM AND TAZOBACTAM SODIUM 3.38 G: 3; .375 INJECTION, POWDER, LYOPHILIZED, FOR SOLUTION INTRAVENOUS at 15:21

## 2021-07-13 RX ADMIN — PANTOPRAZOLE SODIUM 40 MG: 40 TABLET, DELAYED RELEASE ORAL at 11:37

## 2021-07-13 NOTE — PLAN OF CARE
Goal Outcome Evaluation:  Plan of Care Reviewed With: patient        Progress: improving     Pt has rested well between care, no complaints of pain,receiving IV fluids and antibiotics. Pt has no complaints at this time. VSS. Will continue to monitor.

## 2021-07-13 NOTE — PROGRESS NOTES
[facility] Medicine Services  INPATIENT PROGRESS NOTE    Length of Stay: 2  Date of Admission: 7/11/2021  Primary Care Physician: Sandee Perla APRN    Subjective   Chief Complaint: Fever  HPI: Patient states he didn't sleep well overnight.  Has worse neck and back pain today.  Overall feeling not quite as good today.    Review of Systems   Constitutional: Positive for activity change and fatigue. Negative for appetite change, chills and fever.   Respiratory: Positive for shortness of breath. Negative for chest tightness.    Cardiovascular: Negative for chest pain, palpitations and leg swelling.   Gastrointestinal: Negative for abdominal pain, constipation, diarrhea, nausea and vomiting.   Musculoskeletal: Positive for neck pain.   Skin: Negative for wound.   Neurological: Positive for headaches. Negative for dizziness, weakness, light-headedness and numbness.        All pertinent negatives and positives are as above. All other systems have been reviewed and are negative unless otherwise stated.     Objective    Temp:  [96.3 °F (35.7 °C)-98.9 °F (37.2 °C)] 97.5 °F (36.4 °C)  Heart Rate:  [62-92] 92  Resp:  [16-20] 16  BP: (119-178)/(67-98) 131/82    Physical Exam  Vitals reviewed.   Constitutional:       Appearance: He is well-developed.   HENT:      Head: Normocephalic and atraumatic.   Eyes:      Pupils: Pupils are equal, round, and reactive to light.   Cardiovascular:      Rate and Rhythm: Normal rate and regular rhythm.      Heart sounds: Normal heart sounds. No murmur heard.   No friction rub. No gallop.    Pulmonary:      Effort: Pulmonary effort is normal. No respiratory distress.      Breath sounds: Normal breath sounds. No wheezing or rales.   Chest:      Chest wall: No tenderness.   Abdominal:      General: Bowel sounds are normal. There is no distension.      Palpations: Abdomen is soft.      Tenderness: There is no abdominal tenderness.    Musculoskeletal:      Cervical back: Normal range of motion and neck supple.   Psychiatric:         Behavior: Behavior normal.       Results Review:  I have reviewed the labs, radiology results, and diagnostic studies.    Laboratory Data:   Results from last 7 days   Lab Units 07/12/21  0600 07/11/21  1732 07/11/21  0726   SODIUM mmol/L 136  --  132*   POTASSIUM mmol/L 3.8 3.1* 2.5*   CHLORIDE mmol/L 104  --  90*   CO2 mmol/L 25.0  --  26.0   BUN mg/dL 13  --  20   CREATININE mg/dL 0.80  --  1.32*   GLUCOSE mg/dL 108*  --  139*   CALCIUM mg/dL 7.6*  --  8.8   BILIRUBIN mg/dL 0.4  --  0.7   ALK PHOS U/L 48  --  55   ALT (SGPT) U/L 13  --  16   AST (SGOT) U/L 14  --  20   ANION GAP mmol/L 7.0  --  16.0*     Estimated Creatinine Clearance: 97.7 mL/min (by C-G formula based on SCr of 0.8 mg/dL).  Results from last 7 days   Lab Units 07/12/21  0600 07/11/21  0726   MAGNESIUM mg/dL 2.4 1.0*         Results from last 7 days   Lab Units 07/12/21  0559 07/11/21  0726   WBC 10*3/mm3 14.16* 6.53   HEMOGLOBIN g/dL 10.2* 12.2*   HEMATOCRIT % 29.6* 34.6*   PLATELETS 10*3/mm3 185 286     Results from last 7 days   Lab Units 07/11/21  0726   INR  0.95       Culture Data:   Blood Culture   Date Value Ref Range Status   07/11/2021 No growth at 2 days  Preliminary   07/11/2021 No growth at 2 days  Preliminary     No results found for: URINECX  Respiratory Culture   Date Value Ref Range Status   07/11/2021   Final    Moderate growth (3+) Normal Respiratory Bette: NO S.aureus/MRSA or Pseudomonas aeruginosa     No results found for: WOUNDCX  No results found for: STOOLCX  No components found for: BODYFLD    Radiology Data:   Imaging Results (Last 24 Hours)     ** No results found for the last 24 hours. **          I have reviewed the patient's current medications.     Assessment/Plan     Active Hospital Problems    Diagnosis    • Septic shock (CMS/[facility])        Plan:    1.  Right-sided pneumonia: Probably aspiration.  Continue empiric  antibiotics.  Culture pending.  2.  Septic shock: Resolved.    3.  Recent C5-C7 cervical fusion: Continue current treatment.  Plan follow-up with physician at [place].  Continue home narcotics and muscle relaxers.  4.  Acute kidney injury: Resolved with hydration.  5.  Hypertension: Patient has been off his home medications since admission due to septic shock.  Will restart home medications and add as needed hydralazine.  6.  Insomnia: We'll start melatonin.  7.  DVT prophylaxis: Lovenox.    The patient was evaluated during the global COVID-19 pandemic, and the diagnosis was suspected/considered upon their initial presentation.  Evaluation, treatment, and testing were consistent with current guidelines for patients who present with complaints or symptoms that may be related to COVID-19.    I confirmed that the patient's Advance Care Plan is present, code status is documented, or surrogate decision maker is listed in the patient's medical record.       Discharge Planning: I expect patient to be discharged to home in 2-3 days.        This document has been electronically signed by Doug Marmolejo MD on July 13, 2021 15:07 CDT

## 2021-07-13 NOTE — THERAPY TREATMENT NOTE
Acute Care - Physical Therapy Treatment Note  [facility]     Patient Name: Jose Zazueta  : 1950  MRN: [id number]  Today's Date: 2021           PT Assessment (last 12 hours)      PT Evaluation and Treatment     Row Name 21 1111          Physical Therapy Time and Intention    Subjective Information  complains of;pain  -TA     Document Type  therapy note (daily note)  -TA     Mode of Treatment  physical therapy  -TA     Row Name 21 1111          General Information    Patient Profile Reviewed  yes  -TA     Existing Precautions/Restrictions  spinal cervical fusion, C5-7 on 2021  -TA     Row Name 21 1111          Cognition    Affect/Mental Status (Cognitive)  WFL  -TA     Orientation Status (Cognition)  oriented x 4  -TA     Personal Safety Interventions  fall prevention program maintained;muscle strengthening facilitated;nonskid shoes/slippers when out of bed;supervised activity  -TA     Row Name 21 1111          Pain Scale: Numbers Pre/Post-Treatment    Pretreatment Pain Rating  7/10  -TA     Posttreatment Pain Rating  7/10  -TA     Pain Location - Orientation  generalized  -TA     Pain Location  head;neck  -TA     Row Name 21 1111          Range of Motion Comprehensive    General Range of Motion  bilateral lower extremity ROM WFL  -TA     Row Name 21 1111          Strength Comprehensive (MMT)    General Manual Muscle Testing (MMT) Assessment  other (see comments)  -TA     Row Name 21 1111          Bed Mobility    Bed Mobility  supine-sit;sit-supine  -TA     Supine-Sit [place] (Bed Mobility)  modified independence  -TA     Sit-Supine [place] (Bed Mobility)  modified independence  -TA     Assistive Device (Bed Mobility)  head of bed elevated;bed rails  -TA     Row Name 21 1111          Transfers    Sit-Stand [place] (Transfers)  independent  -TA     Stand-Sit [place] (Transfers)  independent  -TA     Row Name 21 1111           Gait/Stairs (Locomotion)    [place] Level (Gait)  independent  -TA     Assistive Device (Gait)  -- none  -TA     Distance in Feet (Gait)  320`  -TA     Row Name 07/13/21 1111          Plan of Care Review    Plan of Care Reviewed With  patient;spouse  -TA     Progress  improving  -TA     Outcome Summary  pt sup<>sit & sit<>stand with independence, pt ambulated 320` without AD with [place] & assistance for IV pole. pt would benefit from home with assistance @ d/C  -TA     Row Name 07/13/21 1111          Vital Signs    Pre Systolic BP Rehab  191 nurse aware  -TA     Pre Treatment Diastolic BP  97  -TA     Post Systolic BP Rehab  199  -TA     Post Treatment Diastolic BP  103  -TA     Pretreatment Heart Rate (beats/min)  64  -TA     Intratreatment Heart Rate (beats/min)  95  -TA     Posttreatment Heart Rate (beats/min)  70  -TA     Pre SpO2 (%)  98  -TA     O2 Delivery Pre Treatment  room air  -TA     Post SpO2 (%)  99  -TA     O2 Delivery Post Treatment  room air  -TA     Pre Patient Position  Supine  -TA     Post Patient Position  Supine  -TA     Row Name 07/13/21 1111          Bed Mobility Goal 1 (PT)    Activity/Assistive Device (Bed Mobility Goal 1, PT)  sit to supine/supine to sit  -TA     [place] Level/Cues Needed (Bed Mobility Goal 1, PT)  independent  -TA     Time Frame (Bed Mobility Goal 1, PT)  by discharge  -TA     Strategies/Barriers (Bed Mobility Goal 1, PT)  HOB flat, no bed rails.  -TA     Progress/Outcomes (Bed Mobility Goal 1, PT)  goal not met  -TA     Row Name 07/13/21 1111          Gait Training Goal 1 (PT)    Activity/Assistive Device (Gait Training Goal 1, PT)  gait (walking locomotion)  -TA     [place] Level (Gait Training Goal 1, PT)  independent  -TA     Distance (Gait Training Goal 1, PT)  300'x2.  -TA     Time Frame (Gait Training Goal 1, PT)  by discharge  -TA     Progress/Outcome (Gait Training Goal 1, PT)  goal not met  -TA     Row Name 07/13/21 1111           Stairs Goal 1 (PT)    Activity/Assistive Device (Stairs Goal 1, PT)  using handrail, left;using handrail, right  -TA     [place] Level/Cues Needed (Stairs Goal 1, PT)  modified independence  -TA     Number of Stairs (Stairs Goal 1, PT)  3 steps.  -TA     Time Frame (Stairs Goal 1, PT)  by discharge  -TA     Progress/Outcome (Stairs Goal 1, PT)  goal not met  -TA     Row Name 07/13/21 1111          Patient Education Goal (PT)    Activity (Patient Education Goal, PT)  Patient will score 28/28 on Tinetti fall risk.  -TA     [place]/Cues/Accuracy (Memory Goal 2, PT)  independent  -TA     Time Frame (Patient Education Goal, PT)  by discharge  -TA     Progress/Outcome (Patient Education Goal, PT)  goal not met  -TA     Row Name 07/13/21 1111          Positioning and Restraints    Pre-Treatment Position  in bed  -TA     Post Treatment Position  bed  -TA     In Bed  supine;call light within reach;exit alarm on;with family/caregiver;with other staff  -TA     Row Name 07/13/21 1111          Therapy Assessment/Plan (PT)    Rehab Potential (PT)  good, to achieve stated therapy goals  -TA     Criteria for Skilled Interventions Met (PT)  yes;skilled treatment is necessary  -TA     Comment, Therapy Assessment/Plan (PT)  continue  -TA       User Key  (r) = Recorded By, (t) = Taken By, (c) = Cosigned By    Initials Name Provider Type    Kelsey Miranda, PTA Physical Therapy Assistant        Physical Therapy Education                 Title: PT OT SLP Therapies (In Progress)     Topic: Physical Therapy (In Progress)     Point: Mobility training (Done)     Learning Progress Summary           Patient Acceptance, E, VU by CZ at 7/12/2021 5973    Comment: PT POC and goals.                   Point: Home exercise program (Not Started)     Learner Progress:  Not documented in this visit.          Point: Body mechanics (Not Started)     Learner Progress:  Not documented in this visit.          Point: Precautions (Not Started)      Learner Progress:  Not documented in this visit.                      User Key     Initials Effective Dates Name Provider Type Discipline    CZ 06/16/21 -  Kevin Terry, PT Physical Therapist PT              PT Recommendation and Plan  Anticipated Discharge Disposition (PT): home  Therapy Frequency (PT): 5 times/wk  Plan of Care Reviewed With: patient, spouse  Progress: improving  Outcome Summary: pt sup<>sit & sit<>stand with independence, pt ambulated 320` without AD with [place] & assistance for IV pole. pt would benefit from home with assistance @ d/C  Outcome Measures     Row Name 07/13/21 1200             How much help from another person do you currently need...    Turning from your back to your side while in flat bed without using bedrails?  4  -TA      Moving from lying on back to sitting on the side of a flat bed without bedrails?  4  -TA      Moving to and from a bed to a chair (including a wheelchair)?  4  -TA      Standing up from a chair using your arms (e.g., wheelchair, bedside chair)?  4  -TA      Climbing 3-5 steps with a railing?  3  -TA      To walk in hospital room?  4  -TA      AM-PAC 6 Clicks Score (PT)  23  -TA         Functional Assessment    Outcome Measure Options  AM-PAC 6 Clicks Basic Mobility (PT)  -TA        User Key  (r) = Recorded By, (t) = Taken By, (c) = Cosigned By    Initials Name Provider Type    TA Kelsey Carlson, PTA Physical Therapy Assistant           Time Calculation:   PT Charges     Row Name 07/13/21 1234             Time Calculation    Start Time  1111  -TA      Stop Time  1134  -TA      Time Calculation (min)  23 min  -TA      PT Received On  07/13/21  -TA         Time Calculation- PT    Total Timed Code Minutes- PT  23 minute(s)  -TA         Timed Charges    72654 - Gait Training Minutes   23  -TA         Total Minutes    Timed Charges Total Minutes  23  -TA       Total Minutes  23  -TA        User Key  (r) = Recorded By, (t) = Taken By, (c) = Cosigned By     Initials Name Provider Type    TA Kelsey Carlson PTA Physical Therapy Assistant        Therapy Charges for Today     Code Description Service Date Service Provider Modifiers Qty    71028723757 HC GAIT TRAINING EA 15 MIN 7/13/2021 Kelsey Carlson PTA GP 2          PT G-Codes  Outcome Measure Options: AM-PAC 6 Clicks Basic Mobility (PT)  AM-PAC 6 Clicks Score (PT): 23  AM-PAC 6 Clicks Score (OT): 24  Tinetti Total Score: 26    Kelsey Carlson PTA  7/13/2021

## 2021-07-13 NOTE — PLAN OF CARE
Goal Outcome Evaluation:  Plan of Care Reviewed With: patient, spouse        Progress: improving  Outcome Summary: pt sup<>sit & sit<>stand with independence, pt ambulated 320` without AD with [place] & assistance for IV pole. pt would benefit from home with assistance @ d/C

## 2021-07-14 LAB
POTASSIUM SERPL-SCNC: 2.9 MMOL/L (ref 3.5–5.2)
WHOLE BLOOD HOLD SPECIMEN: NORMAL

## 2021-07-14 PROCEDURE — 25010000002 PIPERACILLIN SOD-TAZOBACTAM PER 1 G: Performed by: INTERNAL MEDICINE

## 2021-07-14 PROCEDURE — 97116 GAIT TRAINING THERAPY: CPT

## 2021-07-14 PROCEDURE — 94760 N-INVAS EAR/PLS OXIMETRY 1: CPT

## 2021-07-14 PROCEDURE — 97530 THERAPEUTIC ACTIVITIES: CPT

## 2021-07-14 PROCEDURE — 84132 ASSAY OF SERUM POTASSIUM: CPT | Performed by: INTERNAL MEDICINE

## 2021-07-14 PROCEDURE — 25010000002 ENOXAPARIN PER 10 MG: Performed by: INTERNAL MEDICINE

## 2021-07-14 PROCEDURE — 94799 UNLISTED PULMONARY SVC/PX: CPT

## 2021-07-14 PROCEDURE — 25010000002 HYDRALAZINE PER 20 MG: Performed by: HOSPITALIST

## 2021-07-14 PROCEDURE — 25010000002 METHYLPREDNISOLONE PER 40 MG: Performed by: INTERNAL MEDICINE

## 2021-07-14 RX ADMIN — ATORVASTATIN CALCIUM 40 MG: 40 TABLET, FILM COATED ORAL at 09:19

## 2021-07-14 RX ADMIN — HYDROCODONE BITARTRATE AND ACETAMINOPHEN 1 TABLET: 10; 325 TABLET ORAL at 21:36

## 2021-07-14 RX ADMIN — SODIUM CHLORIDE, PRESERVATIVE FREE 10 ML: 5 INJECTION INTRAVENOUS at 13:56

## 2021-07-14 RX ADMIN — SODIUM CHLORIDE, PRESERVATIVE FREE 10 ML: 5 INJECTION INTRAVENOUS at 20:27

## 2021-07-14 RX ADMIN — IPRATROPIUM BROMIDE AND ALBUTEROL SULFATE 3 ML: 2.5; .5 SOLUTION RESPIRATORY (INHALATION) at 15:01

## 2021-07-14 RX ADMIN — IPRATROPIUM BROMIDE AND ALBUTEROL SULFATE 3 ML: 2.5; .5 SOLUTION RESPIRATORY (INHALATION) at 07:46

## 2021-07-14 RX ADMIN — METAXALONE 800 MG: 800 TABLET ORAL at 09:19

## 2021-07-14 RX ADMIN — PIPERACILLIN SODIUM AND TAZOBACTAM SODIUM 3.38 G: 3; .375 INJECTION, POWDER, LYOPHILIZED, FOR SOLUTION INTRAVENOUS at 06:15

## 2021-07-14 RX ADMIN — SUCRALFATE 1 G: 1 TABLET ORAL at 09:19

## 2021-07-14 RX ADMIN — MELATONIN 3 MG: 3 TAB ORAL at 20:24

## 2021-07-14 RX ADMIN — SODIUM CHLORIDE, PRESERVATIVE FREE 10 ML: 5 INJECTION INTRAVENOUS at 09:20

## 2021-07-14 RX ADMIN — METAXALONE 800 MG: 800 TABLET ORAL at 20:24

## 2021-07-14 RX ADMIN — HYDROCODONE BITARTRATE AND ACETAMINOPHEN 1 TABLET: 10; 325 TABLET ORAL at 11:01

## 2021-07-14 RX ADMIN — ASPIRIN 81 MG: 81 TABLET, FILM COATED ORAL at 09:18

## 2021-07-14 RX ADMIN — SODIUM CHLORIDE 150 ML/HR: 9 INJECTION, SOLUTION INTRAVENOUS at 17:26

## 2021-07-14 RX ADMIN — PIPERACILLIN SODIUM AND TAZOBACTAM SODIUM 3.38 G: 3; .375 INJECTION, POWDER, LYOPHILIZED, FOR SOLUTION INTRAVENOUS at 14:00

## 2021-07-14 RX ADMIN — METAXALONE 800 MG: 800 TABLET ORAL at 17:24

## 2021-07-14 RX ADMIN — ISOSORBIDE MONONITRATE 30 MG: 30 TABLET, EXTENDED RELEASE ORAL at 09:18

## 2021-07-14 RX ADMIN — SUCRALFATE 1 G: 1 TABLET ORAL at 17:24

## 2021-07-14 RX ADMIN — METHYLPREDNISOLONE SODIUM SUCCINATE 10 MG: 40 INJECTION, POWDER, FOR SOLUTION INTRAMUSCULAR; INTRAVENOUS at 09:19

## 2021-07-14 RX ADMIN — SUCRALFATE 1 G: 1 TABLET ORAL at 20:24

## 2021-07-14 RX ADMIN — IPRATROPIUM BROMIDE AND ALBUTEROL SULFATE 3 ML: 2.5; .5 SOLUTION RESPIRATORY (INHALATION) at 19:24

## 2021-07-14 RX ADMIN — PANTOPRAZOLE SODIUM 40 MG: 40 TABLET, DELAYED RELEASE ORAL at 06:15

## 2021-07-14 RX ADMIN — SODIUM CHLORIDE, PRESERVATIVE FREE 10 ML: 5 INJECTION INTRAVENOUS at 09:25

## 2021-07-14 RX ADMIN — SODIUM CHLORIDE 150 ML/HR: 9 INJECTION, SOLUTION INTRAVENOUS at 09:27

## 2021-07-14 RX ADMIN — POTASSIUM CHLORIDE 40 MEQ: 750 CAPSULE, EXTENDED RELEASE ORAL at 13:56

## 2021-07-14 RX ADMIN — ENOXAPARIN SODIUM 40 MG: 40 INJECTION SUBCUTANEOUS at 13:56

## 2021-07-14 RX ADMIN — SODIUM CHLORIDE, PRESERVATIVE FREE 10 ML: 5 INJECTION INTRAVENOUS at 20:26

## 2021-07-14 RX ADMIN — SODIUM CHLORIDE 150 ML/HR: 9 INJECTION, SOLUTION INTRAVENOUS at 02:09

## 2021-07-14 RX ADMIN — SUCRALFATE 1 G: 1 TABLET ORAL at 13:56

## 2021-07-14 RX ADMIN — POTASSIUM CHLORIDE 40 MEQ: 750 CAPSULE, EXTENDED RELEASE ORAL at 21:36

## 2021-07-14 RX ADMIN — PIPERACILLIN SODIUM AND TAZOBACTAM SODIUM 3.38 G: 3; .375 INJECTION, POWDER, LYOPHILIZED, FOR SOLUTION INTRAVENOUS at 21:53

## 2021-07-14 RX ADMIN — POTASSIUM CHLORIDE 40 MEQ: 750 CAPSULE, EXTENDED RELEASE ORAL at 17:54

## 2021-07-14 RX ADMIN — HYDRALAZINE HYDROCHLORIDE 10 MG: 20 INJECTION INTRAMUSCULAR; INTRAVENOUS at 06:15

## 2021-07-14 RX ADMIN — HYDROCHLOROTHIAZIDE 12.5 MG: 12.5 TABLET ORAL at 09:18

## 2021-07-14 RX ADMIN — LOSARTAN POTASSIUM 100 MG: 50 TABLET, FILM COATED ORAL at 09:18

## 2021-07-14 NOTE — PLAN OF CARE
Goal Outcome Evaluation:  Plan of Care Reviewed With: patient        Progress: improving  Outcome Summary: pt sup<>sit & sit<>stand with [place], pt ambulated 200` & 600` withhhout AD with  SBA- independence with 1 LOB. pt up/down 2 & 3 steps without HR with CGA -SBA.

## 2021-07-14 NOTE — THERAPY TREATMENT NOTE
Acute Care - Physical Therapy Treatment Note  [facility]     Patient Name: Jose Zazueta  : 1950  MRN: [id number]  Today's Date: 2021           PT Assessment (last 12 hours)      PT Evaluation and Treatment     Row Name 21 1436          Physical Therapy Time and Intention    Subjective Information  no complaints  -TA     Document Type  therapy note (daily note)  -TA     Mode of Treatment  physical therapy  -TA     Row Name 21 1436          General Information    Patient Profile Reviewed  yes  -TA     Existing Precautions/Restrictions  spinal cervical fusion, C5-7 on 2021  -TA     Row Name 21 1436          Cognition    Affect/Mental Status (Cognitive)  WFL  -TA     Orientation Status (Cognition)  oriented x 4  -TA     Personal Safety Interventions  fall prevention program maintained;muscle strengthening facilitated;nonskid shoes/slippers when out of bed;supervised activity  -TA     Row Name 21 143          Pain Scale: Numbers Pre/Post-Treatment    Pretreatment Pain Rating  0/10 - no pain  -TA     Posttreatment Pain Rating  0/10 - no pain  -TA     Row Name 21 1436          Range of Motion Comprehensive    General Range of Motion  bilateral lower extremity ROM WFL  -TA     [facility] Name 21 143          Strength Comprehensive (MMT)    General Manual Muscle Testing (MMT) Assessment  other (see comments)  -TA     Row Name 21 143          Bed Mobility    Bed Mobility  supine-sit;sit-supine  -TA     Supine-Sit [place] (Bed Mobility)  modified independence  -TA     Sit-Supine [place] (Bed Mobility)  modified independence  -TA     Assistive Device (Bed Mobility)  head of bed elevated;bed rails  -TA     Row Name 21 143          Transfers    Sit-Stand [place] (Transfers)  independent  -TA     Stand-Sit [place] (Transfers)  independent  -TA     [facility] Name 21 143          Gait/Stairs (Locomotion)    [place] Level (Gait)   independent  -TA     Assistive Device (Gait)  -- none  -TA     Distance in Feet (Gait)  200`  & 600`  -TA     [place] Level (Stairs)  contact guard;stand by assist  -TA     Handrail Location (Stairs)  none  -TA     Number of Steps (Stairs)  2 & 3  -TA     Row Name 07/14/21 1436          Plan of Care Review    Plan of Care Reviewed With  patient  -TA     Progress  improving  -TA     Outcome Summary  pt sup<>sit & sit<>stand with [place], pt ambulated 200` & 600` withhhout AD with  SBA- independence with 1 LOB. pt up/down 2 & 3 steps without HR with CGA -SBA.   -TA     Row Name 07/14/21 1436          Vital Signs    Pre Systolic BP Rehab  161  -TA     Pre Treatment Diastolic BP  101  -TA     Post Systolic BP Rehab  162  -TA     Post Treatment Diastolic BP  89  -TA     Pretreatment Heart Rate (beats/min)  67  -TA     Posttreatment Heart Rate (beats/min)  73  -TA     Pre SpO2 (%)  96  -TA     O2 Delivery Pre Treatment  room air  -TA     Post SpO2 (%)  91  -TA     O2 Delivery Post Treatment  room air  -TA     Pre Patient Position  Supine  -TA     Post Patient Position  Supine  -TA     Row Name 07/14/21 1436          Bed Mobility Goal 1 (PT)    Activity/Assistive Device (Bed Mobility Goal 1, PT)  sit to supine/supine to sit  -TA     [place] Level/Cues Needed (Bed Mobility Goal 1, PT)  independent  -TA     Time Frame (Bed Mobility Goal 1, PT)  by discharge  -TA     Strategies/Barriers (Bed Mobility Goal 1, PT)  HOB flat, no bed rails.  -TA     Progress/Outcomes (Bed Mobility Goal 1, PT)  (S) goal met  -TA     Row Name 07/14/21 1436          Gait Training Goal 1 (PT)    Activity/Assistive Device (Gait Training Goal 1, PT)  gait (walking locomotion)  -TA     [place] Level (Gait Training Goal 1, PT)  independent  -TA     Distance (Gait Training Goal 1, PT)  300'x2.  -TA     Time Frame (Gait Training Goal 1, PT)  by discharge  -TA     Progress/Outcome (Gait Training Goal 1, PT)  goal not met  -TA     Row  Name 07/14/21 1436          Stairs Goal 1 (PT)    Activity/Assistive Device (Stairs Goal 1, PT)  using handrail, left;using handrail, right  -TA     [place] Level/Cues Needed (Stairs Goal 1, PT)  modified independence  -TA     Number of Stairs (Stairs Goal 1, PT)  3 steps.  -TA     Time Frame (Stairs Goal 1, PT)  by discharge  -TA     Progress/Outcome (Stairs Goal 1, PT)  goal not met  -TA     Row Name 07/14/21 1436          Patient Education Goal (PT)    Activity (Patient Education Goal, PT)  Patient will score 28/28 on Tinetti fall risk.  -TA     [place]/Cues/Accuracy (Memory Goal 2, PT)  independent  -TA     Time Frame (Patient Education Goal, PT)  by discharge  -TA     Progress/Outcome (Patient Education Goal, PT)  goal not met  -TA     Row Name 07/14/21 1436          Therapy Assessment/Plan (PT)    Rehab Potential (PT)  good, to achieve stated therapy goals  -TA     Criteria for Skilled Interventions Met (PT)  yes;skilled treatment is necessary  -TA       User Key  (r) = Recorded By, (t) = Taken By, (c) = Cosigned By    Initials Name Provider Type    TA Kelsey Carlson, PTA Physical Therapy Assistant        Physical Therapy Education                 Title: PT OT SLP Therapies (In Progress)     Topic: Physical Therapy (In Progress)     Point: Mobility training (Done)     Learning Progress Summary           Patient Acceptance, E, VU by  at 7/12/2021 [id number]    Comment: PT POC and goals.                   Point: Home exercise program (Not Started)     Learner Progress:  Not documented in this visit.          Point: Body mechanics (Not Started)     Learner Progress:  Not documented in this visit.          Point: Precautions (Not Started)     Learner Progress:  Not documented in this visit.                      User Key     Initials Effective Dates Name Provider Type Discipline     06/16/21 -  Kevin Terry, PT Physical Therapist PT              PT Recommendation and Plan  Anticipated Discharge  Disposition (PT): home  Therapy Frequency (PT): 5 times/wk  Plan of Care Reviewed With: patient  Progress: improving  Outcome Summary: pt sup<>sit & sit<>stand with [place], pt ambulated 200` & 600` withhhout AD with  SBA- independence with 1 LOB. pt up/down 2 & 3 steps without HR with CGA -SBA.   Outcome Measures     Row Name 07/14/21 1700 07/13/21 1200          How much help from another person do you currently need...    Turning from your back to your side while in flat bed without using bedrails?  4  -TA  4  -TA     Moving from lying on back to sitting on the side of a flat bed without bedrails?  4  -TA  4  -TA     Moving to and from a bed to a chair (including a wheelchair)?  4  -TA  4  -TA     Standing up from a chair using your arms (e.g., wheelchair, bedside chair)?  4  -TA  4  -TA     Climbing 3-5 steps with a railing?  3  -TA  3  -TA     To walk in hospital room?  4  -TA  4  -TA     AM-PAC 6 Clicks Score (PT)  23  -TA  23  -TA        Functional Assessment    Outcome Measure Options  AM-PAC 6 Clicks Basic Mobility (PT)  -TA  AM-PAC 6 Clicks Basic Mobility (PT)  -TA       User Key  (r) = Recorded By, (t) = Taken By, (c) = Cosigned By    Initials Name Provider Type    Kelsey Miranda PTA Physical Therapy Assistant           Time Calculation:   PT Charges     Row Name 07/14/21 1748             Time Calculation    Start Time  1436  -TA      Stop Time  1500  -TA      Time Calculation (min)  24 min  -TA      PT Received On  07/14/21  -TA         Time Calculation- PT    Total Timed Code Minutes- PT  24 minute(s)  -TA         Timed Charges    71893 - Gait Training Minutes   15  -TA      07126 - PT Therapeutic Activity Minutes  9  -TA         Total Minutes    Timed Charges Total Minutes  24  -TA       Total Minutes  24  -TA        User Key  (r) = Recorded By, (t) = Taken By, (c) = Cosigned By    Initials Name Provider Type    Kelsey Miranda PTA Physical Therapy Assistant        Therapy Charges for  Today     Code Description Service Date Service Provider Modifiers Qty    34005530225 HC GAIT TRAINING EA 15 MIN 7/13/2021 Kelsey Carlson, PTA GP 2    15518623647 HC GAIT TRAINING EA 15 MIN 7/14/2021 Kelsey Carlson, PTA GP 1    75565557350 HC PT THERAPEUTIC ACT EA 15 MIN 7/14/2021 Kelsey Carlson, PTA GP 1          PT G-Codes  Outcome Measure Options: AM-PAC 6 Clicks Basic Mobility (PT)  AM-PAC 6 Clicks Score (PT): 23  AM-PAC 6 Clicks Score (OT): 24  Tinetti Total Score: 26    Kelsey Carlson PTA  7/14/2021

## 2021-07-14 NOTE — PLAN OF CARE
Goal Outcome Evaluation:  Plan of Care Reviewed With: patient        Progress: improving  Outcome Summary: patient resting. prn pain meds given for neck/head pain, effective per patient. potassium level 2.9, replaced. VSS.  will continue to monitor

## 2021-07-15 ENCOUNTER — READMISSION MANAGEMENT (OUTPATIENT)
Dept: CALL CENTER | Facility: HOSPITAL | Age: 71
End: 2021-07-15

## 2021-07-15 VITALS
OXYGEN SATURATION: 95 % | WEIGHT: 162.8 LBS | TEMPERATURE: 96.6 F | RESPIRATION RATE: 18 BRPM | HEIGHT: 70 IN | BODY MASS INDEX: 23.31 KG/M2 | HEART RATE: 77 BPM | SYSTOLIC BLOOD PRESSURE: 148 MMHG | DIASTOLIC BLOOD PRESSURE: 79 MMHG

## 2021-07-15 LAB
ALBUMIN SERPL-MCNC: 2.9 G/DL (ref 3.5–5.2)
ALBUMIN/GLOB SERPL: 1.1 G/DL
ALP SERPL-CCNC: 83 U/L (ref 39–117)
ALT SERPL W P-5'-P-CCNC: 27 U/L (ref 1–41)
ANION GAP SERPL CALCULATED.3IONS-SCNC: 11 MMOL/L (ref 5–15)
ANION GAP SERPL CALCULATED.3IONS-SCNC: 9 MMOL/L (ref 5–15)
AST SERPL-CCNC: 24 U/L (ref 1–40)
BILIRUB SERPL-MCNC: 0.3 MG/DL (ref 0–1.2)
BUN SERPL-MCNC: 7 MG/DL (ref 8–23)
BUN SERPL-MCNC: 7 MG/DL (ref 8–23)
BUN/CREAT SERPL: 9.1 (ref 7–25)
BUN/CREAT SERPL: 9.5 (ref 7–25)
CALCIUM SPEC-SCNC: 8.4 MG/DL (ref 8.6–10.5)
CALCIUM SPEC-SCNC: 8.6 MG/DL (ref 8.6–10.5)
CHLORIDE SERPL-SCNC: 101 MMOL/L (ref 98–107)
CHLORIDE SERPL-SCNC: 104 MMOL/L (ref 98–107)
CO2 SERPL-SCNC: 23 MMOL/L (ref 22–29)
CO2 SERPL-SCNC: 25 MMOL/L (ref 22–29)
CREAT SERPL-MCNC: 0.74 MG/DL (ref 0.76–1.27)
CREAT SERPL-MCNC: 0.77 MG/DL (ref 0.76–1.27)
DEPRECATED RDW RBC AUTO: 47 FL (ref 37–54)
ERYTHROCYTE [DISTWIDTH] IN BLOOD BY AUTOMATED COUNT: 13.1 % (ref 12.3–15.4)
GFR SERPL CREATININE-BSD FRML MDRD: 100 ML/MIN/1.73
GFR SERPL CREATININE-BSD FRML MDRD: 105 ML/MIN/1.73
GLOBULIN UR ELPH-MCNC: 2.7 GM/DL
GLUCOSE SERPL-MCNC: 114 MG/DL (ref 65–99)
GLUCOSE SERPL-MCNC: 98 MG/DL (ref 65–99)
HCT VFR BLD AUTO: 31.9 % (ref 37.5–51)
HGB BLD-MCNC: 10.8 G/DL (ref 13–17.7)
MAGNESIUM SERPL-MCNC: 1.1 MG/DL (ref 1.6–2.4)
MAGNESIUM SERPL-MCNC: 2 MG/DL (ref 1.6–2.4)
MCH RBC QN AUTO: 33 PG (ref 26.6–33)
MCHC RBC AUTO-ENTMCNC: 33.9 G/DL (ref 31.5–35.7)
MCV RBC AUTO: 97.6 FL (ref 79–97)
PLATELET # BLD AUTO: 216 10*3/MM3 (ref 140–450)
PMV BLD AUTO: 8.7 FL (ref 6–12)
POTASSIUM SERPL-SCNC: 3.3 MMOL/L (ref 3.5–5.2)
POTASSIUM SERPL-SCNC: 4 MMOL/L (ref 3.5–5.2)
POTASSIUM SERPL-SCNC: 4.1 MMOL/L (ref 3.5–5.2)
PROT SERPL-MCNC: 5.6 G/DL (ref 6–8.5)
RBC # BLD AUTO: 3.27 10*6/MM3 (ref 4.14–5.8)
SODIUM SERPL-SCNC: 135 MMOL/L (ref 136–145)
SODIUM SERPL-SCNC: 138 MMOL/L (ref 136–145)
WBC # BLD AUTO: 7.14 10*3/MM3 (ref 3.4–10.8)

## 2021-07-15 PROCEDURE — 94760 N-INVAS EAR/PLS OXIMETRY 1: CPT

## 2021-07-15 PROCEDURE — 25010000002 HYDRALAZINE PER 20 MG: Performed by: HOSPITALIST

## 2021-07-15 PROCEDURE — 25010000002 ENOXAPARIN PER 10 MG: Performed by: INTERNAL MEDICINE

## 2021-07-15 PROCEDURE — 97116 GAIT TRAINING THERAPY: CPT

## 2021-07-15 PROCEDURE — 85027 COMPLETE CBC AUTOMATED: CPT | Performed by: INTERNAL MEDICINE

## 2021-07-15 PROCEDURE — 25010000002 METHYLPREDNISOLONE PER 40 MG: Performed by: INTERNAL MEDICINE

## 2021-07-15 PROCEDURE — 83735 ASSAY OF MAGNESIUM: CPT | Performed by: INTERNAL MEDICINE

## 2021-07-15 PROCEDURE — 94799 UNLISTED PULMONARY SVC/PX: CPT

## 2021-07-15 PROCEDURE — 80053 COMPREHEN METABOLIC PANEL: CPT | Performed by: INTERNAL MEDICINE

## 2021-07-15 PROCEDURE — 25010000002 PIPERACILLIN SOD-TAZOBACTAM PER 1 G: Performed by: INTERNAL MEDICINE

## 2021-07-15 PROCEDURE — 25010000002 MAGNESIUM SULFATE 2 GM/50ML SOLUTION: Performed by: INTERNAL MEDICINE

## 2021-07-15 PROCEDURE — 97110 THERAPEUTIC EXERCISES: CPT

## 2021-07-15 PROCEDURE — 84132 ASSAY OF SERUM POTASSIUM: CPT | Performed by: INTERNAL MEDICINE

## 2021-07-15 RX ORDER — MELOXICAM 15 MG/1
TABLET ORAL
Qty: 90 TABLET | Refills: 0 | Status: SHIPPED | OUTPATIENT
Start: 2021-07-15 | End: 2021-08-02 | Stop reason: ALTCHOICE

## 2021-07-15 RX ORDER — DOXYCYCLINE 100 MG/1
100 CAPSULE ORAL EVERY 12 HOURS SCHEDULED
Qty: 11 CAPSULE | Refills: 0 | Status: SHIPPED | OUTPATIENT
Start: 2021-07-15 | End: 2021-07-21

## 2021-07-15 RX ORDER — DOXYCYCLINE 100 MG/1
100 CAPSULE ORAL EVERY 12 HOURS SCHEDULED
Status: DISCONTINUED | OUTPATIENT
Start: 2021-07-15 | End: 2021-07-15 | Stop reason: HOSPADM

## 2021-07-15 RX ORDER — LANOLIN ALCOHOL/MO/W.PET/CERES
3 CREAM (GRAM) TOPICAL NIGHTLY
Qty: 30 TABLET | Refills: 3 | Status: SHIPPED | OUTPATIENT
Start: 2021-07-15 | End: 2022-05-20

## 2021-07-15 RX ORDER — HYDROCHLOROTHIAZIDE 25 MG/1
25 TABLET ORAL DAILY
Status: DISCONTINUED | OUTPATIENT
Start: 2021-07-15 | End: 2021-07-15 | Stop reason: HOSPADM

## 2021-07-15 RX ORDER — TIZANIDINE 2 MG/1
4 TABLET ORAL EVERY 8 HOURS PRN
Qty: 270 TABLET | Refills: 3 | Status: SHIPPED | OUTPATIENT
Start: 2021-07-15 | End: 2021-07-23

## 2021-07-15 RX ORDER — HYDRALAZINE HYDROCHLORIDE 20 MG/ML
10 INJECTION INTRAMUSCULAR; INTRAVENOUS EVERY 4 HOURS PRN
Status: DISCONTINUED | OUTPATIENT
Start: 2021-07-15 | End: 2021-07-15 | Stop reason: HOSPADM

## 2021-07-15 RX ORDER — POTASSIUM CHLORIDE 750 MG/1
40 CAPSULE, EXTENDED RELEASE ORAL DAILY
Qty: 30 CAPSULE | Refills: 3 | Status: SHIPPED | OUTPATIENT
Start: 2021-07-15 | End: 2022-05-20

## 2021-07-15 RX ORDER — LOSARTAN POTASSIUM 50 MG/1
100 TABLET ORAL
Status: DISCONTINUED | OUTPATIENT
Start: 2021-07-15 | End: 2021-07-15 | Stop reason: HOSPADM

## 2021-07-15 RX ORDER — HYDROCHLOROTHIAZIDE 25 MG/1
25 TABLET ORAL DAILY
Qty: 30 TABLET | Refills: 3 | Status: SHIPPED | OUTPATIENT
Start: 2021-07-16 | End: 2021-09-02

## 2021-07-15 RX ADMIN — PIPERACILLIN SODIUM AND TAZOBACTAM SODIUM 3.38 G: 3; .375 INJECTION, POWDER, LYOPHILIZED, FOR SOLUTION INTRAVENOUS at 06:00

## 2021-07-15 RX ADMIN — PANTOPRAZOLE SODIUM 40 MG: 40 TABLET, DELAYED RELEASE ORAL at 06:00

## 2021-07-15 RX ADMIN — HYDRALAZINE HYDROCHLORIDE 10 MG: 20 INJECTION INTRAMUSCULAR; INTRAVENOUS at 07:08

## 2021-07-15 RX ADMIN — SODIUM CHLORIDE 150 ML/HR: 9 INJECTION, SOLUTION INTRAVENOUS at 03:52

## 2021-07-15 RX ADMIN — IPRATROPIUM BROMIDE AND ALBUTEROL SULFATE 3 ML: 2.5; .5 SOLUTION RESPIRATORY (INHALATION) at 08:19

## 2021-07-15 RX ADMIN — SUCRALFATE 1 G: 1 TABLET ORAL at 11:37

## 2021-07-15 RX ADMIN — MAGNESIUM SULFATE HEPTAHYDRATE 2 G: 40 INJECTION, SOLUTION INTRAVENOUS at 12:14

## 2021-07-15 RX ADMIN — METAXALONE 800 MG: 800 TABLET ORAL at 08:37

## 2021-07-15 RX ADMIN — ASPIRIN 81 MG: 81 TABLET, FILM COATED ORAL at 08:37

## 2021-07-15 RX ADMIN — HYDROCHLOROTHIAZIDE 25 MG: 12.5 TABLET ORAL at 08:47

## 2021-07-15 RX ADMIN — POTASSIUM CHLORIDE 40 MEQ: 750 CAPSULE, EXTENDED RELEASE ORAL at 10:56

## 2021-07-15 RX ADMIN — ATORVASTATIN CALCIUM 40 MG: 40 TABLET, FILM COATED ORAL at 08:37

## 2021-07-15 RX ADMIN — HYDROCODONE BITARTRATE AND ACETAMINOPHEN 1 TABLET: 10; 325 TABLET ORAL at 10:57

## 2021-07-15 RX ADMIN — SODIUM CHLORIDE, PRESERVATIVE FREE 10 ML: 5 INJECTION INTRAVENOUS at 08:46

## 2021-07-15 RX ADMIN — METHYLPREDNISOLONE SODIUM SUCCINATE 10 MG: 40 INJECTION, POWDER, FOR SOLUTION INTRAMUSCULAR; INTRAVENOUS at 08:38

## 2021-07-15 RX ADMIN — SUCRALFATE 1 G: 1 TABLET ORAL at 08:37

## 2021-07-15 RX ADMIN — DOXYCYCLINE 100 MG: 100 CAPSULE ORAL at 09:32

## 2021-07-15 RX ADMIN — LOSARTAN POTASSIUM 100 MG: 50 TABLET, FILM COATED ORAL at 08:47

## 2021-07-15 RX ADMIN — ISOSORBIDE MONONITRATE 30 MG: 30 TABLET, EXTENDED RELEASE ORAL at 08:37

## 2021-07-15 RX ADMIN — IPRATROPIUM BROMIDE AND ALBUTEROL SULFATE 3 ML: 2.5; .5 SOLUTION RESPIRATORY (INHALATION) at 11:47

## 2021-07-15 RX ADMIN — METAXALONE 800 MG: 800 TABLET ORAL at 15:02

## 2021-07-15 RX ADMIN — POTASSIUM CHLORIDE 40 MEQ: 750 CAPSULE, EXTENDED RELEASE ORAL at 15:02

## 2021-07-15 RX ADMIN — ENOXAPARIN SODIUM 40 MG: 40 INJECTION SUBCUTANEOUS at 13:57

## 2021-07-15 RX ADMIN — MAGNESIUM SULFATE HEPTAHYDRATE 2 G: 40 INJECTION, SOLUTION INTRAVENOUS at 13:55

## 2021-07-15 NOTE — DISCHARGE SUMMARY
[facility]  DISCHARGE SUMMARY       Date of Admission: 7/11/2021  Date of Discharge:  7/15/2021  Primary Care Physician: Sandee Perla APRN    Presenting Problem/History of Present Illness:  Hypokalemia [E87.6]  Hypomagnesemia [E83.42]  Hospital-acquired pneumonia [J18.9, Y95]  Septic shock (CMS/HCC) [A41.9, R65.21]       Final Discharge Diagnoses:  Active Hospital Problems    Diagnosis     Septic shock (CMS/HCC)        Consults:   Consults       Date and Time Order Name Status Description    7/11/2021 10:36 AM Hospitalist (on-call MD unless specified)              Procedures Performed:   Ct scan and mri of head and neck                 Pertinent Test Results:   Lab Results (most recent)       Procedure Component Value Units Date/Time    Magnesium [[id number]]  (Normal) Collected: 07/15/21 1439    Specimen: Blood Updated: 07/15/21 1606     Magnesium 2.0 mg/dL     Basic Metabolic Panel [[id number]]  (Abnormal) Collected: 07/15/21 1439    Specimen: Blood Updated: 07/15/21 1541     Glucose 114 mg/dL      BUN 7 mg/dL      Creatinine 0.77 mg/dL      Sodium 135 mmol/L      Potassium 4.1 mmol/L      Chloride 101 mmol/L      CO2 25.0 mmol/L      Calcium 8.6 mg/dL      eGFR Non African Amer 100 mL/min/1.73      BUN/Creatinine Ratio 9.1     Anion Gap 9.0 mmol/L     Narrative:      GFR Normal >60  Chronic Kidney Disease <60  Kidney Failure <15      Magnesium [[id number]]  (Abnormal) Collected: 07/15/21 0904    Specimen: Blood Updated: 07/15/21 1059     Magnesium 1.1 mg/dL     Comprehensive Metabolic Panel [[id number]]  (Abnormal) Collected: 07/15/21 0904    Specimen: Blood Updated: 07/15/21 0931     Glucose 98 mg/dL      BUN 7 mg/dL      Creatinine 0.74 mg/dL      Sodium 138 mmol/L      Potassium 3.3 mmol/L      Chloride 104 mmol/L      CO2 23.0 mmol/L      Calcium 8.4 mg/dL      Total Protein 5.6 g/dL      Albumin 2.90 g/dL      ALT (SGPT) 27 U/L      AST (SGOT)  24 U/L      Alkaline Phosphatase 83 U/L      Total Bilirubin 0.3 mg/dL      eGFR Non African Amer 105 mL/min/1.73      Globulin 2.7 gm/dL      A/G Ratio 1.1 g/dL      BUN/Creatinine Ratio 9.5     Anion Gap 11.0 mmol/L     Narrative:      GFR Normal >60  Chronic Kidney Disease <60  Kidney Failure <15      CBC (No Diff) [[id number]]  (Abnormal) Collected: 07/15/21 0904    Specimen: Blood Updated: 07/15/21 0909     WBC 7.14 10*3/mm3      RBC 3.27 10*6/mm3      Hemoglobin 10.8 g/dL      Hematocrit 31.9 %      MCV 97.6 fL      MCH 33.0 pg      MCHC 33.9 g/dL      RDW 13.1 %      RDW-SD 47.0 fl      MPV 8.7 fL      Platelets 216 10*3/mm3     Blood Culture - Blood, Blood, Venous Line [[id number]] Collected: 07/11/21 0733    Specimen: Blood, Venous Line Updated: 07/15/21 0745     Blood Culture No growth at 4 days    Blood Culture - Blood, Arm, Right [[id number]] Collected: 07/11/21 0726    Specimen: Blood from Arm, Right Updated: 07/15/21 0730     Blood Culture No growth at 4 days    Potassium [[id number]]  (Normal) Collected: 07/15/21 0107    Specimen: Blood Updated: 07/15/21 0140     Potassium 4.0 mmol/L     Extra Tubes [[id number]] Collected: 07/14/21 1301    Specimen: Blood, Venous Line Updated: 07/14/21 1415    Narrative:      The following orders were created for panel order Extra Tubes.  Procedure                               Abnormality         Status                     ---------                               -----------         ------                     Lavender Top[[id number]]                                     Final result                 Please view results for these tests on the individual orders.    Lavender Top [[id number]] Collected: 07/14/21 1301    Specimen: Blood Updated: 07/14/21 1415     Extra Tube hold for add-on     Comment: Auto resulted       Potassium [[id number]]  (Abnormal) Collected: 07/14/21 1254    Specimen: Blood Updated: 07/14/21 1317     Potassium 2.9 mmol/L     Respiratory Culture - Sputum,  Cough [[id number]] Collected: 07/11/21 1829    Specimen: Sputum from Cough Updated: 07/13/21 1126     Respiratory Culture Moderate growth (3+) Normal Respiratory Ida: NO S.aureus/MRSA or Pseudomonas aeruginosa     Gram Stain No Epithelial cells seen      Many (4+) WBCs seen      Mixed bacterial ida    Manual Differential [[id number]]  (Abnormal) Collected: 07/12/21 0559    Specimen: Blood Updated: 07/12/21 0639     Neutrophil % 74.0 %      Lymphocyte % 11.0 %      Monocyte % 3.0 %      Bands %  11.0 %      Atypical Lymphocyte % 1.0 %      Neutrophils Absolute 12.04 10*3/mm3      Lymphocytes Absolute 1.70 10*3/mm3      Monocytes Absolute 0.42 10*3/mm3      RBC Morphology Normal     Toxic Granulation Slight/1+     Vacuolated Neutrophils Slight/1+     Clumped Platelets Present    Comprehensive Metabolic Panel [[id number]]  (Abnormal) Collected: 07/12/21 0600    Specimen: Blood Updated: 07/12/21 0623     Glucose 108 mg/dL      BUN 13 mg/dL      Creatinine 0.80 mg/dL      Sodium 136 mmol/L      Potassium 3.8 mmol/L      Chloride 104 mmol/L      CO2 25.0 mmol/L      Calcium 7.6 mg/dL      Total Protein 5.2 g/dL      Albumin 2.70 g/dL      ALT (SGPT) 13 U/L      AST (SGOT) 14 U/L      Alkaline Phosphatase 48 U/L      Total Bilirubin 0.4 mg/dL      eGFR Non African Amer 96 mL/min/1.73      Globulin 2.5 gm/dL      A/G Ratio 1.1 g/dL      BUN/Creatinine Ratio 16.3     Anion Gap 7.0 mmol/L     Narrative:      GFR Normal >60  Chronic Kidney Disease <60  Kidney Failure <15      Lactic Acid, Plasma [[id number]]  (Normal) Collected: 07/12/21 0600    Specimen: Blood Updated: 07/12/21 0615     Lactate 1.8 mmol/L     CBC & Differential [[id number]]  (Abnormal) Collected: 07/12/21 0559    Specimen: Blood Updated: 07/12/21 0610    Narrative:      The following orders were created for panel order CBC & Differential.  Procedure                               Abnormality         Status                     ---------                                -----------         ------                     Scan Slide[[id number]]                                                                  CBC Auto Differential[[id number]]        Abnormal            Final result                 Please view results for these tests on the individual orders.    CBC Auto Differential [[id number]]  (Abnormal) Collected: 07/12/21 0559    Specimen: Blood Updated: 07/12/21 0610     WBC 14.16 10*3/mm3      RBC 3.05 10*6/mm3      Hemoglobin 10.2 g/dL      Hematocrit 29.6 %      MCV 97.0 fL      MCH 33.4 pg      MCHC 34.5 g/dL      RDW 12.8 %      RDW-SD 45.0 fl      MPV 9.0 fL      Platelets 185 10*3/mm3     MRSA Screen, PCR (Inpatient) - Swab, Nares [[id number]]  (Normal) Collected: 07/11/21 1353    Specimen: Swab from Nares Updated: 07/11/21 1503     MRSA, PCR Negative    Narrative:      Performed by real-time polymerase chain reaction (qPCR).    Respiratory Panel, PCR (WITHOUT COVID) - Swab, Nasopharynx [[id number]]  (Normal) Collected: 07/11/21 0754    Specimen: Swab from Nasopharynx Updated: 07/11/21 1407     ADENOVIRUS, PCR Not Detected     Coronavirus 229E Not Detected     Coronavirus HKU1 Not Detected     Coronavirus NL63 Not Detected     Coronavirus OC43 Not Detected     Human Metapneumovirus Not Detected     Human Rhinovirus/Enterovirus Not Detected     Influenza B PCR Not Detected     Parainfluenza Virus 1 Not Detected     Parainfluenza Virus 2 Not Detected     Parainfluenza Virus 3 Not Detected     Parainfluenza Virus 4 Not Detected     Bordetella pertussis pcr Not Detected     Chlamydophila pneumoniae PCR Not Detected     Mycoplasma pneumo by PCR Not Detected     Influenza A PCR Not Detected     RSV, PCR Not Detected     Bordetella parapertussis PCR Not Detected    Narrative:      The coronavirus on the RVP is NOT COVID-19 and is NOT indicative of infection with COVID-19.    In the setting of a positive respiratory panel with a viral infection PLUS a negative procalcitonin without  other underlying concern for bacterial infection, consider observing off antibiotics or discontinuation of antibiotics and continue supportive care. If the respiratory panel is positive for atypical bacterial infection (Bordetella pertussis, Chlamydophila pneumoniae, or Mycoplasma pneumoniae), consider antibiotic de-escalation to target atypical bacterial infection.    Urinalysis With Microscopic If Indicated (No Culture) - Urine, Clean Catch [[id number]]  (Abnormal) Collected: 07/11/21 1313    Specimen: Urine, Clean Catch Updated: 07/11/21 1325     Color, UA Yellow     Appearance, UA Clear     pH, UA 5.5     Specific Gravity, UA 1.042     Comment: Result obtained by Refractometer        Glucose, UA Negative     Ketones, UA Negative     Bilirubin, UA Negative     Blood, UA Negative     Protein, UA Negative     Leuk Esterase, UA Negative     Nitrite, UA Negative     Urobilinogen, UA 0.2 E.U./dL    Narrative:      Urine microscopic not indicated.    STAT Lactic Acid, Reflex [[id number]]  (Abnormal) Collected: 07/11/21 1031    Specimen: Blood Updated: 07/11/21 1057     Lactate 3.3 mmol/L     Extra Tubes [[id number]] Collected: 07/11/21 0726    Specimen: Blood from Arm, Right Updated: 07/11/21 0830    Narrative:      The following orders were created for panel order Extra Tubes.  Procedure                               Abnormality         Status                     ---------                               -----------         ------                     Gold Top - SST[[id number]]                                   Final result                 Please view results for these tests on the individual orders.    Gold Top - SST [[id number]] Collected: 07/11/21 0726    Specimen: Blood from Arm, Right Updated: 07/11/21 0830     Extra Tube Hold for add-ons.     Comment: Auto resulted.       COVID-19 and FLU A/B PCR - Swab, Nasopharynx [[id number]]  (Normal) Collected: 07/11/21 0739    Specimen: Swab from Nasopharynx Updated: 07/11/21 0815      COVID19 Not Detected     Influenza A PCR Not Detected     Influenza B PCR Not Detected    Narrative:      Fact sheet for providers: https://www.fda.gov/media/762086/download    Fact sheet for patients: https://www.fda.gov/media/206932/download    Test performed by PCR.    Protime-INR [[id number]]  (Normal) Collected: 07/11/21 0726    Specimen: Blood Updated: 07/11/21 0755     Protime 12.6 Seconds      INR 0.95    Narrative:      Therapeutic range for most indications is 2.0-3.0 INR,  or 2.5-3.5 for mechanical heart valves.    aPTT [[id number]]  (Normal) Collected: 07/11/21 0726    Specimen: Blood Updated: 07/11/21 0755     PTT 21.2 seconds     Narrative:      The recommended Heparin therapeutic range is 68-97 seconds.    Troponin [[id number]]  (Normal) Collected: 07/11/21 0726    Specimen: Blood Updated: 07/11/21 0752     Troponin T <0.010 ng/mL     Narrative:      Troponin T Reference Range:  <= 0.03 ng/mL-   Negative for AMI  >0.03 ng/mL-     Abnormal for myocardial necrosis.  Clinicians would have to utilize clinical acumen, EKG, Troponin and serial changes to determine if it is an Acute Myocardial Infarction or myocardial injury due to an underlying chronic condition.       Results may be falsely decreased if patient taking Biotin.      Lactic Acid, Plasma [[id number]]  (Abnormal) Collected: 07/11/21 0726    Specimen: Blood Updated: 07/11/21 0751     Lactate 4.8 mmol/L     BNP [[id number]]  (Normal) Collected: 07/11/21 0726    Specimen: Blood Updated: 07/11/21 0749     proBNP 285.3 pg/mL     Narrative:      Among patients with dyspnea, NT-proBNP is highly sensitive for the detection of acute congestive heart failure. In addition NT-proBNP of <300 pg/ml effectively rules out acute congestive heart failure with 99% negative predictive value.    Results may be falsely decreased if patient taking Biotin.      CBC & Differential [[id number]]  (Abnormal) Collected: 07/11/21 0726    Specimen: Blood Updated: 07/11/21 0732     Narrative:      The following orders were created for panel order CBC & Differential.  Procedure                               Abnormality         Status                     ---------                               -----------         ------                     CBC Auto Differential[[id number]]        Abnormal            Final result                 Please view results for these tests on the individual orders.    CBC Auto Differential [[id number]]  (Abnormal) Collected: 07/11/21 0726    Specimen: Blood Updated: 07/11/21 0732     WBC 6.53 10*3/mm3      RBC 3.62 10*6/mm3      Hemoglobin 12.2 g/dL      Hematocrit 34.6 %      MCV 95.6 fL      MCH 33.7 pg      MCHC 35.3 g/dL      RDW 12.5 %      RDW-SD 43.6 fl      MPV 8.8 fL      Platelets 286 10*3/mm3      Neutrophil % 83.1 %      Lymphocyte % 11.3 %      Monocyte % 4.6 %      Eosinophil % 0.2 %      Basophil % 0.2 %      Immature Grans % 0.6 %      Neutrophils, Absolute 5.43 10*3/mm3      Lymphocytes, Absolute 0.74 10*3/mm3      Monocytes, Absolute 0.30 10*3/mm3      Eosinophils, Absolute 0.01 10*3/mm3      Basophils, Absolute 0.01 10*3/mm3      Immature Grans, Absolute 0.04 10*3/mm3      nRBC 0.0 /100 WBC           Imaging Results (Most Recent)       Procedure Component Value Units Date/Time    US Guided Vascular Access [[id number]] Collected: 07/12/21 1030     Updated: 07/12/21 1333    Narrative:      PROCEDURE: Ultrasound Guidance Vascular Access      Ordering physician(s): SAJI SMART    Clinical Indication: Venous access      Findings:    The vessel was sonographically evaluated and determined to be  patent. Concurrent realtime ultrasound was used to visualize  needle entry into the right basilic     vein    and a permanent  image was stored for permanent recording and reporting.         Impression:      Impression:     Successful uneventful US guided placement right basilic    vein  for midline catheter insertion.    Electronically signed by:  Jameson Arthur  MD  7/12/2021 1:32 PM CDT  Workstation: [id number]    IR Insert Midline Without Port Pump 5 Plus [[id number]] Resulted: 07/12/21 1134     Updated: 07/12/21 1134    Narrative:      This procedure was auto-finalized with no dictation required.    CT Angiogram Chest [[id number]] Collected: 07/11/21 0827     Updated: 07/11/21 0918    Narrative:      STUDY: CT CHEST ANGIOGRAPHY WITH IV CONTRAST    COMPARISON: Radiograph dated same day    INDICATION: hypotension    TECHNIQUE: Multiple CT images of the chest were obtained after  the uneventful administration of iodinated intravenous contrast  in the pulmonary arterial phase. Orthogonal and 3-D MIPS  reformats were provided. All CT scans at this facility uses dose  modulation, iterative reconstruction, and/or weight-based dosing  when appropriate to achieve a radiation dose as low as reasonably  achievable.    FINDINGS:   Pulmonary arteries: The examination is technically adequate. No  filling defects within the pulmonary arteries to suggest emboli.    Neck: Visualized thyroid is unremarkable. No supraclavicular or  axillary lymphadenopathy.    Mediastinum: No bulky mediastinal or hilar lymphadenopathy.  Esophagus is unremarkable. Airways are patent.  Aorta: Atherosclerosis of the thoracic aorta.  Heart: Heart size is normal. No pericardial effusion. There is  coronary artery atherosclerosis.    Lungs: Groundglass airspace opacities are seen in the central  right upper and middle lobes and throughout the right lower lobe  with air bronchograms. No mass, effusion, or pneumothorax. No  suspicious nodules. Mild upper lobe paraseptal and centrilobular  emphysema.    Abdomen: Small hiatal hernia.    Musculoskeletal: No acute fracture or suspicious osseous lesion.  Patient status post anterior cervical spine fusion. Mild to  moderate spondylosis and degenerative disc disease of the  visualized spine.      Impression:      No pulmonary embolus.    Diffuse right lung atypical  airspace disease to include viral and  bacterial etiologies.    Mild upper lobe emphysema.    Coronary artery disease.    Small hiatal hernia.    Electronically signed by:  Bree Crawford MD  7/11/2021  9:17 AM CDT Workstation: [id number]    XR Chest 1 View [[id number]] Collected: 07/11/21 0740     Updated: 07/11/21 0756    Narrative:      EXAM: XR CHEST 1 VIEW    COMPARISONS: None    INDICATION: hypotension    FINDINGS:  Frontal view of the chest.    Patchy airspace opacities are seen throughout the right lung,  lower lobe predominant. No effusion or pneumothorax. Heart size  is normal. Atherosclerosis of the thoracic aorta. No acute  osseous abnormality.      Impression:      Diffuse right lung airspace disease/pneumonia.    Electronically signed by:  Bree Crawford MD  7/11/2021  7:55 AM CDT Workstation: [id number]            Chief Complaint on Day of Discharge: he is improved and no headache and no pain along neck.     Hospital Course:  The patient is a 70 y.o. male who presented to [facility] with neck pain and cervical disc disease and had cervical disc fusion and he came in due to intractable pain and fever, and chills and weakness and was admitted with dx of sepsis and he was admitted for abx therapy. And fluids and monitoring.      This was not necessarily substantiated that he had diskitis that caused the recurrent sx and fever and chills.     He improved wth fluids and abx and supportive care.     He is supposed to follow up with neurosurgeon in [place].   Will copy chart and radiological studies to take with him     This may have all been due to stopping pain meds too early.  He defervesced very quicky.     This could have all been also secondary to perhaps aspiration pneumonia from cervical disc issues, and pain and aspiration issues.   This was aggressively treated.    He was discharged home on po abx   Pain meds   Follow up with pcp   Limit activity until he sees  "  Neurosurgeon.       Condition on Discharge: stable and improved     Physical Exam on Discharge:  /79 (BP Location: Left arm, Patient Position: Lying)   Pulse 77   Temp 96.6 °F (35.9 °C) (Oral)   Resp 18   Ht 177.8 cm (70\")   Wt 73.8 kg (162 lb 12.8 oz)   SpO2 95%   BMI 23.36 kg/m²   Physical Exam  Oral dry and clear   No thrush  Cv rrr   Lungs clear   Abd bowel sounds present   Ext no edema   Neuro nonfocal   Pain along neck controlled   No rash   Along neck   Neuro nonfocal   Mental status intact.       Discharge Disposition:  Home or Self Care    Discharge Medications:     Discharge Medications        New Medications        Instructions Start Date   doxycycline 100 MG capsule  Commonly known as: MONODOX   100 mg, Oral, Every 12 Hours Scheduled      melatonin 3 MG tablet   3 mg, Oral, Nightly      potassium chloride 10 MEQ CR capsule  Commonly known as: MICRO-K   40 mEq, Oral, Daily      tiZANidine 2 MG tablet  Commonly known as: ZANAFLEX   4 mg, Oral, Every 8 Hours PRN             Changes to Medications        Instructions Start Date   hydroCHLOROthiazide 25 MG tablet  Commonly known as: HYDRODIURIL  What changed:   medication strength  how much to take  additional instructions   25 mg, Oral, Daily   Start Date: July 16, 2021            Continue These Medications        Instructions Start Date   aspirin 81 MG EC tablet   81 mg, Oral, Daily      atorvastatin 40 MG tablet  Commonly known as: LIPITOR   TAKE 1 TABLET BY MOUTH EVERY DAY      baclofen 10 MG tablet  Commonly known as: LIORESAL   10 mg, Oral, 3 Times Daily      cetirizine 10 MG tablet  Commonly known as: zyrTEC   10 mg, Oral, As Needed      fish oil 1000 MG capsule capsule   1,000 mg, Oral, Nightly      fluticasone 50 MCG/ACT nasal spray  Commonly known as: FLONASE   2 sprays, Nasal, Daily PRN      folic acid 1 MG tablet  Commonly known as: FOLVITE   Take 1 tablet by mouth on Monday, Wednesday and Friday      guanFACINE HCl ER 2 MG tablet " sustained-release 24 hour   TAKE 1 TABLET BY MOUTH EVERY DAY      isosorbide mononitrate 30 MG 24 hr tablet  Commonly known as: IMDUR   30 mg, Oral, Daily      losartan 50 MG tablet 100 mg, hydroCHLOROthiazide 12.5 MG 12.5 mg   1 dose, Oral, Daily      meloxicam 15 MG tablet  Commonly known as: MOBIC   TAKE 1 TABLET BY MOUTH EVERY DAY WITH MEALS      omeprazole 40 MG capsule  Commonly known as: priLOSEC   TAKE 1 CAPSULE BY MOUTH EVERY DAY      Potassium 99 MG tablet   1 tablet, Oral, Daily      sucralfate 1 g tablet  Commonly known as: CARAFATE   TAKE 1 TABLET BY MOUTH FOUR TIMES DAILY 30 MINUTES BEFORE EACH MEAL AND A 4TH TABLET AT BEDTIME      vitamin B-12 1000 MCG tablet  Commonly known as: CYANOCOBALAMIN   Take 1 tablet by mouth on Monday, Wednesday and Friday             Stop These Medications      HYDROcodone-acetaminophen  MG per tablet  Commonly known as: NORCO     telmisartan 80 MG tablet  Commonly known as: MICARDIS              Discharge Diet:  regular     Activity at Discharge:  as tolearted     Discharge Care Plan/Instructions: follow up with pcp and with neurosurgeon     Follow-up Appointments:   Future Appointments   Date Time Provider Department Center   7/26/2021  1:00 PM Sandee Perla APRN [facility]   9/24/2021  7:30 AM NURSE [facility] O[facility]   9/24/2021  8:00 AM Dickson Vega MD [facility]   3/25/2022 10:30 [facility]       Test Results Pending at Discharge:   Pending Labs       Order Current Status    Blood Culture - Blood, Arm, Right Preliminary result    Blood Culture - Blood, Blood, Venous Line Preliminary result            The patient has current or prior documentation of LVEF less than 40%, or moderate to severely depressed left ventricular systolic function. The patent was prescribed or already taking an ACE inhibitor or ARB.     The patient has current or prior documentation of LVEF less than 40%, or moderate to severely  depressed left ventricular systolic function. The patient was prescribed or already taking a beta-blocker.       Angella Anderson MD    Time: greater than 30 min on discharge.

## 2021-07-15 NOTE — PROGRESS NOTES
Adult Nutrition  Assessment    Patient Name:  Jose Zazueta  YOB: 1950  MRN: [id number]  Admit Date:  7/11/2021    Assessment Date:  7/15/2021    Comments:  Per MD notes, septic shock and BARBER resolved. Continues treatment for pneumonia. Cardiac diet- intakes average 64% meals x 4days. # w/ BMI 23.3. Pt requests to d/c the milk and ice cream. RD to follow hospital course.    Reason for Assessment     Row Name 07/15/21 1451          Reason for Assessment    Reason For Assessment  follow-up protocol     Diagnosis  surgery/postoperative complications     Identified At Risk by Screening Criteria  no indicators present         Nutrition/Diet History     Row Name 07/15/21 1452          Nutrition/Diet History    Typical Food/Fluid Intake  Pt requests to d/c the milk and ice cream- he feels he is eating enough.           Labs/Tests/Procedures/Meds     Row Name 07/15/21 1453          Labs/Procedures/Meds    Lab Results Reviewed  reviewed     Lab Results Comments  Gllu 98, low bun/Cr/K/mg, alb 2.9L        Diagnostic Tests/Procedures    Diagnostic Test/Procedure Reviewed  reviewed        Medications    Pertinent Medications Reviewed  reviewed             Nutrition Prescription Ordered     Row Name 07/15/21 1453          Nutrition Prescription PO    Current PO Diet  Regular     Fluid Consistency  Thin     Supplement  Milk;Ice Cream     Supplement Frequency  2 times a day;3 times a day     Common Modifiers  Cardiac         Evaluation of Received Nutrient/Fluid Intake     Row Name 07/15/21 1454          PO Evaluation    Number of Days PO Intake Evaluated  Other (comment) 4days     Number of Meals  7     % PO Intake  0x1, 50x2, 75x2, 100x2               Electronically signed by:  Ana De La Torre RD  07/15/21 14:56 CDT

## 2021-07-15 NOTE — PLAN OF CARE
Goal Outcome Evaluation:  Plan of Care Reviewed With: patient        Progress: improving  Outcome Summary: pt sup<>sit & sit<>stand with independence, pt ambulated 400` & 600` without AD with inedpendence, pt up/down 2 & 3 steps without HR with CGA-SBA. PTA advised pt  to have a  HR installed @ the steps to the home for safety

## 2021-07-15 NOTE — PLAN OF CARE
Goal Outcome Evaluation:  Plan of Care Reviewed With: patient        Progress: improving     Pt is resting well between care. Pain controlled with PRN Norco. Receiving IV fluids and antibiotics. Pt had potassium replacement and his redraw came back 4.0.

## 2021-07-15 NOTE — PLAN OF CARE
Goal Outcome Evaluation:  Plan of Care Reviewed With: patient        Progress: improving  Outcome Summary: replaced mag and potassium per protocol. waiting on d/c orders. VSS

## 2021-07-15 NOTE — OUTREACH NOTE
Prep Survey      Responses   Restorationist facility patient discharged from?  [place]   Is LACE score < 7 ?  No   Emergency Room discharge w/ pulse ox?  No   Eligibility  [facility]   Date of Admission  07/11/21   Date of Discharge  07/15/21   Discharge Disposition  Home or Self Care   Discharge diagnosis  Septic shock    Does the patient have one of the following disease processes/diagnoses(primary or secondary)?  Sepsis   Does the patient have Home health ordered?  No   Is there a DME ordered?  No   Prep survey completed?  Yes          Andreea Gibson RN

## 2021-07-15 NOTE — DISCHARGE INSTRUCTIONS
Follow up with pcp   Follow up with neurosurgeon who has done surgery in [place] on your cervical spine   Obtain copies of chart from this hospitalization and copy chart and put imaging radiology

## 2021-07-15 NOTE — PLAN OF CARE
Problem: Adult Inpatient Plan of Care  Goal: Plan of Care Review  Outcome: Ongoing, Progressing  Flowsheets (Taken 7/15/2021 5912)  Plan of Care Reviewed With: patient   Goal Outcome Evaluation:  Plan of Care Reviewed With: patient         Cardiac diet- intakes average 64% meals x 4days. # w/ BMI 23.3. Alb 2.9L. Pt requests to d/c the milk and ice cream. RD following.

## 2021-07-15 NOTE — PROGRESS NOTES
Enter Query Response Below      Query Response:     Patient was initially admitted with neck pain and what appeared to be diskitis and increased and intractable neck pain which then led to splinting and poor respiratory effort.  Dx can now be changed to acute on chronic hypoxemic respiratory failure, due to bronchitis and pneumonia that was and is being treated well. This was a complicated community acquired pneumonia and an aspiration pneumonia due to splinting from neck pain.           If applicable, please update the problem list.        Patient: Jose Zazueta        : 1950  Account: [id number]           Admit Date: 21         Options to Respond to Query:    1. Access the Encounter     a. From the To-Do Side bar, click Respond With Note.     b. Click New Note     c. Answer query within the yellow box.                d. Update the Problem List if applicable.     Dr. Marmolejo,     69 y/o noted with Septic Shock, Pneumonia probably aspiration, and Acute Kidney Injury. On admit, vitals record with oxygen saturation 86% on room air, improved to 87% on 3L and then to 91% on 4L. At 14:00, patient weaned back to 2L/min by nasal cannula. Patient treated with oxygen 2-4L by nasal cannula.     After study, can the patient's condition be specified as:     >>Acute Respiratory Failure with hypoxia   >>Hypoxia only   >>Other (please specify):____________  >>Unable to determine     By submitting this query, we are merely seeking further clarification of documentation to accurately reflect all conditions that you are monitoring, evaluating, treating or that extend the hospitalization or utilize additional resources of care. Please utilize your independent clinical judgment when addressing the question(s) above.     This query and your response, once completed, will be entered into the legal medical record.    Sincerely,  Marcella Fontenot  Clinical Documentation Integrity Program    [email or web address]

## 2021-07-15 NOTE — THERAPY TREATMENT NOTE
Acute Care - Physical Therapy Treatment Note  [facility]     Patient Name: Jose Zazueta  : 1950  MRN: [id number]  Today's Date: 7/15/2021           PT Assessment (last 12 hours)      PT Evaluation and Treatment     Row Name 07/15/21 1443          Physical Therapy Time and Intention    Subjective Information  no complaints  -TA     Document Type  therapy note (daily note)  -TA     Mode of Treatment  physical therapy  -TA     Row Name 07/15/21 1443          General Information    Patient Profile Reviewed  yes  -TA     Existing Precautions/Restrictions  spinal cervical fusion, C5-7 on 2021  -TA     Row Name 07/15/21 1443          Cognition    Affect/Mental Status (Cognitive)  WFL  -TA     Orientation Status (Cognition)  oriented x 4  -TA     Personal Safety Interventions  fall prevention program maintained;gait belt;nonskid shoes/slippers when out of bed;supervised activity  -TA     [facility] Name 07/15/21 1443          Pain Scale: Numbers Pre/Post-Treatment    Pretreatment Pain Rating  0/10 - no pain  -TA     Posttreatment Pain Rating  0/10 - no pain  -TA     [facility] Name 07/15/21 1443          Range of Motion Comprehensive    General Range of Motion  bilateral lower extremity ROM WFL  -TA     [facility] Name 07/15/21 1443          Strength Comprehensive (MMT)    General Manual Muscle Testing (MMT) Assessment  other (see comments)  -TA     [facility] Name 07/15/21 1443          Bed Mobility    Bed Mobility  supine-sit;sit-supine  -TA     Supine-Sit [place] (Bed Mobility)  modified independence  -TA     Sit-Supine [place] (Bed Mobility)  modified independence  -TA     Assistive Device (Bed Mobility)  head of bed elevated;bed rails  -TA     Row Name 07/15/21 1443          Transfers    Sit-Stand [place] (Transfers)  independent  -TA     Stand-Sit [place] (Transfers)  independent  -TA     [facility] Name 07/15/21 1443          Gait/Stairs (Locomotion)    [place] Level (Gait)  independent  -TA      Assistive Device (Gait)  -- none  -TA     Distance in Feet (Gait)  600` & 400`  -TA     [place] Level (Stairs)  contact guard;stand by assist  -TA     Handrail Location (Stairs)  none  -TA     Number of Steps (Stairs)  2 & 3  -TA     Row Name 07/15/21 1443          Plan of Care Review    Plan of Care Reviewed With  patient  -TA     Progress  improving  -TA     Outcome Summary  pt sup<>sit & sit<>stand with independence, pt ambulated 400` & 600` without AD with inedpendence, pt up/down 2 & 3 steps without HR with CGA-SBA. PTA advised pt  to have a  HR installed @ the steps to the home for safety  -TA     Row Name 07/15/21 1443          Vital Signs    Pre Systolic BP Rehab  146  -TA     Pre Treatment Diastolic BP  95  -TA     Post Systolic BP Rehab  147  -TA     Post Treatment Diastolic BP  97  -TA     Pretreatment Heart Rate (beats/min)  76  -TA     Posttreatment Heart Rate (beats/min)  77  -TA     Pre SpO2 (%)  95  -TA     O2 Delivery Pre Treatment  room air  -TA     Post SpO2 (%)  91  -TA     O2 Delivery Post Treatment  room air  -TA     Pre Patient Position  Supine  -TA     Post Patient Position  Supine  -TA     Row Name 07/15/21 1443          Bed Mobility Goal 1 (PT)    Activity/Assistive Device (Bed Mobility Goal 1, PT)  sit to supine/supine to sit  -TA     [place] Level/Cues Needed (Bed Mobility Goal 1, PT)  independent  -TA     Time Frame (Bed Mobility Goal 1, PT)  by discharge  -TA     Strategies/Barriers (Bed Mobility Goal 1, PT)  HOB flat, no bed rails.  -TA     Progress/Outcomes (Bed Mobility Goal 1, PT)  (S) goal met  -TA     Row Name 07/15/21 1443          Gait Training Goal 1 (PT)    Activity/Assistive Device (Gait Training Goal 1, PT)  gait (walking locomotion)  -TA     [place] Level (Gait Training Goal 1, PT)  independent  -TA     Distance (Gait Training Goal 1, PT)  300'x2.  -TA     Time Frame (Gait Training Goal 1, PT)  by discharge  -TA     Progress/Outcome (Gait Training Goal 1, PT)   (S) goal met  -TA     Row Name 07/15/21 1443          Stairs Goal 1 (PT)    Activity/Assistive Device (Stairs Goal 1, PT)  using handrail, left;using handrail, right  -TA     [place] Level/Cues Needed (Stairs Goal 1, PT)  modified independence  -TA     Number of Stairs (Stairs Goal 1, PT)  3 steps.  -TA     Time Frame (Stairs Goal 1, PT)  by discharge  -TA     Progress/Outcome (Stairs Goal 1, PT)  goal not met  -TA     Row Name 07/15/21 1443          Patient Education Goal (PT)    Activity (Patient Education Goal, PT)  Patient will score 28/28 on Tinetti fall risk.  -TA     [place]/Cues/Accuracy (Memory Goal 2, PT)  independent  -TA     Time Frame (Patient Education Goal, PT)  by discharge  -TA     Progress/Outcome (Patient Education Goal, PT)  goal not met  -TA     Row Name 07/15/21 1443          Positioning and Restraints    Pre-Treatment Position  in bed  -TA     Post Treatment Position  bed  -TA     In Bed  supine;call light within reach;exit alarm on;with family/caregiver  -TA     Row Name 07/15/21 1443          Therapy Assessment/Plan (PT)    Rehab Potential (PT)  good, to achieve stated therapy goals  -TA     Criteria for Skilled Interventions Met (PT)  yes;skilled treatment is necessary  -TA     Comment, Therapy Assessment/Plan (PT)  continue  -TA       User Key  (r) = Recorded By, (t) = Taken By, (c) = Cosigned By    Initials Name Provider Type    Kelsey Miranda, OC Physical Therapy Assistant        Physical Therapy Education                 Title: PT OT SLP Therapies (In Progress)     Topic: Physical Therapy (In Progress)     Point: Mobility training (Done)     Learning Progress Summary           Patient Acceptance, E, VU by CZ at 7/12/2021 0634    Comment: PT POC and goals.                   Point: Home exercise program (Not Started)     Learner Progress:  Not documented in this visit.          Point: Body mechanics (Not Started)     Learner Progress:  Not documented in this visit.           Point: Precautions (Not Started)     Learner Progress:  Not documented in this visit.                      User Key     Initials Effective Dates Name Provider Type Discipline    CZ 06/16/21 -  Kevin Terry, PT Physical Therapist PT              PT Recommendation and Plan  Anticipated Discharge Disposition (PT): home  Therapy Frequency (PT): 5 times/wk  Plan of Care Reviewed With: patient  Progress: improving  Outcome Summary: pt sup<>sit & sit<>stand with independence, pt ambulated 400` & 600` without AD with inedpendence, pt up/down 2 & 3 steps without HR with CGA-SBA. PTA advised pt  to have a  HR installed @ the steps to the home for safety  Outcome Measures     Row Name 07/15/21 1500 07/14/21 1700 07/13/21 1200       How much help from another person do you currently need...    Turning from your back to your side while in flat bed without using bedrails?  4  -TA  4  -TA  4  -TA    Moving from lying on back to sitting on the side of a flat bed without bedrails?  4  -TA  4  -TA  4  -TA    Moving to and from a bed to a chair (including a wheelchair)?  4  -TA  4  -TA  4  -TA    Standing up from a chair using your arms (e.g., wheelchair, bedside chair)?  4  -TA  4  -TA  4  -TA    Climbing 3-5 steps with a railing?  3  -TA  3  -TA  3  -TA    To walk in hospital room?  4  -TA  4  -TA  4  -TA    AM-PAC 6 Clicks Score (PT)  23  -TA  23  -TA  23  -TA       Functional Assessment    Outcome Measure Options  AM-PAC 6 Clicks Basic Mobility (PT)  -TA  AM-PAC 6 Clicks Basic Mobility (PT)  -TA  AM-PAC 6 Clicks Basic Mobility (PT)  -TA      User Key  (r) = Recorded By, (t) = Taken By, (c) = Cosigned By    Initials Name Provider Type    Kelsey Miranda, PTA Physical Therapy Assistant           Time Calculation:   PT Charges     Row Name 07/15/21 1538             Time Calculation    Start Time  1443  -TA      Stop Time  1506  -TA      Time Calculation (min)  23 min  -TA      PT Received On  07/15/21  -TA         Time  Calculation- PT    Total Timed Code Minutes- PT  23 minute(s)  -TA         Timed Charges    56040 - Gait Training Minutes   15  -TA      78323 - PT Therapeutic Activity Minutes  8  -TA         Total Minutes    Timed Charges Total Minutes  23  -TA       Total Minutes  23  -TA        User Key  (r) = Recorded By, (t) = Taken By, (c) = Cosigned By    Initials Name Provider Type    Kelsey Miranda PTA Physical Therapy Assistant        Therapy Charges for Today     Code Description Service Date Service Provider Modifiers Qty    52983773193 HC GAIT TRAINING EA 15 MIN 7/14/2021 Kelsey Carlson, PTA GP 1    77933753339 HC PT THERAPEUTIC ACT EA 15 MIN 7/14/2021 Kelsey Carlson, PTA GP 1    02315815365 HC GAIT TRAINING EA 15 MIN 7/15/2021 Kelsey Carlson, PTA GP 1    59828251701 HC PT THER PROC EA 15 MIN 7/15/2021 Kelsey Carlson, PTA GP 1          PT G-Codes  Outcome Measure Options: AM-PAC 6 Clicks Basic Mobility (PT)  AM-PAC 6 Clicks Score (PT): 23  AM-PAC 6 Clicks Score (OT): 24  Tinetti Total Score: 26    Kelsey Carlson PTA  7/15/2021

## 2021-07-15 NOTE — PROGRESS NOTES
[facility] Medicine Services  INPATIENT PROGRESS NOTE    Length of Stay: 4  Date of Admission: 7/11/2021  Primary Care Physician: Sandee Perla APRN    Subjective   Chief Complaint: fever improved and had not felt well in am. Now improved and feels better. Ate poorly in am.       HPI: Patient states he didn't sleep well overnight.  Neck pain better controlled, he does not like to take pain meds and thus had stopped them too early even last time.     Review of Systems   Constitutional: Positive for activity change and fatigue. Negative for appetite change, chills and fever.   Respiratory: Positive for shortness of breath. Negative for chest tightness.    Cardiovascular: Negative for chest pain, palpitations and leg swelling.   Gastrointestinal: Negative for abdominal pain, constipation, diarrhea, nausea and vomiting.   Musculoskeletal: Positive for neck pain.   Skin: Negative for wound.   Neurological: Positive for headaches. Negative for dizziness, weakness, light-headedness and numbness.        All pertinent negatives and positives are as above. All other systems have been reviewed and are negative unless otherwise stated.     Objective    Temp:  [96 °F (35.6 °C)-98 °F (36.7 °C)] 96 °F (35.6 °C)  Heart Rate:  [65-89] 65  Resp:  [18-20] 20  BP: (123-182)/() 182/100    Physical Exam  Vitals reviewed.   Constitutional:       Appearance: He is well-developed.   HENT:      Head: Normocephalic and atraumatic.   Eyes:      Pupils: Pupils are equal, round, and reactive to light.   Cardiovascular:      Rate and Rhythm: Normal rate and regular rhythm.      Heart sounds: Normal heart sounds. No murmur heard.   No friction rub. No gallop.    Pulmonary:      Effort: Pulmonary effort is normal. No respiratory distress.      Breath sounds: Normal breath sounds. No wheezing or rales.   Chest:      Chest wall: No tenderness.   Abdominal:      General: Bowel sounds are normal.  There is no distension.      Palpations: Abdomen is soft.      Tenderness: There is no abdominal tenderness.   Musculoskeletal:      Cervical back: Normal range of motion and neck supple.   Psychiatric:         Behavior: Behavior normal.       Results Review:  I have reviewed the labs, radiology results, and diagnostic studies.    Laboratory Data:   Results from last 7 days   Lab Units 07/15/21  0107 07/14/21  1254 07/12/21  0600 07/11/21  0726   SODIUM mmol/L  --   --  136 132*   POTASSIUM mmol/L 4.0 2.9* 3.8 2.5*   CHLORIDE mmol/L  --   --  104 90*   CO2 mmol/L  --   --  25.0 26.0   BUN mg/dL  --   --  13 20   CREATININE mg/dL  --   --  0.80 1.32*   GLUCOSE mg/dL  --   --  108* 139*   CALCIUM mg/dL  --   --  7.6* 8.8   BILIRUBIN mg/dL  --   --  0.4 0.7   ALK PHOS U/L  --   --  48 55   ALT (SGPT) U/L  --   --  13 16   AST (SGOT) U/L  --   --  14 20   ANION GAP mmol/L  --   --  7.0 16.0*     Estimated Creatinine Clearance: 91 mL/min (by C-G formula based on SCr of 0.8 mg/dL).  Results from last 7 days   Lab Units 07/12/21  0600 07/11/21  0726   MAGNESIUM mg/dL 2.4 1.0*         Results from last 7 days   Lab Units 07/12/21  0559 07/11/21  0726   WBC 10*3/mm3 14.16* 6.53   HEMOGLOBIN g/dL 10.2* 12.2*   HEMATOCRIT % 29.6* 34.6*   PLATELETS 10*3/mm3 185 286     Results from last 7 days   Lab Units 07/11/21  0726   INR  0.95       Culture Data:   No results found for: BLOODCX  No results found for: URINECX  No results found for: RESPCX  No results found for: WOUNDCX  No results found for: STOOLCX  No components found for: BODYFLD    Radiology Data:   Imaging Results (Last 24 Hours)     ** No results found for the last 24 hours. **          I have reviewed the patient's current medications.     Assessment/Plan     Active Hospital Problems    Diagnosis    • Septic shock (CMS/[facility])        Plan:    1.  Right-sided pneumonia: Probably aspiration.  Continue empiric antibiotics.  Culture pending.  2.  Septic shock: Resolved.     3.  Recent C5-C7 cervical fusion: Continue current treatment.  Plan follow-up with physician at [place].  Continue home narcotics and muscle relaxers.  4.  Acute kidney injury: Resolved with hydration.  5.  Hypertension: Patient has been off his home medications since admission due to septic shock.  Will restart home medications and add as needed hydralazine.  6.  Insomnia: We'll start melatonin.  7.  DVT prophylaxis: Lovenox.    The patient was evaluated during the global COVID-19 pandemic, and the diagnosis was suspected/considered upon their initial presentation.  Evaluation, treatment, and testing were consistent with current guidelines for patients who present with complaints or symptoms that may be related to COVID-19.    I confirmed that the patient's Advance Care Plan is present, code status is documented, or surrogate decision maker is listed in the patient's medical record.       Sepsis due to pneumonia complicated post procedure and related to aspiration caused and started the cascade of fever, chills, wbc, and hyotension.  Admitted initially to icu and dx was sepsis with septic shock and etiology thought to be related to pneumonia CAP and Aspiration   With then additional dx of acute respiratory failure, with hypoxemia, that has improved now with empiric abx.   Steroids, fluids   Cultures   repleting electrolytes and bronchodilators     Have reviewed with family to deescalate abx and dishcarge home.     Have told him to limit activity until he sees neurosurgeon in [place] and he clears him to participate in activities .     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     Discharge planning and case management query answered.     Discharge Planning: I expect patient to be discharged to home in 2-3 days.        This document has been electronically signed by Angella Anderson MD on July 15, 2021 08:39 CDT

## 2021-07-16 ENCOUNTER — TRANSITIONAL CARE MANAGEMENT TELEPHONE ENCOUNTER (OUTPATIENT)
Dept: CALL CENTER | Facility: HOSPITAL | Age: 71
End: 2021-07-16

## 2021-07-16 LAB
BACTERIA SPEC AEROBE CULT: NORMAL
BACTERIA SPEC AEROBE CULT: NORMAL

## 2021-07-16 NOTE — OUTREACH NOTE
Call Center TCM Note      Responses   [facility] patient discharged from?  [facility]   Does the patient have one of the following disease processes/diagnoses(primary or secondary)?  Sepsis   TCM attempt successful?  Yes   Call start time  1331   Call end time  1333   Discharge diagnosis  Septic shock    Does the patient have all medications related to this admission filled (includes all antibiotics, inhalers, nebulizers,steroids,etc.)  Yes   Is the patient taking all medications as directed (includes completed medication regime)?  Yes   Does the patient have a primary care provider?   Yes   Comments regarding PCP  appt with PCP on 7/26   Does the patient have an appointment with their PCP within 7 days of discharge?  Greater than 7 days   Nursing Interventions  Verified appointment date/time/provider   Has the patient kept scheduled appointments due by today?  N/A   Has home health visited the patient within 72 hours of discharge?  N/A   Psychosocial issues?  No   Did the patient receive a copy of their discharge instructions?  Yes   Nursing interventions  Reviewed instructions with patient   What is the patient's perception of their health status since discharge?  Improving   Nursing interventions  Nurse provided patient education   Is the patient/caregiver able to teach back Sepsis?  S - Shivering,fever or very cold, E - Extreme pain or generalized discomfort (worst ever,especially abdomen), S - Sleepy, difficult to arouse,confused, P - Pale or discolored skin, I -   I feel like I might die-a feeling of hopelessness, S - Short of breath   Is the patient/caregiver able to teach back signs and symptoms of worsening condition:  Fever   Is the patient/caregiver able to teach back the hierarchy of who to call/visit for symptoms/problems? PCP, Specialist, Home health nurse, Urgent Care, ED, 911  Yes   TCM call completed?  Yes   Wrap up additional comments  Says he is doing well, confirmed appt with PCP for  7/26, no questions or concerns at this time.          Chloe Munguia RN    7/16/2021, 13:33 EDT

## 2021-07-19 DIAGNOSIS — R73.9 HIGH BLOOD SUGAR: ICD-10-CM

## 2021-07-19 DIAGNOSIS — M54.2 NECK PAIN: ICD-10-CM

## 2021-07-19 RX ORDER — BACLOFEN 10 MG/1
TABLET ORAL
Qty: 90 TABLET | Refills: 2 | Status: SHIPPED | OUTPATIENT
Start: 2021-07-19 | End: 2021-07-23

## 2021-07-23 ENCOUNTER — HOSPITAL ENCOUNTER (OUTPATIENT)
Dept: ULTRASOUND IMAGING | Facility: HOSPITAL | Age: 71
Discharge: HOME | End: 2021-07-23

## 2021-07-23 ENCOUNTER — TELEPHONE (OUTPATIENT)
Dept: FAMILY MEDICINE CLINIC | Facility: CLINIC | Age: 71
End: 2021-07-23

## 2021-07-23 ENCOUNTER — OFFICE VISIT (OUTPATIENT)
Dept: FAMILY MEDICINE CLINIC | Facility: CLINIC | Age: 71
End: 2021-07-23

## 2021-07-23 VITALS
HEIGHT: 70 IN | OXYGEN SATURATION: 99 % | TEMPERATURE: 97.3 F | SYSTOLIC BLOOD PRESSURE: 140 MMHG | WEIGHT: 169 LBS | DIASTOLIC BLOOD PRESSURE: 88 MMHG | HEART RATE: 83 BPM | BODY MASS INDEX: 24.2 KG/M2

## 2021-07-23 DIAGNOSIS — R60.9 SWELLING: ICD-10-CM

## 2021-07-23 DIAGNOSIS — R60.1 GENERALIZED EDEMA: ICD-10-CM

## 2021-07-23 DIAGNOSIS — R05.9 COUGH: ICD-10-CM

## 2021-07-23 DIAGNOSIS — R52 PAIN: ICD-10-CM

## 2021-07-23 DIAGNOSIS — M79.89 SWELLING OF ARM: ICD-10-CM

## 2021-07-23 DIAGNOSIS — I82.621 ARM DVT (DEEP VENOUS THROMBOEMBOLISM), ACUTE, RIGHT (HCC): Primary | ICD-10-CM

## 2021-07-23 PROCEDURE — 93971 EXTREMITY STUDY: CPT

## 2021-07-23 PROCEDURE — 99496 TRANSJ CARE MGMT HIGH F2F 7D: CPT | Performed by: NURSE PRACTITIONER

## 2021-07-23 RX ORDER — HYDROCODONE BITARTRATE AND ACETAMINOPHEN 10; 325 MG/1; MG/1
1 TABLET ORAL EVERY 6 HOURS PRN
COMMUNITY
End: 2021-08-02 | Stop reason: ALTCHOICE

## 2021-07-23 RX ORDER — METHOCARBAMOL 750 MG/1
750 TABLET, FILM COATED ORAL 3 TIMES DAILY
Qty: 90 TABLET | Refills: 5 | Status: SHIPPED | OUTPATIENT
Start: 2021-07-23 | End: 2022-05-20

## 2021-07-23 RX ORDER — ALBUTEROL SULFATE 90 UG/1
2 AEROSOL, METERED RESPIRATORY (INHALATION) EVERY 4 HOURS PRN
Qty: 18 G | Refills: 4 | Status: SHIPPED | OUTPATIENT
Start: 2021-07-23 | End: 2022-05-20

## 2021-07-23 RX ORDER — DOXYCYCLINE HYCLATE 100 MG/1
100 CAPSULE ORAL 2 TIMES DAILY
Qty: 20 CAPSULE | Refills: 0 | Status: SHIPPED | OUTPATIENT
Start: 2021-07-23 | End: 2021-08-02

## 2021-07-23 NOTE — PROGRESS NOTES
Chief Complaint   Patient presents with   • Transitional Care Management     1 week hospital follow up pneumonia      Subjective   Jose Zazueta is a 70 y.o. male.     Presents with recheck from hospital -pneumonia and review of meds    Hypertension  This is a chronic problem. The current episode started more than 1 year ago. The problem has been resolved since onset. The problem is controlled. Associated symptoms include malaise/fatigue and neck pain. Pertinent negatives include no anxiety, orthopnea, palpitations, peripheral edema or PND. Risk factors for coronary artery disease include dyslipidemia, male gender, sedentary lifestyle and family history. The current treatment provides significant improvement. Compliance problems include diet.  There is no history of angina, kidney disease, CAD/MI, CVA, heart failure, left ventricular hypertrophy, PVD or retinopathy. There is no history of hypercortisolism, hyperparathyroidism, a hypertension causing med, pheochromocytoma or renovascular disease.   Leg Pain   The incident occurred 2 days ago. There was no injury mechanism. The pain is present in the left leg. The quality of the pain is described as burning. The pain is at a severity of 2/10. The pain is mild. The pain has been fluctuating since onset. Pertinent negatives include no inability to bear weight, loss of motion, loss of sensation, muscle weakness, numbness or tingling. The symptoms are aggravated by movement. He has tried acetaminophen, elevation, heat and ice for the symptoms. The treatment provided mild relief.   Arm Pain   The incident occurred 2 days ago. There was no injury mechanism. The pain is present in the right forearm, right hand and right shoulder. The quality of the pain is described as burning. The pain does not radiate. The pain is at a severity of 5/10. The pain is moderate. The pain has been worsening since the incident. Pertinent negatives include no muscle weakness, numbness or  tingling. The symptoms are aggravated by lifting, palpation and movement. He has tried nothing for the symptoms. The treatment provided no relief.        The following portions of the patient's history were reviewed and updated as appropriate: allergies, current medications, past social history and problem list.    Review of Systems   Constitutional: Positive for activity change, appetite change, diaphoresis, fatigue and malaise/fatigue. Negative for unexpected weight change.   HENT: Negative for congestion, dental problem, drooling, ear discharge, facial swelling, hearing loss and nosebleeds.    Eyes: Negative.  Negative for pain, discharge, redness, itching and visual disturbance.   Respiratory: Negative.  Negative for apnea, chest tightness and stridor.    Cardiovascular: Positive for leg swelling. Negative for palpitations, orthopnea and PND.   Gastrointestinal: Negative.  Negative for abdominal distention, abdominal pain, anal bleeding, blood in stool, constipation and diarrhea.        Hx of gerd controlled    Endocrine: Negative.  Negative for cold intolerance, heat intolerance, polydipsia, polyphagia and polyuria.   Genitourinary: Negative.    Musculoskeletal: Positive for arthralgias, back pain, gait problem, joint swelling, neck pain and neck stiffness.        Neck pain right side with radiation of pain to right side of head radiating to right arm     Right arm edema and pain    Skin: Positive for color change. Negative for pallor.        Swelling right arm and left leg    Allergic/Immunologic: Negative.  Negative for food allergies and immunocompromised state.   Neurological: Negative for dizziness, tingling, tremors, seizures, syncope, facial asymmetry, speech difficulty, light-headedness and numbness.        Numbness tingling right and left arm, symptoms on and off    Hematological: Negative.  Negative for adenopathy. Does not bruise/bleed easily.   Psychiatric/Behavioral: Negative.  Negative for  "agitation, behavioral problems, confusion, decreased concentration, dysphoric mood, hallucinations, self-injury and suicidal ideas. The patient is not nervous/anxious and is not hyperactive.        Objective   /88   Pulse 83   Temp 97.3 °F (36.3 °C) (Infrared)   Ht 177.8 cm (70\")   Wt 76.7 kg (169 lb)   SpO2 99%   BMI 24.25 kg/m²   Physical Exam  Constitutional:       General: He is in acute distress.      Appearance: Normal appearance. He is ill-appearing.      Comments: Reports severe pain -cannot get comfortable today    HENT:      Head: Normocephalic.      Right Ear: Tympanic membrane normal.      Nose: Nose normal. No congestion or rhinorrhea.      Mouth/Throat:      Mouth: Mucous membranes are moist.      Pharynx: No oropharyngeal exudate or posterior oropharyngeal erythema.   Eyes:      General: No scleral icterus.        Right eye: No discharge.         Left eye: No discharge.      Pupils: Pupils are equal, round, and reactive to light.   Cardiovascular:      Rate and Rhythm: Normal rate.      Heart sounds: No murmur heard.   No friction rub. No gallop.    Pulmonary:      Effort: Pulmonary effort is normal. No respiratory distress.      Breath sounds: No stridor. No wheezing, rhonchi or rales.   Chest:      Chest wall: No tenderness.   Abdominal:      General: Abdomen is flat. There is no distension.      Palpations: There is no mass.      Tenderness: There is no abdominal tenderness. There is no right CVA tenderness, left CVA tenderness, guarding or rebound.      Hernia: No hernia is present.   Musculoskeletal:         General: No swelling, tenderness, deformity or signs of injury.        Arms:       Cervical back: Torticollis and bony tenderness present. No swelling, edema, deformity, erythema, signs of trauma, lacerations, rigidity, spasms, tenderness or crepitus. Pain with movement present. Normal range of motion.        Back:       Right lower leg: No edema.      Left lower leg: No edema.    "   Comments: Pain and swelling right arm and left leg -history of pe    Skin:     General: Skin is warm.      Coloration: Skin is not jaundiced or pale.      Findings: No bruising, erythema, lesion or rash.   Neurological:      Mental Status: He is alert.      Cranial Nerves: No cranial nerve deficit.      Sensory: No sensory deficit.      Motor: No weakness.      Coordination: Coordination normal.      Gait: Gait normal.      Deep Tendon Reflexes: Reflexes normal.   Psychiatric:         Mood and Affect: Mood normal.         Behavior: Behavior normal.         Assessment/Plan   Problems Addressed this Visit     None      Visit Diagnoses     Arm DVT (deep venous thromboembolism), acute, right (CMS/HCC)    -  Primary    Relevant Orders    Ambulatory Referral to Vascular Surgery    Cough        Relevant Orders    XR Chest PA & Lateral (Completed)    Swelling        Pain        Generalized edema        Relevant Orders    US venous doppler lower extremity left (duplex) (Completed)    US venous doppler upper extremity right (duplex) (Completed)    Swelling of arm          Diagnoses       Codes Comments    Arm DVT (deep venous thromboembolism), acute, right (CMS/HCC)    -  Primary ICD-10-CM: I82.621  ICD-9-CM: 453.82     Cough     ICD-10-CM: R05  ICD-9-CM: 786.2     Swelling     ICD-10-CM: R60.9  ICD-9-CM: 782.3     Pain     ICD-10-CM: R52  ICD-9-CM: 780.96     Generalized edema     ICD-10-CM: R60.1  ICD-9-CM: 782.3     Swelling of arm     ICD-10-CM: M79.89  ICD-9-CM: 729.81            New Medications Ordered This Visit   Medications   • methocarbamol (ROBAXIN) 750 MG tablet     Sig: Take 1 tablet by mouth 3 (Three) Times a Day.     Dispense:  90 tablet     Refill:  5   • doxycycline (VIBRAMYCIN) 100 MG capsule     Sig: Take 1 capsule by mouth 2 (Two) Times a Day.     Dispense:  20 capsule     Refill:  0   • albuterol sulfate  (90 Base) MCG/ACT inhaler     Sig: Inhale 2 puffs Every 4 (Four) Hours As Needed for  Wheezing.     Dispense:  18 g     Refill:  4   • Rivaroxaban (XARELTO) tablet therapy pack starter pack     Sig: Take one 15 mg tablet twice daily with food for 21 days.  Followed by one 20 mg tablet by mouth once daily with food. Take as directed     Dispense:  51 tablet     Refill:  0      meds as directed-repeat chest xray  EXAM: XR CHEST 2 VIEWS     COMPARISONS: Radiographs 7/11/2021     INDICATION: recheck pneumonia, R05 Cough     FINDINGS:   Interval improvement in aeration of the right upper and lower  lobes with trace residual right lower lobe airspace opacity. Left  lung is well aerated. Heart size is normal. No effusion or  pneumothorax. No acute osseous abnormality.     IMPRESSION:  Near complete resolution of right lung airspace disease/pneumonia  with trace residual right lower lobe airspace disease.     Electronically signed by:  Bree Crawford MD  7/23/2021  11:50 AM CDT Workstation: [id number]        Specimen Collected: 07/23/21 11:28 Last Resulted: 07/23/21 11:50           Jameson Arthur MD  [phone] 7/23/2021       Addenda   ADDENDUM   ADDENDUM #1         Addendum, correction:      Otilia Drakep received this report at 2:51 PM.     Electronically signed by:  Jameson Arthur MD  7/25/2021 8:00 PM CDT  Workstation: [id number]     Signed by Jameson Arthur MD on 7/25/2021 20:00  Narrative & Impression  Doppler duplex venous examination right upper extremity. Left  upper extremity.         History: History of deep venous thrombosis in left leg and  pulmonary emboli.     There is extensive deep venous thrombi in the right and left  upper extremities. Thrombi are noted in the right left distal  subclavian veins, axillary veins proximal brachial ,cephalic and  distal brachial veins.     IMPRESSION:  Extensive bilateral upper extremity deep venous  thrombosis as more fully described above.     Electronically signed by:  Jameson Arthur MD  7/23/2021 2:00 PM CDT  Workstation: [id number]              Addendum, correction:      Otilia Singh received this report at 2:51 PM.     Electronically signed by:  Jameson Arthur MD  7/25/2021 8:00 PM CDT  Workstation: [id number]     Signed by Jameson Arthur MD on 7/25/2021 20:00  Narrative & Impression  Doppler duplex venous examination right upper extremity. Left  upper extremity.         History: History of deep venous thrombosis in left leg and  pulmonary emboli.     There is extensive deep venous thrombi in the right and left  upper extremities. Thrombi are noted in the right left distal  subclavian veins, axillary veins proximal brachial ,cephalic and  distal brachial veins.     IMPRESSION:  Extensive bilateral upper extremity deep venous  thrombosis as more fully described above.     Electronically signed by:  Jameson Arthur MD  7/23/2021 2:00 PM CDT  Workstation: [id number]        Specimen Collected          Chest xray clear -patient notified, xray as directed      Plan-xarelto 15mg bid x21 days-  samples given in office lot [id number] expo 03/23 -then 20mg daily until clot resolved, er if worsen, monitor for soa and/or sudden onset chest pain, patient agrees-see report for further information -follow up with vascular next week as directed

## 2021-08-02 ENCOUNTER — OFFICE VISIT (OUTPATIENT)
Dept: FAMILY MEDICINE CLINIC | Facility: CLINIC | Age: 71
End: 2021-08-02

## 2021-08-02 VITALS
DIASTOLIC BLOOD PRESSURE: 70 MMHG | OXYGEN SATURATION: 99 % | WEIGHT: 166 LBS | HEIGHT: 70 IN | SYSTOLIC BLOOD PRESSURE: 130 MMHG | TEMPERATURE: 97.5 F | BODY MASS INDEX: 23.77 KG/M2

## 2021-08-02 DIAGNOSIS — I82.621 ARM DVT (DEEP VENOUS THROMBOEMBOLISM), ACUTE, RIGHT (HCC): Primary | ICD-10-CM

## 2021-08-02 DIAGNOSIS — I10 ESSENTIAL HYPERTENSION: ICD-10-CM

## 2021-08-02 DIAGNOSIS — R06.02 SHORTNESS OF BREATH: ICD-10-CM

## 2021-08-02 PROCEDURE — 99213 OFFICE O/P EST LOW 20 MIN: CPT | Performed by: NURSE PRACTITIONER

## 2021-08-02 RX ORDER — SENNOSIDES 8.6 MG
650 CAPSULE ORAL EVERY 8 HOURS PRN
COMMUNITY
End: 2021-11-16

## 2021-08-02 NOTE — PROGRESS NOTES
Chief Complaint   Patient presents with   • DVT     follow up , has appt next Mon with Fara Bowling   Jose Zazueta is a 71 y.o. male.     Presents with recheck from last week-dvt right arm -pneumonia and review of meds-er visit reviewed with cta     Hypertension  This is a chronic problem. The current episode started more than 1 year ago. The problem has been resolved since onset. The problem is controlled. Associated symptoms include malaise/fatigue, neck pain and shortness of breath. Pertinent negatives include no anxiety, chest pain, headaches, orthopnea, palpitations, peripheral edema or PND. Risk factors for coronary artery disease include dyslipidemia, male gender, sedentary lifestyle and family history. The current treatment provides significant improvement. Compliance problems include diet.  There is no history of angina, kidney disease, CAD/MI, CVA, heart failure, left ventricular hypertrophy, PVD or retinopathy. There is no history of hypercortisolism, hyperparathyroidism, a hypertension causing med, pheochromocytoma or renovascular disease.   Arm Pain   The incident occurred more than 1 week ago. There was no injury mechanism. The pain is present in the right forearm, right hand and right shoulder. The quality of the pain is described as burning. The pain does not radiate. The pain is at a severity of 2/10. The pain is mild. The pain has been worsening since the incident. Associated symptoms include tingling. Pertinent negatives include no chest pain or muscle weakness. The symptoms are aggravated by lifting, palpation and movement. He has tried nothing (xarelto ) for the symptoms. The treatment provided no relief.   Shortness of Breath  This is a new problem. The current episode started more than 1 month ago. The problem occurs intermittently. The problem has been gradually worsening. Associated symptoms include leg swelling and neck pain. Pertinent negatives include no abdominal  pain, chest pain, fever, headaches, orthopnea, PND, vomiting or wheezing. The treatment provided mild relief. His past medical history is significant for DVT. There is no history of allergies, aspirin allergies, asthma, bronchiolitis, CAD, chronic lung disease, COPD, a heart failure, PE, pneumonia or a recent surgery.        The following portions of the patient's history were reviewed and updated as appropriate: allergies, current medications, past social history and problem list.    Review of Systems   Constitutional: Positive for activity change, appetite change, diaphoresis, fatigue and malaise/fatigue. Negative for fever and unexpected weight change.   HENT: Negative for congestion, dental problem, drooling, ear discharge, facial swelling, hearing loss, nosebleeds and postnasal drip.    Eyes: Negative.  Negative for pain, discharge, redness, itching and visual disturbance.   Respiratory: Positive for shortness of breath. Negative for apnea, chest tightness, wheezing and stridor.    Cardiovascular: Positive for leg swelling. Negative for chest pain, palpitations, orthopnea and PND.   Gastrointestinal: Negative.  Negative for abdominal distention, abdominal pain, anal bleeding, blood in stool, constipation, diarrhea and vomiting.        Hx of gerd controlled    Endocrine: Negative.  Negative for cold intolerance, heat intolerance, polydipsia, polyphagia and polyuria.   Genitourinary: Negative.    Musculoskeletal: Positive for arthralgias, back pain, gait problem, joint swelling, neck pain and neck stiffness.        Neck pain right side with radiation of pain to right side of head radiating to right arm     Right arm edema and pain improving this visit    Skin: Positive for color change. Negative for pallor.        Swelling right arm and left leg    Allergic/Immunologic: Negative.  Negative for food allergies and immunocompromised state.   Neurological: Positive for tingling. Negative for dizziness, tremors,  "seizures, syncope, facial asymmetry, speech difficulty, light-headedness and headaches.        Numbness tingling right and left arm, symptoms on and off    Hematological: Negative.  Negative for adenopathy. Does not bruise/bleed easily.   Psychiatric/Behavioral: Negative.  Negative for agitation, behavioral problems, confusion, decreased concentration, dysphoric mood, hallucinations, self-injury and suicidal ideas. The patient is not nervous/anxious and is not hyperactive.        Objective   /70   Temp 97.5 °F (36.4 °C) (Infrared)   Ht 177.8 cm (70\")   Wt 75.3 kg (166 lb)   SpO2 99%   BMI 23.82 kg/m²   Physical Exam  Constitutional:       General: He is not in acute distress.     Appearance: Normal appearance. He is not ill-appearing or diaphoretic.   HENT:      Head: Normocephalic.      Right Ear: Tympanic membrane normal.      Nose: Nose normal. No congestion or rhinorrhea.      Mouth/Throat:      Mouth: Mucous membranes are moist.      Pharynx: No oropharyngeal exudate or posterior oropharyngeal erythema.   Eyes:      General: No scleral icterus.        Right eye: No discharge.         Left eye: No discharge.      Pupils: Pupils are equal, round, and reactive to light.   Cardiovascular:      Rate and Rhythm: Normal rate.      Heart sounds: No murmur heard.   No friction rub. No gallop.    Pulmonary:      Effort: Pulmonary effort is normal. No respiratory distress.      Breath sounds: No stridor. No wheezing, rhonchi or rales.   Chest:      Chest wall: No tenderness.   Abdominal:      General: Abdomen is flat. There is no distension.      Palpations: There is no mass.      Tenderness: There is no abdominal tenderness. There is no right CVA tenderness, left CVA tenderness, guarding or rebound.      Hernia: No hernia is present.   Musculoskeletal:         General: No swelling, tenderness, deformity or signs of injury.        Arms:       Cervical back: No swelling, edema, deformity, erythema, signs of " trauma, lacerations, rigidity, spasms, torticollis, tenderness, bony tenderness or crepitus. No pain with movement. Normal range of motion.      Right lower leg: No edema.      Left lower leg: No edema.   Skin:     General: Skin is warm.      Coloration: Skin is not jaundiced or pale.      Findings: No bruising, erythema, lesion or rash.   Neurological:      General: No focal deficit present.      Mental Status: He is alert and oriented to person, place, and time.      Cranial Nerves: No cranial nerve deficit.      Sensory: No sensory deficit.      Motor: No weakness.      Coordination: Coordination normal.      Gait: Gait normal.      Deep Tendon Reflexes: Reflexes normal.   Psychiatric:         Mood and Affect: Mood normal.         Behavior: Behavior normal.         Assessment/Plan   Problems Addressed this Visit     None      Visit Diagnoses     Arm DVT (deep venous thromboembolism), acute, right (CMS/HCC)    -  Primary    Essential hypertension        Shortness of breath        Relevant Orders    Ambulatory Referral to Pulmonology      Diagnoses       Codes Comments    Arm DVT (deep venous thromboembolism), acute, right (CMS/HCC)    -  Primary ICD-10-CM: I82.621  ICD-9-CM: 453.82     Essential hypertension     ICD-10-CM: I10  ICD-9-CM: 401.9     Shortness of breath     ICD-10-CM: R06.02  ICD-9-CM: 786.05          No orders of the defined types were placed in this encounter.  PACS Images     Radiology Images   Addendum     ADDENDUM   ADDENDUM #1         Addendum, correction:      Otilia Drakep received this report at 2:51 PM.     Electronically signed by:  Jameson Arthur MD  7/25/2021 8:00 PM CDT  Workstation: [id number] #2         Addendum, correction:     PROCEDURE: Right upper extremity venous Doppler. Left lower  extremity venous Doppler.     IMPRESSION: Extensive deep venous thrombi right right upper  extremity.     Negative Doppler venous examination left lower extremity.     Electronically signed by:  Jameson  Sandhya OLIVAREZ  7/26/2021 11:32 AM CDT  Workstation: [id number]      Addended by Jameson Arthur MD on 7/26/2021 11:32 AM               continue meds as directed xarelto-follow up with vascular next week and pulmonary referral due to soa-cta reviewed from another facility-no dvt-patient instructed to follow up if worsen in the next few days or er if worsen -otherwise see back in 4 weeks as directed    Schedule medicare wellness with next visit   Suggest covid vaccine as directed

## 2021-08-09 ENCOUNTER — OFFICE VISIT (OUTPATIENT)
Dept: CARDIAC SURGERY | Facility: CLINIC | Age: 71
End: 2021-08-09

## 2021-08-09 VITALS
OXYGEN SATURATION: 98 % | TEMPERATURE: 98.1 F | HEART RATE: 76 BPM | WEIGHT: 170 LBS | DIASTOLIC BLOOD PRESSURE: 70 MMHG | HEIGHT: 70 IN | SYSTOLIC BLOOD PRESSURE: 122 MMHG | BODY MASS INDEX: 24.34 KG/M2

## 2021-08-09 DIAGNOSIS — Z79.01 CURRENT USE OF LONG TERM ANTICOAGULATION: ICD-10-CM

## 2021-08-09 DIAGNOSIS — I82.621 ACUTE DEEP VEIN THROMBOSIS (DVT) OF OTHER VEIN OF RIGHT UPPER EXTREMITY (HCC): Primary | ICD-10-CM

## 2021-08-09 PROCEDURE — 99215 OFFICE O/P EST HI 40 MIN: CPT | Performed by: NURSE PRACTITIONER

## 2021-08-09 NOTE — PATIENT INSTRUCTIONS
Stable RIGHT Upper DVT  Stop ASA  Anticoagulation: Xarelto  30d Free Card  Expected Length of therapy: Lifelong  Will evaluate medication cost/coverage  Return 3 months : Duplex    Verify with Pharmacy: Xarelto $129  Verify Eliquis 5mg twice daily cost    Notify provider if you experience excessive bleeding from the nose, cuts, gums, rectum, urinary tract, or vagina. Reddish or brown urine or stool. Vomiting of blood or hemorrhoidal bleeding. If major injury occurs present to the Emergency Department.  Return if further Rx assistance needed: Loss of prescriptive coverage, refills, cost of medications. If you should experience any additional leg pain with swelling or sudden onset of shortness of breath notify heart and vascular center immediately for evaluation.
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
## 2021-08-24 ENCOUNTER — OFFICE VISIT (OUTPATIENT)
Dept: PULMONOLOGY | Facility: CLINIC | Age: 71
End: 2021-08-24

## 2021-08-24 VITALS
HEIGHT: 70 IN | BODY MASS INDEX: 23.05 KG/M2 | TEMPERATURE: 96.9 F | OXYGEN SATURATION: 99 % | HEART RATE: 83 BPM | SYSTOLIC BLOOD PRESSURE: 92 MMHG | DIASTOLIC BLOOD PRESSURE: 58 MMHG | WEIGHT: 161 LBS

## 2021-08-24 DIAGNOSIS — I26.99 PULMONARY EMBOLISM, UNSPECIFIED CHRONICITY, UNSPECIFIED PULMONARY EMBOLISM TYPE, UNSPECIFIED WHETHER ACUTE COR PULMONALE PRESENT (HCC): Chronic | ICD-10-CM

## 2021-08-24 DIAGNOSIS — R06.09 DYSPNEA ON EXERTION: Primary | ICD-10-CM

## 2021-08-24 PROCEDURE — 99203 OFFICE O/P NEW LOW 30 MIN: CPT | Performed by: INTERNAL MEDICINE
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
## 2021-08-30 ENCOUNTER — HOSPITAL ENCOUNTER (OUTPATIENT)
Dept: CT IMAGING | Facility: HOSPITAL | Age: 71
Discharge: HOME | End: 2021-08-30
Admitting: INTERNAL MEDICINE

## 2021-08-30 DIAGNOSIS — R06.09 DYSPNEA ON EXERTION: ICD-10-CM

## 2021-08-30 PROCEDURE — 71250 CT THORAX DX C-: CPT

## 2021-09-02 ENCOUNTER — LAB (OUTPATIENT)
Dept: LAB | Facility: HOSPITAL | Age: 71
End: 2021-09-02

## 2021-09-02 ENCOUNTER — OFFICE VISIT (OUTPATIENT)
Dept: FAMILY MEDICINE CLINIC | Facility: CLINIC | Age: 71
End: 2021-09-02

## 2021-09-02 VITALS
DIASTOLIC BLOOD PRESSURE: 82 MMHG | HEIGHT: 70 IN | BODY MASS INDEX: 24.48 KG/M2 | WEIGHT: 171 LBS | SYSTOLIC BLOOD PRESSURE: 122 MMHG | TEMPERATURE: 97.8 F

## 2021-09-02 DIAGNOSIS — R63.4 WEIGHT LOSS: ICD-10-CM

## 2021-09-02 DIAGNOSIS — R53.81 MALAISE AND FATIGUE: ICD-10-CM

## 2021-09-02 DIAGNOSIS — R52 PAIN: ICD-10-CM

## 2021-09-02 DIAGNOSIS — R06.02 SHORTNESS OF BREATH: ICD-10-CM

## 2021-09-02 DIAGNOSIS — R53.83 MALAISE AND FATIGUE: ICD-10-CM

## 2021-09-02 DIAGNOSIS — Z51.81 ENCOUNTER FOR THERAPEUTIC DRUG LEVEL MONITORING: ICD-10-CM

## 2021-09-02 DIAGNOSIS — I10 BENIGN ESSENTIAL HTN: Primary | ICD-10-CM

## 2021-09-02 LAB
AMPHET+METHAMPHET UR QL: NEGATIVE
AMPHETAMINES UR QL: NEGATIVE
BARBITURATES UR QL SCN: NEGATIVE
BENZODIAZ UR QL SCN: NEGATIVE
BUPRENORPHINE SERPL-MCNC: NEGATIVE NG/ML
CANNABINOIDS SERPL QL: NEGATIVE
COCAINE UR QL: NEGATIVE
HBA1C MFR BLD: 5.14 % (ref 4.8–5.6)
METHADONE UR QL SCN: NEGATIVE
OPIATES UR QL: POSITIVE
OXYCODONE UR QL SCN: NEGATIVE
PCP UR QL SCN: NEGATIVE
PROPOXYPH UR QL: NEGATIVE
TRICYCLICS UR QL SCN: NEGATIVE

## 2021-09-02 PROCEDURE — 83036 HEMOGLOBIN GLYCOSYLATED A1C: CPT | Performed by: NURSE PRACTITIONER

## 2021-09-02 PROCEDURE — 36415 COLL VENOUS BLD VENIPUNCTURE: CPT | Performed by: NURSE PRACTITIONER

## 2021-09-02 PROCEDURE — 99214 OFFICE O/P EST MOD 30 MIN: CPT | Performed by: NURSE PRACTITIONER

## 2021-09-02 PROCEDURE — 86769 SARS-COV-2 COVID-19 ANTIBODY: CPT | Performed by: NURSE PRACTITIONER

## 2021-09-02 PROCEDURE — 80306 DRUG TEST PRSMV INSTRMNT: CPT | Performed by: NURSE PRACTITIONER

## 2021-09-02 RX ORDER — HYDROCODONE BITARTRATE AND ACETAMINOPHEN 7.5; 325 MG/1; MG/1
1 TABLET ORAL EVERY 6 HOURS PRN
Qty: 12 TABLET | Refills: 0 | Status: SHIPPED | OUTPATIENT
Start: 2021-09-02 | End: 2022-01-03 | Stop reason: SDUPTHER

## 2021-09-03 ENCOUNTER — TELEPHONE (OUTPATIENT)
Dept: FAMILY MEDICINE CLINIC | Facility: CLINIC | Age: 71
End: 2021-09-03

## 2021-09-03 LAB — SARS-COV-2 AB SERPL QL IA: POSITIVE

## 2021-09-03 NOTE — TELEPHONE ENCOUNTER
-Per DARION Ignacio, Mr. Zazueta has been called with recent lab results & recommendations.  Continue current medications and follow-up as planned or sooner if any problems.       ---- Message from DARION Reyes sent at 9/3/2021  7:48 AM CDT -----  Can you let him know his labs are good, on changes needed

## 2021-09-22 ENCOUNTER — PROCEDURE VISIT (OUTPATIENT)
Dept: PULMONOLOGY | Facility: CLINIC | Age: 71
End: 2021-09-22

## 2021-09-22 ENCOUNTER — OFFICE VISIT (OUTPATIENT)
Dept: PULMONOLOGY | Facility: CLINIC | Age: 71
End: 2021-09-22

## 2021-09-22 VITALS
WEIGHT: 170 LBS | OXYGEN SATURATION: 99 % | BODY MASS INDEX: 24.34 KG/M2 | HEIGHT: 70 IN | DIASTOLIC BLOOD PRESSURE: 88 MMHG | SYSTOLIC BLOOD PRESSURE: 160 MMHG | HEART RATE: 79 BPM | TEMPERATURE: 96.6 F

## 2021-09-22 DIAGNOSIS — Z87.01 HISTORY OF PNEUMONIA: Primary | ICD-10-CM

## 2021-09-22 DIAGNOSIS — R06.09 DYSPNEA ON EXERTION: ICD-10-CM

## 2021-09-22 PROCEDURE — 94060 EVALUATION OF WHEEZING: CPT | Performed by: INTERNAL MEDICINE

## 2021-09-22 PROCEDURE — 99214 OFFICE O/P EST MOD 30 MIN: CPT | Performed by: INTERNAL MEDICINE

## 2021-09-22 PROCEDURE — 94727 GAS DIL/WSHOT DETER LNG VOL: CPT | Performed by: INTERNAL MEDICINE

## 2021-09-22 PROCEDURE — 94729 DIFFUSING CAPACITY: CPT | Performed by: INTERNAL MEDICINE

## 2021-09-22 NOTE — PROGRESS NOTES
Full PFT performed.     6 second exhalation on spirometry.     4 puffs Ventolin given, tolerated well.     Ordered by ROBAUTO, read by ROBAUTO.
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
## 2021-09-24 ENCOUNTER — OFFICE VISIT (OUTPATIENT)
Dept: ONCOLOGY | Facility: CLINIC | Age: 71
End: 2021-09-24

## 2021-09-24 ENCOUNTER — LAB (OUTPATIENT)
Dept: ONCOLOGY | Facility: HOSPITAL | Age: 71
End: 2021-09-24

## 2021-09-24 VITALS
DIASTOLIC BLOOD PRESSURE: 95 MMHG | BODY MASS INDEX: 25.54 KG/M2 | OXYGEN SATURATION: 98 % | RESPIRATION RATE: 18 BRPM | HEART RATE: 72 BPM | WEIGHT: 178 LBS | SYSTOLIC BLOOD PRESSURE: 176 MMHG | TEMPERATURE: 97.4 F

## 2021-09-24 DIAGNOSIS — I26.99 OTHER ACUTE PULMONARY EMBOLISM WITHOUT ACUTE COR PULMONALE (HCC): ICD-10-CM

## 2021-09-24 DIAGNOSIS — I26.99 PULMONARY EMBOLISM, UNSPECIFIED CHRONICITY, UNSPECIFIED PULMONARY EMBOLISM TYPE, UNSPECIFIED WHETHER ACUTE COR PULMONALE PRESENT (HCC): Primary | Chronic | ICD-10-CM

## 2021-09-24 DIAGNOSIS — D50.9 IRON DEFICIENCY ANEMIA, UNSPECIFIED IRON DEFICIENCY ANEMIA TYPE: ICD-10-CM

## 2021-09-24 DIAGNOSIS — D50.9 IRON DEFICIENCY ANEMIA, UNSPECIFIED IRON DEFICIENCY ANEMIA TYPE: Chronic | ICD-10-CM

## 2021-09-24 LAB
BASOPHILS # BLD AUTO: 0.06 10*3/MM3 (ref 0–0.2)
BASOPHILS NFR BLD AUTO: 1.1 % (ref 0–1.5)
DEPRECATED RDW RBC AUTO: 56.6 FL (ref 37–54)
EOSINOPHIL # BLD AUTO: 0.12 10*3/MM3 (ref 0–0.4)
EOSINOPHIL NFR BLD AUTO: 2.1 % (ref 0.3–6.2)
ERYTHROCYTE [DISTWIDTH] IN BLOOD BY AUTOMATED COUNT: 15.3 % (ref 12.3–15.4)
FERRITIN SERPL-MCNC: 138.9 NG/ML (ref 30–400)
FOLATE SERPL-MCNC: >20 NG/ML (ref 4.78–24.2)
HCT VFR BLD AUTO: 35.7 % (ref 37.5–51)
HGB BLD-MCNC: 12.1 G/DL (ref 13–17.7)
HOLD SPECIMEN: NORMAL
IMM GRANULOCYTES # BLD AUTO: 0.03 10*3/MM3 (ref 0–0.05)
IMM GRANULOCYTES NFR BLD AUTO: 0.5 % (ref 0–0.5)
IRON 24H UR-MRATE: 84 MCG/DL (ref 59–158)
IRON SATN MFR SERPL: 25 % (ref 20–50)
LYMPHOCYTES # BLD AUTO: 1.99 10*3/MM3 (ref 0.7–3.1)
LYMPHOCYTES NFR BLD AUTO: 35.6 % (ref 19.6–45.3)
MCH RBC QN AUTO: 34.2 PG (ref 26.6–33)
MCHC RBC AUTO-ENTMCNC: 33.9 G/DL (ref 31.5–35.7)
MCV RBC AUTO: 100.8 FL (ref 79–97)
MONOCYTES # BLD AUTO: 0.7 10*3/MM3 (ref 0.1–0.9)
MONOCYTES NFR BLD AUTO: 12.5 % (ref 5–12)
NEUTROPHILS NFR BLD AUTO: 2.69 10*3/MM3 (ref 1.7–7)
NEUTROPHILS NFR BLD AUTO: 48.2 % (ref 42.7–76)
NRBC BLD AUTO-RTO: 0 /100 WBC (ref 0–0.2)
PLATELET # BLD AUTO: 266 10*3/MM3 (ref 140–450)
PMV BLD AUTO: 9.3 FL (ref 6–12)
RBC # BLD AUTO: 3.54 10*6/MM3 (ref 4.14–5.8)
TIBC SERPL-MCNC: 334 MCG/DL (ref 298–536)
TRANSFERRIN SERPL-MCNC: 224 MG/DL (ref 200–360)
VIT B12 BLD-MCNC: 712 PG/ML (ref 211–946)
WBC # BLD AUTO: 5.59 10*3/MM3 (ref 3.4–10.8)

## 2021-09-24 PROCEDURE — G9903 PT SCRN TBCO ID AS NON USER: HCPCS | Performed by: INTERNAL MEDICINE

## 2021-09-24 PROCEDURE — 85025 COMPLETE CBC W/AUTO DIFF WBC: CPT

## 2021-09-24 PROCEDURE — 1126F AMNT PAIN NOTED NONE PRSNT: CPT | Performed by: INTERNAL MEDICINE

## 2021-09-24 PROCEDURE — 99214 OFFICE O/P EST MOD 30 MIN: CPT | Performed by: INTERNAL MEDICINE

## 2021-09-24 PROCEDURE — 84466 ASSAY OF TRANSFERRIN: CPT

## 2021-09-24 PROCEDURE — 82607 VITAMIN B-12: CPT

## 2021-09-24 PROCEDURE — 82746 ASSAY OF FOLIC ACID SERUM: CPT

## 2021-09-24 PROCEDURE — G0463 HOSPITAL OUTPT CLINIC VISIT: HCPCS | Performed by: INTERNAL MEDICINE

## 2021-09-24 PROCEDURE — 83540 ASSAY OF IRON: CPT

## 2021-09-24 PROCEDURE — 82728 ASSAY OF FERRITIN: CPT

## 2021-09-24 PROCEDURE — 1157F ADVNC CARE PLAN IN RCRD: CPT | Performed by: INTERNAL MEDICINE

## 2021-09-24 NOTE — PROGRESS NOTES
DATE OF VISIT: 9/24/2021      REASON FOR VISIT: DVT and pulmonary embolism history, anemia, elevated homocystine      HISTORY OF PRESENT ILLNESS:   71-year-old male with medical problems significant for hypertension, dyslipidemia, bilateral pulmonary embolism and DVT diagnosed in February 2019 for which patient was on Coumadin until February 22, 2021, anemia, elevated homocystine is here for follow-up appointment today.  Patient states he was admitted to [facility] in July 2021 with low blood pressure and pneumonia.  Patient was diagnosed with right upper extremity DVT on July 23, 2021 and has been started on Xarelto.  States swelling of right upper extremity is resolved since starting Xarelto.  Denies any bleeding.  Denies any new swelling of lower extremity or any new chest pain or shortness of breath.  Denies any new lymph node enlargement.          Past Medical History, Past Surgical History, Social History, Family History have been reviewed and are without significant changes except as mentioned.    Review of Systems   Constitutional: Positive for fatigue.   Musculoskeletal: Positive for arthralgias.      A comprehensive 14 point review of systems was performed and was negative except as mentioned.    Medications:  The current medication list was reviewed in the EMR    ALLERGIES:    Allergies   Allergen Reactions   • Sulfa Antibiotics Rash   • Sulfamethoxazole-Trimethoprim Swelling   • Clindamycin/Lincomycin Hives   • Codeine Nausea Only       Objective      Vitals:    09/24/21 0754   BP: 176/95   Pulse: 72   Resp: 18   Temp: 97.4 °F (36.3 °C)   SpO2: 98%   Weight: 80.7 kg (178 lb)   PainSc: 0-No pain     Current Status 6/22/2021   ECOG score 0       Physical Exam  Pulmonary:      Breath sounds: Normal breath sounds.   Neurological:      Mental Status: He is alert and oriented to person, place, and time.           RECENT LABS:  Glucose   Date Value Ref Range Status   07/15/2021 114 (H) 65 - 99  mg/dL Final     Sodium   Date Value Ref Range Status   07/15/2021 135 (L) 136 - 145 mmol/L Final     Potassium   Date Value Ref Range Status   07/15/2021 4.1 3.5 - 5.2 mmol/L Final     CO2   Date Value Ref Range Status   07/15/2021 25.0 22.0 - 29.0 mmol/L Final     Chloride   Date Value Ref Range Status   07/15/2021 101 98 - 107 mmol/L Final     Anion Gap   Date Value Ref Range Status   07/15/2021 9.0 5.0 - 15.0 mmol/L Final     Creatinine   Date Value Ref Range Status   07/15/2021 0.77 0.76 - 1.27 mg/dL Final     BUN   Date Value Ref Range Status   07/15/2021 7 (L) 8 - 23 mg/dL Final     BUN/Creatinine Ratio   Date Value Ref Range Status   07/15/2021 9.1 7.0 - 25.0 Final     Calcium   Date Value Ref Range Status   07/15/2021 8.6 8.6 - 10.5 mg/dL Final     eGFR Non  Amer   Date Value Ref Range Status   07/15/2021 100 >60 mL/min/1.73 Final     Alkaline Phosphatase   Date Value Ref Range Status   07/15/2021 83 39 - 117 U/L Final     Total Protein   Date Value Ref Range Status   07/15/2021 5.6 (L) 6.0 - 8.5 g/dL Final     ALT (SGPT)   Date Value Ref Range Status   07/15/2021 27 1 - 41 U/L Final     AST (SGOT)   Date Value Ref Range Status   07/15/2021 24 1 - 40 U/L Final     Total Bilirubin   Date Value Ref Range Status   07/15/2021 0.3 0.0 - 1.2 mg/dL Final     Albumin   Date Value Ref Range Status   07/15/2021 2.90 (L) 3.50 - 5.20 g/dL Final     Globulin   Date Value Ref Range Status   07/15/2021 2.7 gm/dL Final     Lab Results   Component Value Date    WBC 5.59 09/24/2021    HGB 12.1 (L) 09/24/2021    HCT 35.7 (L) 09/24/2021    .8 (H) 09/24/2021     09/24/2021     Lab Results   Component Value Date    NEUTROABS 2.69 09/24/2021    IRON 84 09/24/2021    IRON 108 06/22/2021    IRON 79 02/22/2021    TIBC 334 09/24/2021    TIBC 390 06/22/2021    TIBC 365 02/22/2021    LABIRON 25 09/24/2021    LABIRON 28 06/22/2021    LABIRON 22 02/22/2021    FERRITIN 138.90 09/24/2021    FERRITIN 297.70 06/22/2021     FERRITIN 125.50 02/22/2021    QRREPSZK22 1,183 (H) 06/22/2021    WNZTWAFU71 >2,000 (H) 03/17/2021    HHRDZWDV41 1,106 (H) 02/22/2021    FOLATE 16.90 06/22/2021    FOLATE >20.00 02/22/2021    FOLATE >20.00 11/16/2020     No results found for: , LABCA2, AFPTM, HCGQUANT, , CHROMGRNA, 2OXJS90BCF, CEA, REFLABREPO      PATHOLOGY:  * Cannot find OR log *         RADIOLOGY DATA :  Doppler ultrasound done on right upper extremity and left lower extremity July 23, 2021 showed:    IMPRESSION: Extensive deep venous thrombi right right upper  extremity.     Negative Doppler venous examination left lower extremity.    No radiology results for the last 7 days        Assessment/Plan     1.  Bilateral lower extremity DVT and bilateral pulmonary embolism, new onset of DVT affecting right upper extremity:  -Patient was diagnosed with bilateral lower extremity DVT and bilateral pulmonary embolism in February 2019  -Hypercoagulable work-up done in February 2019 was negative except for homocystine level that was elevated at 16.9.  -CT angiogram done on August 2019 showed resolution of pulmonary embolism.  Doppler ultrasound done in February 12, 2021 showed resolution of DVT  -Patient was on Coumadin between February 2019 until February 22, 2021.  Recommend continue with aspirin 81 mg p.o. daily  -Since patient has been diagnosed with new onset of DVT affecting right upper extremity agree with Xarelto.  -Since patient has a 2nd episode of DVT recommend lifelong anticoagulation with Xarelto.  -Side effect of Xarelto including risk of bleeding and not having readily available antidote was discussed with patient and his wife.  -We will ask patient to return to clinic in 3 months with repeat CBC, CMP, iron studies, ferritin, B12 and folate to be done on that day    2.  Anemia:  -Hemoglobin is 12.1.  Iron level is adequate, no need to start any iron replacement at present.  -Currently on B12 and folic acid 3 times a week      3.   History of peptic ulcer disease    4.  Health maintenance: Patient does not smoke.  Had a colonoscopy in 2018    5. Advance Care Planning   ACP discussion was held with the patient during this visit. Patient has an advance directive in EMR which is still valid.                      PHQ-9 Total Score: 0   -Patient is not homicidal or suicidal.  No acute intervention required.    Jose Zazueta reports a pain score of 0.  Given his pain assessment as noted, treatment options were discussed and the following options were decided upon as a follow-up plan to address the patient's pain: continuation of current treatment plan for pain.         Dickson Vega MD  9/24/2021  08:24 CDT        Part of this note may be an electronic transcription/translation of spoken language to printed text using the Dragon Dictation System.          CC:

## 2021-09-25 PROBLEM — I82.A11 ACUTE DEEP VEIN THROMBOSIS (DVT) OF AXILLARY VEIN OF RIGHT UPPER EXTREMITY: Status: ACTIVE | Noted: 2021-09-25

## 2021-09-25 PROBLEM — I82.A11 ACUTE DEEP VEIN THROMBOSIS (DVT) OF AXILLARY VEIN OF RIGHT UPPER EXTREMITY: Chronic | Status: ACTIVE | Noted: 2021-09-25

## 2021-09-27 ENCOUNTER — TELEPHONE (OUTPATIENT)
Dept: ONCOLOGY | Facility: HOSPITAL | Age: 71
End: 2021-09-27

## 2021-09-27 NOTE — TELEPHONE ENCOUNTER
----- Message from Dickson Vega MD sent at 9/25/2021  9:40 AM CDT -----  Please let patient know, iron level is adequate, no need to start any iron replacement at present.  Recommend continuing with B12 and folic acid 3 times a week.  Thank you

## 2021-10-01 RX ORDER — ATORVASTATIN CALCIUM 40 MG/1
TABLET, FILM COATED ORAL
Qty: 90 TABLET | Refills: 0 | Status: SHIPPED | OUTPATIENT
Start: 2021-10-01 | End: 2021-12-30

## 2021-11-16 ENCOUNTER — OFFICE VISIT (OUTPATIENT)
Dept: CARDIAC SURGERY | Facility: CLINIC | Age: 71
End: 2021-11-16

## 2021-11-16 VITALS
SYSTOLIC BLOOD PRESSURE: 154 MMHG | WEIGHT: 182 LBS | DIASTOLIC BLOOD PRESSURE: 76 MMHG | OXYGEN SATURATION: 99 % | HEART RATE: 67 BPM | BODY MASS INDEX: 26.05 KG/M2 | HEIGHT: 70 IN

## 2021-11-16 DIAGNOSIS — I82.721 CHRONIC DEEP VEIN THROMBOSIS (DVT) OF OTHER VEIN OF RIGHT UPPER EXTREMITY (HCC): ICD-10-CM

## 2021-11-16 DIAGNOSIS — Z79.01 CURRENT USE OF LONG TERM ANTICOAGULATION: Primary | ICD-10-CM

## 2021-11-16 PROBLEM — I82.A11 ACUTE DEEP VEIN THROMBOSIS (DVT) OF AXILLARY VEIN OF RIGHT UPPER EXTREMITY: Chronic | Status: RESOLVED | Noted: 2021-09-25 | Resolved: 2021-11-16

## 2021-11-16 PROCEDURE — 99214 OFFICE O/P EST MOD 30 MIN: CPT | Performed by: NURSE PRACTITIONER

## 2021-11-16 RX ORDER — MELOXICAM 15 MG/1
15 TABLET ORAL DAILY
COMMUNITY
End: 2021-12-06

## 2021-11-16 NOTE — PATIENT INSTRUCTIONS
Stable Chronic RIGHT subclavian DVT  Resolved RIGHT UE thrombus  Anticoagulation: Xarelto  Expected Length of therapy: lifelong  Will evaluate medication cost/coverage  30d samples given today  May switch to Coumadin if patient wishes  Notify office of plan.   Follow up as needed    Notify provider if you experience excessive bleeding from the nose, cuts, gums, rectum, urinary tract, or vagina. Reddish or brown urine or stool. Vomiting of blood or hemorrhoidal bleeding. If major injury occurs present to the Emergency Department.  Return if further Rx assistance needed: Loss of prescriptive coverage, refills, cost of medications. If you should experience any additional leg pain with swelling or sudden onset of shortness of breath notify heart and vascular center immediately for evaluation.
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
## 2021-11-19 RX ORDER — OMEPRAZOLE 40 MG/1
CAPSULE, DELAYED RELEASE ORAL
Qty: 30 CAPSULE | Refills: 9 | Status: SHIPPED | OUTPATIENT
Start: 2021-11-19 | End: 2022-09-26

## 2021-11-19 RX ORDER — SUCRALFATE 1 G/1
TABLET ORAL
Qty: 120 TABLET | Refills: 4 | Status: SHIPPED | OUTPATIENT
Start: 2021-11-19 | End: 2022-05-04

## 2021-12-06 RX ORDER — MELOXICAM 15 MG/1
TABLET ORAL
Qty: 90 TABLET | Refills: 0 | Status: SHIPPED | OUTPATIENT
Start: 2021-12-06 | End: 2022-05-04

## 2021-12-27 ENCOUNTER — APPOINTMENT (OUTPATIENT)
Dept: ONCOLOGY | Facility: HOSPITAL | Age: 71
End: 2021-12-27

## 2021-12-28 ENCOUNTER — OFFICE VISIT (OUTPATIENT)
Dept: ONCOLOGY | Facility: CLINIC | Age: 71
End: 2021-12-28

## 2021-12-28 ENCOUNTER — LAB (OUTPATIENT)
Dept: ONCOLOGY | Facility: HOSPITAL | Age: 71
End: 2021-12-28

## 2021-12-28 VITALS
SYSTOLIC BLOOD PRESSURE: 163 MMHG | DIASTOLIC BLOOD PRESSURE: 85 MMHG | BODY MASS INDEX: 27.13 KG/M2 | OXYGEN SATURATION: 97 % | HEART RATE: 86 BPM | WEIGHT: 189.1 LBS

## 2021-12-28 DIAGNOSIS — D50.9 IRON DEFICIENCY ANEMIA, UNSPECIFIED IRON DEFICIENCY ANEMIA TYPE: Chronic | ICD-10-CM

## 2021-12-28 DIAGNOSIS — I82.721 CHRONIC DEEP VEIN THROMBOSIS (DVT) OF OTHER VEIN OF RIGHT UPPER EXTREMITY (HCC): ICD-10-CM

## 2021-12-28 DIAGNOSIS — R79.89 ELEVATED HOMOCYSTEINE: ICD-10-CM

## 2021-12-28 DIAGNOSIS — D50.9 IRON DEFICIENCY ANEMIA, UNSPECIFIED IRON DEFICIENCY ANEMIA TYPE: ICD-10-CM

## 2021-12-28 DIAGNOSIS — I26.99 PULMONARY EMBOLISM, UNSPECIFIED CHRONICITY, UNSPECIFIED PULMONARY EMBOLISM TYPE, UNSPECIFIED WHETHER ACUTE COR PULMONALE PRESENT: ICD-10-CM

## 2021-12-28 LAB
ALBUMIN SERPL-MCNC: 4.4 G/DL (ref 3.5–5.2)
ALBUMIN/GLOB SERPL: 2.2 G/DL
ALP SERPL-CCNC: 69 U/L (ref 39–117)
ALT SERPL W P-5'-P-CCNC: 17 U/L (ref 1–41)
ANION GAP SERPL CALCULATED.3IONS-SCNC: 11 MMOL/L (ref 5–15)
AST SERPL-CCNC: 24 U/L (ref 1–40)
BASOPHILS # BLD AUTO: 0.05 10*3/MM3 (ref 0–0.2)
BASOPHILS NFR BLD AUTO: 0.7 % (ref 0–1.5)
BILIRUB SERPL-MCNC: 0.4 MG/DL (ref 0–1.2)
BUN SERPL-MCNC: 24 MG/DL (ref 8–23)
BUN/CREAT SERPL: 24 (ref 7–25)
CALCIUM SPEC-SCNC: 9.1 MG/DL (ref 8.6–10.5)
CHLORIDE SERPL-SCNC: 104 MMOL/L (ref 98–107)
CO2 SERPL-SCNC: 25 MMOL/L (ref 22–29)
CREAT SERPL-MCNC: 1 MG/DL (ref 0.76–1.27)
DEPRECATED RDW RBC AUTO: 46.8 FL (ref 37–54)
EOSINOPHIL # BLD AUTO: 0.04 10*3/MM3 (ref 0–0.4)
EOSINOPHIL NFR BLD AUTO: 0.5 % (ref 0.3–6.2)
ERYTHROCYTE [DISTWIDTH] IN BLOOD BY AUTOMATED COUNT: 13 % (ref 12.3–15.4)
FERRITIN SERPL-MCNC: 119.5 NG/ML (ref 30–400)
FOLATE SERPL-MCNC: 18.7 NG/ML (ref 4.78–24.2)
GFR SERPL CREATININE-BSD FRML MDRD: 74 ML/MIN/1.73
GLOBULIN UR ELPH-MCNC: 2 GM/DL
GLUCOSE SERPL-MCNC: 136 MG/DL (ref 65–99)
HCT VFR BLD AUTO: 36.5 % (ref 37.5–51)
HGB BLD-MCNC: 12.8 G/DL (ref 13–17.7)
IMM GRANULOCYTES # BLD AUTO: 0.01 10*3/MM3 (ref 0–0.05)
IMM GRANULOCYTES NFR BLD AUTO: 0.1 % (ref 0–0.5)
IRON 24H UR-MRATE: 108 MCG/DL (ref 59–158)
IRON SATN MFR SERPL: 27 % (ref 20–50)
LYMPHOCYTES # BLD AUTO: 1.54 10*3/MM3 (ref 0.7–3.1)
LYMPHOCYTES NFR BLD AUTO: 20.7 % (ref 19.6–45.3)
MCH RBC QN AUTO: 34.6 PG (ref 26.6–33)
MCHC RBC AUTO-ENTMCNC: 35.1 G/DL (ref 31.5–35.7)
MCV RBC AUTO: 98.6 FL (ref 79–97)
MONOCYTES # BLD AUTO: 0.71 10*3/MM3 (ref 0.1–0.9)
MONOCYTES NFR BLD AUTO: 9.6 % (ref 5–12)
NEUTROPHILS NFR BLD AUTO: 5.08 10*3/MM3 (ref 1.7–7)
NEUTROPHILS NFR BLD AUTO: 68.4 % (ref 42.7–76)
NRBC BLD AUTO-RTO: 0 /100 WBC (ref 0–0.2)
PLATELET # BLD AUTO: 214 10*3/MM3 (ref 140–450)
PMV BLD AUTO: 9.3 FL (ref 6–12)
POTASSIUM SERPL-SCNC: 3.5 MMOL/L (ref 3.5–5.2)
PROT SERPL-MCNC: 6.4 G/DL (ref 6–8.5)
RBC # BLD AUTO: 3.7 10*6/MM3 (ref 4.14–5.8)
SODIUM SERPL-SCNC: 140 MMOL/L (ref 136–145)
TIBC SERPL-MCNC: 401 MCG/DL (ref 298–536)
TRANSFERRIN SERPL-MCNC: 269 MG/DL (ref 200–360)
VIT B12 BLD-MCNC: 612 PG/ML (ref 211–946)
WBC NRBC COR # BLD: 7.43 10*3/MM3 (ref 3.4–10.8)

## 2021-12-28 PROCEDURE — 85025 COMPLETE CBC W/AUTO DIFF WBC: CPT | Performed by: INTERNAL MEDICINE

## 2021-12-28 PROCEDURE — G0463 HOSPITAL OUTPT CLINIC VISIT: HCPCS | Performed by: INTERNAL MEDICINE

## 2021-12-28 PROCEDURE — 84466 ASSAY OF TRANSFERRIN: CPT | Performed by: INTERNAL MEDICINE

## 2021-12-28 PROCEDURE — 82746 ASSAY OF FOLIC ACID SERUM: CPT | Performed by: INTERNAL MEDICINE

## 2021-12-28 PROCEDURE — 80053 COMPREHEN METABOLIC PANEL: CPT | Performed by: INTERNAL MEDICINE

## 2021-12-28 PROCEDURE — 99214 OFFICE O/P EST MOD 30 MIN: CPT | Performed by: INTERNAL MEDICINE

## 2021-12-28 PROCEDURE — 83540 ASSAY OF IRON: CPT | Performed by: INTERNAL MEDICINE

## 2021-12-28 PROCEDURE — 82728 ASSAY OF FERRITIN: CPT | Performed by: INTERNAL MEDICINE

## 2021-12-28 PROCEDURE — 82607 VITAMIN B-12: CPT | Performed by: INTERNAL MEDICINE

## 2021-12-28 PROCEDURE — 1157F ADVNC CARE PLAN IN RCRD: CPT | Performed by: INTERNAL MEDICINE

## 2021-12-28 PROCEDURE — 1126F AMNT PAIN NOTED NONE PRSNT: CPT | Performed by: INTERNAL MEDICINE

## 2021-12-28 NOTE — PROGRESS NOTES
DATE OF VISIT: 12/28/2021      REASON FOR VISIT: DVT and pulmonary embolism on Xarelto, anemia, elevated homocystine      HISTORY OF PRESENT ILLNESS:   71-year-old male with medical problems significant for hypertension, dyslipidemia, bilateral pulmonary embolism and DVT that was initially diagnosed in February 2019 for which patient was on Coumadin until February 22, 2021, anemia, elevated homocysteine, recurrent right upper extremity DVT that was diagnosed on July 23, 2021 for which patient has been on Xarelto since diagnosis is here for follow-up appointment today.  Denies any bleeding.  Complains of arthralgia.  Denies any new swelling of upper or lower extremity.  Denies any chest pain or shortness of breath.  Denies any new lymph node enlargement.          Past Medical History, Past Surgical History, Social History, Family History have been reviewed and are without significant changes except as mentioned.    Review of Systems   A comprehensive 14 point review of systems was performed and was negative except as mentioned in HPI.    Medications:  The current medication list was reviewed in the EMR    ALLERGIES:    Allergies   Allergen Reactions   • Sulfa Antibiotics Rash   • Sulfamethoxazole-Trimethoprim Swelling   • Clindamycin/Lincomycin Hives   • Codeine Nausea Only       Objective      Vitals:    12/28/21 1347   BP: 163/85   Pulse: 86   SpO2: 97%   Weight: 85.8 kg (189 lb 1.6 oz)   PainSc: 0-No pain     Current Status 6/22/2021   ECOG score 0       Physical Exam  Pulmonary:      Breath sounds: Normal breath sounds.   Neurological:      Mental Status: He is alert and oriented to person, place, and time.           RECENT LABS:  Glucose   Date Value Ref Range Status   12/28/2021 136 (H) 65 - 99 mg/dL Final     Sodium   Date Value Ref Range Status   12/28/2021 140 136 - 145 mmol/L Final     Potassium   Date Value Ref Range Status   12/28/2021 3.5 3.5 - 5.2 mmol/L Final     CO2   Date Value Ref Range Status    12/28/2021 25.0 22.0 - 29.0 mmol/L Final     Chloride   Date Value Ref Range Status   12/28/2021 104 98 - 107 mmol/L Final     Anion Gap   Date Value Ref Range Status   12/28/2021 11.0 5.0 - 15.0 mmol/L Final     Creatinine   Date Value Ref Range Status   12/28/2021 1.00 0.76 - 1.27 mg/dL Final     BUN   Date Value Ref Range Status   12/28/2021 24 (H) 8 - 23 mg/dL Final     BUN/Creatinine Ratio   Date Value Ref Range Status   12/28/2021 24.0 7.0 - 25.0 Final     Calcium   Date Value Ref Range Status   12/28/2021 9.1 8.6 - 10.5 mg/dL Final     eGFR Non  Amer   Date Value Ref Range Status   12/28/2021 74 >60 mL/min/1.73 Final     Alkaline Phosphatase   Date Value Ref Range Status   12/28/2021 69 39 - 117 U/L Final     Total Protein   Date Value Ref Range Status   12/28/2021 6.4 6.0 - 8.5 g/dL Final     ALT (SGPT)   Date Value Ref Range Status   12/28/2021 17 1 - 41 U/L Final     AST (SGOT)   Date Value Ref Range Status   12/28/2021 24 1 - 40 U/L Final     Total Bilirubin   Date Value Ref Range Status   12/28/2021 0.4 0.0 - 1.2 mg/dL Final     Albumin   Date Value Ref Range Status   12/28/2021 4.40 3.50 - 5.20 g/dL Final     Globulin   Date Value Ref Range Status   12/28/2021 2.0 gm/dL Final     Lab Results   Component Value Date    WBC 7.43 12/28/2021    HGB 12.8 (L) 12/28/2021    HCT 36.5 (L) 12/28/2021    MCV 98.6 (H) 12/28/2021     12/28/2021     Lab Results   Component Value Date    NEUTROABS 5.08 12/28/2021    IRON 108 12/28/2021    IRON 84 09/24/2021    IRON 108 06/22/2021    TIBC 401 12/28/2021    TIBC 334 09/24/2021    TIBC 390 06/22/2021    LABIRON 27 12/28/2021    LABIRON 25 09/24/2021    LABIRON 28 06/22/2021    FERRITIN 119.50 12/28/2021    FERRITIN 138.90 09/24/2021    FERRITIN 297.70 06/22/2021    SCMOAWHH36 712 09/24/2021    NQCILXMX03 1,183 (H) 06/22/2021    GQRMBGSY31 >2,000 (H) 03/17/2021    FOLATE >20.00 09/24/2021    FOLATE 16.90 06/22/2021    FOLATE >20.00 02/22/2021     No results  found for: , LABCA2, AFPTM, HCGQUANT, , CHROMGRNA, [id number], CEA, REFLABREPO      PATHOLOGY:  * Cannot find OR log *         RADIOLOGY DATA :  No radiology results for the last 7 days        Assessment/Plan     1.  Bilateral lower extremity DVT and pulmonary embolism history, DVT of right upper extremity:  -Patient was initially diagnosed with bilateral lower extremity DVT and bilateral pulmonary embolism in February 2019.  -Hypercoagulable work-up done in February 2019 was negative except for homocystine level that was elevated at 16.9  -CT angiogram done on August 2019 showed resolution of pulmonary embolism Doppler ultrasound done in February 2021 showed resolution of DVT.  -Patient was on Coumadin between February 2019 until February 22, 2021.  -Patient was diagnosed with DVT affecting the right upper extremity in July 2021.  -Due to second episode of DVT patient has been started on Xarelto.  Recommend lifelong anticoagulation with Xarelto due to recurrent nature of DVT.  -We will have patient return to clinic in 3 months with repeat CBC, CMP, iron studies, ferritin, B12 and folate to be done on that day.  -Patient was instructed to come to the emergency room if she starts having any bleeding complication    2.  Anemia:  -Hemoglobin today is 12.8.  -Currently on B12 and folic acid 3 times a week    3.  History of peptic ulcer disease    4.  Health maintenance: Patient does not smoke.  Had a colonoscopy in 2018    5. Advance Care Planning   ACP discussion was held with the patient during this visit. Patient has an advance directive in EMR which is still valid.                  PHQ-9 Total Score: 0   -Patient is not homicidal or suicidal.  No acute intervention required.    Jose Zazueta reports a pain score of 0.  Given his pain assessment as noted, treatment options were discussed and the following options were decided upon as a follow-up plan to address the patient's pain: continuation of  current treatment plan for pain.         Dickson Vega MD  12/28/2021  14:12 CST        Part of this note may be an electronic transcription/translation of spoken language to printed text using the Dragon Dictation System.          CC:

## 2021-12-29 ENCOUNTER — TELEPHONE (OUTPATIENT)
Dept: ONCOLOGY | Facility: HOSPITAL | Age: 71
End: 2021-12-29

## 2021-12-29 NOTE — TELEPHONE ENCOUNTER
----- Message from Dickson Vega MD sent at 12/29/2021  6:44 AM CST -----  Please let patient know, his iron level is adequate.  No need for any iron replacement at present.  Recommend continuing with B12 and folic acid 3 times a week.  Thank you

## 2021-12-29 NOTE — TELEPHONE ENCOUNTER
Contacted pt regarding labs and supplements. PT verbalizes understanding and denies any further questions.

## 2021-12-30 RX ORDER — ATORVASTATIN CALCIUM 40 MG/1
TABLET, FILM COATED ORAL
Qty: 90 TABLET | Refills: 0 | Status: SHIPPED | OUTPATIENT
Start: 2021-12-30 | End: 2022-01-04 | Stop reason: SDUPTHER

## 2022-01-03 ENCOUNTER — LAB (OUTPATIENT)
Dept: LAB | Facility: HOSPITAL | Age: 72
End: 2022-01-03

## 2022-01-03 ENCOUNTER — OFFICE VISIT (OUTPATIENT)
Dept: FAMILY MEDICINE CLINIC | Facility: CLINIC | Age: 72
End: 2022-01-03

## 2022-01-03 VITALS
HEART RATE: 75 BPM | SYSTOLIC BLOOD PRESSURE: 130 MMHG | OXYGEN SATURATION: 97 % | WEIGHT: 187 LBS | DIASTOLIC BLOOD PRESSURE: 88 MMHG | BODY MASS INDEX: 26.77 KG/M2 | HEIGHT: 70 IN

## 2022-01-03 DIAGNOSIS — R53.83 MALAISE AND FATIGUE: ICD-10-CM

## 2022-01-03 DIAGNOSIS — Z12.5 PROSTATE CANCER SCREENING: ICD-10-CM

## 2022-01-03 DIAGNOSIS — R52 PAIN: ICD-10-CM

## 2022-01-03 DIAGNOSIS — R53.81 MALAISE AND FATIGUE: ICD-10-CM

## 2022-01-03 DIAGNOSIS — I10 BENIGN ESSENTIAL HTN: Primary | ICD-10-CM

## 2022-01-03 LAB
CHOLEST SERPL-MCNC: 234 MG/DL (ref 0–200)
HDLC SERPL-MCNC: 140 MG/DL (ref 40–60)
LDLC SERPL CALC-MCNC: 80 MG/DL (ref 0–100)
LDLC/HDLC SERPL: 0.55 {RATIO}
PSA SERPL-MCNC: 3.74 NG/ML (ref 0–4)
TRIGL SERPL-MCNC: 87 MG/DL (ref 0–150)
TSH SERPL DL<=0.05 MIU/L-ACNC: 1.74 UIU/ML (ref 0.27–4.2)
VLDLC SERPL-MCNC: 14 MG/DL (ref 5–40)

## 2022-01-03 PROCEDURE — 36415 COLL VENOUS BLD VENIPUNCTURE: CPT | Performed by: NURSE PRACTITIONER

## 2022-01-03 PROCEDURE — 84443 ASSAY THYROID STIM HORMONE: CPT | Performed by: NURSE PRACTITIONER

## 2022-01-03 PROCEDURE — 80061 LIPID PANEL: CPT | Performed by: NURSE PRACTITIONER

## 2022-01-03 PROCEDURE — G0103 PSA SCREENING: HCPCS | Performed by: NURSE PRACTITIONER

## 2022-01-03 PROCEDURE — 99214 OFFICE O/P EST MOD 30 MIN: CPT | Performed by: NURSE PRACTITIONER

## 2022-01-03 RX ORDER — HYDROCODONE BITARTRATE AND ACETAMINOPHEN 7.5; 325 MG/1; MG/1
1 TABLET ORAL EVERY 6 HOURS PRN
Qty: 12 TABLET | Refills: 0 | Status: SHIPPED | OUTPATIENT
Start: 2022-01-03 | End: 2022-10-06 | Stop reason: SDUPTHER

## 2022-01-03 NOTE — PROGRESS NOTES
Chief Complaint   Patient presents with   • Hypertension     4 month check up      Subjective   Jose Zazueta is a 71 y.o. male.           Presents with recheck of htn     Hypertension  This is a chronic problem. The current episode started more than 1 year ago. The problem has been resolved since onset. The problem is controlled. Associated symptoms include malaise/fatigue and neck pain. Pertinent negatives include no anxiety, chest pain, headaches, palpitations, peripheral edema or shortness of breath. Risk factors for coronary artery disease include dyslipidemia, male gender, sedentary lifestyle and family history. Current antihypertension treatment includes angiotensin blockers, lifestyle changes and diuretics. The current treatment provides significant improvement. Compliance problems include diet.  There is no history of angina, kidney disease, CAD/MI, CVA, heart failure, left ventricular hypertrophy, PVD or retinopathy. There is no history of hypercortisolism, hyperparathyroidism, a hypertension causing med, pheochromocytoma or renovascular disease.   Fatigue  This is a recurrent problem. The problem occurs daily. The problem has been waxing and waning. Associated symptoms include arthralgias, joint swelling, myalgias and neck pain. Pertinent negatives include no abdominal pain, chest pain, chills, congestion, coughing, diaphoresis, fatigue, fever, headaches, nausea, numbness, rash, vertigo, visual change or weakness. He has tried relaxation for the symptoms. The treatment provided mild relief.   Pain  This is a recurrent problem. The current episode started more than 1 year ago. The problem occurs intermittently. The problem has been waxing and waning. Associated symptoms include arthralgias, joint swelling, myalgias and neck pain. Pertinent negatives include no abdominal pain, chest pain, chills, congestion, coughing, diaphoresis, fatigue, fever, headaches, nausea, numbness, rash, vertigo, visual  change or weakness. The symptoms are aggravated by twisting and walking. He has tried acetaminophen and NSAIDs for the symptoms. The treatment provided no relief.        The following portions of the patient's history were reviewed and updated as appropriate: allergies, current medications, past social history and problem list.    Review of Systems   Constitutional: Positive for malaise/fatigue. Negative for activity change, appetite change, chills, diaphoresis, fatigue, fever and unexpected weight change.            HENT: Negative.  Negative for congestion, dental problem, drooling, ear discharge, ear pain, facial swelling, hearing loss, mouth sores, nosebleeds, postnasal drip, rhinorrhea, sinus pressure and sinus pain.    Eyes: Negative.  Negative for photophobia, pain, discharge, redness, itching and visual disturbance.   Respiratory: Negative for apnea, cough, choking, chest tightness, shortness of breath, wheezing and stridor.    Cardiovascular: Negative for chest pain, palpitations and leg swelling.   Gastrointestinal: Negative.  Negative for abdominal distention, abdominal pain, anal bleeding, blood in stool, constipation, diarrhea and nausea.   Endocrine: Negative.  Negative for cold intolerance, polydipsia, polyphagia and polyuria.   Genitourinary: Negative.  Negative for difficulty urinating, dysuria and enuresis.   Musculoskeletal: Positive for arthralgias, back pain, joint swelling, myalgias, neck pain and neck stiffness. Negative for gait problem.   Skin: Negative.  Negative for color change, pallor and rash.   Allergic/Immunologic: Negative.  Negative for environmental allergies, food allergies and immunocompromised state.   Neurological: Negative for dizziness, vertigo, tremors, seizures, syncope, facial asymmetry, speech difficulty, weakness, light-headedness, numbness and headaches.   Hematological: Negative.  Negative for adenopathy. Does not bruise/bleed easily.   Psychiatric/Behavioral: Negative.  " Negative for agitation, behavioral problems, confusion, decreased concentration, dysphoric mood, hallucinations, self-injury and sleep disturbance. The patient is not nervous/anxious and is not hyperactive.        Objective   /88   Pulse 75   Ht 177.8 cm (70\")   Wt 84.8 kg (187 lb)   SpO2 97%   BMI 26.83 kg/m²   Physical Exam  Constitutional:       General: He is not in acute distress.     Appearance: Normal appearance. He is obese. He is not ill-appearing, toxic-appearing or diaphoretic.   HENT:      Head: Normocephalic and atraumatic.      Right Ear: Tympanic membrane normal. There is no impacted cerumen.      Left Ear: Tympanic membrane normal. There is no impacted cerumen.      Nose: Nose normal. No congestion or rhinorrhea.      Mouth/Throat:      Mouth: Mucous membranes are moist.      Pharynx: No oropharyngeal exudate or posterior oropharyngeal erythema.   Eyes:      General: No scleral icterus.        Right eye: No discharge.         Left eye: No discharge.      Pupils: Pupils are equal, round, and reactive to light.   Neck:      Vascular: No carotid bruit.   Cardiovascular:      Rate and Rhythm: Normal rate and regular rhythm.      Pulses: Normal pulses.      Heart sounds: No murmur heard.  No friction rub. No gallop.    Pulmonary:      Effort: Pulmonary effort is normal. No respiratory distress.      Breath sounds: No stridor. No wheezing, rhonchi or rales.   Chest:      Chest wall: No tenderness.   Abdominal:      General: Abdomen is flat. There is no distension.      Palpations: There is no mass.      Tenderness: There is no abdominal tenderness. There is no right CVA tenderness, left CVA tenderness, guarding or rebound.      Hernia: No hernia is present.   Musculoskeletal:         General: Tenderness present. No swelling, deformity or signs of injury.      Cervical back: No swelling, edema, deformity, erythema, signs of trauma, lacerations, rigidity, spasms, torticollis, tenderness, bony " tenderness or crepitus. No pain with movement. Normal range of motion.      Right lower leg: No edema.      Left lower leg: No edema.      Comments: Diffuse joint pain throughout body-age related , no injury    Lymphadenopathy:      Cervical: No cervical adenopathy.   Skin:     General: Skin is warm.      Coloration: Skin is not jaundiced or pale.      Findings: No bruising, erythema, lesion or rash.   Neurological:      General: No focal deficit present.      Mental Status: He is alert and oriented to person, place, and time.      Cranial Nerves: No cranial nerve deficit.      Sensory: No sensory deficit.      Motor: No weakness.      Coordination: Coordination normal.      Gait: Gait normal.      Deep Tendon Reflexes: Reflexes normal.   Psychiatric:         Mood and Affect: Mood normal.         Behavior: Behavior normal.              Assessment/Plan     Problems Addressed this Visit        Cardiac and Vasculature    Benign essential HTN - Primary    Relevant Medications    HYDROcodone-acetaminophen (Norco) 7.5-325 MG per tablet    Other Relevant Orders    Lipid Panel    TSH    PSA Screen      Other Visit Diagnoses     Malaise and fatigue        Relevant Orders    Lipid Panel    TSH    PSA Screen    Prostate cancer screening        Relevant Orders    Lipid Panel    TSH    PSA Screen    Pain        Relevant Medications    HYDROcodone-acetaminophen (Norco) 7.5-325 MG per tablet      Diagnoses       Codes Comments    Benign essential HTN    -  Primary ICD-10-CM: I10  ICD-9-CM: 401.1     Malaise and fatigue     ICD-10-CM: R53.81, R53.83  ICD-9-CM: 780.79     Prostate cancer screening     ICD-10-CM: Z12.5  ICD-9-CM: V76.44     Pain     ICD-10-CM: R52  ICD-9-CM: 780.96            New Medications Ordered This Visit   Medications   • HYDROcodone-acetaminophen (Norco) 7.5-325 MG per tablet     Sig: Take 1 tablet by mouth Every 6 (Six) Hours As Needed for Moderate Pain .     Dispense:  12 tablet     Refill:  0     Current  Outpatient Medications on File Prior to Visit   Medication Sig Dispense Refill   • albuterol sulfate  (90 Base) MCG/ACT inhaler Inhale 2 puffs Every 4 (Four) Hours As Needed for Wheezing. 18 g 4   • atorvastatin (LIPITOR) 40 MG tablet TAKE 1 TABLET BY MOUTH EVERY DAY 90 tablet 0   • cetirizine (zyrTEC) 10 MG tablet Take 10 mg by mouth As Needed.     • fluticasone (FLONASE) 50 MCG/ACT nasal spray 2 sprays into the nostril(s) as directed by provider As Needed for Rhinitis.     • folic acid (FOLVITE) 1 MG tablet Take 1 tablet by mouth on Monday, Wednesday and Friday 36 tablet 1   • isosorbide mononitrate (IMDUR) 30 MG 24 hr tablet Take 30 mg by mouth Daily.     • losartan 50 MG tablet 100 mg, hydroCHLOROthiazide 12.5 MG 12.5 mg Take 1 dose by mouth Daily.     • melatonin 3 MG tablet Take 1 tablet by mouth Every Night. (Patient taking differently: Take 3 mg by mouth As Needed.) 30 tablet 3   • meloxicam (MOBIC) 15 MG tablet TAKE 1 TABLET BY MOUTH EVERY DAY WITH MEALS 90 tablet 0   • methocarbamol (ROBAXIN) 750 MG tablet Take 1 tablet by mouth 3 (Three) Times a Day. (Patient taking differently: Take 750 mg by mouth Daily With Breakfast.) 90 tablet 5   • Omega-3 Fatty Acids (FISH OIL) 1000 MG capsule capsule Take 1,000 mg by mouth Every Night.     • omeprazole (priLOSEC) 40 MG capsule TAKE 1 CAPSULE BY MOUTH EVERY DAY 30 capsule 9   • potassium chloride (MICRO-K) 10 MEQ CR capsule Take 4 capsules by mouth Daily. 30 capsule 3   • rivaroxaban (Xarelto) 20 MG tablet Take 1 tablet by mouth Daily With Dinner. 30 tablet 5   • sucralfate (CARAFATE) 1 g tablet TAKE 1 TABLET BY MOUTH FOUR TIMES DAILY 30 MINUTES BEFORE EACH MEAL AND A 4TH TABLET AT BEDTIME 120 tablet 4   • vitamin B-12 (CYANOCOBALAMIN) 1000 MCG tablet Take 1 tablet by mouth on Monday, Wednesday and Friday 36 tablet 1   • [DISCONTINUED] HYDROcodone-acetaminophen (Norco) 7.5-325 MG per tablet Take 1 tablet by mouth Every 6 (Six) Hours As Needed for Moderate  Pain . 12 tablet 0     No current facility-administered medications on file prior to visit.       20 minutes  Follow Up   Return in about 6 months (around 7/3/2022).     It's not just what you eat, but when you eat  Eat breakfast, and eat smaller meals throughout the day. A healthy breakfast can jumpstart your metabolism, while eating small, healthy meals (rather than the standard three large meals) keeps your energy up.   Avoid eating at night. Try to eat dinner earlier and fast for 14-16 hours until breakfast the next morning. Studies suggest that eating only when you’re most active and giving your digestive system a long break each day may help to regulate weight.     Labs today as directed    suggest annual medicare wellness exam -see back in 6 months as directed    Patient understands the risks associated with this controlled medication, including tolerance and addiction.  he also agrees to only obtain this medication from me, and not from a another provider, unless that provider is covering for me in my absence.  he also agrees to be compliant in dosing, and not self adjust the dose of medication.  A signed controlled substance agreement is on file, and he has received a controlled substance education sheet at this a previous visit.  he has also signed a consent for treatment with a controlled substance as per [facility] policy. ROYAL was obtained.

## 2022-01-04 ENCOUNTER — TELEPHONE (OUTPATIENT)
Dept: FAMILY MEDICINE CLINIC | Facility: CLINIC | Age: 72
End: 2022-01-04

## 2022-01-04 DIAGNOSIS — E78.2 MIXED HYPERLIPIDEMIA: Primary | ICD-10-CM

## 2022-01-04 RX ORDER — ATORVASTATIN CALCIUM 40 MG/1
40 TABLET, FILM COATED ORAL DAILY
Qty: 90 TABLET | Refills: 0 | Status: SHIPPED | OUTPATIENT
Start: 2022-01-04 | End: 2022-04-01

## 2022-01-04 NOTE — TELEPHONE ENCOUNTER
Per DARION Ignacio, Mr. Zazueta has been called with recent lab results & recommendations.  Continue current medications and follow-up as planned or sooner if any problems.       ----- Message from DARION Reyes sent at 1/4/2022 12:20 PM CST -----  Regarding: FW:  Can you let him know that labs look good except cholesterol is a little high. Otherwise good results  ----- Message -----  From: Lab, Background User  Sent: 1/3/2022   7:46 PM CST  To: DARION Reyes

## 2022-01-04 NOTE — PROGRESS NOTES
Per DARION Ignacio, Mr. Zazueta has been called with recent lab results & recommendations.  Continue current medications and follow-up as planned or sooner if any problems.

## 2022-03-25 ENCOUNTER — LAB (OUTPATIENT)
Dept: LAB | Facility: HOSPITAL | Age: 72
End: 2022-03-25

## 2022-03-25 ENCOUNTER — OFFICE VISIT (OUTPATIENT)
Dept: GASTROENTEROLOGY | Facility: CLINIC | Age: 72
End: 2022-03-25

## 2022-03-25 VITALS
WEIGHT: 194.4 LBS | HEART RATE: 88 BPM | BODY MASS INDEX: 27.83 KG/M2 | HEIGHT: 70 IN | DIASTOLIC BLOOD PRESSURE: 108 MMHG | OXYGEN SATURATION: 96 % | SYSTOLIC BLOOD PRESSURE: 192 MMHG

## 2022-03-25 DIAGNOSIS — D50.9 IRON DEFICIENCY ANEMIA, UNSPECIFIED IRON DEFICIENCY ANEMIA TYPE: Primary | ICD-10-CM

## 2022-03-25 DIAGNOSIS — D50.9 IRON DEFICIENCY ANEMIA, UNSPECIFIED IRON DEFICIENCY ANEMIA TYPE: ICD-10-CM

## 2022-03-25 LAB
DEPRECATED RDW RBC AUTO: 44.8 FL (ref 37–54)
ERYTHROCYTE [DISTWIDTH] IN BLOOD BY AUTOMATED COUNT: 12.3 % (ref 12.3–15.4)
HCT VFR BLD AUTO: 42.4 % (ref 37.5–51)
HGB BLD-MCNC: 14.4 G/DL (ref 13–17.7)
MCH RBC QN AUTO: 33.7 PG (ref 26.6–33)
MCHC RBC AUTO-ENTMCNC: 34 G/DL (ref 31.5–35.7)
MCV RBC AUTO: 99.3 FL (ref 79–97)
PLATELET # BLD AUTO: 200 10*3/MM3 (ref 140–450)
PMV BLD AUTO: 10 FL (ref 6–12)
RBC # BLD AUTO: 4.27 10*6/MM3 (ref 4.14–5.8)
WBC NRBC COR # BLD: 5.88 10*3/MM3 (ref 3.4–10.8)

## 2022-03-25 PROCEDURE — 99213 OFFICE O/P EST LOW 20 MIN: CPT | Performed by: INTERNAL MEDICINE

## 2022-03-25 PROCEDURE — 85027 COMPLETE CBC AUTOMATED: CPT

## 2022-03-25 PROCEDURE — 36415 COLL VENOUS BLD VENIPUNCTURE: CPT

## 2022-03-25 RX ORDER — LOSARTAN POTASSIUM AND HYDROCHLOROTHIAZIDE 12.5; 1 MG/1; MG/1
TABLET ORAL
COMMUNITY
Start: 2022-02-25 | End: 2022-03-25
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
## 2022-03-29 ENCOUNTER — OFFICE VISIT (OUTPATIENT)
Dept: ONCOLOGY | Facility: CLINIC | Age: 72
End: 2022-03-29

## 2022-03-29 ENCOUNTER — LAB (OUTPATIENT)
Dept: ONCOLOGY | Facility: HOSPITAL | Age: 72
End: 2022-03-29

## 2022-03-29 VITALS
RESPIRATION RATE: 18 BRPM | OXYGEN SATURATION: 94 % | SYSTOLIC BLOOD PRESSURE: 136 MMHG | HEART RATE: 98 BPM | TEMPERATURE: 97.3 F | DIASTOLIC BLOOD PRESSURE: 75 MMHG | BODY MASS INDEX: 26.83 KG/M2 | WEIGHT: 187 LBS

## 2022-03-29 DIAGNOSIS — N18.4 CHRONIC KIDNEY DISEASE, STAGE 4 (SEVERE): ICD-10-CM

## 2022-03-29 DIAGNOSIS — R79.9 ABNORMAL FINDING OF BLOOD CHEMISTRY, UNSPECIFIED: ICD-10-CM

## 2022-03-29 DIAGNOSIS — D50.9 IRON DEFICIENCY ANEMIA, UNSPECIFIED IRON DEFICIENCY ANEMIA TYPE: ICD-10-CM

## 2022-03-29 DIAGNOSIS — I82.721 CHRONIC DEEP VEIN THROMBOSIS (DVT) OF OTHER VEIN OF RIGHT UPPER EXTREMITY: ICD-10-CM

## 2022-03-29 DIAGNOSIS — I82.721 CHRONIC DEEP VEIN THROMBOSIS (DVT) OF OTHER VEIN OF RIGHT UPPER EXTREMITY: Primary | ICD-10-CM

## 2022-03-29 LAB
ALBUMIN SERPL-MCNC: 4.7 G/DL (ref 3.5–5.2)
ALBUMIN/GLOB SERPL: 2 G/DL
ALP SERPL-CCNC: 70 U/L (ref 39–117)
ALT SERPL W P-5'-P-CCNC: 39 U/L (ref 1–41)
ANION GAP SERPL CALCULATED.3IONS-SCNC: 12 MMOL/L (ref 5–15)
AST SERPL-CCNC: 47 U/L (ref 1–40)
BASOPHILS # BLD AUTO: 0.05 10*3/MM3 (ref 0–0.2)
BASOPHILS NFR BLD AUTO: 0.7 % (ref 0–1.5)
BILIRUB SERPL-MCNC: 0.6 MG/DL (ref 0–1.2)
BUN SERPL-MCNC: 19 MG/DL (ref 8–23)
BUN/CREAT SERPL: 14 (ref 7–25)
CALCIUM SPEC-SCNC: 9 MG/DL (ref 8.6–10.5)
CHLORIDE SERPL-SCNC: 100 MMOL/L (ref 98–107)
CO2 SERPL-SCNC: 22 MMOL/L (ref 22–29)
CREAT SERPL-MCNC: 1.36 MG/DL (ref 0.76–1.27)
DEPRECATED RDW RBC AUTO: 42.6 FL (ref 37–54)
EGFRCR SERPLBLD CKD-EPI 2021: 56 ML/MIN/1.73
EOSINOPHIL # BLD AUTO: 0.03 10*3/MM3 (ref 0–0.4)
EOSINOPHIL NFR BLD AUTO: 0.4 % (ref 0.3–6.2)
ERYTHROCYTE [DISTWIDTH] IN BLOOD BY AUTOMATED COUNT: 12.3 % (ref 12.3–15.4)
FERRITIN SERPL-MCNC: 74.24 NG/ML (ref 30–400)
FOLATE SERPL-MCNC: >20 NG/ML (ref 4.78–24.2)
GLOBULIN UR ELPH-MCNC: 2.4 GM/DL
GLUCOSE SERPL-MCNC: 163 MG/DL (ref 65–99)
HCT VFR BLD AUTO: 40.4 % (ref 37.5–51)
HGB BLD-MCNC: 14 G/DL (ref 13–17.7)
IMM GRANULOCYTES # BLD AUTO: 0.03 10*3/MM3 (ref 0–0.05)
IMM GRANULOCYTES NFR BLD AUTO: 0.4 % (ref 0–0.5)
IRON 24H UR-MRATE: 140 MCG/DL (ref 59–158)
IRON SATN MFR SERPL: 32 % (ref 20–50)
LYMPHOCYTES # BLD AUTO: 1.56 10*3/MM3 (ref 0.7–3.1)
LYMPHOCYTES NFR BLD AUTO: 21.6 % (ref 19.6–45.3)
MCH RBC QN AUTO: 32.8 PG (ref 26.6–33)
MCHC RBC AUTO-ENTMCNC: 34.7 G/DL (ref 31.5–35.7)
MCV RBC AUTO: 94.6 FL (ref 79–97)
MONOCYTES # BLD AUTO: 0.59 10*3/MM3 (ref 0.1–0.9)
MONOCYTES NFR BLD AUTO: 8.2 % (ref 5–12)
NEUTROPHILS NFR BLD AUTO: 4.96 10*3/MM3 (ref 1.7–7)
NEUTROPHILS NFR BLD AUTO: 68.7 % (ref 42.7–76)
NRBC BLD AUTO-RTO: 0 /100 WBC (ref 0–0.2)
PLATELET # BLD AUTO: 191 10*3/MM3 (ref 140–450)
PMV BLD AUTO: 9.2 FL (ref 6–12)
POTASSIUM SERPL-SCNC: 3.9 MMOL/L (ref 3.5–5.2)
PROT SERPL-MCNC: 7.1 G/DL (ref 6–8.5)
RBC # BLD AUTO: 4.27 10*6/MM3 (ref 4.14–5.8)
SODIUM SERPL-SCNC: 134 MMOL/L (ref 136–145)
TIBC SERPL-MCNC: 432 MCG/DL (ref 298–536)
TRANSFERRIN SERPL-MCNC: 290 MG/DL (ref 200–360)
VIT B12 BLD-MCNC: 628 PG/ML (ref 211–946)
WBC NRBC COR # BLD: 7.22 10*3/MM3 (ref 3.4–10.8)

## 2022-03-29 PROCEDURE — G0463 HOSPITAL OUTPT CLINIC VISIT: HCPCS | Performed by: NURSE PRACTITIONER

## 2022-03-29 PROCEDURE — 85025 COMPLETE CBC W/AUTO DIFF WBC: CPT

## 2022-03-29 PROCEDURE — 82607 VITAMIN B-12: CPT

## 2022-03-29 PROCEDURE — 80053 COMPREHEN METABOLIC PANEL: CPT

## 2022-03-29 PROCEDURE — 82746 ASSAY OF FOLIC ACID SERUM: CPT

## 2022-03-29 PROCEDURE — 99214 OFFICE O/P EST MOD 30 MIN: CPT | Performed by: NURSE PRACTITIONER

## 2022-03-29 PROCEDURE — 82728 ASSAY OF FERRITIN: CPT

## 2022-03-29 PROCEDURE — 83540 ASSAY OF IRON: CPT

## 2022-03-29 PROCEDURE — 84466 ASSAY OF TRANSFERRIN: CPT

## 2022-03-30 NOTE — PROGRESS NOTES
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  Denies any bleeding.  Complains of arthralgia.  Denies any new swelling of upper or lower extremity.  Denies any chest pain or shortness of breath.  Denies any new lymph node enlargement.      HPI reviewed and unchanged from previous visit      Past Medical History, Past Surgical History, Social History, Family History have been reviewed and are without significant changes except as mentioned.    Review of Systems   A comprehensive 14 point review of systems was performed and was negative except as mentioned.    Medications:  The current medication list was reviewed in the EMR    ALLERGIES:    Allergies   Allergen Reactions   • Sulfa Antibiotics Rash   • Sulfamethoxazole-Trimethoprim Swelling   • Clindamycin/Lincomycin Hives   • Codeine Nausea Only       Objective      Vitals:    03/29/22 1347   BP: 136/75   Pulse: 98   Resp: 18   Temp: 97.3 °F (36.3 °C)   SpO2: 94%   Weight: 84.8 kg (187 lb)   PainSc: 0-No pain     Current Status 6/22/2021   ECOG score 0       Physical Exam  Pulmonary:      Breath sounds: Normal breath sounds.   Neurological:      Mental Status: He is alert and oriented to person, place, and time.       I have reexamined the patient and the results are consistent with the previously documented exam. DARION Alexander     RECENT LABS:  Glucose   Date Value Ref Range Status   03/29/2022 163 (H) 65 - 99 mg/dL Final     Sodium   Date  Value Ref Range Status   03/29/2022 134 (L) 136 - 145 mmol/L Final     Potassium   Date Value Ref Range Status   03/29/2022 3.9 3.5 - 5.2 mmol/L Final     CO2   Date Value Ref Range Status   03/29/2022 22.0 22.0 - 29.0 mmol/L Final     Chloride   Date Value Ref Range Status   03/29/2022 100 98 - 107 mmol/L Final     Anion Gap   Date Value Ref Range Status   03/29/2022 12.0 5.0 - 15.0 mmol/L Final     Creatinine   Date Value Ref Range Status   03/29/2022 1.36 (H) 0.76 - 1.27 mg/dL Final     BUN   Date Value Ref Range Status   03/29/2022 19 8 - 23 mg/dL Final     BUN/Creatinine Ratio   Date Value Ref Range Status   03/29/2022 14.0 7.0 - 25.0 Final     Calcium   Date Value Ref Range Status   03/29/2022 9.0 8.6 - 10.5 mg/dL Final     eGFR Non  Amer   Date Value Ref Range Status   12/28/2021 74 >60 mL/min/1.73 Final     Alkaline Phosphatase   Date Value Ref Range Status   03/29/2022 70 39 - 117 U/L Final     Total Protein   Date Value Ref Range Status   03/29/2022 7.1 6.0 - 8.5 g/dL Final     ALT (SGPT)   Date Value Ref Range Status   03/29/2022 39 1 - 41 U/L Final     AST (SGOT)   Date Value Ref Range Status   03/29/2022 47 (H) 1 - 40 U/L Final     Total Bilirubin   Date Value Ref Range Status   03/29/2022 0.6 0.0 - 1.2 mg/dL Final     Albumin   Date Value Ref Range Status   03/29/2022 4.70 3.50 - 5.20 g/dL Final     Globulin   Date Value Ref Range Status   03/29/2022 2.4 gm/dL Final     Lab Results   Component Value Date    WBC 7.22 03/29/2022    HGB 14.0 03/29/2022    HCT 40.4 03/29/2022    MCV 94.6 03/29/2022     03/29/2022     Lab Results   Component Value Date    NEUTROABS 4.96 03/29/2022    IRON 140 03/29/2022    IRON 108 12/28/2021    IRON 84 09/24/2021    TIBC 432 03/29/2022    TIBC 401 12/28/2021    TIBC 334 09/24/2021    LABIRON 32 03/29/2022    LABIRON 27 12/28/2021    LABIRON 25 09/24/2021    FERRITIN 74.24 03/29/2022    FERRITIN 119.50 12/28/2021    FERRITIN 138.90 09/24/2021    [id number] 628  03/29/2022    [id number] 612 12/28/2021    [id number] 712 09/24/2021    FOLATE >20.00 03/29/2022    FOLATE 18.70 12/28/2021    FOLATE >20.00 09/24/2021     No results found for: , LABCA2, AFPTM, HCGQUANT, , CHROMGRNA, [id number], CEA, REFLABREPO              RADIOLOGY DATA :  No radiology results for the last 7 days        Assessment/Plan     1.  Bilateral lower extremity DVT and pulmonary embolism history, DVT of right upper extremity:  -Patient was initially diagnosed with bilateral lower extremity DVT and bilateral pulmonary embolism in February 2019.  -Hypercoagulable work-up done in February 2019 was negative except for homocystine level that was elevated at 16.9  -CT angiogram done on August 2019 showed resolution of pulmonary embolism Doppler ultrasound done in February 2021 showed resolution of DVT.  -Patient was on Coumadin between February 2019 until February 22, 2021.  -Patient was diagnosed with DVT affecting the right upper extremity in July 2021.  -Due to second episode of DVT patient has been started on Xarelto.  Recommend lifelong anticoagulation with Xarelto due to recurrent nature of DVT.  -We will have patient return to clinic in 3 months with repeat CBC, CMP, iron studies, ferritin, B12 and folate to be done on that day.  -Patient was instructed to come to the emergency room if she starts having any bleeding complication     2.  Anemia:  -Hemoglobin today is 14.0.  -Currently on B12 and folic acid 3 times a week     3.  History of peptic ulcer disease     4.  Health maintenance: Patient does not smoke.  Had a colonoscopy in 2018               PHQ-9 Total Score: 0     Jose Zazueta reports a pain score of 0.  Given his pain assessment as noted, treatment options were discussed and the following options were decided upon as a follow-up plan to address the patient's pain: no pain tody.         Kavitha Grace, DARION  3/30/2022  12:29 CDT        Part of this note may be an electronic  transcription/translation of spoken language to printed text using the Dragon Dictation System.          CC:

## 2022-04-01 DIAGNOSIS — E78.2 MIXED HYPERLIPIDEMIA: ICD-10-CM

## 2022-04-01 RX ORDER — ATORVASTATIN CALCIUM 40 MG/1
40 TABLET, FILM COATED ORAL DAILY
Qty: 90 TABLET | Refills: 1 | Status: SHIPPED | OUTPATIENT
Start: 2022-04-01 | End: 2022-11-18

## 2022-05-04 RX ORDER — MELOXICAM 15 MG/1
TABLET ORAL
Qty: 90 TABLET | Refills: 0 | Status: SHIPPED | OUTPATIENT
Start: 2022-05-04 | End: 2022-07-06

## 2022-05-04 RX ORDER — SUCRALFATE 1 G/1
TABLET ORAL
Qty: 120 TABLET | Refills: 3 | Status: SHIPPED | OUTPATIENT
Start: 2022-05-04 | End: 2022-09-02

## 2022-05-20 ENCOUNTER — OFFICE VISIT (OUTPATIENT)
Dept: FAMILY MEDICINE CLINIC | Facility: CLINIC | Age: 72
End: 2022-05-20

## 2022-05-20 VITALS
SYSTOLIC BLOOD PRESSURE: 128 MMHG | BODY MASS INDEX: 26.63 KG/M2 | HEART RATE: 82 BPM | HEIGHT: 70 IN | OXYGEN SATURATION: 97 % | DIASTOLIC BLOOD PRESSURE: 82 MMHG | WEIGHT: 186 LBS

## 2022-05-20 DIAGNOSIS — R53.83 MALAISE AND FATIGUE: ICD-10-CM

## 2022-05-20 DIAGNOSIS — R53.81 MALAISE AND FATIGUE: ICD-10-CM

## 2022-05-20 DIAGNOSIS — M25.50 ARTHRALGIA, UNSPECIFIED JOINT: Primary | ICD-10-CM

## 2022-05-20 DIAGNOSIS — I10 PRIMARY HYPERTENSION: ICD-10-CM

## 2022-05-20 PROCEDURE — 99213 OFFICE O/P EST LOW 20 MIN: CPT | Performed by: NURSE PRACTITIONER

## 2022-05-20 RX ORDER — AMLODIPINE BESYLATE 5 MG/1
5 TABLET ORAL DAILY
COMMUNITY
End: 2022-05-20

## 2022-05-20 RX ORDER — AMLODIPINE BESYLATE 2.5 MG/1
2.5 TABLET ORAL DAILY
Qty: 30 TABLET | Refills: 11 | Status: SHIPPED | OUTPATIENT
Start: 2022-05-20 | End: 2022-06-03

## 2022-05-20 RX ORDER — VALSARTAN AND HYDROCHLOROTHIAZIDE 320; 12.5 MG/1; MG/1
1 TABLET, FILM COATED ORAL DAILY
Qty: 30 TABLET | Refills: 11 | Status: SHIPPED | OUTPATIENT
Start: 2022-05-20 | End: 2022-12-08

## 2022-05-20 NOTE — PROGRESS NOTES
Chief Complaint   Patient presents with   • hands swelling     Started 2 months ago     Subjective   Jose Zazueta is a 71 y.o. male.           Hand Pain   There was no injury mechanism. The pain is present in the left hand and right hand. The quality of the pain is described as cramping, shooting and stabbing. The pain radiates to the left hand and right hand. The pain is at a severity of 3/10. The pain is mild. The pain has been worsening since the incident. Associated symptoms include muscle weakness and tingling. Pertinent negatives include no chest pain or numbness. The symptoms are aggravated by movement. He has tried acetaminophen and rest for the symptoms. The treatment provided no relief.   Hypertension  This is a chronic problem. The current episode started more than 1 year ago. The problem has been resolved since onset. The problem is controlled. Associated symptoms include neck pain. Pertinent negatives include no chest pain, headaches, palpitations or shortness of breath. Risk factors for coronary artery disease include dyslipidemia and male gender. Past treatments include calcium channel blockers, angiotensin blockers, diuretics and lifestyle changes. Current antihypertension treatment includes lifestyle changes and calcium channel blockers. The current treatment provides mild improvement. Compliance problems include diet.  There is no history of angina, kidney disease, CAD/MI, CVA, heart failure, left ventricular hypertrophy, PVD or retinopathy. There is no history of chronic renal disease, coarctation of the aorta, hyperaldosteronism, a hypertension causing med, pheochromocytoma or renovascular disease.   Fatigue  This is a recurrent problem. The current episode started more than 1 month ago. The problem occurs intermittently. The problem has been gradually worsening. Associated symptoms include arthralgias, fatigue, joint swelling, myalgias and neck pain. Pertinent negatives include no  abdominal pain, chest pain, chills, congestion, coughing, diaphoresis, fever, headaches, nausea, numbness, rash, sore throat, swollen glands, urinary symptoms, vertigo or weakness. He has tried relaxation and rest for the symptoms. The treatment provided mild relief.        The following portions of the patient's history were reviewed and updated as appropriate: allergies, current medications, past social history and problem list.    Review of Systems   Constitutional: Positive for activity change and fatigue. Negative for appetite change, chills, diaphoresis, fever and unexpected weight change.            HENT: Negative.  Negative for congestion, dental problem, drooling, ear discharge, ear pain, facial swelling, hearing loss, mouth sores, nosebleeds, postnasal drip, rhinorrhea, sinus pressure, sinus pain, sneezing, sore throat, tinnitus, trouble swallowing and voice change.    Eyes: Negative.  Negative for photophobia, pain, discharge, redness, itching and visual disturbance.   Respiratory: Negative.  Negative for apnea, cough, choking, chest tightness, shortness of breath, wheezing and stridor.    Cardiovascular: Positive for leg swelling. Negative for chest pain and palpitations.   Gastrointestinal: Negative.  Negative for abdominal distention, abdominal pain, anal bleeding, blood in stool, constipation, diarrhea, nausea and rectal pain.   Endocrine: Negative.  Negative for cold intolerance, polydipsia, polyphagia and polyuria.   Genitourinary: Negative.  Negative for difficulty urinating, dysuria, enuresis and flank pain.   Musculoskeletal: Positive for arthralgias, back pain, gait problem, joint swelling, myalgias, neck pain and neck stiffness.        Bilateral hand feet swelling -after taking norvasc    Skin: Negative.  Negative for color change, pallor and rash.   Allergic/Immunologic: Negative.  Negative for environmental allergies, food allergies and immunocompromised state.   Neurological: Positive for  "tingling. Negative for dizziness, vertigo, tremors, seizures, syncope, facial asymmetry, speech difficulty, weakness, light-headedness, numbness and headaches.   Hematological: Negative.  Negative for adenopathy. Does not bruise/bleed easily.   Psychiatric/Behavioral: Negative.  Negative for agitation, behavioral problems, confusion, decreased concentration, dysphoric mood, hallucinations, self-injury, sleep disturbance and suicidal ideas. The patient is not nervous/anxious and is not hyperactive.        Objective   /82   Pulse 82   Ht 177.8 cm (70\")   Wt 84.4 kg (186 lb)   SpO2 97%   BMI 26.69 kg/m²   Physical Exam  Constitutional:       General: He is not in acute distress.     Appearance: Normal appearance. He is not ill-appearing, toxic-appearing or diaphoretic.   HENT:      Head: Normocephalic and atraumatic.      Right Ear: Tympanic membrane normal. There is no impacted cerumen.      Left Ear: Tympanic membrane normal. There is no impacted cerumen.      Nose: Nose normal. No congestion or rhinorrhea.      Mouth/Throat:      Mouth: Mucous membranes are moist.      Pharynx: No oropharyngeal exudate or posterior oropharyngeal erythema.   Eyes:      General: No scleral icterus.        Right eye: No discharge.         Left eye: No discharge.      Extraocular Movements: Extraocular movements intact.      Pupils: Pupils are equal, round, and reactive to light.   Neck:      Vascular: No carotid bruit.   Cardiovascular:      Rate and Rhythm: Normal rate and regular rhythm.      Pulses: Normal pulses.      Heart sounds: No murmur heard.    No friction rub. No gallop.   Pulmonary:      Effort: Pulmonary effort is normal. No respiratory distress.      Breath sounds: No stridor. No wheezing, rhonchi or rales.   Chest:      Chest wall: No tenderness.   Abdominal:      General: Abdomen is flat. There is no distension.      Palpations: There is no mass.      Tenderness: There is no abdominal tenderness. There is no " right CVA tenderness, left CVA tenderness, guarding or rebound.      Hernia: No hernia is present.   Musculoskeletal:         General: Tenderness present. No swelling, deformity or signs of injury.      Cervical back: No swelling, edema, deformity, erythema, signs of trauma, lacerations, rigidity, spasms, torticollis, tenderness, bony tenderness or crepitus. No pain with movement. Normal range of motion.      Right lower leg: No edema.      Left lower leg: No edema.      Comments: Diffuse joint pain throughout body-age related , no injury     Hand and feet edema-recent labs reviewed    Lymphadenopathy:      Cervical: No cervical adenopathy.   Skin:     General: Skin is warm.      Coloration: Skin is not jaundiced or pale.      Findings: No bruising, erythema, lesion or rash.   Neurological:      General: No focal deficit present.      Mental Status: He is alert and oriented to person, place, and time.      Cranial Nerves: No cranial nerve deficit.      Sensory: No sensory deficit.      Motor: No weakness.      Coordination: Coordination normal.      Gait: Gait normal.      Deep Tendon Reflexes: Reflexes normal.   Psychiatric:         Mood and Affect: Mood normal.         Behavior: Behavior normal.              Assessment & Plan     Problems Addressed this Visit    None     Visit Diagnoses     Arthralgia, unspecified joint    -  Primary    Relevant Orders    Uric acid    ABO/Rh    Primary hypertension        Relevant Medications    valsartan-hydrochlorothiazide (Diovan HCT) 320-12.5 MG per tablet    amLODIPine (Norvasc) 2.5 MG tablet    Other Relevant Orders    ABO/Rh    Malaise and fatigue          Diagnoses       Codes Comments    Arthralgia, unspecified joint    -  Primary ICD-10-CM: M25.50  ICD-9-CM: 719.40     Primary hypertension     ICD-10-CM: I10  ICD-9-CM: 401.9     Malaise and fatigue     ICD-10-CM: R53.81, R53.83  ICD-9-CM: 780.79            New Medications Ordered This Visit   Medications   •  valsartan-hydrochlorothiazide (Diovan HCT) 320-12.5 MG per tablet     Sig: Take 1 tablet by mouth Daily.     Dispense:  30 tablet     Refill:  11   • amLODIPine (Norvasc) 2.5 MG tablet     Sig: Take 1 tablet by mouth Daily. Start taking if bp is 150/90 or above -low dose     Dispense:  30 tablet     Refill:  11     Current Outpatient Medications on File Prior to Visit   Medication Sig Dispense Refill   • atorvastatin (LIPITOR) 40 MG tablet TAKE 1 TABLET BY MOUTH DAILY 90 tablet 1   • cetirizine (zyrTEC) 10 MG tablet Take 10 mg by mouth As Needed.     • fluticasone (FLONASE) 50 MCG/ACT nasal spray 2 sprays into the nostril(s) as directed by provider As Needed for Rhinitis.     • folic acid (FOLVITE) 1 MG tablet Take 1 tablet by mouth on Monday, Wednesday and Friday 36 tablet 1   • HYDROcodone-acetaminophen (Norco) 7.5-325 MG per tablet Take 1 tablet by mouth Every 6 (Six) Hours As Needed for Moderate Pain . 12 tablet 0   • isosorbide mononitrate (IMDUR) 30 MG 24 hr tablet Take 30 mg by mouth Daily.     • losartan 50 MG tablet 100 mg, hydroCHLOROthiazide 12.5 MG 12.5 mg Take 1 dose by mouth Daily.     • meloxicam (MOBIC) 15 MG tablet TAKE 1 TABLET BY MOUTH EVERY DAY WITH MEALS 90 tablet 0   • omeprazole (priLOSEC) 40 MG capsule TAKE 1 CAPSULE BY MOUTH EVERY DAY 30 capsule 9   • rivaroxaban (Xarelto) 20 MG tablet Take 1 tablet by mouth Daily With Dinner. 30 tablet 5   • sucralfate (CARAFATE) 1 g tablet TAKE 1 TABLET BY MOUTH FOUR TIMES DAILY 30 MINUTES BEFORE EACH MEAL AND A 4TH TABLET AT BEDTIME 120 tablet 3   • vitamin B-12 (CYANOCOBALAMIN) 1000 MCG tablet Take 1 tablet by mouth on Monday, Wednesday and Friday 36 tablet 1   • [DISCONTINUED] amLODIPine (NORVASC) 5 MG tablet Take 5 mg by mouth Daily.     • [DISCONTINUED] methocarbamol (ROBAXIN) 750 MG tablet Take 1 tablet by mouth 3 (Three) Times a Day. (Patient taking differently: Take 750 mg by mouth. Monday, Wednesday, And Friday) 90 tablet 5   • [DISCONTINUED]  albuterol sulfate  (90 Base) MCG/ACT inhaler Inhale 2 puffs Every 4 (Four) Hours As Needed for Wheezing. 18 g 4   • [DISCONTINUED] melatonin 3 MG tablet Take 1 tablet by mouth Every Night. (Patient taking differently: Take 3 mg by mouth As Needed.) 30 tablet 3   • [DISCONTINUED] Omega-3 Fatty Acids (FISH OIL) 1000 MG capsule capsule Take 1,000 mg by mouth Every Night.     • [DISCONTINUED] potassium chloride (MICRO-K) 10 MEQ CR capsule Take 4 capsules by mouth Daily. 30 capsule 3     No current facility-administered medications on file prior to visit.       20 minutes   Follow Up   Return in about 2 weeks (around 6/3/2022).     Recently seen by cardio in march started on norvasc -started swelling after taking it -reports feeling bad after taking meds        Stop norvasc 5mg losartan /hctz and   start diovan 320/12.5   And start norvasc .2.5mg is 150/90 or above   See back in 2 weeks -monitor blood pressure and increase water, no fast foods -decrease salt in diet     Keep record of bp and recheck in 2 weeks as directed

## 2022-06-03 ENCOUNTER — OFFICE VISIT (OUTPATIENT)
Dept: FAMILY MEDICINE CLINIC | Facility: CLINIC | Age: 72
End: 2022-06-03

## 2022-06-03 VITALS
OXYGEN SATURATION: 98 % | BODY MASS INDEX: 26.2 KG/M2 | HEIGHT: 70 IN | SYSTOLIC BLOOD PRESSURE: 120 MMHG | DIASTOLIC BLOOD PRESSURE: 80 MMHG | HEART RATE: 88 BPM | WEIGHT: 183 LBS

## 2022-06-03 DIAGNOSIS — R53.83 MALAISE AND FATIGUE: ICD-10-CM

## 2022-06-03 DIAGNOSIS — M79.642 PAIN IN BOTH HANDS: ICD-10-CM

## 2022-06-03 DIAGNOSIS — R53.81 MALAISE AND FATIGUE: ICD-10-CM

## 2022-06-03 DIAGNOSIS — M79.641 PAIN IN BOTH HANDS: ICD-10-CM

## 2022-06-03 DIAGNOSIS — I10 PRIMARY HYPERTENSION: Primary | ICD-10-CM

## 2022-06-03 PROCEDURE — 99213 OFFICE O/P EST LOW 20 MIN: CPT | Performed by: NURSE PRACTITIONER

## 2022-06-03 NOTE — PROGRESS NOTES
Chief Complaint   Patient presents with   • Hypertension     2 week follow up      Subjective   Jose Zazueta is a 71 y.o. male.           Presents with recheck of hand swelling and htn, reports bp improved, feels better -hand pain still present     Hand Pain   There was no injury mechanism. The pain is present in the left hand and right hand. The quality of the pain is described as cramping, shooting and stabbing. The pain radiates to the left hand and right hand. The pain is at a severity of 3/10. The pain is mild. The pain has been constant since the incident. Associated symptoms include muscle weakness and tingling. Pertinent negatives include no chest pain or numbness. The symptoms are aggravated by movement. He has tried acetaminophen, rest, heat, ice and immobilization for the symptoms. The treatment provided no relief.   Hypertension  This is a chronic problem. The current episode started more than 1 year ago. The problem has been resolved since onset. The problem is controlled. Associated symptoms include malaise/fatigue and neck pain. Pertinent negatives include no chest pain, headaches, palpitations or shortness of breath. Risk factors for coronary artery disease include dyslipidemia and male gender. Past treatments include calcium channel blockers, angiotensin blockers, diuretics and lifestyle changes. Current antihypertension treatment includes lifestyle changes, angiotensin blockers and diuretics. The current treatment provides significant improvement. Compliance problems include diet.  There is no history of angina, kidney disease, CAD/MI, CVA, heart failure, left ventricular hypertrophy, PVD or retinopathy. There is no history of chronic renal disease, coarctation of the aorta, hyperaldosteronism, a hypertension causing med, pheochromocytoma or renovascular disease.   Fatigue  This is a recurrent problem. The current episode started more than 1 month ago. The problem occurs intermittently.  The problem has been waxing and waning. Associated symptoms include arthralgias, congestion, fatigue, joint swelling, myalgias and neck pain. Pertinent negatives include no abdominal pain, chest pain, chills, coughing, diaphoresis, fever, headaches, nausea, numbness, rash, sore throat, swollen glands, urinary symptoms, vertigo or weakness. He has tried relaxation and rest for the symptoms. The treatment provided mild relief.        The following portions of the patient's history were reviewed and updated as appropriate: allergies, current medications, past social history and problem list.    Review of Systems   Constitutional: Positive for activity change, fatigue and malaise/fatigue. Negative for appetite change, chills, diaphoresis, fever and unexpected weight change.            HENT: Positive for congestion and ear pain. Negative for dental problem, drooling, ear discharge, facial swelling, hearing loss, mouth sores, nosebleeds, postnasal drip, rhinorrhea, sinus pressure, sinus pain, sneezing, sore throat, tinnitus, trouble swallowing and voice change.    Eyes: Negative.  Negative for photophobia, pain, discharge, redness, itching and visual disturbance.   Respiratory: Negative.  Negative for apnea, cough, choking, chest tightness, shortness of breath, wheezing and stridor.    Cardiovascular: Negative for chest pain, palpitations and leg swelling.   Gastrointestinal: Negative.  Negative for abdominal distention, abdominal pain, anal bleeding, blood in stool, constipation, diarrhea, nausea and rectal pain.   Endocrine: Negative.  Negative for cold intolerance, polydipsia, polyphagia and polyuria.   Genitourinary: Negative.  Negative for difficulty urinating, dysuria, enuresis, flank pain, frequency and genital sores.   Musculoskeletal: Positive for arthralgias, back pain, gait problem, joint swelling, myalgias, neck pain and neck stiffness.        Bilateral hand feet swelling -after taking norvasc -improved since  "stopping, bp improved     Pain still present with movement    Skin: Negative.  Negative for color change, pallor and rash.   Allergic/Immunologic: Negative.  Negative for environmental allergies, food allergies and immunocompromised state.   Neurological: Positive for tingling. Negative for dizziness, vertigo, tremors, seizures, syncope, facial asymmetry, speech difficulty, weakness, light-headedness, numbness and headaches.   Hematological: Negative.  Negative for adenopathy. Does not bruise/bleed easily.   Psychiatric/Behavioral: Negative.  Negative for agitation, behavioral problems, confusion, decreased concentration, dysphoric mood, hallucinations, self-injury, sleep disturbance and suicidal ideas. The patient is not nervous/anxious and is not hyperactive.        Objective   /80   Pulse 88   Ht 177.8 cm (70\")   Wt 83 kg (183 lb)   SpO2 98%   BMI 26.26 kg/m²   Physical Exam  Constitutional:       General: He is not in acute distress.     Appearance: Normal appearance. He is not ill-appearing, toxic-appearing or diaphoretic.   HENT:      Head: Normocephalic and atraumatic.      Right Ear: Tympanic membrane normal. There is impacted cerumen.      Left Ear: Tympanic membrane normal. There is impacted cerumen.      Ears:      Comments: Wax removed from bilateral ear canals    Used water and h202 to flush canal-CURRETTE TO REMOVE LARGE AMOUNT OF WAX IN CANAL- instructed on POST care afterwards-NO NOT USE QTIPS       Nose: Nose normal. No congestion or rhinorrhea.      Mouth/Throat:      Mouth: Mucous membranes are moist.      Pharynx: No oropharyngeal exudate or posterior oropharyngeal erythema.   Eyes:      General: No scleral icterus.        Right eye: No discharge.         Left eye: No discharge.      Extraocular Movements: Extraocular movements intact.      Pupils: Pupils are equal, round, and reactive to light.   Neck:      Vascular: No carotid bruit.   Cardiovascular:      Rate and Rhythm: Normal " rate and regular rhythm.      Pulses: Normal pulses.      Heart sounds: No murmur heard.    No friction rub. No gallop.   Pulmonary:      Effort: Pulmonary effort is normal. No respiratory distress.      Breath sounds: No stridor. No wheezing, rhonchi or rales.   Chest:      Chest wall: No tenderness.   Abdominal:      General: Abdomen is flat. There is no distension.      Palpations: There is no mass.      Tenderness: There is no abdominal tenderness. There is no right CVA tenderness, left CVA tenderness, guarding or rebound.      Hernia: No hernia is present.   Musculoskeletal:         General: Tenderness present. No swelling, deformity or signs of injury.      Cervical back: No swelling, edema, deformity, erythema, signs of trauma, lacerations, rigidity, spasms, torticollis, tenderness, bony tenderness or crepitus. No pain with movement. Normal range of motion.      Right lower leg: No edema.      Left lower leg: No edema.      Comments: Diffuse joint pain throughout body-age related , no injury        Lymphadenopathy:      Cervical: No cervical adenopathy.   Skin:     General: Skin is warm.      Coloration: Skin is not jaundiced or pale.      Findings: No bruising, erythema, lesion or rash.   Neurological:      General: No focal deficit present.      Mental Status: He is alert and oriented to person, place, and time.      Cranial Nerves: No cranial nerve deficit.      Sensory: No sensory deficit.      Motor: No weakness.      Coordination: Coordination normal.      Gait: Gait normal.      Deep Tendon Reflexes: Reflexes normal.   Psychiatric:         Mood and Affect: Mood normal.         Behavior: Behavior normal.              Assessment & Plan     Problems Addressed this Visit    None     Visit Diagnoses     Primary hypertension    -  Primary    Pain in both hands        Malaise and fatigue          Diagnoses       Codes Comments    Primary hypertension    -  Primary ICD-10-CM: I10  ICD-9-CM: 401.9     Pain in  both hands     ICD-10-CM: M79.641, M79.642  ICD-9-CM: 729.5     Malaise and fatigue     ICD-10-CM: R53.81, R53.83  ICD-9-CM: 780.79            New Medications Ordered This Visit   Medications   • neomycin-polymyxin-hydrocortisone (CORTISPORIN) 3.5-26651-6 otic solution     Sig: Administer 4 drops into both ears 2 (Two) Times a Day.     Dispense:  10 mL     Refill:  1     Current Outpatient Medications on File Prior to Visit   Medication Sig Dispense Refill   • atorvastatin (LIPITOR) 40 MG tablet TAKE 1 TABLET BY MOUTH DAILY 90 tablet 1   • cetirizine (zyrTEC) 10 MG tablet Take 10 mg by mouth As Needed.     • fluticasone (FLONASE) 50 MCG/ACT nasal spray 2 sprays into the nostril(s) as directed by provider As Needed for Rhinitis.     • folic acid (FOLVITE) 1 MG tablet Take 1 tablet by mouth on Monday, Wednesday and Friday 36 tablet 1   • HYDROcodone-acetaminophen (Norco) 7.5-325 MG per tablet Take 1 tablet by mouth Every 6 (Six) Hours As Needed for Moderate Pain . 12 tablet 0   • isosorbide mononitrate (IMDUR) 30 MG 24 hr tablet Take 30 mg by mouth Daily.     • losartan 50 MG tablet 100 mg, hydroCHLOROthiazide 12.5 MG 12.5 mg Take 1 dose by mouth Daily.     • meloxicam (MOBIC) 15 MG tablet TAKE 1 TABLET BY MOUTH EVERY DAY WITH MEALS 90 tablet 0   • omeprazole (priLOSEC) 40 MG capsule TAKE 1 CAPSULE BY MOUTH EVERY DAY 30 capsule 9   • rivaroxaban (Xarelto) 20 MG tablet Take 1 tablet by mouth Daily With Dinner. 30 tablet 5   • sucralfate (CARAFATE) 1 g tablet TAKE 1 TABLET BY MOUTH FOUR TIMES DAILY 30 MINUTES BEFORE EACH MEAL AND A 4TH TABLET AT BEDTIME 120 tablet 3   • valsartan-hydrochlorothiazide (Diovan HCT) 320-12.5 MG per tablet Take 1 tablet by mouth Daily. 30 tablet 11   • vitamin B-12 (CYANOCOBALAMIN) 1000 MCG tablet Take 1 tablet by mouth on Monday, Wednesday and Friday 36 tablet 1   • [DISCONTINUED] amLODIPine (Norvasc) 2.5 MG tablet Take 1 tablet by mouth Daily. Start taking if bp is 150/90 or above -low  dose 30 tablet 11     No current facility-administered medications on file prior to visit.       15 minutes   Follow Up   Return in about 6 months (around 12/3/2022) for Next scheduled follow up.     It's not just what you eat, but when you eat  Eat breakfast, and eat smaller meals throughout the day. A healthy breakfast can jumpstart your metabolism, while eating small, healthy meals (rather than the standard three large meals) keeps your energy up.   Avoid eating at night. Try to eat dinner earlier and fast for 14-16 hours until breakfast the next morning. Studies suggest that eating only when you’re most active and giving your digestive system a long break each day may help to regulate weight.     meds as directed -kenalog as directed   BP controlled, no changes for now   Continue to monitor bp at home-ranging normal per record     Diet discussed         meds as directed -continue meds as directed, diet discussed, follow up as directed -see back in 6months sooner if needed

## 2022-06-14 DIAGNOSIS — Z79.01 CURRENT USE OF LONG TERM ANTICOAGULATION: ICD-10-CM

## 2022-06-22 ENCOUNTER — APPOINTMENT (OUTPATIENT)
Dept: CT IMAGING | Facility: HOSPITAL | Age: 72
End: 2022-06-22

## 2022-06-22 ENCOUNTER — HOSPITAL ENCOUNTER (EMERGENCY)
Facility: HOSPITAL | Age: 72
Discharge: HOME | End: 2022-06-22
Attending: FAMILY MEDICINE | Admitting: FAMILY MEDICINE

## 2022-06-22 VITALS
DIASTOLIC BLOOD PRESSURE: 85 MMHG | OXYGEN SATURATION: 98 % | HEART RATE: 76 BPM | BODY MASS INDEX: 25.77 KG/M2 | SYSTOLIC BLOOD PRESSURE: 146 MMHG | HEIGHT: 70 IN | RESPIRATION RATE: 16 BRPM | WEIGHT: 180 LBS | TEMPERATURE: 97.3 F

## 2022-06-22 DIAGNOSIS — S00.12XA PERIORBITAL ECCHYMOSIS OF LEFT EYE, INITIAL ENCOUNTER: ICD-10-CM

## 2022-06-22 DIAGNOSIS — S00.03XA CONTUSION OF SCALP, INITIAL ENCOUNTER: Primary | ICD-10-CM

## 2022-06-22 LAB
ALBUMIN SERPL-MCNC: 4.3 G/DL (ref 3.5–5.2)
ALBUMIN/GLOB SERPL: 1.8 G/DL
ALP SERPL-CCNC: 67 U/L (ref 39–117)
ALT SERPL W P-5'-P-CCNC: 59 U/L (ref 1–41)
ANION GAP SERPL CALCULATED.3IONS-SCNC: 16 MMOL/L (ref 5–15)
AST SERPL-CCNC: 64 U/L (ref 1–40)
BASOPHILS # BLD AUTO: 0.04 10*3/MM3 (ref 0–0.2)
BASOPHILS NFR BLD AUTO: 0.6 % (ref 0–1.5)
BILIRUB SERPL-MCNC: 0.6 MG/DL (ref 0–1.2)
BUN SERPL-MCNC: 19 MG/DL (ref 8–23)
BUN/CREAT SERPL: 17.1 (ref 7–25)
CALCIUM SPEC-SCNC: 8.9 MG/DL (ref 8.6–10.5)
CHLORIDE SERPL-SCNC: 102 MMOL/L (ref 98–107)
CO2 SERPL-SCNC: 21 MMOL/L (ref 22–29)
CREAT SERPL-MCNC: 1.11 MG/DL (ref 0.76–1.27)
DEPRECATED RDW RBC AUTO: 49.6 FL (ref 37–54)
EGFRCR SERPLBLD CKD-EPI 2021: 71.4 ML/MIN/1.73
EOSINOPHIL # BLD AUTO: 0.09 10*3/MM3 (ref 0–0.4)
EOSINOPHIL NFR BLD AUTO: 1.4 % (ref 0.3–6.2)
ERYTHROCYTE [DISTWIDTH] IN BLOOD BY AUTOMATED COUNT: 14 % (ref 12.3–15.4)
GLOBULIN UR ELPH-MCNC: 2.4 GM/DL
GLUCOSE SERPL-MCNC: 93 MG/DL (ref 65–99)
HCT VFR BLD AUTO: 35.7 % (ref 37.5–51)
HGB BLD-MCNC: 12.4 G/DL (ref 13–17.7)
HOLD SPECIMEN: NORMAL
HOLD SPECIMEN: NORMAL
IMM GRANULOCYTES # BLD AUTO: 0.02 10*3/MM3 (ref 0–0.05)
IMM GRANULOCYTES NFR BLD AUTO: 0.3 % (ref 0–0.5)
LYMPHOCYTES # BLD AUTO: 1.66 10*3/MM3 (ref 0.7–3.1)
LYMPHOCYTES NFR BLD AUTO: 25.9 % (ref 19.6–45.3)
MCH RBC QN AUTO: 33.4 PG (ref 26.6–33)
MCHC RBC AUTO-ENTMCNC: 34.7 G/DL (ref 31.5–35.7)
MCV RBC AUTO: 96.2 FL (ref 79–97)
MONOCYTES # BLD AUTO: 0.66 10*3/MM3 (ref 0.1–0.9)
MONOCYTES NFR BLD AUTO: 10.3 % (ref 5–12)
NEUTROPHILS NFR BLD AUTO: 3.95 10*3/MM3 (ref 1.7–7)
NEUTROPHILS NFR BLD AUTO: 61.5 % (ref 42.7–76)
NRBC BLD AUTO-RTO: 0 /100 WBC (ref 0–0.2)
PLATELET # BLD AUTO: 179 10*3/MM3 (ref 140–450)
PMV BLD AUTO: 9.3 FL (ref 6–12)
POTASSIUM SERPL-SCNC: 3.7 MMOL/L (ref 3.5–5.2)
PROT SERPL-MCNC: 6.7 G/DL (ref 6–8.5)
RBC # BLD AUTO: 3.71 10*6/MM3 (ref 4.14–5.8)
SODIUM SERPL-SCNC: 139 MMOL/L (ref 136–145)
WBC NRBC COR # BLD: 6.42 10*3/MM3 (ref 3.4–10.8)
WHOLE BLOOD HOLD SPECIMEN: NORMAL

## 2022-06-22 PROCEDURE — 70450 CT HEAD/BRAIN W/O DYE: CPT

## 2022-06-22 PROCEDURE — 99283 EMERGENCY DEPT VISIT LOW MDM: CPT

## 2022-06-22 PROCEDURE — 72125 CT NECK SPINE W/O DYE: CPT

## 2022-06-22 PROCEDURE — 85025 COMPLETE CBC W/AUTO DIFF WBC: CPT | Performed by: FAMILY MEDICINE

## 2022-06-22 PROCEDURE — 80053 COMPREHEN METABOLIC PANEL: CPT | Performed by: FAMILY MEDICINE

## 2022-06-22 RX ORDER — SODIUM CHLORIDE 0.9 % (FLUSH) 0.9 %
10 SYRINGE (ML) INJECTION AS NEEDED
Status: DISCONTINUED | OUTPATIENT
Start: 2022-06-22 | End: 2022-06-22 | Stop reason: HOSPADM
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
## 2022-06-24 ENCOUNTER — OFFICE VISIT (OUTPATIENT)
Dept: ONCOLOGY | Facility: CLINIC | Age: 72
End: 2022-06-24

## 2022-06-24 ENCOUNTER — LAB (OUTPATIENT)
Dept: ONCOLOGY | Facility: HOSPITAL | Age: 72
End: 2022-06-24

## 2022-06-24 ENCOUNTER — TELEPHONE (OUTPATIENT)
Dept: FAMILY MEDICINE CLINIC | Facility: CLINIC | Age: 72
End: 2022-06-24

## 2022-06-24 VITALS
DIASTOLIC BLOOD PRESSURE: 92 MMHG | TEMPERATURE: 97.2 F | WEIGHT: 178.9 LBS | SYSTOLIC BLOOD PRESSURE: 148 MMHG | BODY MASS INDEX: 25.67 KG/M2 | HEART RATE: 80 BPM | OXYGEN SATURATION: 98 %

## 2022-06-24 DIAGNOSIS — N18.4 CHRONIC KIDNEY DISEASE, STAGE 4 (SEVERE): ICD-10-CM

## 2022-06-24 DIAGNOSIS — R79.9 ABNORMAL FINDING OF BLOOD CHEMISTRY, UNSPECIFIED: ICD-10-CM

## 2022-06-24 DIAGNOSIS — I82.721 CHRONIC DEEP VEIN THROMBOSIS (DVT) OF OTHER VEIN OF RIGHT UPPER EXTREMITY: ICD-10-CM

## 2022-06-24 DIAGNOSIS — I26.99 PULMONARY EMBOLISM, UNSPECIFIED CHRONICITY, UNSPECIFIED PULMONARY EMBOLISM TYPE, UNSPECIFIED WHETHER ACUTE COR PULMONALE PRESENT: Primary | Chronic | ICD-10-CM

## 2022-06-24 DIAGNOSIS — D50.9 IRON DEFICIENCY ANEMIA, UNSPECIFIED IRON DEFICIENCY ANEMIA TYPE: Chronic | ICD-10-CM

## 2022-06-24 LAB
ABO GROUP BLD: NORMAL
ALBUMIN SERPL-MCNC: 4.3 G/DL (ref 3.5–5.2)
ALBUMIN/GLOB SERPL: 1.6 G/DL
ALP SERPL-CCNC: 67 U/L (ref 39–117)
ALT SERPL W P-5'-P-CCNC: 47 U/L (ref 1–41)
ANION GAP SERPL CALCULATED.3IONS-SCNC: 15 MMOL/L (ref 5–15)
AST SERPL-CCNC: 36 U/L (ref 1–40)
BASOPHILS # BLD AUTO: 0.05 10*3/MM3 (ref 0–0.2)
BASOPHILS NFR BLD AUTO: 0.9 % (ref 0–1.5)
BILIRUB SERPL-MCNC: 0.5 MG/DL (ref 0–1.2)
BUN SERPL-MCNC: 21 MG/DL (ref 8–23)
BUN/CREAT SERPL: 16.5 (ref 7–25)
CALCIUM SPEC-SCNC: 9.3 MG/DL (ref 8.6–10.5)
CHLORIDE SERPL-SCNC: 102 MMOL/L (ref 98–107)
CO2 SERPL-SCNC: 23 MMOL/L (ref 22–29)
CREAT SERPL-MCNC: 1.27 MG/DL (ref 0.76–1.27)
DEPRECATED RDW RBC AUTO: 47.9 FL (ref 37–54)
EGFRCR SERPLBLD CKD-EPI 2021: 60.8 ML/MIN/1.73
EOSINOPHIL # BLD AUTO: 0.08 10*3/MM3 (ref 0–0.4)
EOSINOPHIL NFR BLD AUTO: 1.4 % (ref 0.3–6.2)
ERYTHROCYTE [DISTWIDTH] IN BLOOD BY AUTOMATED COUNT: 13.7 % (ref 12.3–15.4)
FERRITIN SERPL-MCNC: 339.3 NG/ML (ref 30–400)
FOLATE SERPL-MCNC: >20 NG/ML (ref 4.78–24.2)
GLOBULIN UR ELPH-MCNC: 2.7 GM/DL
GLUCOSE SERPL-MCNC: 98 MG/DL (ref 65–99)
HCT VFR BLD AUTO: 36.4 % (ref 37.5–51)
HGB BLD-MCNC: 13.1 G/DL (ref 13–17.7)
IMM GRANULOCYTES # BLD AUTO: 0.03 10*3/MM3 (ref 0–0.05)
IMM GRANULOCYTES NFR BLD AUTO: 0.5 % (ref 0–0.5)
IRON 24H UR-MRATE: 104 MCG/DL (ref 59–158)
IRON SATN MFR SERPL: 27 % (ref 20–50)
LYMPHOCYTES # BLD AUTO: 1.66 10*3/MM3 (ref 0.7–3.1)
LYMPHOCYTES NFR BLD AUTO: 28.5 % (ref 19.6–45.3)
MCH RBC QN AUTO: 34.3 PG (ref 26.6–33)
MCHC RBC AUTO-ENTMCNC: 36 G/DL (ref 31.5–35.7)
MCV RBC AUTO: 95.3 FL (ref 79–97)
MONOCYTES # BLD AUTO: 0.68 10*3/MM3 (ref 0.1–0.9)
MONOCYTES NFR BLD AUTO: 11.7 % (ref 5–12)
NEUTROPHILS NFR BLD AUTO: 3.32 10*3/MM3 (ref 1.7–7)
NEUTROPHILS NFR BLD AUTO: 57 % (ref 42.7–76)
NRBC BLD AUTO-RTO: 0 /100 WBC (ref 0–0.2)
PLATELET # BLD AUTO: 173 10*3/MM3 (ref 140–450)
PMV BLD AUTO: 9 FL (ref 6–12)
POTASSIUM SERPL-SCNC: 3.5 MMOL/L (ref 3.5–5.2)
PROT SERPL-MCNC: 7 G/DL (ref 6–8.5)
RBC # BLD AUTO: 3.82 10*6/MM3 (ref 4.14–5.8)
RH BLD: POSITIVE
SODIUM SERPL-SCNC: 140 MMOL/L (ref 136–145)
TIBC SERPL-MCNC: 384 MCG/DL (ref 298–536)
TRANSFERRIN SERPL-MCNC: 258 MG/DL (ref 200–360)
URATE SERPL-MCNC: 6.9 MG/DL (ref 3.4–7)
VIT B12 BLD-MCNC: 799 PG/ML (ref 211–946)
WBC NRBC COR # BLD: 5.82 10*3/MM3 (ref 3.4–10.8)

## 2022-06-24 PROCEDURE — 80053 COMPREHEN METABOLIC PANEL: CPT

## 2022-06-24 PROCEDURE — 1157F ADVNC CARE PLAN IN RCRD: CPT | Performed by: INTERNAL MEDICINE

## 2022-06-24 PROCEDURE — 82746 ASSAY OF FOLIC ACID SERUM: CPT

## 2022-06-24 PROCEDURE — 86901 BLOOD TYPING SEROLOGIC RH(D): CPT

## 2022-06-24 PROCEDURE — 84466 ASSAY OF TRANSFERRIN: CPT

## 2022-06-24 PROCEDURE — G0463 HOSPITAL OUTPT CLINIC VISIT: HCPCS | Performed by: INTERNAL MEDICINE

## 2022-06-24 PROCEDURE — 1126F AMNT PAIN NOTED NONE PRSNT: CPT | Performed by: INTERNAL MEDICINE

## 2022-06-24 PROCEDURE — 82728 ASSAY OF FERRITIN: CPT

## 2022-06-24 PROCEDURE — 86900 BLOOD TYPING SEROLOGIC ABO: CPT

## 2022-06-24 PROCEDURE — 85025 COMPLETE CBC W/AUTO DIFF WBC: CPT

## 2022-06-24 PROCEDURE — 82607 VITAMIN B-12: CPT

## 2022-06-24 PROCEDURE — 84550 ASSAY OF BLOOD/URIC ACID: CPT | Performed by: NURSE PRACTITIONER

## 2022-06-24 PROCEDURE — 99214 OFFICE O/P EST MOD 30 MIN: CPT | Performed by: INTERNAL MEDICINE

## 2022-06-24 PROCEDURE — 83540 ASSAY OF IRON: CPT

## 2022-06-24 NOTE — TELEPHONE ENCOUNTER
Per DARION Ignacio, Mr. Zazueta  has been called with recent lab results & recommendations.  Continue current medications and follow-up as planned or sooner if any problems.       ----- Message from DARION Reyes sent at 6/24/2022  2:20 PM CDT -----  Can you let him know normal uric acid

## 2022-06-24 NOTE — PROGRESS NOTES
DATE OF VISIT: 6/25/2022      REASON FOR VISIT: DVT and pulmonary embolism on Xarelto, anemia, elevated homocystine      HISTORY OF PRESENT ILLNESS:   71-year-old male with medical problems significant for hypertension, dyslipidemia, bilateral pulmonary embolism and DVT that was initially diagnosed in February 2019 for which patient was on Coumadin until February 22, 2021, anemia, elevated homocystine, recurrent right upper extremity DVT diagnosed on July 23, 2021 for which patient has been on Xarelto is here for follow-up appointment today.  Complains of recent fall for which she was seen in the emergency room and had a CT scan of head done.  Complains of periorbital bruising and ecchymosis on left side.  States he was seen by ophthalmologist and there is no visual problem.  Denies any bleeding.  Complains of arthralgia.  Denies any new chest pain or shortness of breath.  Denies any new lymph node enlargement.              Past Medical History, Past Surgical History, Social History, Family History have been reviewed and are without significant changes except as mentioned.    Review of Systems   A comprehensive 14 point review of systems was performed and was negative except as mentioned in HPI.    Medications:  The current medication list was reviewed in the EMR    ALLERGIES:    Allergies   Allergen Reactions   • Sulfa Antibiotics Rash   • Sulfamethoxazole-Trimethoprim Swelling   • Clindamycin/Lincomycin Hives   • Codeine Nausea Only       Objective      Vitals:    06/24/22 0810   BP: 148/92   Pulse: 80   Temp: 97.2 °F (36.2 °C)   TempSrc: Temporal   SpO2: 98%   Weight: 81.1 kg (178 lb 14.4 oz)   PainSc: 0-No pain     Current Status 6/22/2021   ECOG score 0       Physical Exam  Pulmonary:      Breath sounds: Normal breath sounds.   Skin:     Comments: Periorbital ecchymosis present on left side.   Neurological:      Mental Status: He is alert and oriented to person, place, and time.           RECENT LABS:  Glucose    Date Value Ref Range Status   06/24/2022 98 65 - 99 mg/dL Final     Sodium   Date Value Ref Range Status   06/24/2022 140 136 - 145 mmol/L Final     Potassium   Date Value Ref Range Status   06/24/2022 3.5 3.5 - 5.2 mmol/L Final     CO2   Date Value Ref Range Status   06/24/2022 23.0 22.0 - 29.0 mmol/L Final     Chloride   Date Value Ref Range Status   06/24/2022 102 98 - 107 mmol/L Final     Anion Gap   Date Value Ref Range Status   06/24/2022 15.0 5.0 - 15.0 mmol/L Final     Creatinine   Date Value Ref Range Status   06/24/2022 1.27 0.76 - 1.27 mg/dL Final     BUN   Date Value Ref Range Status   06/24/2022 21 8 - 23 mg/dL Final     BUN/Creatinine Ratio   Date Value Ref Range Status   06/24/2022 16.5 7.0 - 25.0 Final     Calcium   Date Value Ref Range Status   06/24/2022 9.3 8.6 - 10.5 mg/dL Final     eGFR Non  Amer   Date Value Ref Range Status   12/28/2021 74 >60 mL/min/1.73 Final     Alkaline Phosphatase   Date Value Ref Range Status   06/24/2022 67 39 - 117 U/L Final     Total Protein   Date Value Ref Range Status   06/24/2022 7.0 6.0 - 8.5 g/dL Final     ALT (SGPT)   Date Value Ref Range Status   06/24/2022 47 (H) 1 - 41 U/L Final     AST (SGOT)   Date Value Ref Range Status   06/24/2022 36 1 - 40 U/L Final     Total Bilirubin   Date Value Ref Range Status   06/24/2022 0.5 0.0 - 1.2 mg/dL Final     Albumin   Date Value Ref Range Status   06/24/2022 4.30 3.50 - 5.20 g/dL Final     Globulin   Date Value Ref Range Status   06/24/2022 2.7 gm/dL Final     Lab Results   Component Value Date    WBC 5.82 06/24/2022    HGB 13.1 06/24/2022    HCT 36.4 (L) 06/24/2022    MCV 95.3 06/24/2022     06/24/2022     Lab Results   Component Value Date    NEUTROABS 3.32 06/24/2022    IRON 104 06/24/2022    IRON 140 03/29/2022    IRON 108 12/28/2021    TIBC 384 06/24/2022    TIBC 432 03/29/2022    TIBC 401 12/28/2021    LABIRON 27 06/24/2022    LABIRON 32 03/29/2022    LABIRON 27 12/28/2021    FERRITIN 339.30  06/24/2022    FERRITIN 74.24 03/29/2022    FERRITIN 119.50 12/28/2021    QSSRQTOG79 799 06/24/2022    ZUCBBGCL17 628 03/29/2022    TDVYLUXF97 612 12/28/2021    FOLATE >20.00 06/24/2022    FOLATE >20.00 03/29/2022    FOLATE 18.70 12/28/2021     No results found for: , LABCA2, AFPTM, HCGQUANT, , CHROMGRNA, [id number], CEA, REFLABREPO      PATHOLOGY:  * Cannot find OR log *         RADIOLOGY DATA :  CT Head Without Contrast    Result Date: 6/22/2022  Left posterior parietal scalp hematoma. No underlying fracture. Involutional, atrophic changes. Otherwise normal brain for age. There are no acute traumatic changes. Electronically signed by:  Jameson Arthur MD  6/22/2022 10:14 AM [employer] Workstation: [id number]    CT Cervical Spine Without Contrast    Result Date: 6/22/2022  Multilevel degenerative, spondylotic changes involving the apophyseal joints at multiple levels. Anterior surgical fusion C5, C6, C7 stabilized by surgical hardware. CT cervical spine is otherwise unremarkable. No evidence of any acute fracture. No acute traumatic changes. Electronically signed by:  Jameson Arthur MD  6/22/2022 10:26 AM [employer] Workstation: [id number]          Assessment & Plan     1.  Bilateral lower extremity DVT and pulmonary embolism history, DVT of right upper extremity  - Patient was initially diagnosed with bilateral lower extremity DVT and bilateral pulmonary embolism in February 2019  - Hypercoagulable work-up done in February 2019 was negative for any elevated homocystine level.  - CT angiogram in August 2019 showed resolution of pulmonary embolism.  Doppler ultrasound done in February 2021 showed resolution of DVT.  - Initially patient was on Coumadin between February 2019 until February 22, 2021  - Patient was diagnosed with DVT affecting right upper extremity in July 2021  - Due to second episode of DVT, patient has been on Xarelto since diagnosis.  - Patient will return to clinic in 3 months with repeat CBC, CMP, iron studies,  ferritin, B12 and folate to be done on that day    2.  Anemia:  - Hemoglobin is 13.1.  Iron studies are adequate no need to start any iron replacement at present.  - Currently on B12 and folic acid 3 times a week.    3.  Elevated liver function test  - CMP shows liver enzymes are elevated but improved as compared to 2 days ago in emergency room.  Recommend any hepatotoxic medication until next clinic visit in 3 months.  - If LFT remains elevated upon next clinic visit will need further work-up which was discussed with patient.    4.  History of peptic ulcer disease    5.  Health maintenance: Patient does not smoke.  Had a colonoscopy in 2018    6. Advance Care Planning   ACP discussion was held with the patient during this visit. Patient has an advance directive in EMR which is still valid.        7.  Prescriptions: Patient is in a prescription for Xarelto, B12 and folic acid             PHQ-9 Total Score: 0   -Patient is not homicidal or suicidal.  No acute intervention required.    Jose Salinas Zazueta reports a pain score of 0.  Given his pain assessment as noted, treatment options were discussed and the following options were decided upon as a follow-up plan to address the patient's pain: continuation of current treatment plan for pain.         Dickson Vega MD  6/25/2022  10:16 CDT        Part of this note may be an electronic transcription/translation of spoken language to printed text using the Dragon Dictation System.          CC:

## 2022-06-27 ENCOUNTER — TELEPHONE (OUTPATIENT)
Dept: ONCOLOGY | Facility: HOSPITAL | Age: 72
End: 2022-06-27

## 2022-06-27 NOTE — TELEPHONE ENCOUNTER
Contacted pt and advised per Dr Vega regarding lab work and supplements. PT verbalizes understanding and denies any further questions.

## 2022-06-27 NOTE — TELEPHONE ENCOUNTER
----- Message from Dickson Vega MD sent at 6/25/2022 10:19 AM CDT -----  Regarding: labs  Please let patient know, iron studies are adequate no need to start any iron replacement at present.  Recommend continue with B12 and folic acid 3 times a week.  Thank you

## 2022-07-06 ENCOUNTER — OFFICE VISIT (OUTPATIENT)
Dept: FAMILY MEDICINE CLINIC | Facility: CLINIC | Age: 72
End: 2022-07-06

## 2022-07-06 VITALS
HEIGHT: 70 IN | HEART RATE: 80 BPM | OXYGEN SATURATION: 98 % | BODY MASS INDEX: 25.34 KG/M2 | DIASTOLIC BLOOD PRESSURE: 80 MMHG | SYSTOLIC BLOOD PRESSURE: 110 MMHG | WEIGHT: 177 LBS

## 2022-07-06 DIAGNOSIS — R53.83 MALAISE AND FATIGUE: ICD-10-CM

## 2022-07-06 DIAGNOSIS — M25.50 ARTHRALGIA, UNSPECIFIED JOINT: ICD-10-CM

## 2022-07-06 DIAGNOSIS — I10 PRIMARY HYPERTENSION: Primary | ICD-10-CM

## 2022-07-06 DIAGNOSIS — R53.81 MALAISE AND FATIGUE: ICD-10-CM

## 2022-07-06 PROCEDURE — 99214 OFFICE O/P EST MOD 30 MIN: CPT | Performed by: NURSE PRACTITIONER

## 2022-07-06 RX ORDER — VALSARTAN 320 MG/1
320 TABLET ORAL DAILY
Qty: 90 TABLET | Refills: 3 | Status: SHIPPED | OUTPATIENT
Start: 2022-07-06

## 2022-07-06 RX ORDER — HYDROCHLOROTHIAZIDE 12.5 MG/1
TABLET ORAL
Qty: 90 TABLET | Refills: 3 | Status: SHIPPED | OUTPATIENT
Start: 2022-07-06

## 2022-07-06 RX ORDER — MELOXICAM 15 MG/1
TABLET ORAL
Qty: 90 TABLET | Refills: 0 | Status: SHIPPED | OUTPATIENT
Start: 2022-07-06 | End: 2022-10-17

## 2022-07-06 NOTE — PROGRESS NOTES
Chief Complaint   Patient presents with   • Hypertension   • Joint Pain     6 month check     Subjective   Jose Zazueta is a 71 y.o. male.           Present with recheck bp -bp ranging from 77//80 -recent fall- see er visit -tripped over an animal     Hypertension  This is a chronic problem. The current episode started more than 1 year ago. The problem has been gradually improving since onset. The problem is controlled. Associated symptoms include malaise/fatigue and neck pain. Pertinent negatives include no chest pain, headaches, palpitations, peripheral edema, PND or shortness of breath. Risk factors for coronary artery disease include dyslipidemia and male gender. Past treatments include calcium channel blockers, angiotensin blockers, diuretics and lifestyle changes. Current antihypertension treatment includes lifestyle changes, angiotensin blockers and diuretics. The current treatment provides significant improvement. Compliance problems include diet.  There is no history of angina, kidney disease, CAD/MI, CVA, heart failure, left ventricular hypertrophy, PVD or retinopathy. There is no history of chronic renal disease, coarctation of the aorta, hyperaldosteronism, a hypertension causing med, pheochromocytoma or renovascular disease.   Joint Pain  Associated symptoms include arthralgias, congestion, fatigue, joint swelling, myalgias and neck pain. Pertinent negatives include no abdominal pain, chest pain, chills, coughing, diaphoresis, fever, headaches, nausea, rash, sore throat, swollen glands, urinary symptoms, vertigo, vomiting or weakness.   Fatigue  This is a recurrent problem. The current episode started more than 1 month ago. The problem occurs intermittently. The problem has been waxing and waning. Associated symptoms include arthralgias, congestion, fatigue, joint swelling, myalgias and neck pain. Pertinent negatives include no abdominal pain, chest pain, chills, coughing, diaphoresis,  fever, headaches, nausea, rash, sore throat, swollen glands, urinary symptoms, vertigo, vomiting or weakness. He has tried relaxation and rest for the symptoms. The treatment provided mild relief.        The following portions of the patient's history were reviewed and updated as appropriate: allergies, current medications, past social history and problem list.    Review of Systems   Constitutional: Positive for activity change, fatigue and malaise/fatigue. Negative for appetite change, chills, diaphoresis, fever and unexpected weight change.            HENT: Positive for congestion and ear pain. Negative for dental problem, drooling, ear discharge, facial swelling, hearing loss, mouth sores, nosebleeds, postnasal drip, rhinorrhea, sinus pressure, sinus pain, sneezing, sore throat, tinnitus, trouble swallowing and voice change.    Eyes: Negative.  Negative for photophobia, pain, discharge, redness, itching and visual disturbance.   Respiratory: Negative.  Negative for apnea, cough, choking, chest tightness, shortness of breath, wheezing and stridor.    Cardiovascular: Negative for chest pain, palpitations, leg swelling and PND.   Gastrointestinal: Negative.  Negative for abdominal distention, abdominal pain, anal bleeding, blood in stool, constipation, diarrhea, nausea, rectal pain and vomiting.   Endocrine: Negative.  Negative for cold intolerance, polydipsia, polyphagia and polyuria.   Genitourinary: Negative.  Negative for difficulty urinating, dysuria, enuresis, flank pain, frequency and genital sores.   Musculoskeletal: Positive for arthralgias, back pain, gait problem, joint swelling, myalgias, neck pain and neck stiffness.        BP low at home   Skin: Negative.  Negative for color change, pallor and rash.   Allergic/Immunologic: Negative.  Negative for environmental allergies, food allergies and immunocompromised state.   Neurological: Negative for dizziness, vertigo, tremors, seizures, syncope, facial  "asymmetry, speech difficulty, weakness, light-headedness and headaches.        Scalp hematoma    Hematological: Negative.  Negative for adenopathy. Does not bruise/bleed easily.   Psychiatric/Behavioral: Negative.  Negative for agitation, behavioral problems, confusion, decreased concentration, dysphoric mood, hallucinations, self-injury, sleep disturbance and suicidal ideas. The patient is not nervous/anxious and is not hyperactive.        Objective   /80   Pulse 80   Ht 177.8 cm (70\")   Wt 80.3 kg (177 lb)   SpO2 98%   BMI 25.40 kg/m²   Physical Exam  Constitutional:       General: He is not in acute distress.     Appearance: Normal appearance. He is not ill-appearing, toxic-appearing or diaphoretic.   HENT:      Head: Normocephalic and atraumatic.      Right Ear: Tympanic membrane and ear canal normal. There is no impacted cerumen.      Left Ear: Tympanic membrane normal. There is no impacted cerumen.      Nose: Nose normal. No congestion or rhinorrhea.      Mouth/Throat:      Mouth: Mucous membranes are moist.      Pharynx: No oropharyngeal exudate or posterior oropharyngeal erythema.   Eyes:      General: No scleral icterus.        Right eye: No discharge.         Left eye: No discharge.      Extraocular Movements: Extraocular movements intact.      Pupils: Pupils are equal, round, and reactive to light.   Neck:      Vascular: No carotid bruit.   Cardiovascular:      Rate and Rhythm: Normal rate and regular rhythm.      Pulses: Normal pulses.      Heart sounds: No murmur heard.    No friction rub. No gallop.   Pulmonary:      Effort: Pulmonary effort is normal. No respiratory distress.      Breath sounds: No stridor. No wheezing, rhonchi or rales.   Chest:      Chest wall: No tenderness.   Abdominal:      General: Abdomen is flat. There is no distension.      Palpations: There is no mass.      Tenderness: There is no abdominal tenderness. There is no right CVA tenderness, left CVA tenderness, " guarding or rebound.      Hernia: No hernia is present.   Genitourinary:     Testes: Normal.   Musculoskeletal:         General: Tenderness present. No swelling, deformity or signs of injury.      Cervical back: No swelling, edema, deformity, erythema, signs of trauma, lacerations, rigidity, spasms, torticollis, tenderness, bony tenderness or crepitus. No pain with movement. Normal range of motion.      Right lower leg: No edema.      Left lower leg: No edema.      Comments: Diffuse joint pain throughout body-age related , no injury        Lymphadenopathy:      Cervical: No cervical adenopathy.   Skin:     General: Skin is warm.      Coloration: Skin is not jaundiced or pale.      Findings: No bruising, erythema, lesion or rash.   Neurological:      General: No focal deficit present.      Mental Status: He is alert and oriented to person, place, and time.      Cranial Nerves: No cranial nerve deficit.      Sensory: No sensory deficit.      Motor: No weakness.      Coordination: Coordination normal.      Gait: Gait normal.      Deep Tendon Reflexes: Reflexes normal.   Psychiatric:         Mood and Affect: Mood normal.         Behavior: Behavior normal.              Assessment & Plan     Problems Addressed this Visit    None     Visit Diagnoses     Primary hypertension    -  Primary    Relevant Medications    valsartan (Diovan) 320 MG tablet    hydroCHLOROthiazide (HYDRODIURIL) 12.5 MG tablet    Arthralgia, unspecified joint        Malaise and fatigue          Diagnoses       Codes Comments    Primary hypertension    -  Primary ICD-10-CM: I10  ICD-9-CM: 401.9     Arthralgia, unspecified joint     ICD-10-CM: M25.50  ICD-9-CM: 719.40     Malaise and fatigue     ICD-10-CM: R53.81, R53.83  ICD-9-CM: 780.79            New Medications Ordered This Visit   Medications   • valsartan (Diovan) 320 MG tablet     Sig: Take 1 tablet by mouth Daily.     Dispense:  90 tablet     Refill:  3   • hydroCHLOROthiazide (HYDRODIURIL) 12.5  MG tablet     Sig: Use as needed and not daily-use 1-3 times a week     Dispense:  90 tablet     Refill:  3     Current Outpatient Medications on File Prior to Visit   Medication Sig Dispense Refill   • atorvastatin (LIPITOR) 40 MG tablet TAKE 1 TABLET BY MOUTH DAILY 90 tablet 1   • cetirizine (zyrTEC) 10 MG tablet Take 10 mg by mouth As Needed.     • fluticasone (FLONASE) 50 MCG/ACT nasal spray 2 sprays into the nostril(s) as directed by provider As Needed for Rhinitis.     • folic acid (FOLVITE) 1 MG tablet Take 1 tablet by mouth on Monday, Wednesday and Friday 36 tablet 1   • HYDROcodone-acetaminophen (Norco) 7.5-325 MG per tablet Take 1 tablet by mouth Every 6 (Six) Hours As Needed for Moderate Pain . 12 tablet 0   • isosorbide mononitrate (IMDUR) 30 MG 24 hr tablet Take 30 mg by mouth Daily.     • neomycin-polymyxin-hydrocortisone (CORTISPORIN) 3.5-32609-0 otic solution Administer 4 drops into both ears 2 (Two) Times a Day. 10 mL 1   • omeprazole (priLOSEC) 40 MG capsule TAKE 1 CAPSULE BY MOUTH EVERY DAY 30 capsule 9   • rivaroxaban (Xarelto) 20 MG tablet Take 1 tablet by mouth Daily With Dinner. 30 tablet 5   • sucralfate (CARAFATE) 1 g tablet TAKE 1 TABLET BY MOUTH FOUR TIMES DAILY 30 MINUTES BEFORE EACH MEAL AND A 4TH TABLET AT BEDTIME 120 tablet 3   • valsartan-hydrochlorothiazide (Diovan HCT) 320-12.5 MG per tablet Take 1 tablet by mouth Daily. 30 tablet 11   • vitamin B-12 (CYANOCOBALAMIN) 1000 MCG tablet Take 1 tablet by mouth on Monday, Wednesday and Friday 36 tablet 1   • [DISCONTINUED] meloxicam (MOBIC) 15 MG tablet TAKE 1 TABLET BY MOUTH EVERY DAY WITH MEALS 90 tablet 0   • losartan 50 MG tablet 100 mg, hydroCHLOROthiazide 12.5 MG 12.5 mg Take 1 dose by mouth Daily.       No current facility-administered medications on file prior to visit.       22 minutes  Follow Up   Return in about 3 months (around 10/6/2022).        It's not just what you eat, but when you eat  Eat breakfast, and eat smaller  meals throughout the day. A healthy breakfast can jumpstart your metabolism, while eating small, healthy meals (rather than the standard three large meals) keeps your energy up.   Avoid eating at night. Try to eat dinner earlier and fast for 14-16 hours until breakfast the next morning. Studies suggest that eating only when you’re most active and giving your digestive system a long break each day may help to regulate weight.      Separate diovan/hctz-use the hctz prn 1-3 days a week and continue diovan,   Monitor blood pressure at home as directed     See back in 3 months, sooner if needed

## 2022-07-29 ENCOUNTER — TELEPHONE (OUTPATIENT)
Dept: FAMILY MEDICINE CLINIC | Facility: CLINIC | Age: 72
End: 2022-07-29

## 2022-07-29 DIAGNOSIS — R31.9 HEMATURIA, UNSPECIFIED TYPE: Primary | ICD-10-CM

## 2022-07-29 NOTE — TELEPHONE ENCOUNTER
Patients' wife called wanting to know what Sandee recommends. Mrs. Zazueta stated for the past 2 wks the patient has had blood in his urine. She said the only others thing he has complained about was his hips hurt. She was wanting to speak to Sandee to see if patient needs a referral or if he needs an appt with Sandee first.     Pt's wife was wanting an appointment for Monday but there is only a same day available @ 2. Please advise     Pt's # [phone]

## 2022-08-23 ENCOUNTER — OFFICE VISIT (OUTPATIENT)
Dept: PODIATRY | Facility: CLINIC | Age: 72
End: 2022-08-23

## 2022-08-23 VITALS — HEART RATE: 90 BPM | WEIGHT: 177 LBS | HEIGHT: 70 IN | OXYGEN SATURATION: 94 % | BODY MASS INDEX: 25.34 KG/M2

## 2022-08-23 DIAGNOSIS — L60.0 INGROWN TOENAIL: Primary | ICD-10-CM

## 2022-08-23 PROCEDURE — 99213 OFFICE O/P EST LOW 20 MIN: CPT | Performed by: PODIATRIST

## 2022-08-23 PROCEDURE — 11750 EXCISION NAIL&NAIL MATRIX: CPT | Performed by: PODIATRIST
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
## 2022-09-02 RX ORDER — SUCRALFATE 1 G/1
TABLET ORAL
Qty: 120 TABLET | Refills: 2 | Status: SHIPPED | OUTPATIENT
Start: 2022-09-02 | End: 2023-01-24 | Stop reason: SDUPTHER

## 2022-09-06 ENCOUNTER — OFFICE VISIT (OUTPATIENT)
Dept: PODIATRY | Facility: CLINIC | Age: 72
End: 2022-09-06

## 2022-09-06 VITALS — WEIGHT: 177 LBS | BODY MASS INDEX: 25.34 KG/M2 | HEIGHT: 70 IN | OXYGEN SATURATION: 98 % | HEART RATE: 84 BPM

## 2022-09-06 DIAGNOSIS — L60.0 INGROWN TOENAIL: Primary | ICD-10-CM

## 2022-09-06 PROCEDURE — 99212 OFFICE O/P EST SF 10 MIN: CPT | Performed by: PODIATRIST
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
## 2022-09-23 ENCOUNTER — OFFICE VISIT (OUTPATIENT)
Dept: ONCOLOGY | Facility: CLINIC | Age: 72
End: 2022-09-23

## 2022-09-23 ENCOUNTER — LAB (OUTPATIENT)
Dept: ONCOLOGY | Facility: HOSPITAL | Age: 72
End: 2022-09-23

## 2022-09-23 VITALS
HEART RATE: 67 BPM | BODY MASS INDEX: 26.32 KG/M2 | SYSTOLIC BLOOD PRESSURE: 191 MMHG | WEIGHT: 183.4 LBS | OXYGEN SATURATION: 96 % | DIASTOLIC BLOOD PRESSURE: 94 MMHG | TEMPERATURE: 97.7 F

## 2022-09-23 DIAGNOSIS — D50.9 IRON DEFICIENCY ANEMIA, UNSPECIFIED IRON DEFICIENCY ANEMIA TYPE: Chronic | ICD-10-CM

## 2022-09-23 DIAGNOSIS — I26.99 PULMONARY EMBOLISM, UNSPECIFIED CHRONICITY, UNSPECIFIED PULMONARY EMBOLISM TYPE, UNSPECIFIED WHETHER ACUTE COR PULMONALE PRESENT: Primary | Chronic | ICD-10-CM

## 2022-09-23 DIAGNOSIS — I26.99 PULMONARY EMBOLISM, UNSPECIFIED CHRONICITY, UNSPECIFIED PULMONARY EMBOLISM TYPE, UNSPECIFIED WHETHER ACUTE COR PULMONALE PRESENT: ICD-10-CM

## 2022-09-23 DIAGNOSIS — E87.0 HYPERNATREMIA: ICD-10-CM

## 2022-09-23 DIAGNOSIS — R79.89 ELEVATED LFTS: ICD-10-CM

## 2022-09-23 DIAGNOSIS — D50.9 IRON DEFICIENCY ANEMIA, UNSPECIFIED IRON DEFICIENCY ANEMIA TYPE: ICD-10-CM

## 2022-09-23 LAB
ALBUMIN SERPL-MCNC: 4.3 G/DL (ref 3.5–5.2)
ALBUMIN/GLOB SERPL: 1.8 G/DL
ALP SERPL-CCNC: 62 U/L (ref 39–117)
ALT SERPL W P-5'-P-CCNC: 34 U/L (ref 1–41)
ANION GAP SERPL CALCULATED.3IONS-SCNC: 10 MMOL/L (ref 5–15)
AST SERPL-CCNC: 44 U/L (ref 1–40)
BASOPHILS # BLD AUTO: 0.05 10*3/MM3 (ref 0–0.2)
BASOPHILS NFR BLD AUTO: 0.9 % (ref 0–1.5)
BILIRUB SERPL-MCNC: 0.5 MG/DL (ref 0–1.2)
BUN SERPL-MCNC: 16 MG/DL (ref 8–23)
BUN/CREAT SERPL: 17.4 (ref 7–25)
CALCIUM SPEC-SCNC: 8.5 MG/DL (ref 8.6–10.5)
CHLORIDE SERPL-SCNC: 109 MMOL/L (ref 98–107)
CO2 SERPL-SCNC: 27 MMOL/L (ref 22–29)
CREAT SERPL-MCNC: 0.92 MG/DL (ref 0.76–1.27)
DEPRECATED RDW RBC AUTO: 51.4 FL (ref 37–54)
EGFRCR SERPLBLD CKD-EPI 2021: 88.9 ML/MIN/1.73
EOSINOPHIL # BLD AUTO: 0.13 10*3/MM3 (ref 0–0.4)
EOSINOPHIL NFR BLD AUTO: 2.3 % (ref 0.3–6.2)
ERYTHROCYTE [DISTWIDTH] IN BLOOD BY AUTOMATED COUNT: 13.5 % (ref 12.3–15.4)
FERRITIN SERPL-MCNC: 114.5 NG/ML (ref 30–400)
FOLATE SERPL-MCNC: >20 NG/ML (ref 4.78–24.2)
GLOBULIN UR ELPH-MCNC: 2.4 GM/DL
GLUCOSE SERPL-MCNC: 94 MG/DL (ref 65–99)
HCT VFR BLD AUTO: 38.5 % (ref 37.5–51)
HGB BLD-MCNC: 12.7 G/DL (ref 13–17.7)
IMM GRANULOCYTES # BLD AUTO: 0.01 10*3/MM3 (ref 0–0.05)
IMM GRANULOCYTES NFR BLD AUTO: 0.2 % (ref 0–0.5)
IRON 24H UR-MRATE: 70 MCG/DL (ref 59–158)
IRON SATN MFR SERPL: 21 % (ref 20–50)
LYMPHOCYTES # BLD AUTO: 1.76 10*3/MM3 (ref 0.7–3.1)
LYMPHOCYTES NFR BLD AUTO: 31 % (ref 19.6–45.3)
MCH RBC QN AUTO: 34.2 PG (ref 26.6–33)
MCHC RBC AUTO-ENTMCNC: 33 G/DL (ref 31.5–35.7)
MCV RBC AUTO: 103.8 FL (ref 79–97)
MONOCYTES # BLD AUTO: 0.71 10*3/MM3 (ref 0.1–0.9)
MONOCYTES NFR BLD AUTO: 12.5 % (ref 5–12)
NEUTROPHILS NFR BLD AUTO: 3.02 10*3/MM3 (ref 1.7–7)
NEUTROPHILS NFR BLD AUTO: 53.1 % (ref 42.7–76)
NRBC BLD AUTO-RTO: 0 /100 WBC (ref 0–0.2)
PLATELET # BLD AUTO: 182 10*3/MM3 (ref 140–450)
PMV BLD AUTO: 9.4 FL (ref 6–12)
POTASSIUM SERPL-SCNC: 3.8 MMOL/L (ref 3.5–5.2)
PROT SERPL-MCNC: 6.7 G/DL (ref 6–8.5)
RBC # BLD AUTO: 3.71 10*6/MM3 (ref 4.14–5.8)
SODIUM SERPL-SCNC: 146 MMOL/L (ref 136–145)
TIBC SERPL-MCNC: 328 MCG/DL (ref 298–536)
TRANSFERRIN SERPL-MCNC: 220 MG/DL (ref 200–360)
VIT B12 BLD-MCNC: 563 PG/ML (ref 211–946)
WBC NRBC COR # BLD: 5.68 10*3/MM3 (ref 3.4–10.8)

## 2022-09-23 PROCEDURE — 1126F AMNT PAIN NOTED NONE PRSNT: CPT | Performed by: INTERNAL MEDICINE

## 2022-09-23 PROCEDURE — 83540 ASSAY OF IRON: CPT

## 2022-09-23 PROCEDURE — 82746 ASSAY OF FOLIC ACID SERUM: CPT

## 2022-09-23 PROCEDURE — 1157F ADVNC CARE PLAN IN RCRD: CPT | Performed by: INTERNAL MEDICINE

## 2022-09-23 PROCEDURE — 99214 OFFICE O/P EST MOD 30 MIN: CPT | Performed by: INTERNAL MEDICINE

## 2022-09-23 PROCEDURE — 82607 VITAMIN B-12: CPT

## 2022-09-23 PROCEDURE — 82728 ASSAY OF FERRITIN: CPT

## 2022-09-23 PROCEDURE — G0463 HOSPITAL OUTPT CLINIC VISIT: HCPCS | Performed by: INTERNAL MEDICINE

## 2022-09-23 PROCEDURE — 85025 COMPLETE CBC W/AUTO DIFF WBC: CPT

## 2022-09-23 PROCEDURE — 84466 ASSAY OF TRANSFERRIN: CPT

## 2022-09-23 PROCEDURE — 80053 COMPREHEN METABOLIC PANEL: CPT

## 2022-09-23 RX ORDER — AMLODIPINE BESYLATE 2.5 MG/1
2.5 TABLET ORAL
COMMUNITY
Start: 2022-07-06

## 2022-09-23 NOTE — PROGRESS NOTES
DATE OF VISIT: 9/23/2022      REASON FOR VISIT: DVT and pulmonary embolism on Xarelto, anemia, elevated homocystine      HISTORY OF PRESENT ILLNESS:   72-year-old male with medical problem consisting of hypertension, dyslipidemia, bilateral pulmonary embolism and DVT.  He was initially diagnosed in February 2019 for which patient was on Coumadin until February 22 2021.  Anemia, elevated homocystine, recurrent right upper extremity DVT diagnosed on July 23, 2021 with history patient has been on Xarelto recently for follow-up appointment today.  Complains of fatigue.  Denies any bleeding.  Complains of arthralgia.  Denies any chest pain or shortness of breath.  Denies any new lymph node enlargement.              Past Medical History, Past Surgical History, Social History, Family History have been reviewed and are without significant changes except as mentioned.    Review of Systems   A comprehensive 14 point review of systems was performed and was negative except as mentioned in HPI.    Medications:  The current medication list was reviewed in the EMR    ALLERGIES:    Allergies   Allergen Reactions   • Sulfa Antibiotics Rash     Other reaction(s): Rash  Severe blisters   • Sulfamethoxazole-Trimethoprim Swelling     Other reaction(s): Rash  Severe blisters   • Clindamycin/Lincomycin Hives   • Codeine Nausea Only       Objective      Vitals:    09/23/22 0818   BP: (!) 191/94   Pulse: 67   Temp: 97.7 °F (36.5 °C)   TempSrc: Temporal   SpO2: 96%   Weight: 83.2 kg (183 lb 6.4 oz)   PainSc: 0-No pain     Current Status 6/22/2021   ECOG score 0       Physical Exam  Pulmonary:      Breath sounds: Normal breath sounds.   Neurological:      Mental Status: He is alert and oriented to person, place, and time.           RECENT LABS:  Glucose   Date Value Ref Range Status   09/23/2022 94 65 - 99 mg/dL Final     Sodium   Date Value Ref Range Status   09/23/2022 146 (H) 136 - 145 mmol/L Final     Potassium   Date Value Ref Range  Status   09/23/2022 3.8 3.5 - 5.2 mmol/L Final     CO2   Date Value Ref Range Status   09/23/2022 27.0 22.0 - 29.0 mmol/L Final     Chloride   Date Value Ref Range Status   09/23/2022 109 (H) 98 - 107 mmol/L Final     Anion Gap   Date Value Ref Range Status   09/23/2022 10.0 5.0 - 15.0 mmol/L Final     Creatinine   Date Value Ref Range Status   09/23/2022 0.92 0.76 - 1.27 mg/dL Final     BUN   Date Value Ref Range Status   09/23/2022 16 8 - 23 mg/dL Final     BUN/Creatinine Ratio   Date Value Ref Range Status   09/23/2022 17.4 7.0 - 25.0 Final     Calcium   Date Value Ref Range Status   09/23/2022 8.5 (L) 8.6 - 10.5 mg/dL Final     eGFR Non  Amer   Date Value Ref Range Status   12/28/2021 74 >60 mL/min/1.73 Final     Alkaline Phosphatase   Date Value Ref Range Status   09/23/2022 62 39 - 117 U/L Final     Total Protein   Date Value Ref Range Status   09/23/2022 6.7 6.0 - 8.5 g/dL Final     ALT (SGPT)   Date Value Ref Range Status   09/23/2022 34 1 - 41 U/L Final     AST (SGOT)   Date Value Ref Range Status   09/23/2022 44 (H) 1 - 40 U/L Final     Total Bilirubin   Date Value Ref Range Status   09/23/2022 0.5 0.0 - 1.2 mg/dL Final     Albumin   Date Value Ref Range Status   09/23/2022 4.30 3.50 - 5.20 g/dL Final     Globulin   Date Value Ref Range Status   09/23/2022 2.4 gm/dL Final     Lab Results   Component Value Date    WBC 5.68 09/23/2022    HGB 12.7 (L) 09/23/2022    HCT 38.5 09/23/2022    .8 (H) 09/23/2022     09/23/2022     Lab Results   Component Value Date    NEUTROABS 3.02 09/23/2022    IRON 70 09/23/2022    IRON 104 06/24/2022    IRON 140 03/29/2022    TIBC 328 09/23/2022    TIBC 384 06/24/2022    TIBC 432 03/29/2022    LABIRON 21 09/23/2022    LABIRON 27 06/24/2022    LABIRON 32 03/29/2022    FERRITIN 114.50 09/23/2022    FERRITIN 339.30 06/24/2022    FERRITIN 74.24 03/29/2022    [id number] 563 09/23/2022    LFFROZFV61 799 06/24/2022    SEVSRLGE49 628 03/29/2022    FOLATE >20.00  09/23/2022    FOLATE >20.00 06/24/2022    FOLATE >20.00 03/29/2022     No results found for: , LABCA2, AFPTM, HCGQUANT, , CHROMGRNA, [id number], CEA, REFLABREPO      PATHOLOGY:  * Cannot find OR log *         RADIOLOGY DATA :  No radiology results for the last 7 days        Assessment & Plan     1.  Bilateral lower extremity DVT and pulmonary embolism history, DVT of right upper extremity  This patient was diagnosed with bilateral lower extremity DVT and bilateral pulmonary embolism in February 2019  - Hypercoagulable work-up done in February 2019 was negative except for elevated homocystine level  - CT angiogram in August 2019 showed resolution of pulmonary embolism.  Doppler ultrasound done in February 2021 showed resolution of DVT.  - Patient did receive Coumadin between February 2019 until February 22, 2021  - Patient was diagnosed with second episode of DVT involving right upper extremity in July 2021  - Due to second episode of DVT, patient has been started on Xarelto.  Recommend lifelong anticoagulation due to 2 episodes of DVT.  - Patient is stating that Xarelto is costing him more because of insurance.  Option of changing him over to Coumadin was discussed with patient today due to cost issue with Xarelto.  - Patient will return to clinic in 3 months with repeat CBC, CMP, iron studies, ferritin, B12 and folate to be done on that day.    2.  Anemia:  - Hemoglobin is 12.7  - Currently on B12 and folic acid 3 times a week.    3.  Elevated liver function test:  - CMP from today shows AST is borderline elevated at 44.  ALT is normal.  Will monitor with CMP for now    4.  Hyponatremia:  His sodium is borderline elevated at 146.  Patient was notified about elevated sodium and need to increase hydration.    5.  History of peptic ulcer disease    6.  Health maintenance: Patient does not smoke.  Had a colonoscopy in 2018    7. Advance Care Planning   ACP discussion was held with the patient during this  visit. Patient has an advance directive in EMR which is still valid.                     PHQ-9 Total Score: 0   -Patient is not homicidal or suicidal.  No acute intervention required.    Jose Salinas Marbin reports a pain score of 0.  Given his pain assessment as noted, treatment options were discussed and the following options were decided upon as a follow-up plan to address the patient's pain: continuation of current treatment plan for pain.         Dickson Vega MD  9/23/2022  16:04 CDT        Part of this note may be an electronic transcription/translation of spoken language to printed text using the Dragon Dictation System.          CC:

## 2022-09-26 ENCOUNTER — TELEPHONE (OUTPATIENT)
Dept: ONCOLOGY | Facility: HOSPITAL | Age: 72
End: 2022-09-26

## 2022-09-26 RX ORDER — OMEPRAZOLE 40 MG/1
CAPSULE, DELAYED RELEASE ORAL
Qty: 30 CAPSULE | Refills: 8 | Status: SHIPPED | OUTPATIENT
Start: 2022-09-26 | End: 2023-03-21

## 2022-09-26 NOTE — TELEPHONE ENCOUNTER
----- Message from Dickson Vega MD sent at 9/23/2022  4:04 PM CDT -----  Regarding: Labs  Please let patient know, he needs to continue taking B12 and folic acid 3 times a week.  Iron studies are adequate, no need for any iron replacement at present.  Thank you

## 2022-10-06 ENCOUNTER — OFFICE VISIT (OUTPATIENT)
Dept: FAMILY MEDICINE CLINIC | Facility: CLINIC | Age: 72
End: 2022-10-06

## 2022-10-06 VITALS
SYSTOLIC BLOOD PRESSURE: 140 MMHG | WEIGHT: 182 LBS | BODY MASS INDEX: 26.05 KG/M2 | HEIGHT: 70 IN | OXYGEN SATURATION: 98 % | HEART RATE: 78 BPM | TEMPERATURE: 99 F | DIASTOLIC BLOOD PRESSURE: 82 MMHG

## 2022-10-06 DIAGNOSIS — R53.81 MALAISE AND FATIGUE: ICD-10-CM

## 2022-10-06 DIAGNOSIS — Z51.81 ENCOUNTER FOR THERAPEUTIC DRUG LEVEL MONITORING: ICD-10-CM

## 2022-10-06 DIAGNOSIS — J06.9 UPPER RESPIRATORY TRACT INFECTION, UNSPECIFIED TYPE: ICD-10-CM

## 2022-10-06 DIAGNOSIS — I10 BENIGN ESSENTIAL HTN: ICD-10-CM

## 2022-10-06 DIAGNOSIS — R53.83 MALAISE AND FATIGUE: ICD-10-CM

## 2022-10-06 DIAGNOSIS — R52 PAIN: ICD-10-CM

## 2022-10-06 DIAGNOSIS — I10 PRIMARY HYPERTENSION: Primary | ICD-10-CM

## 2022-10-06 DIAGNOSIS — M25.50 MULTIPLE JOINT PAIN: ICD-10-CM

## 2022-10-06 DIAGNOSIS — R25.1 TREMORS OF NERVOUS SYSTEM: ICD-10-CM

## 2022-10-06 LAB
EXPIRATION DATE: NORMAL
INTERNAL CONTROL: NORMAL
Lab: NORMAL
SARS-COV-2 AG UPPER RESP QL IA.RAPID: NOT DETECTED

## 2022-10-06 PROCEDURE — 99214 OFFICE O/P EST MOD 30 MIN: CPT | Performed by: NURSE PRACTITIONER

## 2022-10-06 PROCEDURE — 87426 SARSCOV CORONAVIRUS AG IA: CPT | Performed by: NURSE PRACTITIONER

## 2022-10-06 RX ORDER — HYDROCODONE BITARTRATE AND ACETAMINOPHEN 7.5; 325 MG/1; MG/1
1 TABLET ORAL EVERY 6 HOURS PRN
Qty: 12 TABLET | Refills: 0 | Status: SHIPPED | OUTPATIENT
Start: 2022-10-06

## 2022-10-06 RX ORDER — CEFUROXIME AXETIL 500 MG/1
500 TABLET ORAL 2 TIMES DAILY
Qty: 20 TABLET | Refills: 0 | Status: SHIPPED | OUTPATIENT
Start: 2022-10-06 | End: 2022-12-08

## 2022-10-06 NOTE — PROGRESS NOTES
Chief Complaint   Patient presents with   • Hypertension     3 month check up    • Cough     Head cold     Subjective   Jose Zazueta is a 72 y.o. male.           History of Present Illness  Presents with recheck of chronic conditions  Reports tremor and shaking worsening-bp has been stable at home   Current uri symptoms   Hypertension  This is a chronic problem. The current episode started more than 1 year ago. The problem has been gradually improving since onset. The problem is controlled. Associated symptoms include malaise/fatigue and neck pain. Pertinent negatives include no chest pain, headaches, palpitations, peripheral edema, PND or shortness of breath. Risk factors for coronary artery disease include dyslipidemia and male gender. Past treatments include calcium channel blockers, angiotensin blockers, diuretics and lifestyle changes. Current antihypertension treatment includes lifestyle changes, angiotensin blockers and diuretics. The current treatment provides significant improvement. Compliance problems include diet.  There is no history of angina, kidney disease, CAD/MI, CVA, heart failure, left ventricular hypertrophy, PVD or retinopathy. There is no history of chronic renal disease, coarctation of the aorta, hyperaldosteronism, a hypertension causing med, pheochromocytoma or renovascular disease.   Joint Pain  Associated symptoms include arthralgias, congestion, fatigue, joint swelling, myalgias and neck pain. Pertinent negatives include no abdominal pain, chest pain, chills, coughing, diaphoresis, fever, headaches, nausea, numbness, rash, sore throat, swollen glands, urinary symptoms, vertigo, vomiting or weakness.   Fatigue  This is a recurrent problem. The current episode started more than 1 month ago. The problem occurs intermittently. The problem has been waxing and waning. Associated symptoms include arthralgias, congestion, fatigue, joint swelling, myalgias and neck pain. Pertinent  negatives include no abdominal pain, chest pain, chills, coughing, diaphoresis, fever, headaches, nausea, numbness, rash, sore throat, swollen glands, urinary symptoms, vertigo, vomiting or weakness. He has tried relaxation and rest for the symptoms. The treatment provided mild relief.   URI   This is a recurrent problem. The current episode started more than 1 month ago. The problem has been gradually worsening. The fever has been present for 1 to 2 days. Associated symptoms include congestion, ear pain, neck pain and sinus pain. Pertinent negatives include no abdominal pain, chest pain, coughing, diarrhea, dysuria, headaches, nausea, rash, rhinorrhea, sneezing, sore throat, swollen glands, vomiting or wheezing. The treatment provided mild relief.        The following portions of the patient's history were reviewed and updated as appropriate: allergies, current medications, past social history and problem list.    Review of Systems   Constitutional: Positive for activity change, fatigue and malaise/fatigue. Negative for appetite change, chills, diaphoresis, fever and unexpected weight change.            HENT: Positive for congestion, ear pain, sinus pressure and sinus pain. Negative for dental problem, drooling, ear discharge, facial swelling, hearing loss, mouth sores, nosebleeds, postnasal drip, rhinorrhea, sneezing, sore throat, tinnitus, trouble swallowing and voice change.    Eyes: Negative.  Negative for photophobia, pain, discharge, redness, itching and visual disturbance.   Respiratory: Negative.  Negative for apnea, cough, choking, chest tightness, shortness of breath, wheezing and stridor.    Cardiovascular: Negative for chest pain, palpitations, leg swelling and PND.   Gastrointestinal: Negative.  Negative for abdominal distention, abdominal pain, anal bleeding, blood in stool, constipation, diarrhea, nausea, rectal pain and vomiting.   Endocrine: Negative.  Negative for cold intolerance, polydipsia,  "polyphagia and polyuria.   Genitourinary: Negative.  Negative for difficulty urinating, dysuria, enuresis, flank pain, frequency and genital sores.   Musculoskeletal: Positive for arthralgias, back pain, gait problem, joint swelling, myalgias, neck pain and neck stiffness.        BP low at home-good in office    Skin: Negative.  Negative for color change, pallor and rash.   Allergic/Immunologic: Negative.  Negative for environmental allergies, food allergies and immunocompromised state.   Neurological: Positive for tremors. Negative for dizziness, vertigo, seizures, syncope, facial asymmetry, speech difficulty, weakness, light-headedness, numbness and headaches.   Hematological: Negative.  Negative for adenopathy. Does not bruise/bleed easily.   Psychiatric/Behavioral: Negative.  Negative for agitation, behavioral problems, confusion, decreased concentration, dysphoric mood, hallucinations, self-injury, sleep disturbance and suicidal ideas. The patient is not nervous/anxious and is not hyperactive.        Objective   /82   Pulse 78   Temp 99 °F (37.2 °C) (Tympanic)   Ht 177.8 cm (70\")   Wt 82.6 kg (182 lb)   SpO2 98%   BMI 26.11 kg/m²   Physical Exam  Constitutional:       General: He is not in acute distress.     Appearance: Normal appearance. He is not ill-appearing, toxic-appearing or diaphoretic.   HENT:      Head: Normocephalic and atraumatic.      Right Ear: Tympanic membrane and ear canal normal. There is no impacted cerumen.      Left Ear: Tympanic membrane normal. There is no impacted cerumen.      Nose: Nose normal. No congestion or rhinorrhea.      Mouth/Throat:      Mouth: Mucous membranes are moist.      Pharynx: No oropharyngeal exudate or posterior oropharyngeal erythema.   Eyes:      General: No scleral icterus.        Right eye: No discharge.         Left eye: No discharge.      Extraocular Movements: Extraocular movements intact.      Pupils: Pupils are equal, round, and reactive to " light.   Neck:      Vascular: No carotid bruit.   Cardiovascular:      Rate and Rhythm: Normal rate and regular rhythm.      Pulses: Normal pulses.      Heart sounds: No murmur heard.    No friction rub. No gallop.   Pulmonary:      Effort: Pulmonary effort is normal. No respiratory distress.      Breath sounds: No stridor. No wheezing, rhonchi or rales.   Chest:      Chest wall: No tenderness.   Abdominal:      General: Abdomen is flat. There is no distension.      Palpations: There is no mass.      Tenderness: There is no abdominal tenderness. There is no right CVA tenderness, left CVA tenderness, guarding or rebound.      Hernia: No hernia is present.   Genitourinary:     Testes: Normal.   Musculoskeletal:         General: Tenderness present. No swelling, deformity or signs of injury.      Cervical back: No swelling, edema, deformity, erythema, signs of trauma, lacerations, rigidity, spasms, torticollis, tenderness, bony tenderness or crepitus. No pain with movement. Normal range of motion.      Right lower leg: No edema.      Left lower leg: No edema.      Comments: Diffuse joint pain throughout body-age related , no injury        Lymphadenopathy:      Cervical: No cervical adenopathy.   Skin:     General: Skin is warm.      Coloration: Skin is not jaundiced or pale.      Findings: No bruising, erythema, lesion or rash.   Neurological:      General: No focal deficit present.      Mental Status: He is alert and oriented to person, place, and time.      Cranial Nerves: No cranial nerve deficit.      Sensory: No sensory deficit.      Motor: No weakness.      Coordination: Coordination normal.      Gait: Gait normal.      Deep Tendon Reflexes: Reflexes normal.      Comments: Tremors    Psychiatric:         Mood and Affect: Mood normal.         Behavior: Behavior normal.              Assessment & Plan     Problems Addressed this Visit        Cardiac and Vasculature    Benign essential HTN    Relevant Medications     HYDROcodone-acetaminophen (Norco) 7.5-325 MG per tablet      Other Visit Diagnoses     Primary hypertension    -  Primary    Multiple joint pain        Malaise and fatigue        Pain        Relevant Medications    HYDROcodone-acetaminophen (Norco) 7.5-325 MG per tablet    Upper respiratory tract infection, unspecified type        Relevant Medications    cefuroxime (CEFTIN) 500 MG tablet    Tremors of nervous system        Relevant Orders    Ambulatory Referral to Neurology      Diagnoses       Codes Comments    Primary hypertension    -  Primary ICD-10-CM: I10  ICD-9-CM: 401.9     Multiple joint pain     ICD-10-CM: M25.50  ICD-9-CM: 719.49     Malaise and fatigue     ICD-10-CM: R53.81, R53.83  ICD-9-CM: 780.79     Benign essential HTN     ICD-10-CM: I10  ICD-9-CM: 401.1     Pain     ICD-10-CM: R52  ICD-9-CM: 780.96     Upper respiratory tract infection, unspecified type     ICD-10-CM: J06.9  ICD-9-CM: 465.9     Tremors of nervous system     ICD-10-CM: R25.1  ICD-9-CM: 781.0            New Medications Ordered This Visit   Medications   • HYDROcodone-acetaminophen (Norco) 7.5-325 MG per tablet     Sig: Take 1 tablet by mouth Every 6 (Six) Hours As Needed for Moderate Pain.     Dispense:  12 tablet     Refill:  0   • cefuroxime (CEFTIN) 500 MG tablet     Sig: Take 1 tablet by mouth 2 (Two) Times a Day.     Dispense:  20 tablet     Refill:  0     Current Outpatient Medications on File Prior to Visit   Medication Sig Dispense Refill   • amLODIPine (NORVASC) 2.5 MG tablet Take 2.5 mg by mouth.     • atorvastatin (LIPITOR) 40 MG tablet TAKE 1 TABLET BY MOUTH DAILY 90 tablet 1   • fluticasone (FLONASE) 50 MCG/ACT nasal spray 2 sprays into the nostril(s) as directed by provider As Needed for Rhinitis.     • folic acid (FOLVITE) 1 MG tablet Take 1 tablet by mouth on Monday, Wednesday and Friday 36 tablet 1   • hydroCHLOROthiazide (HYDRODIURIL) 12.5 MG tablet Use as needed and not daily-use 1-3 times a week 90 tablet 3   •  isosorbide mononitrate (IMDUR) 30 MG 24 hr tablet Take 30 mg by mouth Daily.     • losartan 50 MG tablet 100 mg, hydroCHLOROthiazide 12.5 MG 12.5 mg Take 1 dose by mouth Daily.     • meloxicam (MOBIC) 15 MG tablet TAKE 1 TABLET BY MOUTH EVERY DAY WITH MEALS 90 tablet 0   • neomycin-polymyxin-hydrocortisone (CORTISPORIN) 3.5-36145-6 otic solution Administer 4 drops into both ears 2 (Two) Times a Day. 10 mL 1   • omeprazole (priLOSEC) 40 MG capsule TAKE 1 CAPSULE BY MOUTH EVERY DAY 30 capsule 8   • rivaroxaban (Xarelto) 20 MG tablet Take 1 tablet by mouth Daily With Dinner. 30 tablet 5   • sucralfate (CARAFATE) 1 g tablet TAKE 1 TABLET BY MOUTH FOUR TIMES DAILY 30 MINUTES BEFORE EACH MEAL AND A 4TH TABLET AT BEDTIME 120 tablet 2   • valsartan (Diovan) 320 MG tablet Take 1 tablet by mouth Daily. 90 tablet 3   • valsartan-hydrochlorothiazide (Diovan HCT) 320-12.5 MG per tablet Take 1 tablet by mouth Daily. 30 tablet 11   • vitamin B-12 (CYANOCOBALAMIN) 1000 MCG tablet Take 1 tablet by mouth on Monday, Wednesday and Friday 36 tablet 1   • [DISCONTINUED] HYDROcodone-acetaminophen (Norco) 7.5-325 MG per tablet Take 1 tablet by mouth Every 6 (Six) Hours As Needed for Moderate Pain . 12 tablet 0   • cetirizine (zyrTEC) 10 MG tablet Take 10 mg by mouth As Needed.       No current facility-administered medications on file prior to visit.       15 minutes   Follow Up   Return in about 3 months (around 1/6/2023) for Next scheduled follow up.        It's not just what you eat, but when you eat  Eat breakfast, and eat smaller meals throughout the day. A healthy breakfast can jumpstart your metabolism, while eating small, healthy meals (rather than the standard three large meals) keeps your energy up.   Avoid eating at night. Try to eat dinner earlier and fast for 14-16 hours until breakfast the next morning. Studies suggest that eating only when you’re most active and giving your digestive system a long break each day may help to  regulate weight.     meds as directed -keep hydrated as directed   Monitor blood pressure at home  Decrease alcohol in diet  See back in 3 months  Refill meds  Treat uri   covid negative   Needs updated uds   royal reviewed  meds as directed      Refer to neurology due to tremor-probably related to alcohol     Patient understands the risks associated with this controlled medication, including tolerance and addiction.  he also agrees to only obtain this medication from me, and not from a another provider, unless that provider is covering for me in my absence.  he also agrees to be compliant in dosing, and not self adjust the dose of medication.  A signed controlled substance agreement is on file, and he has received a controlled substance education sheet at this a previous visit.  he has also signed a consent for treatment with a controlled substance as per [facility] policy. ROYAL was obtained.

## 2022-10-17 RX ORDER — MELOXICAM 15 MG/1
TABLET ORAL
Qty: 90 TABLET | Refills: 0 | Status: SHIPPED | OUTPATIENT
Start: 2022-10-17 | End: 2022-12-08 | Stop reason: SDUPTHER

## 2022-11-18 DIAGNOSIS — E78.2 MIXED HYPERLIPIDEMIA: ICD-10-CM

## 2022-11-18 RX ORDER — ATORVASTATIN CALCIUM 40 MG/1
40 TABLET, FILM COATED ORAL DAILY
Qty: 90 TABLET | Refills: 0 | Status: SHIPPED | OUTPATIENT
Start: 2022-11-18 | End: 2023-03-23

## 2022-12-08 ENCOUNTER — OFFICE VISIT (OUTPATIENT)
Dept: FAMILY MEDICINE CLINIC | Facility: CLINIC | Age: 72
End: 2022-12-08

## 2022-12-08 VITALS
OXYGEN SATURATION: 98 % | HEIGHT: 70 IN | BODY MASS INDEX: 26.34 KG/M2 | SYSTOLIC BLOOD PRESSURE: 130 MMHG | DIASTOLIC BLOOD PRESSURE: 88 MMHG | WEIGHT: 184 LBS | HEART RATE: 77 BPM

## 2022-12-08 DIAGNOSIS — R53.81 MALAISE AND FATIGUE: ICD-10-CM

## 2022-12-08 DIAGNOSIS — R53.83 MALAISE AND FATIGUE: ICD-10-CM

## 2022-12-08 DIAGNOSIS — R25.1 TREMORS OF NERVOUS SYSTEM: ICD-10-CM

## 2022-12-08 DIAGNOSIS — I10 PRIMARY HYPERTENSION: Primary | ICD-10-CM

## 2022-12-08 DIAGNOSIS — M25.50 MULTIPLE JOINT PAIN: ICD-10-CM

## 2022-12-08 PROCEDURE — 99214 OFFICE O/P EST MOD 30 MIN: CPT | Performed by: NURSE PRACTITIONER

## 2022-12-08 RX ORDER — METHOCARBAMOL 750 MG/1
750 TABLET, FILM COATED ORAL 4 TIMES DAILY PRN
COMMUNITY

## 2022-12-08 RX ORDER — MELOXICAM 15 MG/1
15 TABLET ORAL DAILY
Qty: 90 TABLET | Refills: 3 | Status: SHIPPED | OUTPATIENT
Start: 2022-12-08

## 2022-12-08 NOTE — PROGRESS NOTES
Chief Complaint   Patient presents with   • Hypertension     3 month check up      Subjective   Jose Zazueta is a 72 y.o. male.           History of Present Illness  Presents with recheck of chronic conditions  Reports tremor and shaking worsening-bp has been stable at home     Hypertension  This is a chronic problem. The current episode started more than 1 year ago. The problem has been gradually improving since onset. The problem is controlled. Associated symptoms include malaise/fatigue. Pertinent negatives include no palpitations, PND or shortness of breath. Risk factors for coronary artery disease include dyslipidemia and male gender. Past treatments include calcium channel blockers, angiotensin blockers, diuretics and lifestyle changes. Current antihypertension treatment includes lifestyle changes, angiotensin blockers and diuretics. The current treatment provides significant improvement. Compliance problems include diet.  There is no history of angina, kidney disease, CAD/MI, CVA, heart failure, left ventricular hypertrophy, PVD or retinopathy. There is no history of chronic renal disease, coarctation of the aorta, hyperaldosteronism, a hypertension causing med, pheochromocytoma or renovascular disease.   Joint Pain  Associated symptoms include arthralgias, fatigue, joint swelling and myalgias. Pertinent negatives include no chills, diaphoresis, fever, numbness, urinary symptoms, vertigo or weakness.   Fatigue  This is a recurrent problem. The current episode started more than 1 month ago. The problem occurs intermittently. The problem has been waxing and waning. Associated symptoms include arthralgias, fatigue, joint swelling and myalgias. Pertinent negatives include no chills, diaphoresis, fever, numbness, urinary symptoms, vertigo or weakness. He has tried relaxation and rest for the symptoms. The treatment provided mild relief.        The following portions of the patient's history were reviewed  and updated as appropriate: allergies, current medications, past social history and problem list.    Review of Systems   Constitutional: Positive for activity change, fatigue and malaise/fatigue. Negative for appetite change, chills, diaphoresis, fever and unexpected weight change.            HENT: Negative for dental problem, drooling, ear discharge, facial swelling, hearing loss, mouth sores, nosebleeds, postnasal drip, sinus pressure, tinnitus, trouble swallowing and voice change.    Eyes: Negative.  Negative for photophobia, pain, discharge, redness, itching and visual disturbance.   Respiratory: Negative.  Negative for apnea, choking, chest tightness, shortness of breath and stridor.    Cardiovascular: Negative for palpitations, leg swelling and PND.   Gastrointestinal: Negative.  Negative for abdominal distention, anal bleeding, blood in stool, constipation and rectal pain.   Endocrine: Negative.  Negative for cold intolerance, polydipsia, polyphagia and polyuria.   Genitourinary: Negative.  Negative for difficulty urinating, enuresis, flank pain, frequency and genital sores.   Musculoskeletal: Positive for arthralgias, back pain, gait problem, joint swelling, myalgias and neck stiffness.        BP low at home-good in office    Skin: Negative.  Negative for color change and pallor.   Allergic/Immunologic: Negative.  Negative for environmental allergies, food allergies and immunocompromised state.   Neurological: Positive for tremors. Negative for dizziness, vertigo, seizures, syncope, facial asymmetry, speech difficulty, weakness, light-headedness and numbness.        Worsening tremors    Hematological: Negative.  Negative for adenopathy. Does not bruise/bleed easily.   Psychiatric/Behavioral: Negative.  Negative for agitation, behavioral problems, confusion, decreased concentration, dysphoric mood, hallucinations, self-injury, sleep disturbance and suicidal ideas. The patient is not nervous/anxious and is not  "hyperactive.        Objective   /88   Pulse 77   Ht 177.8 cm (70\")   Wt 83.5 kg (184 lb)   SpO2 98%   BMI 26.40 kg/m²   Physical Exam  Constitutional:       General: He is not in acute distress.     Appearance: Normal appearance. He is not ill-appearing, toxic-appearing or diaphoretic.   HENT:      Head: Normocephalic and atraumatic.      Right Ear: Tympanic membrane and ear canal normal. There is no impacted cerumen.      Left Ear: Tympanic membrane normal. There is no impacted cerumen.      Nose: Nose normal. No congestion or rhinorrhea.      Mouth/Throat:      Mouth: Mucous membranes are moist.      Pharynx: No oropharyngeal exudate or posterior oropharyngeal erythema.   Eyes:      General: No scleral icterus.        Right eye: No discharge.         Left eye: No discharge.      Extraocular Movements: Extraocular movements intact.      Pupils: Pupils are equal, round, and reactive to light.   Neck:      Vascular: No carotid bruit.   Cardiovascular:      Rate and Rhythm: Normal rate and regular rhythm.      Pulses: Normal pulses.      Heart sounds: No murmur heard.    No friction rub. No gallop.   Pulmonary:      Effort: Pulmonary effort is normal. No respiratory distress.      Breath sounds: No stridor. No wheezing, rhonchi or rales.   Chest:      Chest wall: No tenderness.   Abdominal:      General: Abdomen is flat. There is no distension.      Palpations: There is no mass.      Tenderness: There is no abdominal tenderness. There is no right CVA tenderness, left CVA tenderness, guarding or rebound.      Hernia: No hernia is present.   Genitourinary:     Testes: Normal.   Musculoskeletal:         General: Tenderness present. No swelling, deformity or signs of injury.      Cervical back: No swelling, edema, deformity, erythema, signs of trauma, lacerations, rigidity, spasms, torticollis, tenderness, bony tenderness or crepitus. No pain with movement. Normal range of motion.      Right lower leg: No " edema.      Left lower leg: No edema.      Comments: Diffuse joint pain throughout body-age related , no injury        Lymphadenopathy:      Cervical: No cervical adenopathy.   Skin:     General: Skin is warm.      Coloration: Skin is not jaundiced or pale.      Findings: No bruising, erythema, lesion or rash.   Neurological:      General: No focal deficit present.      Mental Status: He is alert and oriented to person, place, and time.      Cranial Nerves: No cranial nerve deficit.      Sensory: No sensory deficit.      Motor: No weakness.      Coordination: Coordination normal.      Gait: Gait normal.      Deep Tendon Reflexes: Reflexes normal.      Comments: Tremors    Psychiatric:         Mood and Affect: Mood normal.         Behavior: Behavior normal.              Assessment & Plan     Problems Addressed this Visit    None  Visit Diagnoses     Primary hypertension    -  Primary    Relevant Orders    TSH    Lipid Panel    Multiple joint pain        Relevant Orders    TSH    Lipid Panel    Malaise and fatigue        Relevant Orders    TSH    Lipid Panel    Tremors of nervous system          Diagnoses       Codes Comments    Primary hypertension    -  Primary ICD-10-CM: I10  ICD-9-CM: 401.9     Multiple joint pain     ICD-10-CM: M25.50  ICD-9-CM: 719.49     Malaise and fatigue     ICD-10-CM: R53.81, R53.83  ICD-9-CM: 780.79     Tremors of nervous system     ICD-10-CM: R25.1  ICD-9-CM: 781.0            New Medications Ordered This Visit   Medications   • meloxicam (MOBIC) 15 MG tablet     Sig: Take 1 tablet by mouth Daily.     Dispense:  90 tablet     Refill:  3     Current Outpatient Medications on File Prior to Visit   Medication Sig Dispense Refill   • amLODIPine (NORVASC) 2.5 MG tablet Take 2.5 mg by mouth. Only takes if b/p 150/90     • atorvastatin (LIPITOR) 40 MG tablet TAKE 1 TABLET BY MOUTH DAILY 90 tablet 0   • cetirizine (zyrTEC) 10 MG tablet Take 10 mg by mouth As Needed.     • fluticasone (FLONASE) 50  MCG/ACT nasal spray 2 sprays into the nostril(s) as directed by provider As Needed for Rhinitis.     • folic acid (FOLVITE) 1 MG tablet Take 1 tablet by mouth on Monday, Wednesday and Friday 36 tablet 1   • hydroCHLOROthiazide (HYDRODIURIL) 12.5 MG tablet Use as needed and not daily-use 1-3 times a week 90 tablet 3   • HYDROcodone-acetaminophen (Norco) 7.5-325 MG per tablet Take 1 tablet by mouth Every 6 (Six) Hours As Needed for Moderate Pain. 12 tablet 0   • isosorbide mononitrate (IMDUR) 30 MG 24 hr tablet Take 30 mg by mouth Daily.     • methocarbamol (ROBAXIN) 750 MG tablet Take 750 mg by mouth 4 (Four) Times a Day As Needed for Muscle Spasms.     • omeprazole (priLOSEC) 40 MG capsule TAKE 1 CAPSULE BY MOUTH EVERY DAY 30 capsule 8   • rivaroxaban (Xarelto) 20 MG tablet Take 1 tablet by mouth Daily With Dinner. 30 tablet 5   • sucralfate (CARAFATE) 1 g tablet TAKE 1 TABLET BY MOUTH FOUR TIMES DAILY 30 MINUTES BEFORE EACH MEAL AND A 4TH TABLET AT BEDTIME 120 tablet 2   • valsartan (Diovan) 320 MG tablet Take 1 tablet by mouth Daily. 90 tablet 3   • vitamin B-12 (CYANOCOBALAMIN) 1000 MCG tablet Take 1 tablet by mouth on Monday, Wednesday and Friday 36 tablet 1   • losartan 50 MG tablet 100 mg, hydroCHLOROthiazide 12.5 MG 12.5 mg Take 1 dose by mouth Daily.       No current facility-administered medications on file prior to visit.       18 minutes  Follow Up   Return in about 3 months (around 3/8/2023).        Start taking mobic as needed-not daily due to gerd history     It's not just what you eat, but when you eat  Eat breakfast, and eat smaller meals throughout the day. A healthy breakfast can jumpstart your metabolism, while eating small, healthy meals (rather than the standard three large meals) keeps your energy up.   Avoid eating at night. Try to eat dinner earlier and fast for 14-16 hours until breakfast the next morning. Studies suggest that eating only when you’re most active and giving your digestive  system a long break each day may help to regulate weight.     meds as directed -refill meds  No changes at this time   BP is stable     psa from [facility]     Seeing hematology this months=labs before visit     See back in 3 months       Answers for HPI/ROS submitted by the patient on 12/7/2022  What is the primary reason for your visit?: Other  Please describe your symptoms.: Scheduled checkup  Have you had these symptoms before?: Yes  How long have you been having these symptoms?: Greater than 2 weeks  Please list any medications you are currently taking for this condition.: On record  Please describe any probable cause for these symptoms. : Old Age    See back in 3 months as directed  Scheduled medicare wellness with next visit     Pt request Dr Tyler

## 2022-12-16 NOTE — PROGRESS NOTES
Chief Complaint   Patient presents with   • Pnuemonia     1 month follow up      Subjective   Jose Zazueta is a 71 y.o. male.           Presents with recheck from pneumonia last month  -reports feeling tired and weak but much improved from last visit -request refills of meds     Hypertension  This is a chronic problem. The current episode started more than 1 year ago. The problem has been resolved since onset. The problem is controlled. Associated symptoms include malaise/fatigue, neck pain and shortness of breath. Pertinent negatives include no anxiety, chest pain, headaches, orthopnea, palpitations, peripheral edema or PND. Risk factors for coronary artery disease include dyslipidemia, male gender, sedentary lifestyle and family history. Current antihypertension treatment includes angiotensin blockers, lifestyle changes and diuretics. The current treatment provides significant improvement. Compliance problems include diet.  There is no history of angina, kidney disease, CAD/MI, CVA, heart failure, left ventricular hypertrophy, PVD or retinopathy. There is no history of hypercortisolism, hyperparathyroidism, a hypertension causing med, pheochromocytoma or renovascular disease.   Shortness of Breath  This is a new problem. The current episode started more than 1 month ago. The problem occurs intermittently. The problem has been gradually improving. Associated symptoms include leg swelling and neck pain. Pertinent negatives include no abdominal pain, chest pain, fever, headaches, orthopnea, PND, sore throat, syncope, vomiting or wheezing. The treatment provided mild relief. His past medical history is significant for DVT. There is no history of allergies, aspirin allergies, asthma, bronchiolitis, CAD, chronic lung disease, COPD, a heart failure, PE, pneumonia or a recent surgery.   Fatigue  This is a recurrent problem. The problem occurs daily. The problem has been waxing and waning. Associated symptoms  include arthralgias, fatigue, joint swelling, myalgias and neck pain. Pertinent negatives include no abdominal pain, chest pain, chills, congestion, coughing, diaphoresis, fever, headaches, numbness, sore throat, vertigo, visual change, vomiting or weakness. He has tried relaxation for the symptoms. The treatment provided mild relief.   Pain  This is a recurrent problem. The current episode started more than 1 year ago. The problem occurs intermittently. The problem has been waxing and waning. Associated symptoms include arthralgias, fatigue, joint swelling, myalgias and neck pain. Pertinent negatives include no abdominal pain, chest pain, chills, congestion, coughing, diaphoresis, fever, headaches, numbness, sore throat, vertigo, visual change, vomiting or weakness. The symptoms are aggravated by twisting and walking. He has tried acetaminophen and NSAIDs for the symptoms. The treatment provided no relief.        The following portions of the patient's history were reviewed and updated as appropriate: allergies, current medications, past social history and problem list.    Review of Systems   Constitutional: Positive for fatigue, malaise/fatigue and unexpected weight change. Negative for activity change, appetite change, chills, diaphoresis and fever.        10 pound weight loss reported since being sick    HENT: Negative.  Negative for congestion, dental problem, drooling, facial swelling, hearing loss, mouth sores, nosebleeds and sore throat.    Eyes: Negative.  Negative for photophobia, pain, discharge, redness, itching and visual disturbance.   Respiratory: Positive for shortness of breath. Negative for apnea, cough, choking, chest tightness, wheezing and stridor.    Cardiovascular: Positive for leg swelling. Negative for chest pain, palpitations, orthopnea, syncope and PND.   Gastrointestinal: Negative.  Negative for abdominal pain, anal bleeding, blood in stool, constipation, diarrhea and vomiting.  "  Endocrine: Negative.  Negative for cold intolerance, polydipsia, polyphagia and polyuria.   Genitourinary: Negative.    Musculoskeletal: Positive for arthralgias, back pain, joint swelling, myalgias, neck pain and neck stiffness. Negative for gait problem.        Pain right arm from dvt   Skin: Negative.    Allergic/Immunologic: Negative.  Negative for environmental allergies, food allergies and immunocompromised state.   Neurological: Negative for dizziness, vertigo, tremors, seizures, syncope, facial asymmetry, speech difficulty, weakness, numbness and headaches.   Hematological: Negative.  Negative for adenopathy. Does not bruise/bleed easily.   Psychiatric/Behavioral: Negative.  Negative for agitation, behavioral problems, confusion, decreased concentration, dysphoric mood and hallucinations.       Objective   /82   Temp 97.8 °F (36.6 °C) (Infrared)   Ht 177.8 cm (70\")   Wt 77.6 kg (171 lb)   BMI 24.54 kg/m²   Physical Exam  Constitutional:       General: He is not in acute distress.     Appearance: Normal appearance. He is not ill-appearing, toxic-appearing or diaphoretic.   HENT:      Head: Normocephalic and atraumatic.      Right Ear: Tympanic membrane normal. There is no impacted cerumen.      Left Ear: There is no impacted cerumen.      Nose: Nose normal. No congestion or rhinorrhea.      Mouth/Throat:      Mouth: Mucous membranes are moist.      Pharynx: No oropharyngeal exudate or posterior oropharyngeal erythema.   Eyes:      General: No scleral icterus.        Right eye: No discharge.         Left eye: No discharge.      Pupils: Pupils are equal, round, and reactive to light.   Cardiovascular:      Rate and Rhythm: Normal rate.      Heart sounds: No murmur heard.   No friction rub. No gallop.    Pulmonary:      Effort: Pulmonary effort is normal. No respiratory distress.      Breath sounds: No stridor. No wheezing, rhonchi or rales.   Chest:      Chest wall: No tenderness.   Abdominal:      " General: Abdomen is flat. There is no distension.      Palpations: There is no mass.      Tenderness: There is no abdominal tenderness. There is no right CVA tenderness, left CVA tenderness, guarding or rebound.      Hernia: No hernia is present.   Musculoskeletal:         General: No swelling, tenderness, deformity or signs of injury.        Arms:       Cervical back: No swelling, edema, deformity, erythema, signs of trauma, lacerations, rigidity, spasms, torticollis, tenderness, bony tenderness or crepitus. No pain with movement. Normal range of motion.      Right lower leg: No edema.      Left lower leg: No edema.      Comments: Pain right forearm from recent dvt    Skin:     General: Skin is warm.      Coloration: Skin is not jaundiced or pale.      Findings: No bruising, erythema, lesion or rash.   Neurological:      General: No focal deficit present.      Mental Status: He is alert and oriented to person, place, and time.      Cranial Nerves: No cranial nerve deficit.      Sensory: No sensory deficit.      Motor: No weakness.      Coordination: Coordination normal.      Gait: Gait normal.      Deep Tendon Reflexes: Reflexes normal.   Psychiatric:         Mood and Affect: Mood normal.         Behavior: Behavior normal.              Assessment/Plan     Problems Addressed this Visit        Cardiac and Vasculature    Benign essential HTN - Primary    Relevant Medications    HYDROcodone-acetaminophen (Norco) 7.5-325 MG per tablet    Other Relevant Orders    Urine Drug Screen - Urine, Clean Catch    SARS-CoV-2 Antibodies (Roche)      Other Visit Diagnoses     Shortness of breath        Relevant Medications    HYDROcodone-acetaminophen (Norco) 7.5-325 MG per tablet    Other Relevant Orders    Urine Drug Screen - Urine, Clean Catch    SARS-CoV-2 Antibodies (Roche)    Malaise and fatigue        Pain        Relevant Medications    HYDROcodone-acetaminophen (Norco) 7.5-325 MG per tablet    Other Relevant Orders    Urine  I will SWITCH the dose or number of times a day I take the medications listed below when I get home from the hospital:  None Drug Screen - Urine, Clean Catch    SARS-CoV-2 Antibodies (Roche)    Weight loss        Relevant Medications    HYDROcodone-acetaminophen (Norco) 7.5-325 MG per tablet    Other Relevant Orders    Urine Drug Screen - Urine, Clean Catch    SARS-CoV-2 Antibodies (Roche)    Encounter for therapeutic drug level monitoring         Relevant Orders    Urine Drug Screen - Urine, Clean Catch      Diagnoses       Codes Comments    Benign essential HTN    -  Primary ICD-10-CM: I10  ICD-9-CM: 401.1     Shortness of breath     ICD-10-CM: R06.02  ICD-9-CM: 786.05     Malaise and fatigue     ICD-10-CM: R53.81, R53.83  ICD-9-CM: 780.79     Pain     ICD-10-CM: R52  ICD-9-CM: 780.96     Weight loss     ICD-10-CM: R63.4  ICD-9-CM: 783.21     Encounter for therapeutic drug level monitoring      ICD-10-CM: Z51.81  ICD-9-CM: V58.83            New Medications Ordered This Visit   Medications   • HYDROcodone-acetaminophen (Norco) 7.5-325 MG per tablet     Sig: Take 1 tablet by mouth Every 6 (Six) Hours As Needed for Moderate Pain .     Dispense:  12 tablet     Refill:  0     Current Outpatient Medications on File Prior to Visit   Medication Sig Dispense Refill   • acetaminophen (Tylenol 8 Hour Arthritis Pain) 650 MG 8 hr tablet Take 650 mg by mouth Every 8 (Eight) Hours As Needed for Mild Pain .     • albuterol sulfate  (90 Base) MCG/ACT inhaler Inhale 2 puffs Every 4 (Four) Hours As Needed for Wheezing. 18 g 4   • atorvastatin (LIPITOR) 40 MG tablet TAKE 1 TABLET BY MOUTH EVERY DAY 90 tablet 0   • cetirizine (zyrTEC) 10 MG tablet Take 10 mg by mouth As Needed.     • fluticasone (FLONASE) 50 MCG/ACT nasal spray 2 sprays into the nostril(s) as directed by provider Daily As Needed for Rhinitis.     • folic acid (FOLVITE) 1 MG tablet Take 1 tablet by mouth on Monday, Wednesday and Friday 36 tablet 1   • isosorbide mononitrate (IMDUR) 30 MG 24 hr tablet Take 30 mg by mouth Daily.     • losartan 50 MG tablet 100 mg, hydroCHLOROthiazide  12.5 MG 12.5 mg Take 1 dose by mouth Daily.     • melatonin 3 MG tablet Take 1 tablet by mouth Every Night. 30 tablet 3   • methocarbamol (ROBAXIN) 750 MG tablet Take 1 tablet by mouth 3 (Three) Times a Day. 90 tablet 5   • Omega-3 Fatty Acids (FISH OIL) 1000 MG capsule capsule Take 1,000 mg by mouth Every Night.     • omeprazole (priLOSEC) 40 MG capsule TAKE 1 CAPSULE BY MOUTH EVERY DAY 30 capsule 10   • potassium chloride (MICRO-K) 10 MEQ CR capsule Take 4 capsules by mouth Daily. 30 capsule 3   • rivaroxaban (Xarelto) 20 MG tablet Take 1 tablet by mouth Daily With Dinner. 30 tablet 5   • sucralfate (CARAFATE) 1 g tablet TAKE 1 TABLET BY MOUTH FOUR TIMES DAILY 30 MINUTES BEFORE EACH MEAL AND A 4TH TABLET AT BEDTIME 120 tablet 5   • [DISCONTINUED] Rivaroxaban (XARELTO) tablet therapy pack starter pack Take one 15 mg tablet twice daily with food for 21 days.  Followed by one 20 mg tablet by mouth once daily with food. Take as directed 51 tablet 0   • vitamin B-12 (CYANOCOBALAMIN) 1000 MCG tablet Take 1 tablet by mouth on Monday, Wednesday and Friday 36 tablet 1   • [DISCONTINUED] atorvastatin (LIPITOR) 40 MG tablet TAKE 1 TABLET BY MOUTH EVERY DAY 90 tablet 0   • [DISCONTINUED] guanFACINE HCl ER 2 MG tablet sustained-release 24 hour 1T PO QD 90 tablet 1   • [DISCONTINUED] guanFACINE HCl ER 2 MG tablet sustained-release 24 hour TAKE 1 TABLET BY MOUTH EVERY DAY 90 tablet 1   • [DISCONTINUED] hydroCHLOROthiazide (HYDRODIURIL) 25 MG tablet Take 1 tablet by mouth Daily. 30 tablet 3     No current facility-administered medications on file prior to visit.       20 minutes   Follow Up   Return in about 4 months (around 1/2/2022).      It's not just what you eat, but when you eat  Eat breakfast, and eat smaller meals throughout the day. A healthy breakfast can jumpstart your metabolism, while eating small, healthy meals (rather than the standard three large meals) keeps your energy up.   Avoid eating at night. Try to eat  dinner earlier and fast for 14-16 hours until breakfast the next morning. Studies suggest that eating only when you’re most active and giving your digestive system a long break each day may help to regulate weight.       Pain and pressure right arm from recent dvt-refill norco for 12-uds ordered, will not be long term script-continue follow up with vascular and xarelto for now, labs and recent cta reviewed, no changes for now. Continue meds as directed see back in 4 months, continue follow up as scheduled     covid antibody test ordered   Suggest covid vaccine  Scheduled medicare wellness as directed tdap not due

## 2022-12-23 ENCOUNTER — APPOINTMENT (OUTPATIENT)
Dept: ONCOLOGY | Facility: CLINIC | Age: 72
End: 2022-12-23

## 2022-12-27 NOTE — PROGRESS NOTES
DATE OF VISIT: 12/30/2022      REASON FOR VISIT: DVT and pulmonary embolism on Xarelto, anemia, elevated homocystine      HISTORY OF PRESENT ILLNESS:   72-year-old male with medical problem consisting of hypertension, dyslipidemia, bilateral pulmonary embolism and DVT that was initially diagnosed in February 2019 for which patient was on Coumadin until February 22, 2022.  Due to recurrence of right upper extremity DVT patient has been on Xarelto since July 23, 2021.  Patient is here for follow-up appointment today.  Complains of fatigue.  Denies any bleeding.  Complains of arthralgia.  Denies any new chest pain or shortness of breath.  Denies any new lymph node enlargement.                  Past Medical History, Past Surgical History, Social History, Family History have been reviewed and are without significant changes except as mentioned.    Review of Systems   A comprehensive 14 point review of systems was performed and was negative except as mentioned in HPI.    Medications:  The current medication list was reviewed in the EMR    ALLERGIES:    Allergies   Allergen Reactions   • Sulfa Antibiotics Rash     Other reaction(s): Rash  Severe blisters   • Sulfamethoxazole-Trimethoprim Swelling     Other reaction(s): Rash  Severe blisters   • Clindamycin/Lincomycin Hives   • Codeine Nausea Only       Objective      Vitals:    12/30/22 0902   BP: 160/98   Pulse: 75   Temp: 97.5 °F (36.4 °C)   TempSrc: Temporal   SpO2: 97%   Weight: 83.1 kg (183 lb 3.2 oz)   PainSc: 0-No pain     Current Status 6/22/2021   ECOG score 0       Physical Exam  Pulmonary:      Breath sounds: Normal breath sounds.   Neurological:      Mental Status: He is alert and oriented to person, place, and time.           RECENT LABS:  Glucose   Date Value Ref Range Status   12/30/2022 109 (H) 65 - 99 mg/dL Final     Sodium   Date Value Ref Range Status   12/30/2022 140 136 - 145 mmol/L Final     Potassium   Date Value Ref Range Status   12/30/2022 4.3 3.5  - 5.2 mmol/L Final     CO2   Date Value Ref Range Status   12/30/2022 25.0 22.0 - 29.0 mmol/L Final     Chloride   Date Value Ref Range Status   12/30/2022 103 98 - 107 mmol/L Final     Anion Gap   Date Value Ref Range Status   12/30/2022 12.0 5.0 - 15.0 mmol/L Final     Creatinine   Date Value Ref Range Status   12/30/2022 0.96 0.76 - 1.27 mg/dL Final     BUN   Date Value Ref Range Status   12/30/2022 15 8 - 23 mg/dL Final     BUN/Creatinine Ratio   Date Value Ref Range Status   12/30/2022 15.6 7.0 - 25.0 Final     Calcium   Date Value Ref Range Status   12/30/2022 9.3 8.6 - 10.5 mg/dL Final     eGFR Non  Amer   Date Value Ref Range Status   12/28/2021 74 >60 mL/min/1.73 Final     Alkaline Phosphatase   Date Value Ref Range Status   12/30/2022 68 39 - 117 U/L Final     Total Protein   Date Value Ref Range Status   12/30/2022 7.3 6.0 - 8.5 g/dL Final     ALT (SGPT)   Date Value Ref Range Status   12/30/2022 31 1 - 41 U/L Final     AST (SGOT)   Date Value Ref Range Status   12/30/2022 34 1 - 40 U/L Final     Total Bilirubin   Date Value Ref Range Status   12/30/2022 0.9 0.0 - 1.2 mg/dL Final     Albumin   Date Value Ref Range Status   12/30/2022 4.4 3.5 - 5.2 g/dL Final     Globulin   Date Value Ref Range Status   12/30/2022 2.9 gm/dL Final     Lab Results   Component Value Date    WBC 5.81 12/30/2022    HGB 13.6 12/30/2022    HCT 40.5 12/30/2022    MCV 96.0 12/30/2022     12/30/2022     Lab Results   Component Value Date    NEUTROABS 3.34 12/30/2022    IRON 179 (H) 12/30/2022    IRON 70 09/23/2022    IRON 104 06/24/2022    TIBC 432 12/30/2022    TIBC 328 09/23/2022    TIBC 384 06/24/2022    LABIRON 41 12/30/2022    LABIRON 21 09/23/2022    LABIRON 27 06/24/2022    FERRITIN 93.85 12/30/2022    FERRITIN 114.50 09/23/2022    FERRITIN 339.30 06/24/2022    GUGKNKMX12 563 09/23/2022    UKRLGKVV93 799 06/24/2022    UYRCLCPU24 628 03/29/2022    FOLATE >20.00 09/23/2022    FOLATE >20.00 06/24/2022    FOLATE  >20.00 03/29/2022     No results found for: , LABCA2, AFPTM, HCGQUANT, , CHROMGRNA, [id number], CEA, REFLABREPO      PATHOLOGY:  * Cannot find OR log *         RADIOLOGY DATA :  No radiology results for the last 7 days        Assessment & Plan     1.  Bilateral lower extremity DVT and pulmonary embolism history: Right upper extremity DVT  - Patient was initially diagnosed with bilateral lower extremity DVT and bilateral pulmonary embolism in February 2019.  Hypercoagulable work-up in February 2019 was negative except for elevated homocystine level  - CT angiogram in August 2019 showed resolution of pulmonary embolism.  Doppler ultrasound in February 2021 showed resolution of DVT  - Patient received Coumadin between February 2019 until February 22, 2021  - Patient was diagnosed with second episode of DVT involving right upper extremity in July 2021  - Due to second episode of DVT patient has been started on Xarelto.  Recommend lifelong anticoagulation due to episode of DVT  - Patient was instructed to come to emergency room if he starts having any abnormal major bleeding.  - Patient will return to clinic in 3 months with repeat CBC, CMP, iron studies, ferritin, B12 and folate to be done on that day.    2.  Anemia:  - Hemoglobin is 13.6  - Currently on B12 and folic acid 3 times a week    3.  Elevated liver function test:  - CMP from today shows AST and ALT are within normal limit.    4.  History of peptic ulcer disease    5.  Abnormal iron studies:  - Iron level is elevated at 179 today but ferritin is only 93.  I believe iron level is artifactually elevated.  Recommend not to take any iron containing supplementation at present.  - We will recheck iron studies upon next clinic visit in 3 months.    6.  Health maintenance: Patient does not smoke.  Had a colonoscopy in 2018    7. Advance Care Planning   ACP discussion was held with the patient during this visit. Patient has an advance directive in EMR which  is still valid.                     PHQ-9 Total Score:       Jose Zazueta reports a pain score of 0.  Given his pain assessment as noted, treatment options were discussed and the following options were decided upon as a follow-up plan to address the patient's pain: continuation of current treatment plan for pain.         Dickson Vega MD  12/30/2022  09:06 CST        Part of this note may be an electronic transcription/translation of spoken language to printed text using the Dragon Dictation System.          CC:

## 2022-12-30 ENCOUNTER — LAB (OUTPATIENT)
Dept: ONCOLOGY | Facility: HOSPITAL | Age: 72
End: 2022-12-30
Payer: MEDICARE

## 2022-12-30 ENCOUNTER — OFFICE VISIT (OUTPATIENT)
Dept: ONCOLOGY | Facility: CLINIC | Age: 72
End: 2022-12-30
Payer: MEDICARE

## 2022-12-30 ENCOUNTER — TELEPHONE (OUTPATIENT)
Dept: FAMILY MEDICINE CLINIC | Facility: CLINIC | Age: 72
End: 2022-12-30

## 2022-12-30 VITALS
OXYGEN SATURATION: 97 % | SYSTOLIC BLOOD PRESSURE: 160 MMHG | DIASTOLIC BLOOD PRESSURE: 98 MMHG | TEMPERATURE: 97.5 F | WEIGHT: 183.2 LBS | HEART RATE: 75 BPM | BODY MASS INDEX: 26.29 KG/M2

## 2022-12-30 DIAGNOSIS — I26.99 PULMONARY EMBOLISM, UNSPECIFIED CHRONICITY, UNSPECIFIED PULMONARY EMBOLISM TYPE, UNSPECIFIED WHETHER ACUTE COR PULMONALE PRESENT: Chronic | ICD-10-CM

## 2022-12-30 DIAGNOSIS — I26.99 PULMONARY EMBOLISM, UNSPECIFIED CHRONICITY, UNSPECIFIED PULMONARY EMBOLISM TYPE, UNSPECIFIED WHETHER ACUTE COR PULMONALE PRESENT: ICD-10-CM

## 2022-12-30 DIAGNOSIS — R79.89 ELEVATED LFTS: Primary | Chronic | ICD-10-CM

## 2022-12-30 DIAGNOSIS — D50.9 IRON DEFICIENCY ANEMIA, UNSPECIFIED IRON DEFICIENCY ANEMIA TYPE: ICD-10-CM

## 2022-12-30 DIAGNOSIS — R79.89 ELEVATED LFTS: ICD-10-CM

## 2022-12-30 DIAGNOSIS — D50.9 IRON DEFICIENCY ANEMIA, UNSPECIFIED IRON DEFICIENCY ANEMIA TYPE: Chronic | ICD-10-CM

## 2022-12-30 DIAGNOSIS — E87.0 HYPERNATREMIA: ICD-10-CM

## 2022-12-30 LAB
ALBUMIN SERPL-MCNC: 4.4 G/DL (ref 3.5–5.2)
ALBUMIN/GLOB SERPL: 1.5 G/DL
ALP SERPL-CCNC: 68 U/L (ref 39–117)
ALT SERPL W P-5'-P-CCNC: 31 U/L (ref 1–41)
ANION GAP SERPL CALCULATED.3IONS-SCNC: 12 MMOL/L (ref 5–15)
AST SERPL-CCNC: 34 U/L (ref 1–40)
BASOPHILS # BLD AUTO: 0.06 10*3/MM3 (ref 0–0.2)
BASOPHILS NFR BLD AUTO: 1 % (ref 0–1.5)
BILIRUB SERPL-MCNC: 0.9 MG/DL (ref 0–1.2)
BUN SERPL-MCNC: 15 MG/DL (ref 8–23)
BUN/CREAT SERPL: 15.6 (ref 7–25)
CALCIUM SPEC-SCNC: 9.3 MG/DL (ref 8.6–10.5)
CHLORIDE SERPL-SCNC: 103 MMOL/L (ref 98–107)
CHOLEST SERPL-MCNC: 242 MG/DL (ref 0–200)
CO2 SERPL-SCNC: 25 MMOL/L (ref 22–29)
CREAT SERPL-MCNC: 0.96 MG/DL (ref 0.76–1.27)
DEPRECATED RDW RBC AUTO: 47.3 FL (ref 37–54)
EGFRCR SERPLBLD CKD-EPI 2021: 84.5 ML/MIN/1.73
EOSINOPHIL # BLD AUTO: 0.07 10*3/MM3 (ref 0–0.4)
EOSINOPHIL NFR BLD AUTO: 1.2 % (ref 0.3–6.2)
ERYTHROCYTE [DISTWIDTH] IN BLOOD BY AUTOMATED COUNT: 13.2 % (ref 12.3–15.4)
FERRITIN SERPL-MCNC: 93.85 NG/ML (ref 30–400)
FOLATE SERPL-MCNC: 18.3 NG/ML (ref 4.78–24.2)
GLOBULIN UR ELPH-MCNC: 2.9 GM/DL
GLUCOSE SERPL-MCNC: 109 MG/DL (ref 65–99)
HCT VFR BLD AUTO: 40.5 % (ref 37.5–51)
HDLC SERPL-MCNC: 127 MG/DL (ref 40–60)
HGB BLD-MCNC: 13.6 G/DL (ref 13–17.7)
HOLD SPECIMEN: NORMAL
HOLD SPECIMEN: NORMAL
IMM GRANULOCYTES # BLD AUTO: 0.02 10*3/MM3 (ref 0–0.05)
IMM GRANULOCYTES NFR BLD AUTO: 0.3 % (ref 0–0.5)
IRON 24H UR-MRATE: 179 MCG/DL (ref 59–158)
IRON SATN MFR SERPL: 41 % (ref 20–50)
LDLC SERPL CALC-MCNC: 103 MG/DL (ref 0–100)
LDLC/HDLC SERPL: 0.8 {RATIO}
LYMPHOCYTES # BLD AUTO: 1.65 10*3/MM3 (ref 0.7–3.1)
LYMPHOCYTES NFR BLD AUTO: 28.4 % (ref 19.6–45.3)
MCH RBC QN AUTO: 32.2 PG (ref 26.6–33)
MCHC RBC AUTO-ENTMCNC: 33.6 G/DL (ref 31.5–35.7)
MCV RBC AUTO: 96 FL (ref 79–97)
MONOCYTES # BLD AUTO: 0.67 10*3/MM3 (ref 0.1–0.9)
MONOCYTES NFR BLD AUTO: 11.5 % (ref 5–12)
NEUTROPHILS NFR BLD AUTO: 3.34 10*3/MM3 (ref 1.7–7)
NEUTROPHILS NFR BLD AUTO: 57.6 % (ref 42.7–76)
NRBC BLD AUTO-RTO: 0 /100 WBC (ref 0–0.2)
PLATELET # BLD AUTO: 227 10*3/MM3 (ref 140–450)
PMV BLD AUTO: 9.2 FL (ref 6–12)
POTASSIUM SERPL-SCNC: 4.3 MMOL/L (ref 3.5–5.2)
PROT SERPL-MCNC: 7.3 G/DL (ref 6–8.5)
RBC # BLD AUTO: 4.22 10*6/MM3 (ref 4.14–5.8)
SODIUM SERPL-SCNC: 140 MMOL/L (ref 136–145)
TIBC SERPL-MCNC: 432 MCG/DL (ref 298–536)
TRANSFERRIN SERPL-MCNC: 290 MG/DL (ref 200–360)
TRIGL SERPL-MCNC: 70 MG/DL (ref 0–150)
TSH SERPL DL<=0.05 MIU/L-ACNC: 2.11 UIU/ML (ref 0.27–4.2)
VIT B12 BLD-MCNC: 554 PG/ML (ref 211–946)
VLDLC SERPL-MCNC: 12 MG/DL (ref 5–40)
WBC NRBC COR # BLD: 5.81 10*3/MM3 (ref 3.4–10.8)

## 2022-12-30 PROCEDURE — 3080F DIAST BP >= 90 MM HG: CPT | Performed by: INTERNAL MEDICINE

## 2022-12-30 PROCEDURE — 1159F MED LIST DOCD IN RCRD: CPT | Performed by: INTERNAL MEDICINE

## 2022-12-30 PROCEDURE — 3077F SYST BP >= 140 MM HG: CPT | Performed by: INTERNAL MEDICINE

## 2022-12-30 PROCEDURE — 1157F ADVNC CARE PLAN IN RCRD: CPT | Performed by: INTERNAL MEDICINE

## 2022-12-30 PROCEDURE — 83540 ASSAY OF IRON: CPT

## 2022-12-30 PROCEDURE — 84466 ASSAY OF TRANSFERRIN: CPT

## 2022-12-30 PROCEDURE — G0463 HOSPITAL OUTPT CLINIC VISIT: HCPCS | Performed by: INTERNAL MEDICINE

## 2022-12-30 PROCEDURE — 1160F RVW MEDS BY RX/DR IN RCRD: CPT | Performed by: INTERNAL MEDICINE

## 2022-12-30 PROCEDURE — 80061 LIPID PANEL: CPT | Performed by: NURSE PRACTITIONER

## 2022-12-30 PROCEDURE — 82607 VITAMIN B-12: CPT

## 2022-12-30 PROCEDURE — 82728 ASSAY OF FERRITIN: CPT

## 2022-12-30 PROCEDURE — 84443 ASSAY THYROID STIM HORMONE: CPT | Performed by: NURSE PRACTITIONER

## 2022-12-30 PROCEDURE — 99214 OFFICE O/P EST MOD 30 MIN: CPT | Performed by: INTERNAL MEDICINE

## 2022-12-30 PROCEDURE — 82746 ASSAY OF FOLIC ACID SERUM: CPT

## 2022-12-30 PROCEDURE — 1126F AMNT PAIN NOTED NONE PRSNT: CPT | Performed by: INTERNAL MEDICINE

## 2022-12-30 PROCEDURE — 85025 COMPLETE CBC W/AUTO DIFF WBC: CPT

## 2022-12-30 PROCEDURE — 80053 COMPREHEN METABOLIC PANEL: CPT

## 2022-12-30 NOTE — TELEPHONE ENCOUNTER
Per DARION Ignacio,  Mr. Zazueta has been called with recent lab results & recommendations.  Continue current medications and follow-up as planned or sooner if any problems.       ----- Message from DARION Reyes sent at 12/30/2022 10:08 AM CST -----  Can you let him know my labs for me look good except lipids are high -needs to eat better after the holidays otherwise will have to increase lipitor -otherwise thyroid is good   ----- Message -----  From: Lab, Background User  Sent: 12/30/2022   8:45 AM CST  To: DARION Reyes

## 2023-01-03 ENCOUNTER — TELEPHONE (OUTPATIENT)
Dept: ONCOLOGY | Facility: HOSPITAL | Age: 73
End: 2023-01-03
Payer: MEDICARE

## 2023-01-03 NOTE — TELEPHONE ENCOUNTER
Contacted pt and advised per Dr Vega. PT verbalizes understanding and denies any further questions.

## 2023-01-24 RX ORDER — SUCRALFATE 1 G/1
1 TABLET ORAL 4 TIMES DAILY
Qty: 120 TABLET | Refills: 2 | Status: SHIPPED | OUTPATIENT
Start: 2023-01-24 | End: 2023-02-23

## 2023-01-27 DIAGNOSIS — Z79.01 CURRENT USE OF LONG TERM ANTICOAGULATION: ICD-10-CM

## 2023-01-27 RX ORDER — RIVAROXABAN 20 MG/1
TABLET, FILM COATED ORAL
Qty: 30 TABLET | Refills: 4 | Status: SHIPPED | OUTPATIENT
Start: 2023-01-27

## 2023-03-15 ENCOUNTER — LAB (OUTPATIENT)
Dept: LAB | Facility: HOSPITAL | Age: 73
End: 2023-03-15
Payer: MEDICARE

## 2023-03-15 ENCOUNTER — OFFICE VISIT (OUTPATIENT)
Dept: FAMILY MEDICINE CLINIC | Facility: CLINIC | Age: 73
End: 2023-03-15
Payer: MEDICARE

## 2023-03-15 VITALS
DIASTOLIC BLOOD PRESSURE: 90 MMHG | HEART RATE: 77 BPM | HEIGHT: 70 IN | WEIGHT: 190 LBS | OXYGEN SATURATION: 97 % | BODY MASS INDEX: 27.2 KG/M2 | SYSTOLIC BLOOD PRESSURE: 148 MMHG

## 2023-03-15 DIAGNOSIS — M25.50 ARTHRALGIA, UNSPECIFIED JOINT: ICD-10-CM

## 2023-03-15 DIAGNOSIS — R53.83 MALAISE AND FATIGUE: ICD-10-CM

## 2023-03-15 DIAGNOSIS — I10 PRIMARY HYPERTENSION: Primary | ICD-10-CM

## 2023-03-15 DIAGNOSIS — Z12.5 SPECIAL SCREENING, PROSTATE CANCER: ICD-10-CM

## 2023-03-15 DIAGNOSIS — J06.9 UPPER RESPIRATORY TRACT INFECTION, UNSPECIFIED TYPE: ICD-10-CM

## 2023-03-15 DIAGNOSIS — R53.81 MALAISE AND FATIGUE: ICD-10-CM

## 2023-03-15 LAB
ALBUMIN SERPL-MCNC: 4.3 G/DL (ref 3.5–5.2)
ALBUMIN/GLOB SERPL: 1.7 G/DL
ALP SERPL-CCNC: 65 U/L (ref 39–117)
ALT SERPL W P-5'-P-CCNC: 34 U/L (ref 1–41)
AMPHET+METHAMPHET UR QL: NEGATIVE
AMPHETAMINES UR QL: NEGATIVE
ANION GAP SERPL CALCULATED.3IONS-SCNC: 14 MMOL/L (ref 5–15)
AST SERPL-CCNC: 39 U/L (ref 1–40)
BARBITURATES UR QL SCN: NEGATIVE
BASOPHILS # BLD AUTO: 0.04 10*3/MM3 (ref 0–0.2)
BASOPHILS NFR BLD AUTO: 0.7 % (ref 0–1.5)
BENZODIAZ UR QL SCN: NEGATIVE
BILIRUB SERPL-MCNC: 0.5 MG/DL (ref 0–1.2)
BUN SERPL-MCNC: 13 MG/DL (ref 8–23)
BUN/CREAT SERPL: 16 (ref 7–25)
BUPRENORPHINE SERPL-MCNC: NEGATIVE NG/ML
CALCIUM SPEC-SCNC: 8.7 MG/DL (ref 8.6–10.5)
CANNABINOIDS SERPL QL: NEGATIVE
CHLORIDE SERPL-SCNC: 106 MMOL/L (ref 98–107)
CHOLEST SERPL-MCNC: 224 MG/DL (ref 0–200)
CO2 SERPL-SCNC: 23 MMOL/L (ref 22–29)
COCAINE UR QL: NEGATIVE
CREAT SERPL-MCNC: 0.81 MG/DL (ref 0.76–1.27)
DEPRECATED RDW RBC AUTO: 45.4 FL (ref 37–54)
EGFRCR SERPLBLD CKD-EPI 2021: 94.3 ML/MIN/1.73
EOSINOPHIL # BLD AUTO: 0.09 10*3/MM3 (ref 0–0.4)
EOSINOPHIL NFR BLD AUTO: 1.6 % (ref 0.3–6.2)
ERYTHROCYTE [DISTWIDTH] IN BLOOD BY AUTOMATED COUNT: 13 % (ref 12.3–15.4)
EXPIRATION DATE: NORMAL
GLOBULIN UR ELPH-MCNC: 2.5 GM/DL
GLUCOSE SERPL-MCNC: 93 MG/DL (ref 65–99)
HCT VFR BLD AUTO: 36.5 % (ref 37.5–51)
HDLC SERPL-MCNC: 108 MG/DL (ref 40–60)
HGB BLD-MCNC: 12.9 G/DL (ref 13–17.7)
IMM GRANULOCYTES # BLD AUTO: 0.03 10*3/MM3 (ref 0–0.05)
IMM GRANULOCYTES NFR BLD AUTO: 0.5 % (ref 0–0.5)
INTERNAL CONTROL: NORMAL
IRON 24H UR-MRATE: 104 MCG/DL (ref 59–158)
LDLC SERPL CALC-MCNC: 101 MG/DL (ref 0–100)
LDLC/HDLC SERPL: 0.91 {RATIO}
LYMPHOCYTES # BLD AUTO: 1.14 10*3/MM3 (ref 0.7–3.1)
LYMPHOCYTES NFR BLD AUTO: 19.9 % (ref 19.6–45.3)
Lab: NORMAL
MCH RBC QN AUTO: 34 PG (ref 26.6–33)
MCHC RBC AUTO-ENTMCNC: 35.3 G/DL (ref 31.5–35.7)
MCV RBC AUTO: 96.3 FL (ref 79–97)
METHADONE UR QL SCN: NEGATIVE
MONOCYTES # BLD AUTO: 0.62 10*3/MM3 (ref 0.1–0.9)
MONOCYTES NFR BLD AUTO: 10.8 % (ref 5–12)
NEUTROPHILS NFR BLD AUTO: 3.82 10*3/MM3 (ref 1.7–7)
NEUTROPHILS NFR BLD AUTO: 66.5 % (ref 42.7–76)
NRBC BLD AUTO-RTO: 0 /100 WBC (ref 0–0.2)
OPIATES UR QL: NEGATIVE
OXYCODONE UR QL SCN: NEGATIVE
PCP UR QL SCN: NEGATIVE
PLATELET # BLD AUTO: 185 10*3/MM3 (ref 140–450)
PMV BLD AUTO: 10 FL (ref 6–12)
POTASSIUM SERPL-SCNC: 4.1 MMOL/L (ref 3.5–5.2)
PROPOXYPH UR QL: NEGATIVE
PROT SERPL-MCNC: 6.8 G/DL (ref 6–8.5)
PSA SERPL-MCNC: 2.34 NG/ML (ref 0–4)
RBC # BLD AUTO: 3.79 10*6/MM3 (ref 4.14–5.8)
SARS-COV-2 AG UPPER RESP QL IA.RAPID: NOT DETECTED
SODIUM SERPL-SCNC: 143 MMOL/L (ref 136–145)
TRICYCLICS UR QL SCN: NEGATIVE
TRIGL SERPL-MCNC: 86 MG/DL (ref 0–150)
TSH SERPL DL<=0.05 MIU/L-ACNC: 1.32 UIU/ML (ref 0.27–4.2)
VIT B12 BLD-MCNC: 514 PG/ML (ref 211–946)
VLDLC SERPL-MCNC: 15 MG/DL (ref 5–40)
WBC NRBC COR # BLD: 5.74 10*3/MM3 (ref 3.4–10.8)

## 2023-03-15 PROCEDURE — 99214 OFFICE O/P EST MOD 30 MIN: CPT | Performed by: NURSE PRACTITIONER

## 2023-03-15 PROCEDURE — 82607 VITAMIN B-12: CPT | Performed by: NURSE PRACTITIONER

## 2023-03-15 PROCEDURE — 80053 COMPREHEN METABOLIC PANEL: CPT | Performed by: NURSE PRACTITIONER

## 2023-03-15 PROCEDURE — 83540 ASSAY OF IRON: CPT | Performed by: NURSE PRACTITIONER

## 2023-03-15 PROCEDURE — 80061 LIPID PANEL: CPT | Performed by: NURSE PRACTITIONER

## 2023-03-15 PROCEDURE — G0103 PSA SCREENING: HCPCS | Performed by: NURSE PRACTITIONER

## 2023-03-15 PROCEDURE — 3080F DIAST BP >= 90 MM HG: CPT | Performed by: NURSE PRACTITIONER

## 2023-03-15 PROCEDURE — 1159F MED LIST DOCD IN RCRD: CPT | Performed by: NURSE PRACTITIONER

## 2023-03-15 PROCEDURE — 80306 DRUG TEST PRSMV INSTRMNT: CPT | Performed by: NURSE PRACTITIONER

## 2023-03-15 PROCEDURE — 36415 COLL VENOUS BLD VENIPUNCTURE: CPT | Performed by: NURSE PRACTITIONER

## 2023-03-15 PROCEDURE — 84443 ASSAY THYROID STIM HORMONE: CPT | Performed by: NURSE PRACTITIONER

## 2023-03-15 PROCEDURE — 87426 SARSCOV CORONAVIRUS AG IA: CPT | Performed by: NURSE PRACTITIONER

## 2023-03-15 PROCEDURE — 96372 THER/PROPH/DIAG INJ SC/IM: CPT | Performed by: NURSE PRACTITIONER

## 2023-03-15 PROCEDURE — 3077F SYST BP >= 140 MM HG: CPT | Performed by: NURSE PRACTITIONER

## 2023-03-15 PROCEDURE — 85025 COMPLETE CBC W/AUTO DIFF WBC: CPT | Performed by: NURSE PRACTITIONER

## 2023-03-15 RX ORDER — TRIAMCINOLONE ACETONIDE 40 MG/ML
80 INJECTION, SUSPENSION INTRA-ARTICULAR; INTRAMUSCULAR ONCE
Status: COMPLETED | OUTPATIENT
Start: 2023-03-15 | End: 2023-03-15

## 2023-03-15 RX ADMIN — TRIAMCINOLONE ACETONIDE 80 MG: 40 INJECTION, SUSPENSION INTRA-ARTICULAR; INTRAMUSCULAR at 09:42

## 2023-03-21 ENCOUNTER — LAB (OUTPATIENT)
Dept: ONCOLOGY | Facility: HOSPITAL | Age: 73
End: 2023-03-21
Payer: MEDICARE

## 2023-03-21 ENCOUNTER — OFFICE VISIT (OUTPATIENT)
Dept: ONCOLOGY | Facility: CLINIC | Age: 73
End: 2023-03-21
Payer: MEDICARE

## 2023-03-21 ENCOUNTER — TELEPHONE (OUTPATIENT)
Dept: FAMILY MEDICINE CLINIC | Facility: CLINIC | Age: 73
End: 2023-03-21
Payer: MEDICARE

## 2023-03-21 ENCOUNTER — OFFICE VISIT (OUTPATIENT)
Dept: GASTROENTEROLOGY | Facility: CLINIC | Age: 73
End: 2023-03-21
Payer: MEDICARE

## 2023-03-21 VITALS
BODY MASS INDEX: 26.51 KG/M2 | HEART RATE: 71 BPM | HEIGHT: 70 IN | DIASTOLIC BLOOD PRESSURE: 93 MMHG | SYSTOLIC BLOOD PRESSURE: 161 MMHG | WEIGHT: 185.2 LBS

## 2023-03-21 VITALS
WEIGHT: 186 LBS | HEART RATE: 69 BPM | SYSTOLIC BLOOD PRESSURE: 180 MMHG | OXYGEN SATURATION: 100 % | DIASTOLIC BLOOD PRESSURE: 97 MMHG | BODY MASS INDEX: 26.69 KG/M2 | TEMPERATURE: 97.1 F

## 2023-03-21 DIAGNOSIS — D50.9 IRON DEFICIENCY ANEMIA, UNSPECIFIED IRON DEFICIENCY ANEMIA TYPE: ICD-10-CM

## 2023-03-21 DIAGNOSIS — R79.89 ELEVATED LFTS: ICD-10-CM

## 2023-03-21 DIAGNOSIS — I26.99 PULMONARY EMBOLISM, UNSPECIFIED CHRONICITY, UNSPECIFIED PULMONARY EMBOLISM TYPE, UNSPECIFIED WHETHER ACUTE COR PULMONALE PRESENT: ICD-10-CM

## 2023-03-21 DIAGNOSIS — K22.70 BARRETT'S ESOPHAGUS WITHOUT DYSPLASIA: Primary | ICD-10-CM

## 2023-03-21 DIAGNOSIS — I26.99 PULMONARY EMBOLISM, UNSPECIFIED CHRONICITY, UNSPECIFIED PULMONARY EMBOLISM TYPE, UNSPECIFIED WHETHER ACUTE COR PULMONALE PRESENT: Primary | Chronic | ICD-10-CM

## 2023-03-21 DIAGNOSIS — D50.9 IRON DEFICIENCY ANEMIA, UNSPECIFIED IRON DEFICIENCY ANEMIA TYPE: Chronic | ICD-10-CM

## 2023-03-21 LAB
ALBUMIN SERPL-MCNC: 4.8 G/DL (ref 3.5–5.2)
ALBUMIN/GLOB SERPL: 1.7 G/DL
ALP SERPL-CCNC: 69 U/L (ref 39–117)
ALT SERPL W P-5'-P-CCNC: 29 U/L (ref 1–41)
ANION GAP SERPL CALCULATED.3IONS-SCNC: 12 MMOL/L (ref 5–15)
AST SERPL-CCNC: 25 U/L (ref 1–40)
BASOPHILS # BLD AUTO: 0.03 10*3/MM3 (ref 0–0.2)
BASOPHILS NFR BLD AUTO: 0.4 % (ref 0–1.5)
BILIRUB SERPL-MCNC: 0.7 MG/DL (ref 0–1.2)
BUN SERPL-MCNC: 29 MG/DL (ref 8–23)
BUN/CREAT SERPL: 35.8 (ref 7–25)
CALCIUM SPEC-SCNC: 9.4 MG/DL (ref 8.6–10.5)
CHLORIDE SERPL-SCNC: 103 MMOL/L (ref 98–107)
CO2 SERPL-SCNC: 25 MMOL/L (ref 22–29)
CREAT SERPL-MCNC: 0.81 MG/DL (ref 0.76–1.27)
DEPRECATED RDW RBC AUTO: 47.9 FL (ref 37–54)
EGFRCR SERPLBLD CKD-EPI 2021: 94.3 ML/MIN/1.73
EOSINOPHIL # BLD AUTO: 0.01 10*3/MM3 (ref 0–0.4)
EOSINOPHIL NFR BLD AUTO: 0.1 % (ref 0.3–6.2)
ERYTHROCYTE [DISTWIDTH] IN BLOOD BY AUTOMATED COUNT: 13.6 % (ref 12.3–15.4)
FERRITIN SERPL-MCNC: 108.9 NG/ML (ref 30–400)
FOLATE SERPL-MCNC: 9.42 NG/ML (ref 4.78–24.2)
GLOBULIN UR ELPH-MCNC: 2.9 GM/DL
GLUCOSE SERPL-MCNC: 103 MG/DL (ref 65–99)
HCT VFR BLD AUTO: 41.6 % (ref 37.5–51)
HGB BLD-MCNC: 14.1 G/DL (ref 13–17.7)
IMM GRANULOCYTES # BLD AUTO: 0.12 10*3/MM3 (ref 0–0.05)
IMM GRANULOCYTES NFR BLD AUTO: 1.5 % (ref 0–0.5)
IRON 24H UR-MRATE: 159 MCG/DL (ref 59–158)
IRON SATN MFR SERPL: 33 % (ref 20–50)
LYMPHOCYTES # BLD AUTO: 1.52 10*3/MM3 (ref 0.7–3.1)
LYMPHOCYTES NFR BLD AUTO: 18.6 % (ref 19.6–45.3)
MCH RBC QN AUTO: 32.3 PG (ref 26.6–33)
MCHC RBC AUTO-ENTMCNC: 33.9 G/DL (ref 31.5–35.7)
MCV RBC AUTO: 95.4 FL (ref 79–97)
MONOCYTES # BLD AUTO: 0.86 10*3/MM3 (ref 0.1–0.9)
MONOCYTES NFR BLD AUTO: 10.5 % (ref 5–12)
NEUTROPHILS NFR BLD AUTO: 5.63 10*3/MM3 (ref 1.7–7)
NEUTROPHILS NFR BLD AUTO: 68.9 % (ref 42.7–76)
NRBC BLD AUTO-RTO: 0 /100 WBC (ref 0–0.2)
PLATELET # BLD AUTO: 221 10*3/MM3 (ref 140–450)
PMV BLD AUTO: 9.3 FL (ref 6–12)
POTASSIUM SERPL-SCNC: 4.2 MMOL/L (ref 3.5–5.2)
PROT SERPL-MCNC: 7.7 G/DL (ref 6–8.5)
RBC # BLD AUTO: 4.36 10*6/MM3 (ref 4.14–5.8)
SODIUM SERPL-SCNC: 140 MMOL/L (ref 136–145)
TIBC SERPL-MCNC: 481 MCG/DL (ref 298–536)
TRANSFERRIN SERPL-MCNC: 323 MG/DL (ref 200–360)
VIT B12 BLD-MCNC: 454 PG/ML (ref 211–946)
WBC NRBC COR # BLD: 8.17 10*3/MM3 (ref 3.4–10.8)

## 2023-03-21 PROCEDURE — 3077F SYST BP >= 140 MM HG: CPT | Performed by: INTERNAL MEDICINE

## 2023-03-21 PROCEDURE — 3080F DIAST BP >= 90 MM HG: CPT | Performed by: INTERNAL MEDICINE

## 2023-03-21 PROCEDURE — 82728 ASSAY OF FERRITIN: CPT

## 2023-03-21 PROCEDURE — 82746 ASSAY OF FOLIC ACID SERUM: CPT

## 2023-03-21 PROCEDURE — 83540 ASSAY OF IRON: CPT

## 2023-03-21 PROCEDURE — 80053 COMPREHEN METABOLIC PANEL: CPT

## 2023-03-21 PROCEDURE — 1159F MED LIST DOCD IN RCRD: CPT | Performed by: INTERNAL MEDICINE

## 2023-03-21 PROCEDURE — 82607 VITAMIN B-12: CPT

## 2023-03-21 PROCEDURE — 99214 OFFICE O/P EST MOD 30 MIN: CPT | Performed by: INTERNAL MEDICINE

## 2023-03-21 PROCEDURE — 84466 ASSAY OF TRANSFERRIN: CPT

## 2023-03-21 PROCEDURE — G0463 HOSPITAL OUTPT CLINIC VISIT: HCPCS | Performed by: INTERNAL MEDICINE

## 2023-03-21 PROCEDURE — 85025 COMPLETE CBC W/AUTO DIFF WBC: CPT

## 2023-03-21 PROCEDURE — 99213 OFFICE O/P EST LOW 20 MIN: CPT | Performed by: INTERNAL MEDICINE

## 2023-03-21 PROCEDURE — 1160F RVW MEDS BY RX/DR IN RCRD: CPT | Performed by: INTERNAL MEDICINE

## 2023-03-21 PROCEDURE — 1123F ACP DISCUSS/DSCN MKR DOCD: CPT | Performed by: INTERNAL MEDICINE

## 2023-03-21 PROCEDURE — 1126F AMNT PAIN NOTED NONE PRSNT: CPT | Performed by: INTERNAL MEDICINE

## 2023-03-21 RX ORDER — SODIUM CHLORIDE 9 MG/ML
40 INJECTION, SOLUTION INTRAVENOUS AS NEEDED
Status: CANCELLED | OUTPATIENT
Start: 2023-04-18

## 2023-03-21 RX ORDER — METHOCARBAMOL 750 MG/1
1 TABLET, FILM COATED ORAL 3 TIMES DAILY
COMMUNITY

## 2023-03-21 RX ORDER — ATORVASTATIN CALCIUM 40 MG/1
1 TABLET, FILM COATED ORAL DAILY
COMMUNITY
End: 2023-03-21

## 2023-03-21 RX ORDER — ALBUTEROL SULFATE 90 UG/1
2 AEROSOL, METERED RESPIRATORY (INHALATION) EVERY 4 HOURS PRN
COMMUNITY

## 2023-03-21 RX ORDER — TELMISARTAN 80 MG/1
1 TABLET ORAL DAILY
COMMUNITY

## 2023-03-21 RX ORDER — TIZANIDINE 2 MG/1
2 TABLET ORAL EVERY 8 HOURS PRN
COMMUNITY

## 2023-03-21 RX ORDER — SUCRALFATE 1 G/1
TABLET ORAL
COMMUNITY

## 2023-03-21 RX ORDER — DOXYCYCLINE HYCLATE 100 MG/1
1 CAPSULE ORAL 2 TIMES DAILY
COMMUNITY
End: 2023-03-21

## 2023-03-21 RX ORDER — FERROUS SULFATE 325(65) MG
1 TABLET ORAL
COMMUNITY

## 2023-03-21 RX ORDER — TOBRAMYCIN AND DEXAMETHASONE 3; 1 MG/ML; MG/ML
SUSPENSION/ DROPS OPHTHALMIC
COMMUNITY
Start: 2023-01-05

## 2023-03-21 RX ORDER — LOSARTAN POTASSIUM AND HYDROCHLOROTHIAZIDE 12.5; 1 MG/1; MG/1
1 TABLET ORAL DAILY
COMMUNITY
End: 2023-03-21

## 2023-03-21 RX ORDER — BACLOFEN 10 MG/1
1 TABLET ORAL 3 TIMES DAILY
COMMUNITY

## 2023-03-21 RX ORDER — OMEPRAZOLE 40 MG/1
1 CAPSULE, DELAYED RELEASE ORAL DAILY
COMMUNITY

## 2023-03-21 RX ORDER — POTASSIUM CHLORIDE 750 MG/1
4 CAPSULE, EXTENDED RELEASE ORAL DAILY
COMMUNITY

## 2023-03-21 RX ORDER — WARFARIN SODIUM 7.5 MG/1
TABLET ORAL
COMMUNITY
End: 2023-03-21

## 2023-03-21 RX ORDER — DEXTROSE AND SODIUM CHLORIDE 5; .45 G/100ML; G/100ML
30 INJECTION, SOLUTION INTRAVENOUS CONTINUOUS PRN
Status: CANCELLED | OUTPATIENT
Start: 2023-04-18

## 2023-03-21 NOTE — PROGRESS NOTES
DATE OF VISIT: 3/21/2023      REASON FOR VISIT: DVT and pulmonary embolism on Xarelto, anemia, elevated homocystine      HISTORY OF PRESENT ILLNESS:   72-year-old male with medical problem consisting of hypertension, dyslipidemia, bilateral pulmonary embolism and DVT initially diagnosed in February 2019, right upper extremity DVT diagnosed in July 2021 for which patient is currently on Xarelto is here for follow-up appointment today.  Complains of fatigue.  Denies any bleeding.  Complains of arthralgia.  Denies any chest pain or shortness of breath.  Denies any new lymph node enlargement              Past Medical History, Past Surgical History, Social History, Family History have been reviewed and are without significant changes except as mentioned.    Review of Systems   A comprehensive 14 point review of systems was performed and was negative except as mentioned in HPI.    Medications:  The current medication list was reviewed in the EMR    ALLERGIES:    Allergies   Allergen Reactions   • Sulfa Antibiotics Rash     Other reaction(s): Rash  Severe blisters   • Sulfamethoxazole-Trimethoprim Swelling     Other reaction(s): Rash  Severe blisters   • Clindamycin/Lincomycin Hives   • Codeine Nausea Only       Objective      Vitals:    03/21/23 0939   BP: 180/97   Pulse: 69   Temp: 97.1 °F (36.2 °C)   TempSrc: Temporal   SpO2: 100%   Weight: 84.4 kg (186 lb)   PainSc: 0-No pain     Current Status 6/22/2021   ECOG score 0       Physical Exam  Pulmonary:      Breath sounds: Normal breath sounds.   Neurological:      Mental Status: He is alert and oriented to person, place, and time.           RECENT LABS:  Glucose   Date Value Ref Range Status   03/21/2023 103 (H) 65 - 99 mg/dL Final     Sodium   Date Value Ref Range Status   03/21/2023 140 136 - 145 mmol/L Final     Potassium   Date Value Ref Range Status   03/21/2023 4.2 3.5 - 5.2 mmol/L Final     CO2   Date Value Ref Range Status   03/21/2023 25.0 22.0 - 29.0 mmol/L  Final     Chloride   Date Value Ref Range Status   03/21/2023 103 98 - 107 mmol/L Final     Anion Gap   Date Value Ref Range Status   03/21/2023 12.0 5.0 - 15.0 mmol/L Final     Creatinine   Date Value Ref Range Status   03/21/2023 0.81 0.76 - 1.27 mg/dL Final     BUN   Date Value Ref Range Status   03/21/2023 29 (H) 8 - 23 mg/dL Final     BUN/Creatinine Ratio   Date Value Ref Range Status   03/21/2023 35.8 (H) 7.0 - 25.0 Final     Calcium   Date Value Ref Range Status   03/21/2023 9.4 8.6 - 10.5 mg/dL Final     eGFR Non  Amer   Date Value Ref Range Status   12/28/2021 74 >60 mL/min/1.73 Final     Alkaline Phosphatase   Date Value Ref Range Status   03/21/2023 69 39 - 117 U/L Final     Total Protein   Date Value Ref Range Status   03/21/2023 7.7 6.0 - 8.5 g/dL Final     ALT (SGPT)   Date Value Ref Range Status   03/21/2023 29 1 - 41 U/L Final     AST (SGOT)   Date Value Ref Range Status   03/21/2023 25 1 - 40 U/L Final     Total Bilirubin   Date Value Ref Range Status   03/21/2023 0.7 0.0 - 1.2 mg/dL Final     Albumin   Date Value Ref Range Status   03/21/2023 4.8 3.5 - 5.2 g/dL Final     Globulin   Date Value Ref Range Status   03/21/2023 2.9 gm/dL Final     Lab Results   Component Value Date    WBC 8.17 03/21/2023    HGB 14.1 03/21/2023    HCT 41.6 03/21/2023    MCV 95.4 03/21/2023     03/21/2023     Lab Results   Component Value Date    NEUTROABS 5.63 03/21/2023    IRON 159 (H) 03/21/2023    IRON 104 03/15/2023    IRON 179 (H) 12/30/2022    TIBC 481 03/21/2023    TIBC 432 12/30/2022    TIBC 328 09/23/2022    LABIRON 33 03/21/2023    LABIRON 41 12/30/2022    LABIRON 21 09/23/2022    FERRITIN 108.90 03/21/2023    FERRITIN 93.85 12/30/2022    FERRITIN 114.50 09/23/2022    QNUEVVYA07 514 03/15/2023    IOKNAHGL34 554 12/30/2022    JBJPZISU99 563 09/23/2022    FOLATE 18.30 12/30/2022    FOLATE >20.00 09/23/2022    FOLATE >20.00 06/24/2022     No results found for: , LABCA2, AFPTM, HCGQUANT, ,  CHROMGRNA, [id number], CEA, REFLABREPO      PATHOLOGY:  * Cannot find OR log *         RADIOLOGY DATA :  No radiology results for the last 7 days        Assessment & Plan     1.  Bilateral lower extremity DVT, pulmonary embolism, right upper extremity DVT  - Patient was initially diagnosed with bilateral lower extremity DVT and bilateral pulmonary embolism in February 2019.  - Hypercoagulable work-up was negative except for elevated homocystine level in February 2019  - Patient was on Coumadin between February 2019 until February 22, 2021.  - Due to second episode of DVT involving right upper extremity patient has been on Xarelto since July 2021  - Recommend continuing with Xarelto for now.  - Patient has been instructed to come to emergency room if he starts having any bleeding.  - Patient will return to clinic in 3 months with repeat CBC, CMP, iron studies, ferritin, B12 and folate to be done on that day.    2.  Anemia:  - Hemoglobin is 14.1.  - Currently on B12 and folic acid 3 times a week.  Folate level is decreased to 9, B12 level is decreased to 454.  Recommend increasing B12 and folic acid to 1 tablet p.o. daily.  Prescription for B12 and folic acid has been sent to his pharmacy today.    3.  History of peptic ulcer disease    4.  Abnormal iron studies:  - Iron level is 159 today.  Iron saturation is normal at 33%.  Ferritin is 108.  I believe iron level is artificially elevated.  - Recommend not to take any iron supplementation at present  - We will recheck iron studies upon next clinic visit in 3 months    5.  Health maintenance: Patient does not smoke.  Had a colonoscopy in 2018    6.  Advance care planning:  - CODE STATUS and resuscitation were discussed with patient today.  Patient remains full code.  Patient has living will on chart.    7.  Prescriptions: Patient has enough prescription for Xarelto             PHQ-9 Total Score: 0   -Patient is not homicidal or suicidal.  No acute intervention  required.    Jose Zazueta reports a pain score of 0.  Given his pain assessment as noted, treatment options were discussed and the following options were decided upon as a follow-up plan to address the patient's pain: continuation of current treatment plan for pain.         Dickson Vega MD  3/21/2023  17:48 CDT        Part of this note may be an electronic transcription/translation of spoken language to printed text using the Dragon Dictation System.          CC:

## 2023-03-21 NOTE — TELEPHONE ENCOUNTER
Per DARION Ignacio, Mr. Zazueta has been called with recent lab results & recommendations.  Continue current medications and follow-up as planned or sooner if any problems.       ----- Message from DARION Reyes sent at 3/16/2023  7:48 AM CDT -----  Can you let him know his labs are stable, make sure he is taking his statin drug and eating and drinking healthy-cholesterol is up and that can cause build up in arteries

## 2023-03-22 ENCOUNTER — TELEPHONE (OUTPATIENT)
Dept: ONCOLOGY | Facility: HOSPITAL | Age: 73
End: 2023-03-22
Payer: MEDICARE

## 2023-03-22 DIAGNOSIS — E78.2 MIXED HYPERLIPIDEMIA: ICD-10-CM

## 2023-03-22 RX ORDER — FOLIC ACID 1 MG/1
1 TABLET ORAL DAILY
Qty: 90 TABLET | Refills: 1 | Status: SHIPPED | OUTPATIENT
Start: 2023-03-22

## 2023-03-22 RX ORDER — LANOLIN ALCOHOL/MO/W.PET/CERES
1000 CREAM (GRAM) TOPICAL DAILY
Qty: 90 TABLET | Refills: 1 | Status: SHIPPED | OUTPATIENT
Start: 2023-03-22

## 2023-03-22 NOTE — TELEPHONE ENCOUNTER
Called and spoke with pt regarding lab results and medication instructions as per Dr. Vega, v/u obtained.

## 2023-03-22 NOTE — TELEPHONE ENCOUNTER
----- Message from Dickson Vega MD sent at 3/22/2023  6:45 AM CDT -----  Please let patient know, B12 and folate level is going lower in blood.  Recommend increasing B12 and folic acid to 1 tablet p.o. daily.  Prescription for B12 and folic acid has been sent to his pharmacy today.  Thank you

## 2023-03-23 RX ORDER — ATORVASTATIN CALCIUM 40 MG/1
40 TABLET, FILM COATED ORAL DAILY
Qty: 90 TABLET | Refills: 0 | Status: SHIPPED | OUTPATIENT
Start: 2023-03-23
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
## 2023-04-18 ENCOUNTER — ANESTHESIA EVENT (OUTPATIENT)
Dept: GASTROENTEROLOGY | Facility: HOSPITAL | Age: 73
End: 2023-04-18
Payer: MEDICARE

## 2023-04-18 ENCOUNTER — ANESTHESIA (OUTPATIENT)
Dept: GASTROENTEROLOGY | Facility: HOSPITAL | Age: 73
End: 2023-04-18
Payer: MEDICARE

## 2023-04-18 ENCOUNTER — HOSPITAL ENCOUNTER (OUTPATIENT)
Facility: HOSPITAL | Age: 73
Setting detail: HOSPITAL OUTPATIENT SURGERY
Discharge: HOME | End: 2023-04-18
Attending: INTERNAL MEDICINE | Admitting: INTERNAL MEDICINE
Payer: MEDICARE

## 2023-04-18 VITALS
HEART RATE: 89 BPM | OXYGEN SATURATION: 96 % | WEIGHT: 175.8 LBS | BODY MASS INDEX: 25.17 KG/M2 | TEMPERATURE: 97.1 F | SYSTOLIC BLOOD PRESSURE: 101 MMHG | HEIGHT: 70 IN | RESPIRATION RATE: 18 BRPM | DIASTOLIC BLOOD PRESSURE: 61 MMHG

## 2023-04-18 DIAGNOSIS — D50.9 IRON DEFICIENCY ANEMIA, UNSPECIFIED IRON DEFICIENCY ANEMIA TYPE: ICD-10-CM

## 2023-04-18 DIAGNOSIS — K22.70 BARRETT'S ESOPHAGUS WITHOUT DYSPLASIA: ICD-10-CM

## 2023-04-18 PROCEDURE — 25010000002 PROPOFOL 10 MG/ML EMULSION

## 2023-04-18 RX ORDER — PROPOFOL 10 MG/ML
VIAL (ML) INTRAVENOUS AS NEEDED
Status: DISCONTINUED | OUTPATIENT
Start: 2023-04-18 | End: 2023-04-18 | Stop reason: SURG

## 2023-04-18 RX ORDER — DEXTROSE AND SODIUM CHLORIDE 5; .45 G/100ML; G/100ML
30 INJECTION, SOLUTION INTRAVENOUS CONTINUOUS PRN
Status: DISCONTINUED | OUTPATIENT
Start: 2023-04-18 | End: 2023-04-18 | Stop reason: HOSPADM

## 2023-04-18 RX ORDER — LIDOCAINE HYDROCHLORIDE 20 MG/ML
INJECTION, SOLUTION INTRAVENOUS AS NEEDED
Status: DISCONTINUED | OUTPATIENT
Start: 2023-04-18 | End: 2023-04-18 | Stop reason: SURG

## 2023-04-18 RX ORDER — SODIUM CHLORIDE 9 MG/ML
40 INJECTION, SOLUTION INTRAVENOUS AS NEEDED
Status: DISCONTINUED | OUTPATIENT
Start: 2023-04-18 | End: 2023-04-18 | Stop reason: HOSPADM

## 2023-04-18 RX ADMIN — PROPOFOL 40 MG: 10 INJECTION, EMULSION INTRAVENOUS at 13:13

## 2023-04-18 RX ADMIN — LIDOCAINE HYDROCHLORIDE 50 MG: 20 INJECTION, SOLUTION INTRAVENOUS at 13:12

## 2023-04-18 RX ADMIN — PROPOFOL 20 MG: 10 INJECTION, EMULSION INTRAVENOUS at 13:19

## 2023-04-18 RX ADMIN — PROPOFOL 20 MG: 10 INJECTION, EMULSION INTRAVENOUS at 13:14

## 2023-04-18 RX ADMIN — PROPOFOL 80 MG: 10 INJECTION, EMULSION INTRAVENOUS at 13:12

## 2023-04-18 RX ADMIN — DEXTROSE AND SODIUM CHLORIDE 30 ML/HR: 5; 450 INJECTION, SOLUTION INTRAVENOUS at 12:33

## 2023-04-18 RX ADMIN — PROPOFOL 20 MG: 10 INJECTION, EMULSION INTRAVENOUS at 13:21

## 2023-04-18 RX ADMIN — PROPOFOL 20 MG: 10 INJECTION, EMULSION INTRAVENOUS at 13:17

## 2023-04-18 RX ADMIN — PROPOFOL 20 MG: 10 INJECTION, EMULSION INTRAVENOUS at 13:16

## 2023-04-18 RX ADMIN — PROPOFOL 20 MG: 10 INJECTION, EMULSION INTRAVENOUS at 13:18

## 2023-04-18 RX ADMIN — GLYCOPYRROLATE 0.1 MG: 0.2 INJECTION, SOLUTION INTRAMUSCULAR; INTRAVITREAL at 13:11

## 2023-04-18 NOTE — ANESTHESIA POSTPROCEDURE EVALUATION
Patient: Jose Zazueta    Procedure Summary     Date: 04/18/23 Room / Location: [facility] ENDOSCOPY 2 / [facility] ENDOSCOPY    Anesthesia Start: 1309 Anesthesia Stop: 1323    Procedures:       ESOPHAGOGASTRODUODENOSCOPY      COLONOSCOPY Diagnosis:       Ferro's esophagus without dysplasia      Iron deficiency anemia, unspecified iron deficiency anemia type      (Ferro's esophagus without dysplasia [K22.70])      (Iron deficiency anemia, unspecified iron deficiency anemia type [D50.9])    Surgeons: Lali Fraser MD Provider: Malinda Garduno CRNA    Anesthesia Type: general ASA Status: 3          Anesthesia Type: general    Vitals  No vitals data found for the desired time range.          Post Anesthesia Care and Evaluation    Patient location during evaluation: bedside  Patient participation: complete - patient participated  Level of consciousness: awake and alert  Pain management: adequate    Airway patency: patent  Anesthetic complications: No anesthetic complications  PONV Status: none  Cardiovascular status: acceptable  Respiratory status: acceptable  Hydration status: acceptable    Comments: ---------------------------               04/18/23                      1220         ---------------------------   BP:          152/90         Pulse:         100          Resp:          20           Temp:   98.9 °F (37.2 °C)   SpO2:          95%         ---------------------------

## 2023-04-18 NOTE — ANESTHESIA PREPROCEDURE EVALUATION
Anesthesia Evaluation     Patient summary reviewed and Nursing notes reviewed   history of anesthetic complications: PONV  NPO Solid Status: > 8 hours  NPO Liquid Status: > 2 hours           Airway   Mallampati: II  TM distance: >3 FB  Neck ROM: full  No difficulty expected  Dental    (+) partials and poor dentition    Pulmonary - normal exam    breath sounds clear to auscultation  (+) pulmonary embolism, a smoker Former,   Cardiovascular - normal exam  Exercise tolerance: good (4-7 METS)    ECG reviewed  PT is on anticoagulation therapy  Rhythm: regular  Rate: normal    (+) hypertension well controlled 2 medications or greater, DVT resolved, hyperlipidemia,     ROS comment: 7/21 ekg:  Vent. Rate :  73 BPM     Atrial Rate :  73 BPM     P-R Int : 182 ms          QRS Dur : 118 ms      QT Int : 430 ms       P-R-T Axes :  83  21  54 degrees     QTc Int : 473 ms     Sinus rhythm with Premature atrial complexes  Incomplete right bundle branch block  Borderline ECG  When compared with ECG of 02-DEC-2019 12:55,  Premature atrial complexes are now Present  Questionable change in QRS axis  QT has lengthened    2/19 echo:  Estimated EF appears to be in the range of 56 - 60%.  ? Right ventricular cavity is mildly dilated.  ? Left ventricular wall thickness is consistent with borderline concentric hypertrophy.  ? Mild mitral valve regurgitation is present  ? Mild to moderate tricuspid valve regurgitation is present.      Neuro/Psych- negative ROS  GI/Hepatic/Renal/Endo    (+)  GERD well controlled, PUD,      Musculoskeletal     (+) neck pain,       ROS comment: Right foot surgery in the past and now working on left  Abdominal  - normal exam   Substance History   (+) alcohol use (daily beer),      OB/GYN          Other   arthritis,    history of cancer (skin)                    Anesthesia Plan    ASA 3     general   total IV anesthesia  intravenous induction     Anesthetic plan, risks, benefits, and alternatives have been  provided, discussed and informed consent has been obtained with: patient.    Plan discussed with CRNA.

## 2023-04-19 LAB — REF LAB TEST METHOD: NORMAL

## 2023-04-25 ENCOUNTER — OFFICE VISIT (OUTPATIENT)
Dept: GASTROENTEROLOGY | Facility: CLINIC | Age: 73
End: 2023-04-25
Payer: MEDICARE

## 2023-04-25 VITALS
WEIGHT: 185 LBS | HEIGHT: 70 IN | HEART RATE: 82 BPM | SYSTOLIC BLOOD PRESSURE: 162 MMHG | DIASTOLIC BLOOD PRESSURE: 96 MMHG | BODY MASS INDEX: 26.48 KG/M2

## 2023-04-25 DIAGNOSIS — K22.70 BARRETT'S ESOPHAGUS WITHOUT DYSPLASIA: Primary | ICD-10-CM

## 2023-04-25 DIAGNOSIS — K21.9 GASTROESOPHAGEAL REFLUX DISEASE, UNSPECIFIED WHETHER ESOPHAGITIS PRESENT: ICD-10-CM

## 2023-04-25 RX ORDER — PROPRANOLOL HYDROCHLORIDE 10 MG/1
10 TABLET ORAL DAILY
Qty: 30 TABLET | Refills: 6 | COMMUNITY
Start: 2023-03-27 | End: 2023-09-24
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
## 2023-05-19 ENCOUNTER — OFFICE VISIT (OUTPATIENT)
Dept: PODIATRY | Facility: CLINIC | Age: 73
End: 2023-05-19
Payer: MEDICARE

## 2023-05-19 VITALS
OXYGEN SATURATION: 98 % | WEIGHT: 185 LBS | HEART RATE: 69 BPM | HEIGHT: 70 IN | DIASTOLIC BLOOD PRESSURE: 87 MMHG | BODY MASS INDEX: 26.48 KG/M2 | SYSTOLIC BLOOD PRESSURE: 134 MMHG

## 2023-05-19 DIAGNOSIS — M79.672 LEFT FOOT PAIN: ICD-10-CM

## 2023-05-19 DIAGNOSIS — S90.32XA CONTUSION OF LEFT FOOT, INITIAL ENCOUNTER: Primary | ICD-10-CM

## 2023-05-19 RX ORDER — SUCRALFATE 1 G/1
TABLET ORAL
Qty: 120 TABLET | Refills: 1 | Status: SHIPPED | OUTPATIENT
Start: 2023-05-19
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
## 2023-05-25 RX ORDER — AMLODIPINE BESYLATE 2.5 MG/1
TABLET ORAL
Qty: 30 TABLET | Refills: 10 | Status: SHIPPED | OUTPATIENT
Start: 2023-05-25

## 2023-06-05 RX ORDER — OMEPRAZOLE 40 MG/1
CAPSULE, DELAYED RELEASE ORAL
Qty: 30 CAPSULE | Refills: 7 | Status: SHIPPED | OUTPATIENT
Start: 2023-06-05

## 2023-06-06 ENCOUNTER — OFFICE VISIT (OUTPATIENT)
Dept: PODIATRY | Facility: CLINIC | Age: 73
End: 2023-06-06
Payer: MEDICARE

## 2023-06-06 VITALS
WEIGHT: 185 LBS | HEIGHT: 70 IN | BODY MASS INDEX: 26.48 KG/M2 | SYSTOLIC BLOOD PRESSURE: 181 MMHG | OXYGEN SATURATION: 96 % | DIASTOLIC BLOOD PRESSURE: 95 MMHG | HEART RATE: 76 BPM

## 2023-06-06 DIAGNOSIS — S90.32XD CONTUSION OF LEFT FOOT, SUBSEQUENT ENCOUNTER: Primary | ICD-10-CM
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
## 2023-06-13 DIAGNOSIS — E78.2 MIXED HYPERLIPIDEMIA: ICD-10-CM

## 2023-06-13 RX ORDER — ATORVASTATIN CALCIUM 40 MG/1
40 TABLET, FILM COATED ORAL DAILY
Qty: 90 TABLET | Refills: 0 | Status: SHIPPED | OUTPATIENT
Start: 2023-06-13
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
## 2023-06-16 RX ORDER — DOXYCYCLINE HYCLATE 100 MG/1
100 CAPSULE ORAL 2 TIMES DAILY
Qty: 28 CAPSULE | Refills: 0 | Status: SHIPPED | OUTPATIENT
Start: 2023-06-16

## 2023-06-21 LAB — B BURGDOR IGG+IGM SER QL IA: NEGATIVE

## 2023-06-24 LAB
R RICKETTSI IGG SER QL IA: POSITIVE
R RICKETTSI IGG TITR SER IF: NORMAL {TITER}
R RICKETTSI IGM SER-ACNC: 0.21 INDEX (ref 0–0.89)

## 2023-07-31 NOTE — PROGRESS NOTES
Chief Complaint   Patient presents with   • Hypertension     3 month check up    • Head cold     Subjective   Jose Zazueta is a 72 y.o. male.           History of Present Illness  Presents with recheck of chronic conditions  Reports tremor and shaking worsening-bp has been stable at home     Hypertension  This is a chronic problem. The current episode started more than 1 year ago. The problem has been gradually improving since onset. The problem is controlled. Associated symptoms include malaise/fatigue. Pertinent negatives include no palpitations, PND or shortness of breath. Risk factors for coronary artery disease include dyslipidemia and male gender. Past treatments include calcium channel blockers, angiotensin blockers, diuretics and lifestyle changes. Current antihypertension treatment includes lifestyle changes, angiotensin blockers and diuretics. The current treatment provides significant improvement. Compliance problems include diet.  There is no history of angina, kidney disease, CAD/MI, CVA, heart failure, left ventricular hypertrophy, PVD or retinopathy. There is no history of chronic renal disease, coarctation of the aorta, hyperaldosteronism, a hypertension causing med, pheochromocytoma or renovascular disease.   Joint Pain  Associated symptoms include arthralgias, congestion, coughing, fatigue, joint swelling and myalgias. Pertinent negatives include no abdominal pain, chills, diaphoresis, fever, numbness, swollen glands, urinary symptoms, vertigo, vomiting or weakness.   Fatigue  This is a recurrent problem. The current episode started more than 1 month ago. The problem occurs intermittently. The problem has been waxing and waning. Associated symptoms include arthralgias, congestion, coughing, fatigue, joint swelling and myalgias. Pertinent negatives include no abdominal pain, chills, diaphoresis, fever, numbness, swollen glands, urinary symptoms, vertigo, vomiting or weakness. He has tried  DOROTHY received a call from [facility]. She states that patients benefits are: home health 60 calendar days, inpatient rehab 30 days, outpatient rehab 20 visit for diagnosis, 6 month prescription limitation.  DOROTHY asked about facilities/ agencies that are in their network. She said that they are under [facility]. She will e-mail  CM what's in network. Evelin Angulo RN     relaxation and rest for the symptoms. The treatment provided mild relief.   URI   This is a recurrent problem. The current episode started more than 1 month ago. The problem has been rapidly worsening. Associated symptoms include congestion, coughing and sinus pain. Pertinent negatives include no abdominal pain, ear pain, swollen glands or vomiting. The treatment provided mild relief.        The following portions of the patient's history were reviewed and updated as appropriate: allergies, current medications, past social history and problem list.    Review of Systems   Constitutional: Positive for activity change, fatigue and malaise/fatigue. Negative for appetite change, chills, diaphoresis, fever and unexpected weight change.            HENT: Positive for congestion, postnasal drip and sinus pain. Negative for dental problem, drooling, ear discharge, ear pain, facial swelling, hearing loss, mouth sores, nosebleeds, sinus pressure, tinnitus, trouble swallowing and voice change.    Eyes: Negative.  Negative for photophobia, pain, discharge, redness, itching and visual disturbance.   Respiratory: Positive for cough. Negative for apnea, choking, chest tightness, shortness of breath and stridor.    Cardiovascular: Negative for palpitations, leg swelling and PND.   Gastrointestinal: Negative.  Negative for abdominal distention, abdominal pain, anal bleeding, blood in stool, constipation, rectal pain and vomiting.   Endocrine: Negative.  Negative for cold intolerance, polydipsia, polyphagia and polyuria.   Genitourinary: Negative.  Negative for difficulty urinating, enuresis, flank pain, frequency and genital sores.   Musculoskeletal: Positive for arthralgias, back pain, gait problem, joint swelling, myalgias and neck stiffness.        BP low at home-good in office    Skin: Negative.  Negative for color change and pallor.   Allergic/Immunologic: Negative.  Negative for environmental allergies, food allergies and  "immunocompromised state.   Neurological: Positive for tremors. Negative for dizziness, vertigo, seizures, syncope, facial asymmetry, speech difficulty, weakness, light-headedness and numbness.        Worsening tremors    Hematological: Negative.  Negative for adenopathy. Does not bruise/bleed easily.   Psychiatric/Behavioral: Negative.  Negative for agitation, behavioral problems, confusion, decreased concentration, dysphoric mood, hallucinations, self-injury, sleep disturbance and suicidal ideas. The patient is not nervous/anxious and is not hyperactive.        Objective   /90   Pulse 77   Ht 177.8 cm (70\")   Wt 86.2 kg (190 lb)   SpO2 97%   BMI 27.26 kg/m²   Physical Exam  Constitutional:       General: He is not in acute distress.     Appearance: Normal appearance. He is not ill-appearing, toxic-appearing or diaphoretic.   HENT:      Head: Normocephalic and atraumatic.      Right Ear: Tympanic membrane and ear canal normal. There is no impacted cerumen.      Left Ear: Tympanic membrane normal. There is no impacted cerumen.      Nose: Congestion and rhinorrhea present.      Comments: Thick pnd present-mild pressure on face      Mouth/Throat:      Mouth: Mucous membranes are moist.      Pharynx: No oropharyngeal exudate or posterior oropharyngeal erythema.   Eyes:      General: No scleral icterus.        Right eye: No discharge.         Left eye: No discharge.      Extraocular Movements: Extraocular movements intact.      Pupils: Pupils are equal, round, and reactive to light.   Neck:      Vascular: No carotid bruit.   Cardiovascular:      Rate and Rhythm: Normal rate and regular rhythm.      Pulses: Normal pulses.      Heart sounds: No murmur heard.    No friction rub. No gallop.   Pulmonary:      Effort: Pulmonary effort is normal. No respiratory distress.      Breath sounds: No stridor. No wheezing, rhonchi or rales.   Chest:      Chest wall: No tenderness.   Abdominal:      General: Abdomen is flat. " There is no distension.      Palpations: There is no mass.      Tenderness: There is no abdominal tenderness. There is no right CVA tenderness, left CVA tenderness, guarding or rebound.      Hernia: No hernia is present.   Genitourinary:     Testes: Normal.   Musculoskeletal:         General: Tenderness present. No swelling, deformity or signs of injury.      Cervical back: No swelling, edema, deformity, erythema, signs of trauma, lacerations, rigidity, spasms, torticollis, tenderness, bony tenderness or crepitus. No pain with movement. Normal range of motion.      Right lower leg: No edema.      Left lower leg: No edema.      Comments: Diffuse joint pain throughout body-age related , no injury        Lymphadenopathy:      Cervical: No cervical adenopathy.   Skin:     General: Skin is warm.      Coloration: Skin is not jaundiced or pale.      Findings: No bruising, erythema, lesion or rash.   Neurological:      General: No focal deficit present.      Mental Status: He is alert and oriented to person, place, and time.      Cranial Nerves: No cranial nerve deficit.      Sensory: No sensory deficit.      Motor: No weakness.      Coordination: Coordination normal.      Gait: Gait normal.      Deep Tendon Reflexes: Reflexes normal.      Comments: Tremors    Psychiatric:         Mood and Affect: Mood normal.         Behavior: Behavior normal.              Assessment & Plan     Problems Addressed this Visit    None  Visit Diagnoses     Primary hypertension    -  Primary    Relevant Orders    CBC & Differential    Comprehensive Metabolic Panel    Iron    Lipid Panel    Vitamin B12    TSH    PSA Screen    Arthralgia, unspecified joint        Relevant Medications    triamcinolone acetonide (KENALOG-40) injection 80 mg (Completed)    Other Relevant Orders    CBC & Differential    Comprehensive Metabolic Panel    Iron    Lipid Panel    Vitamin B12    TSH    PSA Screen    Malaise and fatigue        Relevant Orders    CBC &  Differential    Comprehensive Metabolic Panel    Iron    Lipid Panel    Vitamin B12    TSH    PSA Screen    Special screening, prostate cancer        Relevant Orders    CBC & Differential    Comprehensive Metabolic Panel    Iron    Lipid Panel    Vitamin B12    TSH    PSA Screen    Upper respiratory tract infection, unspecified type        Relevant Orders    POCT SARS-CoV-2 Antigen ANAM (Completed)      Diagnoses       Codes Comments    Primary hypertension    -  Primary ICD-10-CM: I10  ICD-9-CM: 401.9     Arthralgia, unspecified joint     ICD-10-CM: M25.50  ICD-9-CM: 719.40     Malaise and fatigue     ICD-10-CM: R53.81, R53.83  ICD-9-CM: 780.79     Special screening, prostate cancer     ICD-10-CM: Z12.5  ICD-9-CM: V76.44     Upper respiratory tract infection, unspecified type     ICD-10-CM: J06.9  ICD-9-CM: 465.9            New Medications Ordered This Visit   Medications   • triamcinolone acetonide (KENALOG-40) injection 80 mg     Current Outpatient Medications on File Prior to Visit   Medication Sig Dispense Refill   • amLODIPine (NORVASC) 2.5 MG tablet Take 1 tablet by mouth. Only takes if b/p 150/90     • atorvastatin (LIPITOR) 40 MG tablet TAKE 1 TABLET BY MOUTH DAILY 90 tablet 0   • cetirizine (zyrTEC) 10 MG tablet Take 1 tablet by mouth As Needed.     • fluticasone (FLONASE) 50 MCG/ACT nasal spray 2 sprays into the nostril(s) as directed by provider As Needed for Rhinitis.     • folic acid (FOLVITE) 1 MG tablet Take 1 tablet by mouth on Monday, Wednesday and Friday 36 tablet 1   • hydroCHLOROthiazide (HYDRODIURIL) 12.5 MG tablet Use as needed and not daily-use 1-3 times a week 90 tablet 3   • HYDROcodone-acetaminophen (Norco) 7.5-325 MG per tablet Take 1 tablet by mouth Every 6 (Six) Hours As Needed for Moderate Pain. (Patient taking differently: Take 1 tablet by mouth As Needed for Moderate Pain.) 12 tablet 0   • isosorbide mononitrate (IMDUR) 30 MG 24 hr tablet Take 1 tablet by mouth Daily.     • losartan  50 MG tablet 100 mg, hydroCHLOROthiazide 12.5 MG 12.5 mg Take 1 dose by mouth Daily.     • meloxicam (MOBIC) 15 MG tablet Take 1 tablet by mouth Daily. (Patient taking differently: Take 1 tablet by mouth 3 (Three) Times a Week. 12-8-2022, per SHIRLEY Singh, PT is to take 3 days a week.) 90 tablet 3   • methocarbamol (ROBAXIN) 750 MG tablet Take 1 tablet by mouth 4 (Four) Times a Day As Needed for Muscle Spasms.     • omeprazole (priLOSEC) 40 MG capsule TAKE 1 CAPSULE BY MOUTH EVERY DAY 30 capsule 8   • valsartan (Diovan) 320 MG tablet Take 1 tablet by mouth Daily. 90 tablet 3   • vitamin B-12 (CYANOCOBALAMIN) 1000 MCG tablet Take 1 tablet by mouth on Monday, Wednesday and Friday 36 tablet 1   • Xarelto 20 MG tablet TAKE 1 TABLET BY MOUTH DAILY WITH DINNER 30 tablet 4     No current facility-administered medications on file prior to visit.       20 minutes   Follow Up   Return in about 3 months (around 6/15/2023).     Answers for HPI/ROS submitted by the patient on 3/14/2023  What is the primary reason for your visit?: Other  Please describe your symptoms.: Old age  Have you had these symptoms before?: Yes  How long have you been having these symptoms?: Greater than 2 weeks  Please list any medications you are currently taking for this condition.: [place]  Please describe any probable cause for these symptoms. : Too much [place]      It's not just what you eat, but when you eat  Eat breakfast, and eat smaller meals throughout the day. A healthy breakfast can jumpstart your metabolism, while eating small, healthy meals (rather than the standard three large meals) keeps your energy up.   Avoid eating at night. Try to eat dinner earlier and fast for 14-16 hours until breakfast the next morning. Studies suggest that eating only when you’re most active and giving your digestive system a long break each day may help to regulate weight.     Schedule annual wellness exam as directed  covid test negative-meds as directed   Labs as  directed    See back in 3 months

## 2023-08-09 ENCOUNTER — DOCUMENTATION (OUTPATIENT)
Dept: FAMILY MEDICINE CLINIC | Facility: CLINIC | Age: 73
End: 2023-08-09
Payer: MEDICARE

## 2023-08-09 DIAGNOSIS — Z79.01 CURRENT USE OF LONG TERM ANTICOAGULATION: ICD-10-CM

## 2023-08-09 NOTE — TELEPHONE ENCOUNTER
Incoming Refill Request      Medication requested (name and dose): [facility]    Pharmacy where request should be sent: [facility]      Additional details provided by patient: PATIENT IS COMPLETELY OUT.  BEEN WAITING FOR THIS FOR 2 WEEKS     Best call back number [phone]    Does the patient have less than a 3 day supply:  [x] Yes  [] No    Ana Quintero  08/09/23, 13:34 CDT

## 2023-08-23 RX ORDER — VALSARTAN 320 MG/1
TABLET ORAL
Qty: 90 TABLET | Refills: 2 | Status: SHIPPED | OUTPATIENT
Start: 2023-08-23

## 2023-08-23 NOTE — TELEPHONE ENCOUNTER
UPCOMING APPTS  With Family Medicine (DARION Reyes)  12/21/2023 at 8:00 AM  LAST OFFICE VISIT - THIS DEPT  6/21/2023 Jamar Singh, DARION Clark

## 2023-09-05 RX ORDER — SUCRALFATE 1 G/1
TABLET ORAL
Qty: 120 TABLET | Refills: 0 | Status: SHIPPED | OUTPATIENT
Start: 2023-09-05

## 2023-09-11 RX ORDER — LANOLIN ALCOHOL/MO/W.PET/CERES
1000 CREAM (GRAM) TOPICAL DAILY
Qty: 90 TABLET | Refills: 1 | Status: SHIPPED | OUTPATIENT
Start: 2023-09-11

## 2023-09-11 NOTE — TELEPHONE ENCOUNTER
Rx Refill Note  Requested Prescriptions     Pending Prescriptions Disp Refills    vitamin B-12 (CYANOCOBALAMIN) 1000 MCG tablet       Sig: Take 1 tablet by mouth Daily. 1 daily      Last office visit with prescribing clinician: 6/20/2023   Last telemedicine visit with prescribing clinician: Visit date not found   Next office visit with prescribing clinician: 9/19/2023                         Would you like a call back once the refill request has been completed: [] Yes [] No    If the office needs to give you a call back, can they leave a voicemail: [] Yes [] No    Osiris Quintero RN  09/11/23, 12:08 CDT

## 2023-09-19 ENCOUNTER — OFFICE VISIT (OUTPATIENT)
Dept: ONCOLOGY | Facility: CLINIC | Age: 73
End: 2023-09-19
Payer: MEDICARE

## 2023-09-19 ENCOUNTER — LAB (OUTPATIENT)
Dept: ONCOLOGY | Facility: HOSPITAL | Age: 73
End: 2023-09-19
Payer: MEDICARE

## 2023-09-19 VITALS
SYSTOLIC BLOOD PRESSURE: 187 MMHG | HEIGHT: 70 IN | HEART RATE: 72 BPM | DIASTOLIC BLOOD PRESSURE: 102 MMHG | WEIGHT: 188 LBS | OXYGEN SATURATION: 96 % | BODY MASS INDEX: 26.92 KG/M2

## 2023-09-19 DIAGNOSIS — R79.89 ELEVATED LFTS: Chronic | ICD-10-CM

## 2023-09-19 DIAGNOSIS — I26.99 PULMONARY EMBOLISM, UNSPECIFIED CHRONICITY, UNSPECIFIED PULMONARY EMBOLISM TYPE, UNSPECIFIED WHETHER ACUTE COR PULMONALE PRESENT: Primary | Chronic | ICD-10-CM

## 2023-09-19 DIAGNOSIS — D50.9 IRON DEFICIENCY ANEMIA, UNSPECIFIED IRON DEFICIENCY ANEMIA TYPE: ICD-10-CM

## 2023-09-19 DIAGNOSIS — I26.99 PULMONARY EMBOLISM, UNSPECIFIED CHRONICITY, UNSPECIFIED PULMONARY EMBOLISM TYPE, UNSPECIFIED WHETHER ACUTE COR PULMONALE PRESENT: ICD-10-CM

## 2023-09-19 DIAGNOSIS — D50.9 IRON DEFICIENCY ANEMIA, UNSPECIFIED IRON DEFICIENCY ANEMIA TYPE: Chronic | ICD-10-CM

## 2023-09-19 LAB
ALBUMIN SERPL-MCNC: 4 G/DL (ref 3.5–5.2)
ALBUMIN/GLOB SERPL: 1.5 G/DL
ALP SERPL-CCNC: 65 U/L (ref 39–117)
ALT SERPL W P-5'-P-CCNC: 16 U/L (ref 1–41)
ANION GAP SERPL CALCULATED.3IONS-SCNC: 12 MMOL/L (ref 5–15)
AST SERPL-CCNC: 24 U/L (ref 1–40)
BASOPHILS # BLD AUTO: 0.04 10*3/MM3 (ref 0–0.2)
BASOPHILS NFR BLD AUTO: 0.7 % (ref 0–1.5)
BILIRUB SERPL-MCNC: 0.6 MG/DL (ref 0–1.2)
BUN SERPL-MCNC: 15 MG/DL (ref 8–23)
BUN/CREAT SERPL: 18.8 (ref 7–25)
CALCIUM SPEC-SCNC: 8.8 MG/DL (ref 8.6–10.5)
CHLORIDE SERPL-SCNC: 109 MMOL/L (ref 98–107)
CO2 SERPL-SCNC: 23 MMOL/L (ref 22–29)
CREAT SERPL-MCNC: 0.8 MG/DL (ref 0.76–1.27)
DEPRECATED RDW RBC AUTO: 45.9 FL (ref 37–54)
EGFRCR SERPLBLD CKD-EPI 2021: 94 ML/MIN/1.73
EOSINOPHIL # BLD AUTO: 0.08 10*3/MM3 (ref 0–0.4)
EOSINOPHIL NFR BLD AUTO: 1.4 % (ref 0.3–6.2)
ERYTHROCYTE [DISTWIDTH] IN BLOOD BY AUTOMATED COUNT: 12.6 % (ref 12.3–15.4)
FERRITIN SERPL-MCNC: 37.1 NG/ML (ref 30–400)
FOLATE SERPL-MCNC: >20 NG/ML (ref 4.78–24.2)
GLOBULIN UR ELPH-MCNC: 2.6 GM/DL
GLUCOSE SERPL-MCNC: 129 MG/DL (ref 65–99)
HCT VFR BLD AUTO: 35.3 % (ref 37.5–51)
HGB BLD-MCNC: 12 G/DL (ref 13–17.7)
IMM GRANULOCYTES # BLD AUTO: 0.02 10*3/MM3 (ref 0–0.05)
IMM GRANULOCYTES NFR BLD AUTO: 0.4 % (ref 0–0.5)
IRON 24H UR-MRATE: 100 MCG/DL (ref 59–158)
IRON SATN MFR SERPL: 24 % (ref 20–50)
LYMPHOCYTES # BLD AUTO: 1.72 10*3/MM3 (ref 0.7–3.1)
LYMPHOCYTES NFR BLD AUTO: 30.3 % (ref 19.6–45.3)
MCH RBC QN AUTO: 33.7 PG (ref 26.6–33)
MCHC RBC AUTO-ENTMCNC: 34 G/DL (ref 31.5–35.7)
MCV RBC AUTO: 99.2 FL (ref 79–97)
MONOCYTES # BLD AUTO: 0.78 10*3/MM3 (ref 0.1–0.9)
MONOCYTES NFR BLD AUTO: 13.8 % (ref 5–12)
NEUTROPHILS NFR BLD AUTO: 3.03 10*3/MM3 (ref 1.7–7)
NEUTROPHILS NFR BLD AUTO: 53.4 % (ref 42.7–76)
NRBC BLD AUTO-RTO: 0 /100 WBC (ref 0–0.2)
PLATELET # BLD AUTO: 186 10*3/MM3 (ref 140–450)
PMV BLD AUTO: 9.4 FL (ref 6–12)
POTASSIUM SERPL-SCNC: 3.8 MMOL/L (ref 3.5–5.2)
PROT SERPL-MCNC: 6.6 G/DL (ref 6–8.5)
RBC # BLD AUTO: 3.56 10*6/MM3 (ref 4.14–5.8)
SODIUM SERPL-SCNC: 144 MMOL/L (ref 136–145)
TIBC SERPL-MCNC: 417 MCG/DL (ref 298–536)
TRANSFERRIN SERPL-MCNC: 280 MG/DL (ref 200–360)
VIT B12 BLD-MCNC: 476 PG/ML (ref 211–946)
WBC NRBC COR # BLD: 5.67 10*3/MM3 (ref 3.4–10.8)

## 2023-09-19 PROCEDURE — 80053 COMPREHEN METABOLIC PANEL: CPT

## 2023-09-19 PROCEDURE — G0463 HOSPITAL OUTPT CLINIC VISIT: HCPCS | Performed by: INTERNAL MEDICINE

## 2023-09-19 PROCEDURE — 83540 ASSAY OF IRON: CPT

## 2023-09-19 PROCEDURE — 82728 ASSAY OF FERRITIN: CPT

## 2023-09-19 PROCEDURE — 82746 ASSAY OF FOLIC ACID SERUM: CPT

## 2023-09-19 PROCEDURE — 85025 COMPLETE CBC W/AUTO DIFF WBC: CPT

## 2023-09-19 PROCEDURE — 84466 ASSAY OF TRANSFERRIN: CPT

## 2023-09-19 PROCEDURE — 82607 VITAMIN B-12: CPT

## 2023-09-19 RX ORDER — FERROUS SULFATE 325(65) MG
1 TABLET ORAL
Qty: 30 TABLET | Refills: 3 | Status: SHIPPED | OUTPATIENT
Start: 2023-09-19

## 2023-09-19 RX ORDER — DOCUSATE SODIUM 100 MG/1
100 CAPSULE, LIQUID FILLED ORAL 2 TIMES DAILY PRN
Qty: 60 CAPSULE | Refills: 2 | Status: SHIPPED | OUTPATIENT
Start: 2023-09-19

## 2023-09-19 NOTE — PROGRESS NOTES
"DATE OF VISIT: 9/19/2023      REASON FOR VISIT: DVT and pulmonary embolism on Xarelto, anemia, elevated homocystine level      HISTORY OF PRESENT ILLNESS:   73-year-old male with medical problems significant for hypertension, dyslipidemia, bilateral pulmonary embolism and DVT initially diagnosed in 2019 currently on Xarelto, elevated homocystine level is here for follow-up appointment today.  Complains of fatigue.  Denies any bleeding.  Complains of arthralgia.  Denies any new chest pain or shortness of breath.  Denies any fevers.  Denies any new lumps or enlargement.              Past Medical History, Past Surgical History, Social History, Family History have been reviewed and are without significant changes except as mentioned.    Review of Systems   A comprehensive 14 point review of systems was performed and was negative except as mentioned in HPI.    Medications:  The current medication list was reviewed in the EMR    ALLERGIES:    Allergies   Allergen Reactions    Sulfa Antibiotics Rash     Other reaction(s): Rash  Severe blisters    Sulfamethoxazole-Trimethoprim Swelling     Other reaction(s): Rash  Severe blisters    Clindamycin/Lincomycin Hives    Codeine Nausea Only    Doxycycline Hyclate Hives       Objective      Vitals:    09/19/23 1053   BP: (!) 187/102   Pulse: 72   SpO2: 96%   Weight: 85.3 kg (188 lb)   Height: 177.8 cm (70\")   PainSc: 0-No pain         6/22/2021     8:04 AM   Current Status   ECOG score 0       Physical Exam  Pulmonary:      Breath sounds: Normal breath sounds.   Neurological:      Mental Status: He is alert and oriented to person, place, and time.         RECENT LABS:  Glucose   Date Value Ref Range Status   09/19/2023 129 (H) 65 - 99 mg/dL Final     Sodium   Date Value Ref Range Status   09/19/2023 144 136 - 145 mmol/L Final     Potassium   Date Value Ref Range Status   09/19/2023 3.8 3.5 - 5.2 mmol/L Final     CO2   Date Value Ref Range Status   09/19/2023 23.0 22.0 - 29.0 mmol/L " Final     Chloride   Date Value Ref Range Status   09/19/2023 109 (H) 98 - 107 mmol/L Final     Anion Gap   Date Value Ref Range Status   09/19/2023 12.0 5.0 - 15.0 mmol/L Final     Creatinine   Date Value Ref Range Status   09/19/2023 0.80 0.76 - 1.27 mg/dL Final     BUN   Date Value Ref Range Status   09/19/2023 15 8 - 23 mg/dL Final     BUN/Creatinine Ratio   Date Value Ref Range Status   09/19/2023 18.8 7.0 - 25.0 Final     Calcium   Date Value Ref Range Status   09/19/2023 8.8 8.6 - 10.5 mg/dL Final     eGFR Non  Amer   Date Value Ref Range Status   12/28/2021 74 >60 mL/min/1.73 Final     Alkaline Phosphatase   Date Value Ref Range Status   09/19/2023 65 39 - 117 U/L Final     Total Protein   Date Value Ref Range Status   09/19/2023 6.6 6.0 - 8.5 g/dL Final     ALT (SGPT)   Date Value Ref Range Status   09/19/2023 16 1 - 41 U/L Final     AST (SGOT)   Date Value Ref Range Status   09/19/2023 24 1 - 40 U/L Final     Total Bilirubin   Date Value Ref Range Status   09/19/2023 0.6 0.0 - 1.2 mg/dL Final     Albumin   Date Value Ref Range Status   09/19/2023 4.0 3.5 - 5.2 g/dL Final     Globulin   Date Value Ref Range Status   09/19/2023 2.6 gm/dL Final     Lab Results   Component Value Date    WBC 5.67 09/19/2023    HGB 12.0 (L) 09/19/2023    HCT 35.3 (L) 09/19/2023    MCV 99.2 (H) 09/19/2023     09/19/2023     Lab Results   Component Value Date    NEUTROABS 3.03 09/19/2023    IRON 100 09/19/2023    IRON 89 06/20/2023    IRON 159 (H) 03/21/2023    TIBC 417 09/19/2023    TIBC 295 (L) 06/20/2023    TIBC 481 03/21/2023    LABIRON 24 09/19/2023    LABIRON 30 06/20/2023    LABIRON 33 03/21/2023    FERRITIN 37.10 09/19/2023    FERRITIN 202.10 06/20/2023    FERRITIN 108.90 03/21/2023    PXBCUNER29 810 06/21/2023    DZWBDHFE73 808 06/20/2023    GBWKAXFY06 454 03/21/2023    FOLATE >20.00 06/20/2023    FOLATE 9.42 03/21/2023    FOLATE 18.30 12/30/2022     Lab Results   Component Value Date    REFLABREPO   2023     [facility]  [address] [place] [address]  Phone: [phone] or [phone]  Fax: [phone]  Yair Wagoner M.D., Medical Director    PATIENT NAME                                     LABORATORY NO.  SANDRA DRUMMOND.                           [id number]  [id number]                                 AGE                    SEX   SSN              CLIENT REF #  [facility]                   72        1950           [id number]      [id number]    [place]                               REQUESTING M.D.           ATTENDING M.D.         COPY TO[address]                         FRANSISCA ANDERSON KELLYE  [address]  DATE COLLECTED            DATE RECEIVED          DATE REPORTED  2023    DIAGNOSIS:  A.     DUODENUM, BIOPSY:  Duodenal type mucosa with no significant histopathologic abnormalities  Negative for Celiac disease, metaplasia, microorganisms or atypia  B.     GASTRIC ANTRUM, BIOPSY:  Gastric antral type mucosa with reactive (chemical) gastropathy  Negative H. pylori, metaplasia or dysplasia  C.     GE JUNCTION:  Ferro's esophagus without dysplasia  D.     TRANSVERSE COLON POLYP:  Tubular adenoma without high-grade dysplasia                       RLL    CLINICAL HISTORY:  Ferro's esophagus without dysplasia, iron deficiency anemia, unspecified iron  deficiency anemia type    SPECIMENS RECEIVED:  A.    DUODENUM, BIOPSY  B.    GASTRIC ANTRUM, BIOPSY  C.    GE JUNCTION  D.    TRANSVERSE COLON POLYP    MICROSCOPIC DESCRIPTION:  Tissue blocks are prepared and slides are examined microscopically on all  specimens. See diagnosis for details.    Professional interpretation rendered by Yair Wagoner M.D., F.C.A.P. at  [employer] [employer], [address] [place] [address].    GROSS DESCRIPTION:  A.     Labeled duodenum are 3 portions of tan soft tissue measuring 0.6 x 0.3 x  0.2 cm in aggregate, all in A.  MTH  B.    Labeled gastric antrum are 2 portions of tan soft tissue measuring 0.4 x 0.3  x 0.2 cm in aggregate, all in B..  C.    Labeled EGJ are 2 portions of tan soft tissue measuring 0.4 x 0.3 x 0.2 cm  in aggregate, all in C  D.    Labeled transverse colon is a 0.6 x 0.2 x 0.2 cm portion of tan soft tissue,  submitted entirely in 1 block.    REVIEWED, DIAGNOSED AND ELECTRONICALLY  SIGNED BY:    Yair Wagoner M.D., FMalissaCMalissaAGONZALES.  CPT CODES:  88305x4           PATHOLOGY:  * Cannot find OR log *         RADIOLOGY DATA :  No radiology results for the last 7 days        Assessment & Plan     1.  Bilateral lower extremity DVT, pulmonary embolism and right upper extremity DVT  - Patient was initially diagnosed with bilateral lower extremity DVT and bilateral pulmonary embolism in February 2019  - Hypercoagulable work-up was negative except for elevated homocystine level in February 2019  - Patient was on Coumadin between February 2019 until February 22, 2021  - Due to second episode of DVT involving right upper extremity patient was started on Xarelto in July 2021 and has been on Xarelto since then.  - Due to 2 separate episode of DVT recommend lifelong anticoagulation  - Patient has been instructed to come to emergency room if he starts having any any abnormal major bleeding.  - We will have patient return to clinic in 3 months with repeat CBC, CMP, iron studies, ferritin, B12 and folate to be done on that day.      2.  Anemia:  - Hemoglobin is 12.0.  Iron saturation is 24% with ferritin of 37 consistent with developing iron deficiency.  Recommend starting ferrous sulfate p.o. daily with stool softener.  - Currently on B12 and folic acid p.o. daily.    3.  History of peptic ulcer disease    4.  Elevated liver function test:  - CMP from today shows liver function test has normalized    5.  Health maintenance:  Patient does not smoke.  Had a colonoscopy in 2018    6.  Advance care planning:  - Patient remains full code.  Has living will on chart    7.  Prescriptions: Patient has enough prescription for Xarelto.  Prescription for ferrous sulfate and Colace has been sent to patient's pharmacy today                   PHQ-9 Total Score: 0   -Patient is not homicidal or suicidal.  No acute intervention required.     Jose Zazueta reports a pain score of 0.  Given his pain assessment as noted, treatment options were discussed and the following options were decided upon as a follow-up plan to address the patient's pain: continuation of current treatment plan for pain.         Dickson Vega MD  9/19/2023  11:04 CDT        Part of this note may be an electronic transcription/translation of spoken language to printed text using the Dragon Dictation System.          CC:

## 2023-09-20 ENCOUNTER — TELEPHONE (OUTPATIENT)
Dept: ONCOLOGY | Facility: HOSPITAL | Age: 73
End: 2023-09-20
Payer: MEDICARE

## 2023-09-20 NOTE — TELEPHONE ENCOUNTER
----- Message from Dickson Vega MD sent at 9/20/2023  6:58 AM CDT -----  Please let patient know, his B12 level is decreased to 476.  B12 level was 810 last clinic visit please make sure he is taking B12 tablet every day.  Recommend continue with folic acid p.o. daily.  Recommend starting iron tablet as we discussed during clinic visit.  Prescription for ferrous sulfate and Colace has been sent to his pharmacy today.  CMP was within normal limit.  Thank you

## 2023-09-27 DIAGNOSIS — E78.2 MIXED HYPERLIPIDEMIA: ICD-10-CM

## 2023-09-28 RX ORDER — ATORVASTATIN CALCIUM 40 MG/1
40 TABLET, FILM COATED ORAL DAILY
Qty: 90 TABLET | Refills: 0 | Status: SHIPPED | OUTPATIENT
Start: 2023-09-28
